# Patient Record
Sex: MALE | Race: WHITE | NOT HISPANIC OR LATINO | Employment: OTHER | URBAN - METROPOLITAN AREA
[De-identification: names, ages, dates, MRNs, and addresses within clinical notes are randomized per-mention and may not be internally consistent; named-entity substitution may affect disease eponyms.]

---

## 2017-01-03 ENCOUNTER — AMBULATORY - HEALTHEAST (OUTPATIENT)
Dept: CARDIAC REHAB | Facility: HOSPITAL | Age: 67
End: 2017-01-03

## 2017-01-03 DIAGNOSIS — Z95.5 STENTED CORONARY ARTERY: ICD-10-CM

## 2017-01-03 DIAGNOSIS — I50.21 ACUTE SYSTOLIC HEART FAILURE (H): ICD-10-CM

## 2017-01-05 ENCOUNTER — AMBULATORY - HEALTHEAST (OUTPATIENT)
Dept: CARDIAC REHAB | Facility: HOSPITAL | Age: 67
End: 2017-01-05

## 2017-01-05 DIAGNOSIS — Z95.5 STENTED CORONARY ARTERY: ICD-10-CM

## 2017-01-10 ENCOUNTER — AMBULATORY - HEALTHEAST (OUTPATIENT)
Dept: CARDIAC REHAB | Facility: HOSPITAL | Age: 67
End: 2017-01-10

## 2017-01-10 DIAGNOSIS — I50.21 ACUTE SYSTOLIC HEART FAILURE (H): ICD-10-CM

## 2017-01-10 DIAGNOSIS — I42.0 CARDIOMYOPATHY, DILATED, NONISCHEMIC (H): ICD-10-CM

## 2017-01-10 DIAGNOSIS — Z95.5 STENTED CORONARY ARTERY: ICD-10-CM

## 2017-01-11 ENCOUNTER — OFFICE VISIT - HEALTHEAST (OUTPATIENT)
Dept: CARDIOLOGY | Facility: CLINIC | Age: 67
End: 2017-01-11

## 2017-01-11 ENCOUNTER — AMBULATORY - HEALTHEAST (OUTPATIENT)
Dept: CARDIOLOGY | Facility: CLINIC | Age: 67
End: 2017-01-11

## 2017-01-11 DIAGNOSIS — I50.22 CHRONIC SYSTOLIC HEART FAILURE (H): ICD-10-CM

## 2017-01-11 DIAGNOSIS — I42.9 CARDIOMYOPATHY (H): ICD-10-CM

## 2017-01-11 DIAGNOSIS — I48.20 CHRONIC ATRIAL FIBRILLATION (H): ICD-10-CM

## 2017-01-11 DIAGNOSIS — E78.5 DYSLIPIDEMIA: ICD-10-CM

## 2017-01-11 ASSESSMENT — MIFFLIN-ST. JEOR: SCORE: 1806.79

## 2017-01-17 ENCOUNTER — AMBULATORY - HEALTHEAST (OUTPATIENT)
Dept: CARDIAC REHAB | Facility: HOSPITAL | Age: 67
End: 2017-01-17

## 2017-01-17 DIAGNOSIS — Z95.5 STENTED CORONARY ARTERY: ICD-10-CM

## 2017-01-17 DIAGNOSIS — I42.0 CARDIOMYOPATHY, DILATED, NONISCHEMIC (H): ICD-10-CM

## 2017-01-18 ENCOUNTER — OFFICE VISIT - HEALTHEAST (OUTPATIENT)
Dept: CARDIOLOGY | Facility: CLINIC | Age: 67
End: 2017-01-18

## 2017-01-18 DIAGNOSIS — I42.0 CARDIOMYOPATHY, DILATED, NONISCHEMIC (H): ICD-10-CM

## 2017-01-18 DIAGNOSIS — I48.20 CHRONIC ATRIAL FIBRILLATION (H): ICD-10-CM

## 2017-01-18 DIAGNOSIS — Z95.5 S/P RIGHT CORONARY ARTERY (RCA) STENT PLACEMENT: ICD-10-CM

## 2017-01-18 DIAGNOSIS — I25.10 CORONARY ARTERY DISEASE DUE TO CALCIFIED CORONARY LESION: ICD-10-CM

## 2017-01-18 DIAGNOSIS — E78.5 DYSLIPIDEMIA: ICD-10-CM

## 2017-01-18 DIAGNOSIS — I25.84 CORONARY ARTERY DISEASE DUE TO CALCIFIED CORONARY LESION: ICD-10-CM

## 2017-01-18 ASSESSMENT — MIFFLIN-ST. JEOR: SCORE: 1811.33

## 2017-01-19 ENCOUNTER — AMBULATORY - HEALTHEAST (OUTPATIENT)
Dept: CARDIAC REHAB | Facility: HOSPITAL | Age: 67
End: 2017-01-19

## 2017-01-19 DIAGNOSIS — Z95.5 STENTED CORONARY ARTERY: ICD-10-CM

## 2017-01-24 ENCOUNTER — AMBULATORY - HEALTHEAST (OUTPATIENT)
Dept: CARDIAC REHAB | Facility: HOSPITAL | Age: 67
End: 2017-01-24

## 2017-01-24 DIAGNOSIS — Z95.5 STENTED CORONARY ARTERY: ICD-10-CM

## 2017-01-26 ENCOUNTER — AMBULATORY - HEALTHEAST (OUTPATIENT)
Dept: CARDIAC REHAB | Facility: HOSPITAL | Age: 67
End: 2017-01-26

## 2017-01-26 DIAGNOSIS — Z95.5 STENTED CORONARY ARTERY: ICD-10-CM

## 2017-01-31 ENCOUNTER — AMBULATORY - HEALTHEAST (OUTPATIENT)
Dept: CARDIAC REHAB | Facility: HOSPITAL | Age: 67
End: 2017-01-31

## 2017-01-31 DIAGNOSIS — Z95.5 STENTED CORONARY ARTERY: ICD-10-CM

## 2017-01-31 DIAGNOSIS — I50.21 ACUTE SYSTOLIC HEART FAILURE (H): ICD-10-CM

## 2017-01-31 DIAGNOSIS — I42.0 CARDIOMYOPATHY, DILATED, NONISCHEMIC (H): ICD-10-CM

## 2017-02-01 ENCOUNTER — HOSPITAL ENCOUNTER (OUTPATIENT)
Dept: CARDIOLOGY | Facility: HOSPITAL | Age: 67
Discharge: HOME | End: 2017-02-01

## 2017-02-01 DIAGNOSIS — I42.9 CARDIOMYOPATHY (H): ICD-10-CM

## 2017-02-01 DIAGNOSIS — I50.22 CHRONIC SYSTOLIC HEART FAILURE (H): ICD-10-CM

## 2017-02-01 LAB
AORTIC ROOT: 3.3 CM
BSA FOR ECHO PROCEDURE: 2.26 M2
CV BLOOD PRESSURE: NORMAL MMHG
CV ECHO HEIGHT: 73 IN
CV ECHO WEIGHT: 220 LBS
DOP CALC LVOT AREA: 2.83 CM2
DOP CALC LVOT DIAMETER: 1.9 CM
EJECTION FRACTION: 27 % (ref 55–75)
FRACTIONAL SHORTENING: 7.7 % (ref 28–44)
INTERVENTRICULAR SEPTUM IN END DIASTOLE: 1.8 CM (ref 0.6–1)
IVS/PW RATIO: 1.2
LEFT ATRIUM AREA: 30.7 CM2
LEFT ATRIUM LENGTH: 6.7 CM
LEFT ATRIUM SIZE: 5.8 CM
LEFT ATRIUM TO AORTIC ROOT RATIO: 1.76 NO UNITS
LEFT VENTRICLE DIASTOLIC VOLUME INDEX: 87.6 CM3/M2 (ref 34–74)
LEFT VENTRICLE DIASTOLIC VOLUME: 198 CM3 (ref 62–150)
LEFT VENTRICLE MASS INDEX: 245.7 G/M2
LEFT VENTRICLE SYSTOLIC VOLUME INDEX: 63.7 CM3/M2 (ref 11–31)
LEFT VENTRICLE SYSTOLIC VOLUME: 144 CM3 (ref 21–61)
LEFT VENTRICULAR INTERNAL DIMENSION IN DIASTOLE: 6.5 CM (ref 4.2–5.8)
LEFT VENTRICULAR INTERNAL DIMENSION IN SYSTOLE: 6 CM (ref 2.5–4)
LEFT VENTRICULAR MASS: 555.2 G
LEFT VENTRICULAR POSTERIOR WALL IN END DIASTOLE: 1.5 CM (ref 0.6–1)
MV AVERAGE E/E' RATIO: 12.1 CM/S
MV DECELERATION TIME: 155 MS
MV E'TISSUE VEL-LAT: 7.31 CM/S
MV E'TISSUE VEL-MED: 5.56 CM/S
MV LATERAL E/E' RATIO: 10.7
MV MEDIAL E/E' RATIO: 14
MV PEAK E VELOCITY: 77.9 CM/S
NUC REST DIASTOLIC VOLUME INDEX: 3520 LBS
NUC REST SYSTOLIC VOLUME INDEX: 73 IN
PR MAX PG: 8 MMHG
PR PEAK VELOCITY: 143 CM/S
TRICUSPID REGURGITATION PEAK PRESSURE GRADIENT: 33.2 MMHG
TRICUSPID VALVE ANULAR PLANE SYSTOLIC EXCURSION: 1.5 CM
TRICUSPID VALVE PEAK REGURGITANT VELOCITY: 288 CM/S

## 2017-02-01 ASSESSMENT — MIFFLIN-ST. JEOR: SCORE: 1806.79

## 2017-02-02 ENCOUNTER — AMBULATORY - HEALTHEAST (OUTPATIENT)
Dept: CARDIAC REHAB | Facility: HOSPITAL | Age: 67
End: 2017-02-02

## 2017-02-02 DIAGNOSIS — I50.21 ACUTE SYSTOLIC HEART FAILURE (H): ICD-10-CM

## 2017-02-02 DIAGNOSIS — I42.0 CARDIOMYOPATHY, DILATED, NONISCHEMIC (H): ICD-10-CM

## 2017-02-02 DIAGNOSIS — Z95.5 STENTED CORONARY ARTERY: ICD-10-CM

## 2017-02-07 ENCOUNTER — AMBULATORY - HEALTHEAST (OUTPATIENT)
Dept: CARDIAC REHAB | Facility: HOSPITAL | Age: 67
End: 2017-02-07

## 2017-02-07 DIAGNOSIS — I50.21 ACUTE SYSTOLIC HEART FAILURE (H): ICD-10-CM

## 2017-02-07 DIAGNOSIS — Z95.5 STENTED CORONARY ARTERY: ICD-10-CM

## 2017-02-07 DIAGNOSIS — I42.0 CARDIOMYOPATHY, DILATED, NONISCHEMIC (H): ICD-10-CM

## 2017-02-09 ENCOUNTER — AMBULATORY - HEALTHEAST (OUTPATIENT)
Dept: CARDIAC REHAB | Facility: HOSPITAL | Age: 67
End: 2017-02-09

## 2017-02-09 DIAGNOSIS — Z95.5 STENTED CORONARY ARTERY: ICD-10-CM

## 2017-02-10 ENCOUNTER — COMMUNICATION - HEALTHEAST (OUTPATIENT)
Dept: CARDIOLOGY | Facility: CLINIC | Age: 67
End: 2017-02-10

## 2017-02-10 ENCOUNTER — OFFICE VISIT - HEALTHEAST (OUTPATIENT)
Dept: CARDIOLOGY | Facility: CLINIC | Age: 67
End: 2017-02-10

## 2017-02-10 ENCOUNTER — SURGERY - HEALTHEAST (OUTPATIENT)
Dept: CARDIOLOGY | Facility: CLINIC | Age: 67
End: 2017-02-10

## 2017-02-10 ENCOUNTER — AMBULATORY - HEALTHEAST (OUTPATIENT)
Dept: CARDIOLOGY | Facility: CLINIC | Age: 67
End: 2017-02-10

## 2017-02-10 DIAGNOSIS — I50.22 CHRONIC SYSTOLIC HEART FAILURE (H): ICD-10-CM

## 2017-02-10 DIAGNOSIS — I48.20 CHRONIC ATRIAL FIBRILLATION (H): ICD-10-CM

## 2017-02-10 DIAGNOSIS — I42.9 CARDIOMYOPATHY (H): ICD-10-CM

## 2017-02-10 DIAGNOSIS — I10 ESSENTIAL HYPERTENSION WITH GOAL BLOOD PRESSURE LESS THAN 130/80: ICD-10-CM

## 2017-02-10 DIAGNOSIS — I49.3 FREQUENT UNIFOCAL PREMATURE VENTRICULAR CONTRACTIONS: ICD-10-CM

## 2017-02-10 LAB
ATRIAL RATE - MUSE: 441 BPM
DIASTOLIC BLOOD PRESSURE - MUSE: NORMAL MMHG
INTERPRETATION ECG - MUSE: NORMAL
P AXIS - MUSE: NORMAL DEGREES
PR INTERVAL - MUSE: NORMAL MS
QRS DURATION - MUSE: 110 MS
QT - MUSE: 404 MS
QTC - MUSE: 494 MS
R AXIS - MUSE: -34 DEGREES
SYSTOLIC BLOOD PRESSURE - MUSE: NORMAL MMHG
T AXIS - MUSE: 85 DEGREES
VENTRICULAR RATE- MUSE: 90 BPM

## 2017-02-10 ASSESSMENT — MIFFLIN-ST. JEOR: SCORE: 1828.33

## 2017-02-15 ENCOUNTER — OFFICE VISIT - HEALTHEAST (OUTPATIENT)
Dept: CARDIOLOGY | Facility: CLINIC | Age: 67
End: 2017-02-15

## 2017-02-15 ENCOUNTER — AMBULATORY - HEALTHEAST (OUTPATIENT)
Dept: CARDIOLOGY | Facility: CLINIC | Age: 67
End: 2017-02-15

## 2017-02-15 DIAGNOSIS — I42.9 CARDIOMYOPATHY (H): ICD-10-CM

## 2017-02-15 DIAGNOSIS — I50.22 CHRONIC SYSTOLIC HEART FAILURE (H): ICD-10-CM

## 2017-02-15 DIAGNOSIS — I48.20 CHRONIC ATRIAL FIBRILLATION (H): ICD-10-CM

## 2017-02-15 DIAGNOSIS — I42.0 CARDIOMYOPATHY, DILATED, NONISCHEMIC (H): ICD-10-CM

## 2017-02-15 ASSESSMENT — MIFFLIN-ST. JEOR: SCORE: 1823.8

## 2017-02-17 ENCOUNTER — SURGERY - HEALTHEAST (OUTPATIENT)
Dept: CARDIOLOGY | Facility: CLINIC | Age: 67
End: 2017-02-17

## 2017-02-17 ASSESSMENT — MIFFLIN-ST. JEOR: SCORE: 1816.75

## 2017-02-18 ASSESSMENT — MIFFLIN-ST. JEOR: SCORE: 1804.07

## 2017-02-22 ENCOUNTER — AMBULATORY - HEALTHEAST (OUTPATIENT)
Dept: CARDIOLOGY | Facility: CLINIC | Age: 67
End: 2017-02-22

## 2017-02-22 DIAGNOSIS — I42.9 CARDIOMYOPATHY (H): ICD-10-CM

## 2017-02-23 ENCOUNTER — AMBULATORY - HEALTHEAST (OUTPATIENT)
Dept: CARDIAC REHAB | Facility: HOSPITAL | Age: 67
End: 2017-02-23

## 2017-02-23 DIAGNOSIS — I42.9 CARDIOMYOPATHY (H): ICD-10-CM

## 2017-02-25 ENCOUNTER — COMMUNICATION - HEALTHEAST (OUTPATIENT)
Dept: CARDIOLOGY | Facility: CLINIC | Age: 67
End: 2017-02-25

## 2017-02-25 DIAGNOSIS — I42.9 CARDIOMYOPATHY (H): ICD-10-CM

## 2017-02-28 ENCOUNTER — AMBULATORY - HEALTHEAST (OUTPATIENT)
Dept: CARDIAC REHAB | Facility: HOSPITAL | Age: 67
End: 2017-02-28

## 2017-02-28 DIAGNOSIS — I50.21 ACUTE SYSTOLIC HEART FAILURE (H): ICD-10-CM

## 2017-02-28 DIAGNOSIS — I42.0 CARDIOMYOPATHY, DILATED, NONISCHEMIC (H): ICD-10-CM

## 2017-02-28 DIAGNOSIS — Z95.5 STENTED CORONARY ARTERY: ICD-10-CM

## 2017-03-02 ENCOUNTER — AMBULATORY - HEALTHEAST (OUTPATIENT)
Dept: CARDIAC REHAB | Facility: HOSPITAL | Age: 67
End: 2017-03-02

## 2017-03-02 DIAGNOSIS — Z95.5 STENTED CORONARY ARTERY: ICD-10-CM

## 2017-03-07 ENCOUNTER — AMBULATORY - HEALTHEAST (OUTPATIENT)
Dept: CARDIOLOGY | Facility: CLINIC | Age: 67
End: 2017-03-07

## 2017-03-07 ENCOUNTER — OFFICE VISIT - HEALTHEAST (OUTPATIENT)
Dept: CARDIOLOGY | Facility: CLINIC | Age: 67
End: 2017-03-07

## 2017-03-07 DIAGNOSIS — I42.9 CARDIOMYOPATHY (H): ICD-10-CM

## 2017-03-07 DIAGNOSIS — Z95.810 ICD (IMPLANTABLE CARDIOVERTER-DEFIBRILLATOR), SINGLE, IN SITU: ICD-10-CM

## 2017-03-07 DIAGNOSIS — I42.0 CARDIOMYOPATHY, DILATED, NONISCHEMIC (H): ICD-10-CM

## 2017-03-07 DIAGNOSIS — I48.20 CHRONIC ATRIAL FIBRILLATION (H): ICD-10-CM

## 2017-03-07 DIAGNOSIS — E78.5 DYSLIPIDEMIA: ICD-10-CM

## 2017-03-07 DIAGNOSIS — I50.22 CHRONIC SYSTOLIC HEART FAILURE (H): ICD-10-CM

## 2017-03-07 ASSESSMENT — MIFFLIN-ST. JEOR: SCORE: 1822.66

## 2017-03-20 ENCOUNTER — AMBULATORY - HEALTHEAST (OUTPATIENT)
Dept: CARDIOLOGY | Facility: CLINIC | Age: 67
End: 2017-03-20

## 2017-03-20 DIAGNOSIS — Z95.810 ICD (IMPLANTABLE CARDIOVERTER-DEFIBRILLATOR) IN PLACE: ICD-10-CM

## 2017-03-23 ENCOUNTER — AMBULATORY - HEALTHEAST (OUTPATIENT)
Dept: CARDIOLOGY | Facility: CLINIC | Age: 67
End: 2017-03-23

## 2017-03-24 ENCOUNTER — AMBULATORY - HEALTHEAST (OUTPATIENT)
Dept: CARDIOLOGY | Facility: CLINIC | Age: 67
End: 2017-03-24

## 2017-03-28 ENCOUNTER — COMMUNICATION - HEALTHEAST (OUTPATIENT)
Dept: CARDIOLOGY | Facility: CLINIC | Age: 67
End: 2017-03-28

## 2017-03-29 ENCOUNTER — OFFICE VISIT - HEALTHEAST (OUTPATIENT)
Dept: CARDIOLOGY | Facility: CLINIC | Age: 67
End: 2017-03-29

## 2017-03-29 ENCOUNTER — AMBULATORY - HEALTHEAST (OUTPATIENT)
Dept: CARDIOLOGY | Facility: CLINIC | Age: 67
End: 2017-03-29

## 2017-03-29 DIAGNOSIS — I42.9 CARDIOMYOPATHY (H): ICD-10-CM

## 2017-03-29 DIAGNOSIS — I50.22 CHRONIC SYSTOLIC HEART FAILURE (H): ICD-10-CM

## 2017-03-29 DIAGNOSIS — E78.5 DYSLIPIDEMIA: ICD-10-CM

## 2017-03-29 ASSESSMENT — MIFFLIN-ST. JEOR: SCORE: 1858.95

## 2017-04-04 ENCOUNTER — AMBULATORY - HEALTHEAST (OUTPATIENT)
Dept: CARDIOLOGY | Facility: CLINIC | Age: 67
End: 2017-04-04

## 2017-04-04 DIAGNOSIS — Z00.6 RESEARCH EXAM: ICD-10-CM

## 2017-04-04 DIAGNOSIS — Z95.810 ICD (IMPLANTABLE CARDIOVERTER-DEFIBRILLATOR) IN PLACE: ICD-10-CM

## 2017-04-04 DIAGNOSIS — I42.9 CARDIOMYOPATHY (H): ICD-10-CM

## 2017-04-04 LAB
ATRIAL RATE - MUSE: 197 BPM
DIASTOLIC BLOOD PRESSURE - MUSE: NORMAL MMHG
INTERPRETATION ECG - MUSE: NORMAL
P AXIS - MUSE: NORMAL DEGREES
PR INTERVAL - MUSE: NORMAL MS
QRS DURATION - MUSE: 108 MS
QT - MUSE: 418 MS
QTC - MUSE: 463 MS
R AXIS - MUSE: -34 DEGREES
SYSTOLIC BLOOD PRESSURE - MUSE: NORMAL MMHG
T AXIS - MUSE: 134 DEGREES
VENTRICULAR RATE- MUSE: 74 BPM

## 2017-04-04 ASSESSMENT — MIFFLIN-ST. JEOR: SCORE: 1877.1

## 2017-04-05 ENCOUNTER — AMBULATORY - HEALTHEAST (OUTPATIENT)
Dept: CARDIOLOGY | Facility: CLINIC | Age: 67
End: 2017-04-05

## 2017-04-05 DIAGNOSIS — I48.20 CHRONIC ATRIAL FIBRILLATION (H): ICD-10-CM

## 2017-04-05 DIAGNOSIS — I42.9 CARDIOMYOPATHY (H): ICD-10-CM

## 2017-05-03 ENCOUNTER — AMBULATORY - HEALTHEAST (OUTPATIENT)
Dept: CARDIOLOGY | Facility: CLINIC | Age: 67
End: 2017-05-03

## 2017-05-03 DIAGNOSIS — I48.20 CHRONIC ATRIAL FIBRILLATION (H): ICD-10-CM

## 2017-05-03 DIAGNOSIS — I42.9 CARDIOMYOPATHY (H): ICD-10-CM

## 2017-05-19 ENCOUNTER — AMBULATORY - HEALTHEAST (OUTPATIENT)
Dept: CARDIOLOGY | Facility: CLINIC | Age: 67
End: 2017-05-19

## 2017-05-24 ENCOUNTER — AMBULATORY - HEALTHEAST (OUTPATIENT)
Dept: CARDIOLOGY | Facility: CLINIC | Age: 67
End: 2017-05-24

## 2017-05-24 ENCOUNTER — OFFICE VISIT - HEALTHEAST (OUTPATIENT)
Dept: CARDIOLOGY | Facility: CLINIC | Age: 67
End: 2017-05-24

## 2017-05-24 DIAGNOSIS — I10 ESSENTIAL HYPERTENSION WITH GOAL BLOOD PRESSURE LESS THAN 130/80: ICD-10-CM

## 2017-05-24 DIAGNOSIS — I50.22 CHRONIC SYSTOLIC HEART FAILURE (H): ICD-10-CM

## 2017-05-24 DIAGNOSIS — I25.84 CORONARY ARTERY DISEASE DUE TO CALCIFIED CORONARY LESION: ICD-10-CM

## 2017-05-24 DIAGNOSIS — I42.9 CARDIOMYOPATHY (H): ICD-10-CM

## 2017-05-24 DIAGNOSIS — E78.5 DYSLIPIDEMIA: ICD-10-CM

## 2017-05-24 DIAGNOSIS — I48.20 CHRONIC ATRIAL FIBRILLATION (H): ICD-10-CM

## 2017-05-24 DIAGNOSIS — I25.10 CORONARY ARTERY DISEASE DUE TO CALCIFIED CORONARY LESION: ICD-10-CM

## 2017-05-24 DIAGNOSIS — Z95.810 ICD (IMPLANTABLE CARDIOVERTER-DEFIBRILLATOR), SINGLE, IN SITU: ICD-10-CM

## 2017-05-24 LAB — HCC DEVICE COMMENTS: NORMAL

## 2017-05-24 ASSESSMENT — MIFFLIN-ST. JEOR: SCORE: 1872.56

## 2017-06-08 ENCOUNTER — COMMUNICATION - HEALTHEAST (OUTPATIENT)
Dept: CARDIOLOGY | Facility: CLINIC | Age: 67
End: 2017-06-08

## 2017-06-08 DIAGNOSIS — I50.22 CHRONIC SYSTOLIC HEART FAILURE (H): ICD-10-CM

## 2017-06-08 DIAGNOSIS — I42.9 CARDIOMYOPATHY (H): ICD-10-CM

## 2017-06-15 ENCOUNTER — AMBULATORY - HEALTHEAST (OUTPATIENT)
Dept: CARDIOLOGY | Facility: CLINIC | Age: 67
End: 2017-06-15

## 2017-06-15 DIAGNOSIS — I42.9 CARDIOMYOPATHY (H): ICD-10-CM

## 2017-06-15 DIAGNOSIS — I48.20 CHRONIC ATRIAL FIBRILLATION (H): ICD-10-CM

## 2017-06-21 ENCOUNTER — AMBULATORY - HEALTHEAST (OUTPATIENT)
Dept: CARDIOLOGY | Facility: CLINIC | Age: 67
End: 2017-06-21

## 2017-06-21 DIAGNOSIS — I48.20 CHRONIC ATRIAL FIBRILLATION (H): ICD-10-CM

## 2017-06-21 DIAGNOSIS — I42.9 CARDIOMYOPATHY (H): ICD-10-CM

## 2017-07-12 ENCOUNTER — AMBULATORY - HEALTHEAST (OUTPATIENT)
Dept: CARDIOLOGY | Facility: CLINIC | Age: 67
End: 2017-07-12

## 2017-07-12 DIAGNOSIS — I42.9 CARDIOMYOPATHY (H): ICD-10-CM

## 2017-07-12 DIAGNOSIS — I48.20 CHRONIC ATRIAL FIBRILLATION (H): ICD-10-CM

## 2017-08-09 ENCOUNTER — AMBULATORY - HEALTHEAST (OUTPATIENT)
Dept: CARDIOLOGY | Facility: CLINIC | Age: 67
End: 2017-08-09

## 2017-08-09 DIAGNOSIS — I48.20 CHRONIC ATRIAL FIBRILLATION (H): ICD-10-CM

## 2017-08-09 DIAGNOSIS — I42.9 CARDIOMYOPATHY (H): ICD-10-CM

## 2017-08-22 ENCOUNTER — RECORDS - HEALTHEAST (OUTPATIENT)
Dept: ADMINISTRATIVE | Facility: OTHER | Age: 67
End: 2017-08-22

## 2017-08-25 ENCOUNTER — OFFICE VISIT - HEALTHEAST (OUTPATIENT)
Dept: CARDIOLOGY | Facility: CLINIC | Age: 67
End: 2017-08-25

## 2017-08-25 ENCOUNTER — AMBULATORY - HEALTHEAST (OUTPATIENT)
Dept: CARDIOLOGY | Facility: CLINIC | Age: 67
End: 2017-08-25

## 2017-08-25 DIAGNOSIS — I48.20 CHRONIC ATRIAL FIBRILLATION (H): ICD-10-CM

## 2017-08-25 DIAGNOSIS — Z95.810 ICD (IMPLANTABLE CARDIOVERTER-DEFIBRILLATOR), SINGLE, IN SITU: ICD-10-CM

## 2017-08-25 DIAGNOSIS — I25.10 CORONARY ARTERY DISEASE DUE TO CALCIFIED CORONARY LESION: ICD-10-CM

## 2017-08-25 DIAGNOSIS — I25.84 CORONARY ARTERY DISEASE DUE TO CALCIFIED CORONARY LESION: ICD-10-CM

## 2017-08-25 DIAGNOSIS — Z95.810 ICD (IMPLANTABLE CARDIOVERTER-DEFIBRILLATOR) IN PLACE: ICD-10-CM

## 2017-08-25 DIAGNOSIS — I50.23 ACUTE ON CHRONIC SYSTOLIC HEART FAILURE (H): ICD-10-CM

## 2017-08-25 DIAGNOSIS — I10 ESSENTIAL HYPERTENSION WITH GOAL BLOOD PRESSURE LESS THAN 130/80: ICD-10-CM

## 2017-08-25 DIAGNOSIS — I42.0 CARDIOMYOPATHY, DILATED, NONISCHEMIC (H): ICD-10-CM

## 2017-08-25 DIAGNOSIS — R00.0 TACHYCARDIA: ICD-10-CM

## 2017-08-25 DIAGNOSIS — I42.9 CARDIOMYOPATHY (H): ICD-10-CM

## 2017-08-25 DIAGNOSIS — E78.5 DYSLIPIDEMIA: ICD-10-CM

## 2017-08-25 LAB — HCC DEVICE COMMENTS: NORMAL

## 2017-08-25 ASSESSMENT — MIFFLIN-ST. JEOR: SCORE: 1895.24

## 2017-09-05 ENCOUNTER — AMBULATORY - HEALTHEAST (OUTPATIENT)
Dept: CARDIOLOGY | Facility: CLINIC | Age: 67
End: 2017-09-05

## 2017-09-05 DIAGNOSIS — I42.9 CARDIOMYOPATHY (H): ICD-10-CM

## 2017-09-06 ENCOUNTER — AMBULATORY - HEALTHEAST (OUTPATIENT)
Dept: CARDIOLOGY | Facility: CLINIC | Age: 67
End: 2017-09-06

## 2017-09-06 DIAGNOSIS — I48.20 CHRONIC ATRIAL FIBRILLATION (H): ICD-10-CM

## 2017-09-14 ENCOUNTER — COMMUNICATION - HEALTHEAST (OUTPATIENT)
Dept: CARDIOLOGY | Facility: CLINIC | Age: 67
End: 2017-09-14

## 2017-09-14 DIAGNOSIS — I50.22 CHRONIC SYSTOLIC HEART FAILURE (H): ICD-10-CM

## 2017-09-14 DIAGNOSIS — I42.0 CARDIOMYOPATHY, DILATED, NONISCHEMIC (H): ICD-10-CM

## 2017-09-14 DIAGNOSIS — E78.5 DYSLIPIDEMIA: ICD-10-CM

## 2017-09-14 DIAGNOSIS — I42.9 CARDIOMYOPATHY (H): ICD-10-CM

## 2017-09-14 DIAGNOSIS — I48.91 A-FIB (H): ICD-10-CM

## 2017-10-03 ENCOUNTER — AMBULATORY - HEALTHEAST (OUTPATIENT)
Dept: CARDIOLOGY | Facility: CLINIC | Age: 67
End: 2017-10-03

## 2017-10-03 DIAGNOSIS — I48.20 CHRONIC ATRIAL FIBRILLATION (H): ICD-10-CM

## 2017-10-31 ENCOUNTER — AMBULATORY - HEALTHEAST (OUTPATIENT)
Dept: CARDIOLOGY | Facility: CLINIC | Age: 67
End: 2017-10-31

## 2017-10-31 DIAGNOSIS — I48.20 CHRONIC ATRIAL FIBRILLATION (H): ICD-10-CM

## 2017-11-16 ENCOUNTER — RECORDS - HEALTHEAST (OUTPATIENT)
Dept: ADMINISTRATIVE | Facility: OTHER | Age: 67
End: 2017-11-16

## 2017-11-20 ENCOUNTER — COMMUNICATION - HEALTHEAST (OUTPATIENT)
Dept: CARDIOLOGY | Facility: CLINIC | Age: 67
End: 2017-11-20

## 2017-11-21 ENCOUNTER — RECORDS - HEALTHEAST (OUTPATIENT)
Dept: ADMINISTRATIVE | Facility: OTHER | Age: 67
End: 2017-11-21

## 2017-11-22 ENCOUNTER — AMBULATORY - HEALTHEAST (OUTPATIENT)
Dept: CARDIOLOGY | Facility: CLINIC | Age: 67
End: 2017-11-22

## 2017-11-22 DIAGNOSIS — Z95.810 ICD (IMPLANTABLE CARDIOVERTER-DEFIBRILLATOR) IN PLACE: ICD-10-CM

## 2017-11-23 LAB — HCC DEVICE COMMENTS: NORMAL

## 2017-11-28 ENCOUNTER — AMBULATORY - HEALTHEAST (OUTPATIENT)
Dept: CARDIOLOGY | Facility: CLINIC | Age: 67
End: 2017-11-28

## 2017-11-28 DIAGNOSIS — I48.20 CHRONIC ATRIAL FIBRILLATION (H): ICD-10-CM

## 2017-12-04 ENCOUNTER — COMMUNICATION - HEALTHEAST (OUTPATIENT)
Dept: CARDIOLOGY | Facility: CLINIC | Age: 67
End: 2017-12-04

## 2017-12-04 ENCOUNTER — HOSPITAL ENCOUNTER (OUTPATIENT)
Dept: MRI IMAGING | Facility: HOSPITAL | Age: 67
Discharge: HOME | End: 2017-12-04

## 2017-12-04 ENCOUNTER — AMBULATORY - HEALTHEAST (OUTPATIENT)
Dept: CARDIOLOGY | Facility: CLINIC | Age: 67
End: 2017-12-04

## 2017-12-04 DIAGNOSIS — Z00.6 CLINICAL TRIAL PARTICIPANT: ICD-10-CM

## 2017-12-04 DIAGNOSIS — Z95.810 ICD (IMPLANTABLE CARDIOVERTER-DEFIBRILLATOR), SINGLE, IN SITU: ICD-10-CM

## 2017-12-04 DIAGNOSIS — M25.519 SHOULDER PAIN: ICD-10-CM

## 2017-12-04 DIAGNOSIS — M54.2 NECK PAIN: ICD-10-CM

## 2017-12-04 DIAGNOSIS — I42.9 CARDIOMYOPATHY (H): ICD-10-CM

## 2017-12-04 LAB
ATRIAL RATE - MUSE: NORMAL BPM
DIASTOLIC BLOOD PRESSURE - MUSE: NORMAL MMHG
INTERPRETATION ECG - MUSE: NORMAL
P AXIS - MUSE: NORMAL DEGREES
PR INTERVAL - MUSE: NORMAL MS
QRS DURATION - MUSE: 108 MS
QT - MUSE: 396 MS
QTC - MUSE: 456 MS
R AXIS - MUSE: -27 DEGREES
SYSTOLIC BLOOD PRESSURE - MUSE: NORMAL MMHG
T AXIS - MUSE: 144 DEGREES
VENTRICULAR RATE- MUSE: 80 BPM

## 2017-12-06 ENCOUNTER — COMMUNICATION - HEALTHEAST (OUTPATIENT)
Dept: NEUROSURGERY | Facility: CLINIC | Age: 67
End: 2017-12-06

## 2017-12-08 ENCOUNTER — COMMUNICATION - HEALTHEAST (OUTPATIENT)
Dept: CARDIOLOGY | Facility: CLINIC | Age: 67
End: 2017-12-08

## 2017-12-08 DIAGNOSIS — I48.91 A-FIB (H): ICD-10-CM

## 2017-12-14 ENCOUNTER — OFFICE VISIT - HEALTHEAST (OUTPATIENT)
Dept: NEUROSURGERY | Facility: CLINIC | Age: 67
End: 2017-12-14

## 2017-12-14 DIAGNOSIS — G95.9 CERVICAL MYELOPATHY (H): ICD-10-CM

## 2017-12-14 ASSESSMENT — MIFFLIN-ST. JEOR: SCORE: 1895.24

## 2017-12-20 ENCOUNTER — COMMUNICATION - HEALTHEAST (OUTPATIENT)
Dept: NEUROSURGERY | Facility: CLINIC | Age: 67
End: 2017-12-20

## 2017-12-26 ENCOUNTER — AMBULATORY - HEALTHEAST (OUTPATIENT)
Dept: CARDIOLOGY | Facility: CLINIC | Age: 67
End: 2017-12-26

## 2017-12-26 DIAGNOSIS — I48.20 CHRONIC ATRIAL FIBRILLATION (H): ICD-10-CM

## 2018-01-09 ENCOUNTER — AMBULATORY - HEALTHEAST (OUTPATIENT)
Dept: NEUROSURGERY | Facility: CLINIC | Age: 68
End: 2018-01-09

## 2018-01-17 ENCOUNTER — RECORDS - HEALTHEAST (OUTPATIENT)
Dept: ADMINISTRATIVE | Facility: OTHER | Age: 68
End: 2018-01-17

## 2018-01-22 ENCOUNTER — OFFICE VISIT - HEALTHEAST (OUTPATIENT)
Dept: CARDIOLOGY | Facility: CLINIC | Age: 68
End: 2018-01-22

## 2018-01-22 ENCOUNTER — AMBULATORY - HEALTHEAST (OUTPATIENT)
Dept: CARDIOLOGY | Facility: CLINIC | Age: 68
End: 2018-01-22

## 2018-01-22 DIAGNOSIS — I10 ESSENTIAL HYPERTENSION WITH GOAL BLOOD PRESSURE LESS THAN 130/80: ICD-10-CM

## 2018-01-22 DIAGNOSIS — I42.0 CARDIOMYOPATHY, DILATED, NONISCHEMIC (H): ICD-10-CM

## 2018-01-22 DIAGNOSIS — I25.10 CORONARY ARTERY DISEASE DUE TO CALCIFIED CORONARY LESION: ICD-10-CM

## 2018-01-22 DIAGNOSIS — R00.0 TACHYCARDIA: ICD-10-CM

## 2018-01-22 DIAGNOSIS — I48.20 CHRONIC ATRIAL FIBRILLATION (H): ICD-10-CM

## 2018-01-22 DIAGNOSIS — E78.5 DYSLIPIDEMIA: ICD-10-CM

## 2018-01-22 DIAGNOSIS — Z95.810 ICD (IMPLANTABLE CARDIOVERTER-DEFIBRILLATOR), SINGLE, IN SITU: ICD-10-CM

## 2018-01-22 DIAGNOSIS — I25.84 CORONARY ARTERY DISEASE DUE TO CALCIFIED CORONARY LESION: ICD-10-CM

## 2018-01-22 LAB
ANION GAP SERPL CALCULATED.3IONS-SCNC: 11 MMOL/L (ref 5–18)
BUN SERPL-MCNC: 22 MG/DL (ref 8–22)
CALCIUM SERPL-MCNC: 9.1 MG/DL (ref 8.5–10.5)
CHLORIDE BLD-SCNC: 105 MMOL/L (ref 98–107)
CO2 SERPL-SCNC: 24 MMOL/L (ref 22–31)
CREAT SERPL-MCNC: 0.91 MG/DL (ref 0.7–1.3)
GFR SERPL CREATININE-BSD FRML MDRD: >60 ML/MIN/1.73M2
GLUCOSE BLD-MCNC: 96 MG/DL (ref 70–125)
INTERNATIONAL NORMALIZATION RATIO, POC - HISTORICAL: 1.7
POTASSIUM BLD-SCNC: 4.5 MMOL/L (ref 3.5–5)
SODIUM SERPL-SCNC: 140 MMOL/L (ref 136–145)

## 2018-01-22 ASSESSMENT — MIFFLIN-ST. JEOR: SCORE: 1926.99

## 2018-02-02 ENCOUNTER — OFFICE VISIT - HEALTHEAST (OUTPATIENT)
Dept: NEUROSURGERY | Facility: CLINIC | Age: 68
End: 2018-02-02

## 2018-02-02 DIAGNOSIS — M48.02 CERVICAL SPINAL STENOSIS: ICD-10-CM

## 2018-02-02 ASSESSMENT — MIFFLIN-ST. JEOR: SCORE: 1926.99

## 2018-02-06 ENCOUNTER — RECORDS - HEALTHEAST (OUTPATIENT)
Dept: ADMINISTRATIVE | Facility: OTHER | Age: 68
End: 2018-02-06

## 2018-02-13 ENCOUNTER — AMBULATORY - HEALTHEAST (OUTPATIENT)
Dept: CARDIOLOGY | Facility: CLINIC | Age: 68
End: 2018-02-13

## 2018-02-13 DIAGNOSIS — I48.20 CHRONIC ATRIAL FIBRILLATION (H): ICD-10-CM

## 2018-02-13 LAB — INTERNATIONAL NORMALIZATION RATIO, POC - HISTORICAL: 2.6 (ref 0.9–1.1)

## 2018-02-14 ENCOUNTER — AMBULATORY - HEALTHEAST (OUTPATIENT)
Dept: CARDIOLOGY | Facility: CLINIC | Age: 68
End: 2018-02-14

## 2018-02-14 DIAGNOSIS — I48.20 CHRONIC ATRIAL FIBRILLATION (H): ICD-10-CM

## 2018-02-27 ENCOUNTER — RECORDS - HEALTHEAST (OUTPATIENT)
Dept: ADMINISTRATIVE | Facility: OTHER | Age: 68
End: 2018-02-27

## 2018-02-27 ENCOUNTER — RECORDS - HEALTHEAST (OUTPATIENT)
Dept: LAB | Facility: CLINIC | Age: 68
End: 2018-02-27

## 2018-02-27 ENCOUNTER — AMBULATORY - HEALTHEAST (OUTPATIENT)
Dept: CARDIOLOGY | Facility: CLINIC | Age: 68
End: 2018-02-27

## 2018-02-27 DIAGNOSIS — Z95.810 ICD (IMPLANTABLE CARDIOVERTER-DEFIBRILLATOR), SINGLE, IN SITU: ICD-10-CM

## 2018-02-27 LAB — LIPASE SERPL-CCNC: 43 U/L (ref 0–52)

## 2018-02-28 ENCOUNTER — AMBULATORY - HEALTHEAST (OUTPATIENT)
Dept: SCHEDULING | Facility: CLINIC | Age: 68
End: 2018-02-28

## 2018-02-28 ENCOUNTER — RECORDS - HEALTHEAST (OUTPATIENT)
Dept: LAB | Facility: CLINIC | Age: 68
End: 2018-02-28

## 2018-02-28 LAB
CHOLEST SERPL-MCNC: 144 MG/DL
FASTING STATUS PATIENT QL REPORTED: ABNORMAL
HDLC SERPL-MCNC: 39 MG/DL
LDLC SERPL CALC-MCNC: 67 MG/DL
TRIGL SERPL-MCNC: 189 MG/DL

## 2018-03-01 ENCOUNTER — AMBULATORY - HEALTHEAST (OUTPATIENT)
Dept: CARDIOLOGY | Facility: CLINIC | Age: 68
End: 2018-03-01

## 2018-03-01 DIAGNOSIS — Z95.810 ICD (IMPLANTABLE CARDIOVERTER-DEFIBRILLATOR), SINGLE, IN SITU: ICD-10-CM

## 2018-03-01 LAB — HCC DEVICE COMMENTS: NORMAL

## 2018-03-12 ENCOUNTER — HOSPITAL ENCOUNTER (OUTPATIENT)
Dept: MRI IMAGING | Facility: HOSPITAL | Age: 68
Discharge: HOME | End: 2018-03-12

## 2018-03-12 DIAGNOSIS — M25.519 SHOULDER PAIN: ICD-10-CM

## 2018-03-13 ENCOUNTER — AMBULATORY - HEALTHEAST (OUTPATIENT)
Dept: CARDIOLOGY | Facility: CLINIC | Age: 68
End: 2018-03-13

## 2018-03-13 ENCOUNTER — HOSPITAL ENCOUNTER (OUTPATIENT)
Dept: MRI IMAGING | Facility: HOSPITAL | Age: 68
Discharge: HOME | End: 2018-03-13

## 2018-03-13 ENCOUNTER — HOSPITAL ENCOUNTER (OUTPATIENT)
Dept: MRI IMAGING | Facility: HOSPITAL | Age: 68
Discharge: HOME | End: 2018-03-13
Attending: INTERNAL MEDICINE

## 2018-03-13 ENCOUNTER — HOSPITAL ENCOUNTER (OUTPATIENT)
Dept: RADIOLOGY | Facility: HOSPITAL | Age: 68
Discharge: HOME | End: 2018-03-13

## 2018-03-13 DIAGNOSIS — M25.519 SHOULDER PAIN: ICD-10-CM

## 2018-03-13 DIAGNOSIS — Z95.810 ICD (IMPLANTABLE CARDIOVERTER-DEFIBRILLATOR), SINGLE, IN SITU: ICD-10-CM

## 2018-03-13 DIAGNOSIS — I48.20 CHRONIC ATRIAL FIBRILLATION (H): ICD-10-CM

## 2018-03-13 LAB — INTERNATIONAL NORMALIZATION RATIO, POC - HISTORICAL: 2.7

## 2018-03-27 ENCOUNTER — COMMUNICATION - HEALTHEAST (OUTPATIENT)
Dept: CARDIOLOGY | Facility: CLINIC | Age: 68
End: 2018-03-27

## 2018-03-27 DIAGNOSIS — I25.10 CAD (CORONARY ARTERY DISEASE): ICD-10-CM

## 2018-03-27 DIAGNOSIS — Z95.5 S/P CORONARY ARTERY STENT PLACEMENT: ICD-10-CM

## 2018-04-03 ENCOUNTER — AMBULATORY - HEALTHEAST (OUTPATIENT)
Dept: NEUROSURGERY | Facility: CLINIC | Age: 68
End: 2018-04-03

## 2018-04-03 DIAGNOSIS — Z01.818 PRE-OP EVALUATION: ICD-10-CM

## 2018-04-04 ENCOUNTER — HOSPITAL ENCOUNTER (OUTPATIENT)
Dept: NUCLEAR MEDICINE | Facility: HOSPITAL | Age: 68
Discharge: HOME | End: 2018-04-04
Attending: INTERNAL MEDICINE

## 2018-04-04 ENCOUNTER — HOSPITAL ENCOUNTER (OUTPATIENT)
Dept: CARDIOLOGY | Facility: HOSPITAL | Age: 68
Discharge: HOME | End: 2018-04-04
Attending: INTERNAL MEDICINE

## 2018-04-04 DIAGNOSIS — Z95.5 S/P CORONARY ARTERY STENT PLACEMENT: ICD-10-CM

## 2018-04-04 DIAGNOSIS — I25.10 CAD (CORONARY ARTERY DISEASE): ICD-10-CM

## 2018-04-04 LAB
CV STRESS CURRENT BP HE: NORMAL
CV STRESS CURRENT HR HE: 67
CV STRESS CURRENT HR HE: 74
CV STRESS CURRENT HR HE: 74
CV STRESS CURRENT HR HE: 76
CV STRESS CURRENT HR HE: 77
CV STRESS CURRENT HR HE: 78
CV STRESS CURRENT HR HE: 78
CV STRESS CURRENT HR HE: 81
CV STRESS CURRENT HR HE: 81
CV STRESS CURRENT HR HE: 82
CV STRESS CURRENT HR HE: 82
CV STRESS CURRENT HR HE: 84
CV STRESS CURRENT HR HE: 85
CV STRESS CURRENT HR HE: 85
CV STRESS CURRENT HR HE: 88
CV STRESS CURRENT HR HE: 95
CV STRESS DEVIATION TIME HE: NORMAL
CV STRESS ECHO PERCENT HR HE: NORMAL
CV STRESS EXERCISE STAGE HE: NORMAL
CV STRESS FINAL RESTING BP HE: NORMAL
CV STRESS FINAL RESTING HR HE: 81
CV STRESS MAX HR HE: 97
CV STRESS MAX TREADMILL GRADE HE: 0
CV STRESS MAX TREADMILL SPEED HE: 0
CV STRESS PEAK DIA BP HE: NORMAL
CV STRESS PEAK SYS BP HE: NORMAL
CV STRESS PHASE HE: NORMAL
CV STRESS PROTOCOL HE: NORMAL
CV STRESS RESTING PT POSITION HE: NORMAL
CV STRESS ST DEVIATION AMOUNT HE: NORMAL
CV STRESS ST DEVIATION ELEVATION HE: NORMAL
CV STRESS ST EVELATION AMOUNT HE: NORMAL
CV STRESS TEST TYPE HE: NORMAL
CV STRESS TOTAL STAGE TIME MIN 1 HE: NORMAL
NUC STRESS EJECTION FRACTION: 45 %
STRESS ECHO BASELINE BP: NORMAL
STRESS ECHO BASELINE HR: 79
STRESS ECHO CALCULATED PERCENT HR: 64 %
STRESS ECHO LAST STRESS BP: NORMAL
STRESS ECHO LAST STRESS HR: 85

## 2018-04-09 ENCOUNTER — COMMUNICATION - HEALTHEAST (OUTPATIENT)
Dept: NEUROSURGERY | Facility: CLINIC | Age: 68
End: 2018-04-09

## 2018-04-10 ENCOUNTER — AMBULATORY - HEALTHEAST (OUTPATIENT)
Dept: CARDIOLOGY | Facility: CLINIC | Age: 68
End: 2018-04-10

## 2018-04-10 ENCOUNTER — COMMUNICATION - HEALTHEAST (OUTPATIENT)
Dept: CARDIOLOGY | Facility: CLINIC | Age: 68
End: 2018-04-10

## 2018-04-10 ENCOUNTER — RECORDS - HEALTHEAST (OUTPATIENT)
Dept: ADMINISTRATIVE | Facility: OTHER | Age: 68
End: 2018-04-10

## 2018-04-10 ENCOUNTER — AMBULATORY - HEALTHEAST (OUTPATIENT)
Dept: NEUROSURGERY | Facility: CLINIC | Age: 68
End: 2018-04-10

## 2018-04-10 ENCOUNTER — RECORDS - HEALTHEAST (OUTPATIENT)
Dept: LAB | Facility: CLINIC | Age: 68
End: 2018-04-10

## 2018-04-10 DIAGNOSIS — I48.20 CHRONIC ATRIAL FIBRILLATION (H): ICD-10-CM

## 2018-04-10 LAB
ANION GAP SERPL CALCULATED.3IONS-SCNC: 12 MMOL/L (ref 5–18)
BUN SERPL-MCNC: 24 MG/DL (ref 8–22)
CALCIUM SERPL-MCNC: 8.9 MG/DL (ref 8.5–10.5)
CHLORIDE BLD-SCNC: 106 MMOL/L (ref 98–107)
CO2 SERPL-SCNC: 20 MMOL/L (ref 22–31)
CREAT SERPL-MCNC: 0.84 MG/DL (ref 0.7–1.3)
GFR SERPL CREATININE-BSD FRML MDRD: >60 ML/MIN/1.73M2
GLUCOSE BLD-MCNC: 105 MG/DL (ref 70–125)
INTERNATIONAL NORMALIZATION RATIO, POC - HISTORICAL: 2.5
POTASSIUM BLD-SCNC: 4.4 MMOL/L (ref 3.5–5)
SODIUM SERPL-SCNC: 138 MMOL/L (ref 136–145)

## 2018-04-11 ENCOUNTER — RECORDS - HEALTHEAST (OUTPATIENT)
Dept: ADMINISTRATIVE | Facility: OTHER | Age: 68
End: 2018-04-11

## 2018-04-12 ENCOUNTER — AMBULATORY - HEALTHEAST (OUTPATIENT)
Dept: NEUROSURGERY | Facility: CLINIC | Age: 68
End: 2018-04-12

## 2018-04-12 ENCOUNTER — RECORDS - HEALTHEAST (OUTPATIENT)
Dept: ADMINISTRATIVE | Facility: OTHER | Age: 68
End: 2018-04-12

## 2018-04-16 ENCOUNTER — AMBULATORY - HEALTHEAST (OUTPATIENT)
Dept: LAB | Facility: HOSPITAL | Age: 68
End: 2018-04-16

## 2018-04-16 ENCOUNTER — OFFICE VISIT - HEALTHEAST (OUTPATIENT)
Dept: NEUROSURGERY | Facility: CLINIC | Age: 68
End: 2018-04-16

## 2018-04-16 ENCOUNTER — RECORDS - HEALTHEAST (OUTPATIENT)
Dept: ADMINISTRATIVE | Facility: OTHER | Age: 68
End: 2018-04-16

## 2018-04-16 DIAGNOSIS — Z01.818 PRE-OP EVALUATION: ICD-10-CM

## 2018-04-16 DIAGNOSIS — G95.20 CORD COMPRESSION (H): ICD-10-CM

## 2018-04-16 LAB
ANION GAP SERPL CALCULATED.3IONS-SCNC: 11 MMOL/L (ref 5–18)
APTT PPP: 27 SECONDS (ref 24–37)
BASOPHILS # BLD AUTO: 0 THOU/UL (ref 0–0.2)
BASOPHILS NFR BLD AUTO: 0 % (ref 0–2)
BUN SERPL-MCNC: 24 MG/DL (ref 8–22)
CALCIUM SERPL-MCNC: 9.1 MG/DL (ref 8.5–10.5)
CHLORIDE BLD-SCNC: 102 MMOL/L (ref 98–107)
CLOSURE TME COLL+ADP BLD: 93 SEC (ref 1–110)
CLOSURE TME COLL+EPINEP BLD: >300 SEC (ref 1–180)
CO2 SERPL-SCNC: 29 MMOL/L (ref 22–31)
CREAT SERPL-MCNC: 0.91 MG/DL (ref 0.7–1.3)
EOSINOPHIL # BLD AUTO: 0.1 THOU/UL (ref 0–0.4)
EOSINOPHIL NFR BLD AUTO: 1 % (ref 0–6)
ERYTHROCYTE [DISTWIDTH] IN BLOOD BY AUTOMATED COUNT: 13.2 % (ref 11–14.5)
GFR SERPL CREATININE-BSD FRML MDRD: >60 ML/MIN/1.73M2
GLUCOSE BLD-MCNC: 104 MG/DL (ref 70–125)
HCT VFR BLD AUTO: 48.3 % (ref 40–54)
HGB BLD-MCNC: 16.2 G/DL (ref 14–18)
INR PPP: 1.13 (ref 0.9–1.1)
LYMPHOCYTES # BLD AUTO: 1.7 THOU/UL (ref 0.8–4.4)
LYMPHOCYTES NFR BLD AUTO: 26 % (ref 20–40)
MCH RBC QN AUTO: 30.1 PG (ref 27–34)
MCHC RBC AUTO-ENTMCNC: 33.5 G/DL (ref 32–36)
MCV RBC AUTO: 90 FL (ref 80–100)
MONOCYTES # BLD AUTO: 0.7 THOU/UL (ref 0–0.9)
MONOCYTES NFR BLD AUTO: 10 % (ref 2–10)
NEUTROPHILS # BLD AUTO: 4.1 THOU/UL (ref 2–7.7)
NEUTROPHILS NFR BLD AUTO: 62 % (ref 50–70)
PLATELET # BLD AUTO: 197 THOU/UL (ref 140–440)
PMV BLD AUTO: 10.3 FL (ref 8.5–12.5)
POTASSIUM BLD-SCNC: 4.4 MMOL/L (ref 3.5–5)
RBC # BLD AUTO: 5.38 MILL/UL (ref 4.4–6.2)
SODIUM SERPL-SCNC: 142 MMOL/L (ref 136–145)
WBC: 6.6 THOU/UL (ref 4–11)

## 2018-04-18 ENCOUNTER — COMMUNICATION - HEALTHEAST (OUTPATIENT)
Dept: NEUROSURGERY | Facility: CLINIC | Age: 68
End: 2018-04-18

## 2018-04-19 ENCOUNTER — ANESTHESIA - HEALTHEAST (OUTPATIENT)
Dept: SURGERY | Facility: CLINIC | Age: 68
End: 2018-04-19

## 2018-04-23 ENCOUNTER — AMBULATORY - HEALTHEAST (OUTPATIENT)
Dept: LAB | Facility: HOSPITAL | Age: 68
End: 2018-04-23

## 2018-04-23 ENCOUNTER — AMBULATORY - HEALTHEAST (OUTPATIENT)
Dept: NEUROSURGERY | Facility: CLINIC | Age: 68
End: 2018-04-23

## 2018-04-23 DIAGNOSIS — Z01.818 PRE-OP EVALUATION: ICD-10-CM

## 2018-04-23 LAB
CLOSURE TME COLL+EPINEP BLD: 124 SEC (ref 1–180)
INR PPP: 1 (ref 0.9–1.1)

## 2018-04-25 ENCOUNTER — SURGERY - HEALTHEAST (OUTPATIENT)
Dept: SURGERY | Facility: CLINIC | Age: 68
End: 2018-04-25

## 2018-04-25 ENCOUNTER — AMBULATORY - HEALTHEAST (OUTPATIENT)
Dept: CARDIOLOGY | Facility: CLINIC | Age: 68
End: 2018-04-25

## 2018-04-25 DIAGNOSIS — Z95.810 ICD (IMPLANTABLE CARDIOVERTER-DEFIBRILLATOR), SINGLE, IN SITU: ICD-10-CM

## 2018-04-25 ASSESSMENT — MIFFLIN-ST. JEOR: SCORE: 1900.91

## 2018-04-26 LAB — HCC DEVICE COMMENTS: NORMAL

## 2018-04-27 ENCOUNTER — COMMUNICATION - HEALTHEAST (OUTPATIENT)
Dept: NEUROSURGERY | Facility: CLINIC | Age: 68
End: 2018-04-27

## 2018-04-27 DIAGNOSIS — G95.9 CERVICAL MYELOPATHY (H): ICD-10-CM

## 2018-04-27 ASSESSMENT — MIFFLIN-ST. JEOR: SCORE: 1908.85

## 2018-04-30 ENCOUNTER — AMBULATORY - HEALTHEAST (OUTPATIENT)
Dept: CARDIOLOGY | Facility: CLINIC | Age: 68
End: 2018-04-30

## 2018-04-30 DIAGNOSIS — Z95.810 ICD (IMPLANTABLE CARDIOVERTER-DEFIBRILLATOR), SINGLE, IN SITU: ICD-10-CM

## 2018-04-30 LAB
HCC DEVICE COMMENTS: NORMAL
HCC DEVICE IMPLANTING PROVIDER: NORMAL
HCC DEVICE MANUFACTURE: NORMAL
HCC DEVICE MODEL: NORMAL
HCC DEVICE SERIAL NUMBER: NORMAL
HCC DEVICE TYPE: NORMAL

## 2018-05-02 ENCOUNTER — AMBULATORY - HEALTHEAST (OUTPATIENT)
Dept: NEUROSURGERY | Facility: CLINIC | Age: 68
End: 2018-05-02

## 2018-05-02 DIAGNOSIS — G95.9 CERVICAL MYELOPATHY (H): ICD-10-CM

## 2018-05-02 DIAGNOSIS — Z51.89 VISIT FOR WOUND CHECK: ICD-10-CM

## 2018-05-14 ENCOUNTER — COMMUNICATION - HEALTHEAST (OUTPATIENT)
Dept: CARDIOLOGY | Facility: CLINIC | Age: 68
End: 2018-05-14

## 2018-05-14 DIAGNOSIS — I50.22 CHRONIC SYSTOLIC HEART FAILURE (H): ICD-10-CM

## 2018-05-14 DIAGNOSIS — I42.9 CARDIOMYOPATHY (H): ICD-10-CM

## 2018-05-14 DIAGNOSIS — I42.0 CARDIOMYOPATHY, DILATED, NONISCHEMIC (H): ICD-10-CM

## 2018-05-15 ENCOUNTER — COMMUNICATION - HEALTHEAST (OUTPATIENT)
Dept: CARDIOLOGY | Facility: CLINIC | Age: 68
End: 2018-05-15

## 2018-05-15 DIAGNOSIS — G95.9 CERVICAL MYELOPATHY (H): ICD-10-CM

## 2018-05-16 ENCOUNTER — AMBULATORY - HEALTHEAST (OUTPATIENT)
Dept: NEUROSURGERY | Facility: CLINIC | Age: 68
End: 2018-05-16

## 2018-05-16 DIAGNOSIS — Z48.02 REMOVAL OF STAPLES: ICD-10-CM

## 2018-05-22 ENCOUNTER — AMBULATORY - HEALTHEAST (OUTPATIENT)
Dept: CARDIOLOGY | Facility: CLINIC | Age: 68
End: 2018-05-22

## 2018-05-22 DIAGNOSIS — I48.20 CHRONIC ATRIAL FIBRILLATION (H): ICD-10-CM

## 2018-05-22 LAB — INTERNATIONAL NORMALIZATION RATIO, POC - HISTORICAL: 4.4

## 2018-06-04 ENCOUNTER — HOSPITAL ENCOUNTER (OUTPATIENT)
Dept: RADIOLOGY | Facility: HOSPITAL | Age: 68
Discharge: HOME | End: 2018-06-04
Attending: SURGERY

## 2018-06-04 ENCOUNTER — OFFICE VISIT - HEALTHEAST (OUTPATIENT)
Dept: NEUROSURGERY | Facility: CLINIC | Age: 68
End: 2018-06-04

## 2018-06-04 DIAGNOSIS — G95.9 CERVICAL MYELOPATHY (H): ICD-10-CM

## 2018-06-04 DIAGNOSIS — M25.819 SHOULDER IMPINGEMENT: ICD-10-CM

## 2018-06-05 ENCOUNTER — AMBULATORY - HEALTHEAST (OUTPATIENT)
Dept: CARDIOLOGY | Facility: CLINIC | Age: 68
End: 2018-06-05

## 2018-06-05 DIAGNOSIS — I48.20 CHRONIC ATRIAL FIBRILLATION (H): ICD-10-CM

## 2018-06-05 LAB — INTERNATIONAL NORMALIZATION RATIO, POC - HISTORICAL: 2.4

## 2018-06-12 ENCOUNTER — OFFICE VISIT - HEALTHEAST (OUTPATIENT)
Dept: PHYSICAL THERAPY | Facility: CLINIC | Age: 68
End: 2018-06-12

## 2018-06-12 DIAGNOSIS — R53.1 DECREASED STRENGTH: ICD-10-CM

## 2018-06-12 DIAGNOSIS — M54.2 CERVICALGIA: ICD-10-CM

## 2018-06-12 DIAGNOSIS — M43.6 NECK STIFFNESS: ICD-10-CM

## 2018-06-12 DIAGNOSIS — R29.3 ABNORMAL POSTURE: ICD-10-CM

## 2018-06-18 ENCOUNTER — COMMUNICATION - HEALTHEAST (OUTPATIENT)
Dept: CARDIOLOGY | Facility: CLINIC | Age: 68
End: 2018-06-18

## 2018-06-19 ENCOUNTER — RECORDS - HEALTHEAST (OUTPATIENT)
Dept: ADMINISTRATIVE | Facility: OTHER | Age: 68
End: 2018-06-19

## 2018-06-19 ENCOUNTER — AMBULATORY - HEALTHEAST (OUTPATIENT)
Dept: CARDIOLOGY | Facility: CLINIC | Age: 68
End: 2018-06-19

## 2018-06-27 ENCOUNTER — AMBULATORY - HEALTHEAST (OUTPATIENT)
Dept: CARDIOLOGY | Facility: CLINIC | Age: 68
End: 2018-06-27

## 2018-07-03 ENCOUNTER — OFFICE VISIT - HEALTHEAST (OUTPATIENT)
Dept: CARDIOLOGY | Facility: CLINIC | Age: 68
End: 2018-07-03

## 2018-07-03 ENCOUNTER — AMBULATORY - HEALTHEAST (OUTPATIENT)
Dept: CARDIOLOGY | Facility: CLINIC | Age: 68
End: 2018-07-03

## 2018-07-03 DIAGNOSIS — I48.20 CHRONIC ATRIAL FIBRILLATION (H): ICD-10-CM

## 2018-07-03 DIAGNOSIS — I42.0 CARDIOMYOPATHY, DILATED, NONISCHEMIC (H): ICD-10-CM

## 2018-07-03 DIAGNOSIS — I25.10 CORONARY ARTERY DISEASE DUE TO CALCIFIED CORONARY LESION: ICD-10-CM

## 2018-07-03 DIAGNOSIS — Z95.810 ICD (IMPLANTABLE CARDIOVERTER-DEFIBRILLATOR), SINGLE, IN SITU: ICD-10-CM

## 2018-07-03 DIAGNOSIS — I10 ESSENTIAL HYPERTENSION WITH GOAL BLOOD PRESSURE LESS THAN 130/80: ICD-10-CM

## 2018-07-03 DIAGNOSIS — I25.84 CORONARY ARTERY DISEASE DUE TO CALCIFIED CORONARY LESION: ICD-10-CM

## 2018-07-03 DIAGNOSIS — R00.0 TACHYCARDIA: ICD-10-CM

## 2018-07-03 DIAGNOSIS — E78.5 DYSLIPIDEMIA: ICD-10-CM

## 2018-07-03 LAB
HCC DEVICE COMMENTS: NORMAL
HCC DEVICE IMPLANTING PROVIDER: NORMAL
HCC DEVICE MANUFACTURE: NORMAL
HCC DEVICE MODEL: NORMAL
HCC DEVICE SERIAL NUMBER: NORMAL
HCC DEVICE TYPE: NORMAL
INTERNATIONAL NORMALIZATION RATIO, POC - HISTORICAL: 2.3

## 2018-07-03 ASSESSMENT — MIFFLIN-ST. JEOR: SCORE: 1869.72

## 2018-07-16 ENCOUNTER — HOSPITAL ENCOUNTER (OUTPATIENT)
Dept: MRI IMAGING | Facility: HOSPITAL | Age: 68
Discharge: HOME | End: 2018-07-16
Attending: INTERNAL MEDICINE

## 2018-07-16 ENCOUNTER — HOSPITAL ENCOUNTER (OUTPATIENT)
Dept: RADIOLOGY | Facility: HOSPITAL | Age: 68
Discharge: HOME | End: 2018-07-16
Attending: PHYSICIAN ASSISTANT

## 2018-07-16 ENCOUNTER — HOSPITAL ENCOUNTER (OUTPATIENT)
Dept: MRI IMAGING | Facility: HOSPITAL | Age: 68
Discharge: HOME | End: 2018-07-16
Attending: PHYSICIAN ASSISTANT

## 2018-07-16 DIAGNOSIS — R53.1 WEAKNESS: ICD-10-CM

## 2018-07-16 DIAGNOSIS — M25.629: ICD-10-CM

## 2018-07-16 DIAGNOSIS — M25.519 SHOULDER PAIN, UNSPECIFIED CHRONICITY, UNSPECIFIED LATERALITY: ICD-10-CM

## 2018-07-31 ENCOUNTER — AMBULATORY - HEALTHEAST (OUTPATIENT)
Dept: CARDIOLOGY | Facility: CLINIC | Age: 68
End: 2018-07-31

## 2018-07-31 DIAGNOSIS — I48.20 CHRONIC ATRIAL FIBRILLATION (H): ICD-10-CM

## 2018-07-31 LAB — INTERNATIONAL NORMALIZATION RATIO, POC - HISTORICAL: 1.8

## 2018-08-28 ENCOUNTER — AMBULATORY - HEALTHEAST (OUTPATIENT)
Dept: CARDIOLOGY | Facility: CLINIC | Age: 68
End: 2018-08-28

## 2018-08-28 DIAGNOSIS — I48.20 CHRONIC ATRIAL FIBRILLATION (H): ICD-10-CM

## 2018-08-28 LAB — INTERNATIONAL NORMALIZATION RATIO, POC - HISTORICAL: 2.7

## 2018-09-12 ENCOUNTER — COMMUNICATION - HEALTHEAST (OUTPATIENT)
Dept: CARDIOLOGY | Facility: CLINIC | Age: 68
End: 2018-09-12

## 2018-09-12 DIAGNOSIS — E78.5 DYSLIPIDEMIA: ICD-10-CM

## 2018-09-12 DIAGNOSIS — I50.23 ACUTE ON CHRONIC SYSTOLIC HEART FAILURE (H): ICD-10-CM

## 2018-09-17 ENCOUNTER — COMMUNICATION - HEALTHEAST (OUTPATIENT)
Dept: CARDIOLOGY | Facility: CLINIC | Age: 68
End: 2018-09-17

## 2018-09-17 DIAGNOSIS — G95.9 CERVICAL MYELOPATHY (H): ICD-10-CM

## 2018-09-18 ENCOUNTER — COMMUNICATION - HEALTHEAST (OUTPATIENT)
Dept: CARDIOLOGY | Facility: CLINIC | Age: 68
End: 2018-09-18

## 2018-09-18 DIAGNOSIS — G95.9 CERVICAL MYELOPATHY (H): ICD-10-CM

## 2018-09-25 ENCOUNTER — AMBULATORY - HEALTHEAST (OUTPATIENT)
Dept: CARDIOLOGY | Facility: CLINIC | Age: 68
End: 2018-09-25

## 2018-09-25 DIAGNOSIS — I48.20 CHRONIC ATRIAL FIBRILLATION (H): ICD-10-CM

## 2018-09-25 LAB — INTERNATIONAL NORMALIZATION RATIO, POC - HISTORICAL: 4.3

## 2018-10-09 ENCOUNTER — AMBULATORY - HEALTHEAST (OUTPATIENT)
Dept: CARDIOLOGY | Facility: CLINIC | Age: 68
End: 2018-10-09

## 2018-10-09 DIAGNOSIS — I48.20 CHRONIC ATRIAL FIBRILLATION (H): ICD-10-CM

## 2018-10-09 LAB — INTERNATIONAL NORMALIZATION RATIO, POC - HISTORICAL: 3.1

## 2018-10-10 ENCOUNTER — AMBULATORY - HEALTHEAST (OUTPATIENT)
Dept: CARDIOLOGY | Facility: CLINIC | Age: 68
End: 2018-10-10

## 2018-10-10 DIAGNOSIS — Z95.810 ICD (IMPLANTABLE CARDIOVERTER-DEFIBRILLATOR), SINGLE, IN SITU: ICD-10-CM

## 2018-11-06 ENCOUNTER — AMBULATORY - HEALTHEAST (OUTPATIENT)
Dept: CARDIOLOGY | Facility: CLINIC | Age: 68
End: 2018-11-06

## 2018-11-06 DIAGNOSIS — I48.20 CHRONIC ATRIAL FIBRILLATION (H): ICD-10-CM

## 2018-11-06 LAB — INTERNATIONAL NORMALIZATION RATIO, POC - HISTORICAL: 4.9

## 2018-11-13 ENCOUNTER — AMBULATORY - HEALTHEAST (OUTPATIENT)
Dept: CARDIOLOGY | Facility: CLINIC | Age: 68
End: 2018-11-13

## 2018-11-13 DIAGNOSIS — I48.20 CHRONIC ATRIAL FIBRILLATION (H): ICD-10-CM

## 2018-11-13 LAB — INTERNATIONAL NORMALIZATION RATIO, POC - HISTORICAL: 1.9

## 2018-11-26 ENCOUNTER — COMMUNICATION - HEALTHEAST (OUTPATIENT)
Dept: CARDIOLOGY | Facility: CLINIC | Age: 68
End: 2018-11-26

## 2018-11-26 DIAGNOSIS — G95.9 CERVICAL MYELOPATHY (H): ICD-10-CM

## 2018-11-27 ENCOUNTER — COMMUNICATION - HEALTHEAST (OUTPATIENT)
Dept: CARDIOLOGY | Facility: CLINIC | Age: 68
End: 2018-11-27

## 2018-11-27 DIAGNOSIS — I42.0 CARDIOMYOPATHY, DILATED, NONISCHEMIC (H): ICD-10-CM

## 2018-11-27 DIAGNOSIS — I42.9 CARDIOMYOPATHY (H): ICD-10-CM

## 2018-11-27 DIAGNOSIS — I50.22 CHRONIC SYSTOLIC HEART FAILURE (H): ICD-10-CM

## 2018-12-03 ENCOUNTER — COMMUNICATION - HEALTHEAST (OUTPATIENT)
Dept: CARDIOLOGY | Facility: CLINIC | Age: 68
End: 2018-12-03

## 2018-12-03 DIAGNOSIS — I42.9 CARDIOMYOPATHY (H): ICD-10-CM

## 2018-12-11 ENCOUNTER — AMBULATORY - HEALTHEAST (OUTPATIENT)
Dept: CARDIOLOGY | Facility: CLINIC | Age: 68
End: 2018-12-11

## 2018-12-11 ENCOUNTER — COMMUNICATION - HEALTHEAST (OUTPATIENT)
Dept: CARDIOLOGY | Facility: CLINIC | Age: 68
End: 2018-12-11

## 2018-12-11 DIAGNOSIS — I48.20 CHRONIC ATRIAL FIBRILLATION (H): ICD-10-CM

## 2018-12-11 DIAGNOSIS — I42.9 CARDIOMYOPATHY (H): ICD-10-CM

## 2018-12-11 LAB — INTERNATIONAL NORMALIZATION RATIO, POC - HISTORICAL: 3.5

## 2018-12-26 ENCOUNTER — AMBULATORY - HEALTHEAST (OUTPATIENT)
Dept: CARDIOLOGY | Facility: CLINIC | Age: 68
End: 2018-12-26

## 2018-12-26 DIAGNOSIS — I48.20 CHRONIC ATRIAL FIBRILLATION (H): ICD-10-CM

## 2018-12-26 LAB — INTERNATIONAL NORMALIZATION RATIO, POC - HISTORICAL: 3.2

## 2019-01-21 ENCOUNTER — AMBULATORY - HEALTHEAST (OUTPATIENT)
Dept: CARDIOLOGY | Facility: CLINIC | Age: 69
End: 2019-01-21

## 2019-01-21 DIAGNOSIS — Z95.810 ICD (IMPLANTABLE CARDIOVERTER-DEFIBRILLATOR), SINGLE, IN SITU: ICD-10-CM

## 2019-01-23 ENCOUNTER — AMBULATORY - HEALTHEAST (OUTPATIENT)
Dept: CARDIOLOGY | Facility: CLINIC | Age: 69
End: 2019-01-23

## 2019-01-23 DIAGNOSIS — I48.20 CHRONIC ATRIAL FIBRILLATION (H): ICD-10-CM

## 2019-01-23 LAB — INTERNATIONAL NORMALIZATION RATIO, POC - HISTORICAL: 1.8

## 2019-02-20 ENCOUNTER — AMBULATORY - HEALTHEAST (OUTPATIENT)
Dept: CARDIOLOGY | Facility: CLINIC | Age: 69
End: 2019-02-20

## 2019-02-20 DIAGNOSIS — I48.20 CHRONIC ATRIAL FIBRILLATION (H): ICD-10-CM

## 2019-02-20 LAB — INTERNATIONAL NORMALIZATION RATIO, POC - HISTORICAL: 1.8

## 2019-03-06 ENCOUNTER — AMBULATORY - HEALTHEAST (OUTPATIENT)
Dept: CARDIOLOGY | Facility: CLINIC | Age: 69
End: 2019-03-06

## 2019-03-06 DIAGNOSIS — I48.20 CHRONIC ATRIAL FIBRILLATION (H): ICD-10-CM

## 2019-03-06 LAB — INTERNATIONAL NORMALIZATION RATIO, POC - HISTORICAL: 2.5

## 2019-03-18 ENCOUNTER — COMMUNICATION - HEALTHEAST (OUTPATIENT)
Dept: CARDIOLOGY | Facility: CLINIC | Age: 69
End: 2019-03-18

## 2019-03-18 DIAGNOSIS — I50.23 ACUTE ON CHRONIC SYSTOLIC HEART FAILURE (H): ICD-10-CM

## 2019-04-03 ENCOUNTER — AMBULATORY - HEALTHEAST (OUTPATIENT)
Dept: CARDIOLOGY | Facility: CLINIC | Age: 69
End: 2019-04-03

## 2019-04-03 DIAGNOSIS — I48.20 CHRONIC ATRIAL FIBRILLATION (H): ICD-10-CM

## 2019-04-03 LAB — INTERNATIONAL NORMALIZATION RATIO, POC - HISTORICAL: 1.5

## 2019-04-10 ENCOUNTER — AMBULATORY - HEALTHEAST (OUTPATIENT)
Dept: CARDIOLOGY | Facility: CLINIC | Age: 69
End: 2019-04-10

## 2019-04-10 DIAGNOSIS — I48.20 CHRONIC ATRIAL FIBRILLATION (H): ICD-10-CM

## 2019-04-10 LAB — INTERNATIONAL NORMALIZATION RATIO, POC - HISTORICAL: 2.4

## 2019-04-24 ENCOUNTER — AMBULATORY - HEALTHEAST (OUTPATIENT)
Dept: CARDIOLOGY | Facility: CLINIC | Age: 69
End: 2019-04-24

## 2019-04-24 DIAGNOSIS — Z95.810 ICD (IMPLANTABLE CARDIOVERTER-DEFIBRILLATOR), SINGLE, IN SITU: ICD-10-CM

## 2019-05-02 ENCOUNTER — COMMUNICATION - HEALTHEAST (OUTPATIENT)
Dept: CARDIOLOGY | Facility: CLINIC | Age: 69
End: 2019-05-02

## 2019-05-02 DIAGNOSIS — I42.9 CARDIOMYOPATHY (H): ICD-10-CM

## 2019-05-02 DIAGNOSIS — E78.5 DYSLIPIDEMIA: ICD-10-CM

## 2019-05-09 ENCOUNTER — AMBULATORY - HEALTHEAST (OUTPATIENT)
Dept: CARDIOLOGY | Facility: CLINIC | Age: 69
End: 2019-05-09

## 2019-05-09 DIAGNOSIS — I48.20 CHRONIC ATRIAL FIBRILLATION (H): ICD-10-CM

## 2019-05-09 LAB — INTERNATIONAL NORMALIZATION RATIO, POC - HISTORICAL: 2.5

## 2019-05-10 ENCOUNTER — COMMUNICATION - HEALTHEAST (OUTPATIENT)
Dept: CARDIOLOGY | Facility: CLINIC | Age: 69
End: 2019-05-10

## 2019-05-10 DIAGNOSIS — I42.9 CARDIOMYOPATHY (H): ICD-10-CM

## 2019-05-10 DIAGNOSIS — I42.0 CARDIOMYOPATHY, DILATED, NONISCHEMIC (H): ICD-10-CM

## 2019-05-10 DIAGNOSIS — I50.22 CHRONIC SYSTOLIC HEART FAILURE (H): ICD-10-CM

## 2019-05-10 DIAGNOSIS — G95.9 CERVICAL MYELOPATHY (H): ICD-10-CM

## 2019-05-17 ENCOUNTER — COMMUNICATION - HEALTHEAST (OUTPATIENT)
Dept: ANTICOAGULATION | Facility: CLINIC | Age: 69
End: 2019-05-17

## 2019-05-17 DIAGNOSIS — I48.20 CHRONIC ATRIAL FIBRILLATION (H): ICD-10-CM

## 2019-05-22 ENCOUNTER — RECORDS - HEALTHEAST (OUTPATIENT)
Dept: LAB | Facility: CLINIC | Age: 69
End: 2019-05-22

## 2019-05-22 LAB
ALBUMIN SERPL-MCNC: 4 G/DL (ref 3.5–5)
ALP SERPL-CCNC: 77 U/L (ref 45–120)
ALT SERPL W P-5'-P-CCNC: 46 U/L (ref 0–45)
ANION GAP SERPL CALCULATED.3IONS-SCNC: 12 MMOL/L (ref 5–18)
AST SERPL W P-5'-P-CCNC: 53 U/L (ref 0–40)
BASOPHILS # BLD AUTO: 0 THOU/UL (ref 0–0.2)
BASOPHILS NFR BLD AUTO: 0 % (ref 0–2)
BILIRUB SERPL-MCNC: 0.6 MG/DL (ref 0–1)
BUN SERPL-MCNC: 20 MG/DL (ref 8–22)
CALCIUM SERPL-MCNC: 8.7 MG/DL (ref 8.5–10.5)
CHLORIDE BLD-SCNC: 99 MMOL/L (ref 98–107)
CHOLEST SERPL-MCNC: 158 MG/DL
CO2 SERPL-SCNC: 21 MMOL/L (ref 22–31)
CREAT SERPL-MCNC: 0.91 MG/DL (ref 0.7–1.3)
EOSINOPHIL COUNT (ABSOLUTE): 0.2 THOU/UL (ref 0–0.4)
EOSINOPHIL NFR BLD AUTO: 3 % (ref 0–6)
ERYTHROCYTE [DISTWIDTH] IN BLOOD BY AUTOMATED COUNT: 13.1 % (ref 11–14.5)
ERYTHROCYTE [SEDIMENTATION RATE] IN BLOOD BY WESTERGREN METHOD: 2 MM/HR (ref 0–15)
FASTING STATUS PATIENT QL REPORTED: YES
GFR SERPL CREATININE-BSD FRML MDRD: >60 ML/MIN/1.73M2
GLUCOSE BLD-MCNC: 101 MG/DL (ref 70–125)
HCT VFR BLD AUTO: 45.8 % (ref 40–54)
HDLC SERPL-MCNC: 46 MG/DL
HGB BLD-MCNC: 15.3 G/DL (ref 14–18)
LDLC SERPL CALC-MCNC: 69 MG/DL
LYMPHOCYTES # BLD AUTO: 2.2 THOU/UL (ref 0.8–4.4)
LYMPHOCYTES NFR BLD AUTO: 35 % (ref 20–40)
MCH RBC QN AUTO: 31.4 PG (ref 27–34)
MCHC RBC AUTO-ENTMCNC: 33.4 G/DL (ref 32–36)
MCV RBC AUTO: 94 FL (ref 80–100)
MONOCYTES # BLD AUTO: 0.6 THOU/UL (ref 0–0.9)
MONOCYTES NFR BLD AUTO: 10 % (ref 2–10)
PLAT MORPH BLD: NORMAL
PLATELET # BLD AUTO: 208 THOU/UL (ref 140–440)
PMV BLD AUTO: 10.7 FL (ref 8.5–12.5)
POTASSIUM BLD-SCNC: 4 MMOL/L (ref 3.5–5)
PROT SERPL-MCNC: 6.2 G/DL (ref 6–8)
PSA SERPL-MCNC: 4.1 NG/ML (ref 0–4.5)
RBC # BLD AUTO: 4.87 MILL/UL (ref 4.4–6.2)
SODIUM SERPL-SCNC: 132 MMOL/L (ref 136–145)
T4 FREE SERPL-MCNC: 1.1 NG/DL (ref 0.7–1.8)
TOTAL NEUTROPHILS-ABS(DIFF): 3.3 THOU/UL (ref 2–7.7)
TOTAL NEUTROPHILS-REL(DIFF): 52 % (ref 50–70)
TRIGL SERPL-MCNC: 215 MG/DL
TSH SERPL DL<=0.005 MIU/L-ACNC: 1.13 UIU/ML (ref 0.3–5)
VIT B12 SERPL-MCNC: 362 PG/ML (ref 213–816)
WBC: 6.3 THOU/UL (ref 4–11)

## 2019-05-23 LAB — ANA SER QL: 0.5 U

## 2019-05-29 ENCOUNTER — AMBULATORY - HEALTHEAST (OUTPATIENT)
Dept: CARDIOLOGY | Facility: CLINIC | Age: 69
End: 2019-05-29

## 2019-06-06 ENCOUNTER — AMBULATORY - HEALTHEAST (OUTPATIENT)
Dept: CARDIOLOGY | Facility: CLINIC | Age: 69
End: 2019-06-06

## 2019-06-06 ENCOUNTER — COMMUNICATION - HEALTHEAST (OUTPATIENT)
Dept: ANTICOAGULATION | Facility: CLINIC | Age: 69
End: 2019-06-06

## 2019-06-06 DIAGNOSIS — I48.20 CHRONIC ATRIAL FIBRILLATION (H): ICD-10-CM

## 2019-06-06 LAB — POC INR - HE - HISTORICAL: 3.2 (ref 0.9–1.1)

## 2019-06-25 ENCOUNTER — COMMUNICATION - HEALTHEAST (OUTPATIENT)
Dept: CARDIOLOGY | Facility: CLINIC | Age: 69
End: 2019-06-25

## 2019-06-25 DIAGNOSIS — I42.9 CARDIOMYOPATHY (H): ICD-10-CM

## 2019-06-25 DIAGNOSIS — I50.22 CHRONIC SYSTOLIC HEART FAILURE (H): ICD-10-CM

## 2019-07-09 ENCOUNTER — AMBULATORY - HEALTHEAST (OUTPATIENT)
Dept: CARDIOLOGY | Facility: CLINIC | Age: 69
End: 2019-07-09

## 2019-07-09 ENCOUNTER — COMMUNICATION - HEALTHEAST (OUTPATIENT)
Dept: CARDIOLOGY | Facility: CLINIC | Age: 69
End: 2019-07-09

## 2019-07-09 ENCOUNTER — COMMUNICATION - HEALTHEAST (OUTPATIENT)
Dept: ANTICOAGULATION | Facility: CLINIC | Age: 69
End: 2019-07-09

## 2019-07-09 DIAGNOSIS — I48.20 CHRONIC ATRIAL FIBRILLATION (H): ICD-10-CM

## 2019-07-09 LAB — POC INR - HE - HISTORICAL: 2.4 (ref 0.9–1.1)

## 2019-08-06 ENCOUNTER — AMBULATORY - HEALTHEAST (OUTPATIENT)
Dept: CARDIOLOGY | Facility: CLINIC | Age: 69
End: 2019-08-06

## 2019-08-06 ENCOUNTER — COMMUNICATION - HEALTHEAST (OUTPATIENT)
Dept: ANTICOAGULATION | Facility: CLINIC | Age: 69
End: 2019-08-06

## 2019-08-06 DIAGNOSIS — I48.20 CHRONIC ATRIAL FIBRILLATION (H): ICD-10-CM

## 2019-08-06 LAB — POC INR - HE - HISTORICAL: 2.5 (ref 0.9–1.1)

## 2019-08-19 ENCOUNTER — COMMUNICATION - HEALTHEAST (OUTPATIENT)
Dept: CARDIOLOGY | Facility: CLINIC | Age: 69
End: 2019-08-19

## 2019-08-19 DIAGNOSIS — G95.9 CERVICAL MYELOPATHY (H): ICD-10-CM

## 2019-08-30 ENCOUNTER — AMBULATORY - HEALTHEAST (OUTPATIENT)
Dept: CARDIOLOGY | Facility: CLINIC | Age: 69
End: 2019-08-30

## 2019-08-30 ENCOUNTER — RECORDS - HEALTHEAST (OUTPATIENT)
Dept: ADMINISTRATIVE | Facility: OTHER | Age: 69
End: 2019-08-30

## 2019-09-04 ENCOUNTER — RECORDS - HEALTHEAST (OUTPATIENT)
Dept: LAB | Facility: CLINIC | Age: 69
End: 2019-09-04

## 2019-09-04 ENCOUNTER — AMBULATORY - HEALTHEAST (OUTPATIENT)
Dept: CARDIOLOGY | Facility: CLINIC | Age: 69
End: 2019-09-04

## 2019-09-04 ENCOUNTER — RECORDS - HEALTHEAST (OUTPATIENT)
Dept: ADMINISTRATIVE | Facility: OTHER | Age: 69
End: 2019-09-04

## 2019-09-04 LAB
ALBUMIN SERPL-MCNC: 4.3 G/DL (ref 3.5–5)
ALP SERPL-CCNC: 104 U/L (ref 45–120)
ALT SERPL W P-5'-P-CCNC: 40 U/L (ref 0–45)
AST SERPL W P-5'-P-CCNC: 40 U/L (ref 0–40)
BILIRUB DIRECT SERPL-MCNC: 0.4 MG/DL
BILIRUB SERPL-MCNC: 1.2 MG/DL (ref 0–1)
PROT SERPL-MCNC: 7 G/DL (ref 6–8)

## 2019-09-05 ENCOUNTER — COMMUNICATION - HEALTHEAST (OUTPATIENT)
Dept: ANTICOAGULATION | Facility: CLINIC | Age: 69
End: 2019-09-05

## 2019-09-05 LAB — HCV AB SERPL QL IA: NEGATIVE

## 2019-09-06 ENCOUNTER — AMBULATORY - HEALTHEAST (OUTPATIENT)
Dept: CARDIOLOGY | Facility: CLINIC | Age: 69
End: 2019-09-06

## 2019-09-06 ENCOUNTER — OFFICE VISIT - HEALTHEAST (OUTPATIENT)
Dept: CARDIOLOGY | Facility: CLINIC | Age: 69
End: 2019-09-06

## 2019-09-06 DIAGNOSIS — I50.22 CHRONIC SYSTOLIC HEART FAILURE (H): ICD-10-CM

## 2019-09-06 DIAGNOSIS — Z95.810 ICD (IMPLANTABLE CARDIOVERTER-DEFIBRILLATOR), SINGLE, IN SITU: ICD-10-CM

## 2019-09-06 DIAGNOSIS — I48.20 CHRONIC ATRIAL FIBRILLATION (H): ICD-10-CM

## 2019-09-06 DIAGNOSIS — I25.10 CORONARY ARTERY DISEASE DUE TO CALCIFIED CORONARY LESION: ICD-10-CM

## 2019-09-06 DIAGNOSIS — I10 ESSENTIAL HYPERTENSION WITH GOAL BLOOD PRESSURE LESS THAN 130/80: ICD-10-CM

## 2019-09-06 DIAGNOSIS — G95.9 CERVICAL MYELOPATHY (H): ICD-10-CM

## 2019-09-06 DIAGNOSIS — I25.84 CORONARY ARTERY DISEASE DUE TO CALCIFIED CORONARY LESION: ICD-10-CM

## 2019-09-06 DIAGNOSIS — I42.0 CARDIOMYOPATHY, DILATED, NONISCHEMIC (H): ICD-10-CM

## 2019-09-06 DIAGNOSIS — R09.89 OTHER SPECIFIED SYMPTOMS AND SIGNS INVOLVING THE CIRCULATORY AND RESPIRATORY SYSTEMS: ICD-10-CM

## 2019-09-06 DIAGNOSIS — E78.5 DYSLIPIDEMIA: ICD-10-CM

## 2019-09-06 DIAGNOSIS — Z09 ENCOUNTER FOR FOLLOW-UP EXAMINATION AFTER COMPLETED TREATMENT FOR CONDITIONS OTHER THAN MALIGNANT NEOPLASM: ICD-10-CM

## 2019-09-06 ASSESSMENT — MIFFLIN-ST. JEOR: SCORE: 1878.79

## 2019-09-09 ENCOUNTER — COMMUNICATION - HEALTHEAST (OUTPATIENT)
Dept: CARDIOLOGY | Facility: CLINIC | Age: 69
End: 2019-09-09

## 2019-09-10 ENCOUNTER — AMBULATORY - HEALTHEAST (OUTPATIENT)
Dept: CARDIOLOGY | Facility: CLINIC | Age: 69
End: 2019-09-10

## 2019-09-10 ENCOUNTER — COMMUNICATION - HEALTHEAST (OUTPATIENT)
Dept: ANTICOAGULATION | Facility: CLINIC | Age: 69
End: 2019-09-10

## 2019-09-10 DIAGNOSIS — I48.20 CHRONIC ATRIAL FIBRILLATION (H): ICD-10-CM

## 2019-09-10 LAB — POC INR - HE - HISTORICAL: 1.3 (ref 0.9–1.1)

## 2019-09-16 ENCOUNTER — RECORDS - HEALTHEAST (OUTPATIENT)
Dept: VASCULAR ULTRASOUND | Facility: CLINIC | Age: 69
End: 2019-09-16

## 2019-09-16 DIAGNOSIS — G95.9 DISEASE OF SPINAL CORD, UNSPECIFIED (H): ICD-10-CM

## 2019-09-16 DIAGNOSIS — I10 ESSENTIAL (PRIMARY) HYPERTENSION: ICD-10-CM

## 2019-09-16 DIAGNOSIS — R09.89 OTHER SPECIFIED SYMPTOMS AND SIGNS INVOLVING THE CIRCULATORY AND RESPIRATORY SYSTEMS: ICD-10-CM

## 2019-09-18 ENCOUNTER — COMMUNICATION - HEALTHEAST (OUTPATIENT)
Dept: ANTICOAGULATION | Facility: CLINIC | Age: 69
End: 2019-09-18

## 2019-09-19 ENCOUNTER — COMMUNICATION - HEALTHEAST (OUTPATIENT)
Dept: CARDIOLOGY | Facility: CLINIC | Age: 69
End: 2019-09-19

## 2019-09-23 ENCOUNTER — COMMUNICATION - HEALTHEAST (OUTPATIENT)
Dept: ANTICOAGULATION | Facility: CLINIC | Age: 69
End: 2019-09-23

## 2019-09-23 ENCOUNTER — AMBULATORY - HEALTHEAST (OUTPATIENT)
Dept: CARDIOLOGY | Facility: CLINIC | Age: 69
End: 2019-09-23

## 2019-09-23 ENCOUNTER — COMMUNICATION - HEALTHEAST (OUTPATIENT)
Dept: CARDIOLOGY | Facility: CLINIC | Age: 69
End: 2019-09-23

## 2019-09-23 DIAGNOSIS — I48.20 CHRONIC ATRIAL FIBRILLATION (H): ICD-10-CM

## 2019-09-23 DIAGNOSIS — I42.9 CARDIOMYOPATHY (H): ICD-10-CM

## 2019-09-23 DIAGNOSIS — I50.22 CHRONIC SYSTOLIC HEART FAILURE (H): ICD-10-CM

## 2019-09-23 LAB — POC INR - HE - HISTORICAL: 2.6 (ref 0.9–1.1)

## 2019-10-04 ENCOUNTER — HOSPITAL ENCOUNTER (OUTPATIENT)
Dept: MRI IMAGING | Facility: HOSPITAL | Age: 69
Discharge: HOME | End: 2019-10-04
Attending: INTERNAL MEDICINE

## 2019-10-04 ENCOUNTER — HOSPITAL ENCOUNTER (OUTPATIENT)
Dept: MRI IMAGING | Facility: HOSPITAL | Age: 69
Discharge: HOME | End: 2019-10-04
Attending: FAMILY MEDICINE

## 2019-10-04 ENCOUNTER — HOSPITAL ENCOUNTER (OUTPATIENT)
Dept: RADIOLOGY | Facility: HOSPITAL | Age: 69
Discharge: HOME | End: 2019-10-04
Attending: FAMILY MEDICINE

## 2019-10-04 DIAGNOSIS — M54.9 UPPER BACK PAIN: ICD-10-CM

## 2019-10-11 ENCOUNTER — COMMUNICATION - HEALTHEAST (OUTPATIENT)
Dept: NEUROSURGERY | Facility: CLINIC | Age: 69
End: 2019-10-11

## 2019-10-12 ENCOUNTER — COMMUNICATION - HEALTHEAST (OUTPATIENT)
Dept: CARDIOLOGY | Facility: CLINIC | Age: 69
End: 2019-10-12

## 2019-10-12 DIAGNOSIS — E78.5 DYSLIPIDEMIA: ICD-10-CM

## 2019-10-14 ENCOUNTER — COMMUNICATION - HEALTHEAST (OUTPATIENT)
Dept: ANTICOAGULATION | Facility: CLINIC | Age: 69
End: 2019-10-14

## 2019-10-29 ENCOUNTER — COMMUNICATION - HEALTHEAST (OUTPATIENT)
Dept: ANTICOAGULATION | Facility: CLINIC | Age: 69
End: 2019-10-29

## 2019-10-29 ENCOUNTER — AMBULATORY - HEALTHEAST (OUTPATIENT)
Dept: CARDIOLOGY | Facility: CLINIC | Age: 69
End: 2019-10-29

## 2019-10-29 DIAGNOSIS — I48.20 CHRONIC ATRIAL FIBRILLATION (H): ICD-10-CM

## 2019-10-29 LAB — POC INR - HE - HISTORICAL: 3.9 (ref 0.9–1.1)

## 2019-11-01 ENCOUNTER — COMMUNICATION - HEALTHEAST (OUTPATIENT)
Dept: CARDIOLOGY | Facility: CLINIC | Age: 69
End: 2019-11-01

## 2019-11-01 DIAGNOSIS — I42.9 CARDIOMYOPATHY (H): ICD-10-CM

## 2019-11-06 ENCOUNTER — COMMUNICATION - HEALTHEAST (OUTPATIENT)
Dept: ANTICOAGULATION | Facility: CLINIC | Age: 69
End: 2019-11-06

## 2019-11-06 DIAGNOSIS — G95.9 CERVICAL MYELOPATHY (H): ICD-10-CM

## 2019-11-10 ENCOUNTER — COMMUNICATION - HEALTHEAST (OUTPATIENT)
Dept: CARDIOLOGY | Facility: CLINIC | Age: 69
End: 2019-11-10

## 2019-11-10 DIAGNOSIS — I50.23 ACUTE ON CHRONIC SYSTOLIC HEART FAILURE (H): ICD-10-CM

## 2019-11-11 ENCOUNTER — COMMUNICATION - HEALTHEAST (OUTPATIENT)
Dept: ANTICOAGULATION | Facility: CLINIC | Age: 69
End: 2019-11-11

## 2019-11-11 DIAGNOSIS — I48.20 CHRONIC ATRIAL FIBRILLATION (H): ICD-10-CM

## 2019-11-11 DIAGNOSIS — G95.9 CERVICAL MYELOPATHY (H): ICD-10-CM

## 2019-11-13 ENCOUNTER — AMBULATORY - HEALTHEAST (OUTPATIENT)
Dept: CARDIOLOGY | Facility: CLINIC | Age: 69
End: 2019-11-13

## 2019-11-13 ENCOUNTER — COMMUNICATION - HEALTHEAST (OUTPATIENT)
Dept: ANTICOAGULATION | Facility: CLINIC | Age: 69
End: 2019-11-13

## 2019-11-13 DIAGNOSIS — I48.20 CHRONIC ATRIAL FIBRILLATION (H): ICD-10-CM

## 2019-11-13 LAB — POC INR - HE - HISTORICAL: 2.7 (ref 0.9–1.1)

## 2019-11-19 ENCOUNTER — AMBULATORY - HEALTHEAST (OUTPATIENT)
Dept: CARDIOLOGY | Facility: CLINIC | Age: 69
End: 2019-11-19

## 2019-11-19 DIAGNOSIS — Z95.810 ICD (IMPLANTABLE CARDIOVERTER-DEFIBRILLATOR), SINGLE, IN SITU: ICD-10-CM

## 2019-11-19 DIAGNOSIS — I42.9 CARDIOMYOPATHY, UNSPECIFIED TYPE (H): ICD-10-CM

## 2019-12-03 ENCOUNTER — COMMUNICATION - HEALTHEAST (OUTPATIENT)
Dept: ANTICOAGULATION | Facility: CLINIC | Age: 69
End: 2019-12-03

## 2019-12-03 DIAGNOSIS — I48.20 CHRONIC ATRIAL FIBRILLATION (H): ICD-10-CM

## 2019-12-06 ENCOUNTER — AMBULATORY - HEALTHEAST (OUTPATIENT)
Dept: CARDIOLOGY | Facility: CLINIC | Age: 69
End: 2019-12-06

## 2019-12-06 ENCOUNTER — COMMUNICATION - HEALTHEAST (OUTPATIENT)
Dept: ANTICOAGULATION | Facility: CLINIC | Age: 69
End: 2019-12-06

## 2019-12-06 DIAGNOSIS — I48.20 CHRONIC ATRIAL FIBRILLATION (H): ICD-10-CM

## 2019-12-06 LAB — POC INR - HE - HISTORICAL: 1.7 (ref 0.9–1.1)

## 2019-12-14 ENCOUNTER — COMMUNICATION - HEALTHEAST (OUTPATIENT)
Dept: CARDIOLOGY | Facility: CLINIC | Age: 69
End: 2019-12-14

## 2019-12-14 DIAGNOSIS — I50.22 CHRONIC SYSTOLIC HEART FAILURE (H): ICD-10-CM

## 2019-12-14 DIAGNOSIS — I42.9 CARDIOMYOPATHY (H): ICD-10-CM

## 2019-12-14 DIAGNOSIS — I42.0 CARDIOMYOPATHY, DILATED, NONISCHEMIC (H): ICD-10-CM

## 2019-12-18 ENCOUNTER — RECORDS - HEALTHEAST (OUTPATIENT)
Dept: LAB | Facility: CLINIC | Age: 69
End: 2019-12-18

## 2019-12-18 LAB
ANION GAP SERPL CALCULATED.3IONS-SCNC: 9 MMOL/L (ref 5–18)
BUN SERPL-MCNC: 20 MG/DL (ref 8–22)
CALCIUM SERPL-MCNC: 9.2 MG/DL (ref 8.5–10.5)
CHLORIDE BLD-SCNC: 101 MMOL/L (ref 98–107)
CO2 SERPL-SCNC: 25 MMOL/L (ref 22–31)
CREAT SERPL-MCNC: 1.09 MG/DL (ref 0.7–1.3)
GFR SERPL CREATININE-BSD FRML MDRD: >60 ML/MIN/1.73M2
GLUCOSE BLD-MCNC: 120 MG/DL (ref 70–125)
POTASSIUM BLD-SCNC: 4.6 MMOL/L (ref 3.5–5)
SODIUM SERPL-SCNC: 135 MMOL/L (ref 136–145)

## 2019-12-19 ENCOUNTER — COMMUNICATION - HEALTHEAST (OUTPATIENT)
Dept: CARDIOLOGY | Facility: CLINIC | Age: 69
End: 2019-12-19

## 2019-12-23 ENCOUNTER — COMMUNICATION - HEALTHEAST (OUTPATIENT)
Dept: ANTICOAGULATION | Facility: CLINIC | Age: 69
End: 2019-12-23

## 2019-12-30 ENCOUNTER — COMMUNICATION - HEALTHEAST (OUTPATIENT)
Dept: CARDIOLOGY | Facility: CLINIC | Age: 69
End: 2019-12-30

## 2019-12-30 ENCOUNTER — ANESTHESIA - HEALTHEAST (OUTPATIENT)
Dept: SURGERY | Facility: HOSPITAL | Age: 69
End: 2019-12-30

## 2019-12-30 DIAGNOSIS — I42.0 CARDIOMYOPATHY, DILATED, NONISCHEMIC (H): ICD-10-CM

## 2019-12-30 DIAGNOSIS — I42.9 CARDIOMYOPATHY, UNSPECIFIED TYPE (H): ICD-10-CM

## 2019-12-31 ENCOUNTER — COMMUNICATION - HEALTHEAST (OUTPATIENT)
Dept: ANTICOAGULATION | Facility: CLINIC | Age: 69
End: 2019-12-31

## 2019-12-31 DIAGNOSIS — I48.20 CHRONIC ATRIAL FIBRILLATION (H): ICD-10-CM

## 2020-01-02 ENCOUNTER — HOSPITAL ENCOUNTER (OUTPATIENT)
Dept: CARDIOLOGY | Facility: CLINIC | Age: 70
Discharge: HOME | End: 2020-01-02
Attending: INTERNAL MEDICINE

## 2020-01-02 DIAGNOSIS — I42.0 CARDIOMYOPATHY, DILATED, NONISCHEMIC (H): ICD-10-CM

## 2020-01-02 LAB
AORTIC ROOT: 3.4 CM
AORTIC VALVE MEAN VELOCITY: 105 CM/S
ASCENDING AORTA: 3.4 CM
AV DIMENSIONLESS INDEX VTI: 0.5
AV MEAN GRADIENT: 5 MMHG
AV PEAK GRADIENT: 8.4 MMHG
AV VALVE AREA: 1.7 CM2
AV VELOCITY RATIO: 0.6
BSA FOR ECHO PROCEDURE: 2.29 M2
CV BLOOD PRESSURE: ABNORMAL MMHG
CV ECHO HEIGHT: 70.3 IN
DOP CALC AO PEAK VEL: 145 CM/S
DOP CALC AO VTI: 26.1 CM
DOP CALC LVOT AREA: 3.14 CM2
DOP CALC LVOT DIAMETER: 2 CM
DOP CALC LVOT PEAK VEL: 81.8 CM/S
DOP CALC LVOT STROKE VOLUME: 43.6 CM3
DOP CALCLVOT PEAK VEL VTI: 13.9 CM
ECHO EJECTION FRACTION ESTIMATED: 35 %
EJECTION FRACTION: 57 % (ref 55–75)
FRACTIONAL SHORTENING: 9.4 % (ref 28–44)
INTERVENTRICULAR SEPTUM IN END DIASTOLE: 1.5 CM (ref 0.6–1)
IVS/PW RATIO: 1.5
LA AREA 1: 29.5 CM2
LA AREA 2: 36.1 CM2
LEFT ATRIUM LENGTH: 7.3 CM
LEFT ATRIUM SIZE: 5.5 CM
LEFT ATRIUM VOLUME INDEX: 54.1 ML/M2
LEFT ATRIUM VOLUME: 124 ML
LEFT VENTRICLE CARDIAC INDEX: 1.4 L/MIN/M2
LEFT VENTRICLE CARDIAC OUTPUT: 3.3 L/MIN
LEFT VENTRICLE DIASTOLIC VOLUME INDEX: 56.3 CM3/M2 (ref 34–74)
LEFT VENTRICLE DIASTOLIC VOLUME: 129 CM3 (ref 62–150)
LEFT VENTRICLE HEART RATE: 76 BPM
LEFT VENTRICLE MASS INDEX: 161.1 G/M2
LEFT VENTRICLE SYSTOLIC VOLUME INDEX: 24.5 CM3/M2 (ref 11–31)
LEFT VENTRICLE SYSTOLIC VOLUME: 56 CM3 (ref 21–61)
LEFT VENTRICULAR INTERNAL DIMENSION IN DIASTOLE: 6.4 CM (ref 4.2–5.8)
LEFT VENTRICULAR INTERNAL DIMENSION IN SYSTOLE: 5.8 CM (ref 2.5–4)
LEFT VENTRICULAR MASS: 369 G
LEFT VENTRICULAR OUTFLOW TRACT MEAN GRADIENT: 2 MMHG
LEFT VENTRICULAR OUTFLOW TRACT MEAN VELOCITY: 57.3 CM/S
LEFT VENTRICULAR OUTFLOW TRACT PEAK GRADIENT: 3 MMHG
LEFT VENTRICULAR POSTERIOR WALL IN END DIASTOLE: 1 CM (ref 0.6–1)
LV STROKE VOLUME INDEX: 19.1 ML/M2
MV AVERAGE E/E' RATIO: 8.8 CM/S
MV DECELERATION TIME: 292 MS
MV E'TISSUE VEL-LAT: 10.1 CM/S
MV E'TISSUE VEL-MED: 6.35 CM/S
MV LATERAL E/E' RATIO: 7.2
MV MEDIAL E/E' RATIO: 11.4
MV PEAK E VELOCITY: 72.7 CM/S
NUC REST SYSTOLIC VOLUME INDEX: 70.25 IN
PR MAX PG: 9 MMHG
PR PEAK VELOCITY: 149 CM/S
TRICUSPID REGURGITATION PEAK PRESSURE GRADIENT: 20.3 MMHG
TRICUSPID VALVE ANULAR PLANE SYSTOLIC EXCURSION: 1.5 CM
TRICUSPID VALVE PEAK REGURGITANT VELOCITY: 225 CM/S

## 2020-01-03 ENCOUNTER — SURGERY - HEALTHEAST (OUTPATIENT)
Dept: SURGERY | Facility: HOSPITAL | Age: 70
End: 2020-01-03

## 2020-01-06 ENCOUNTER — COMMUNICATION - HEALTHEAST (OUTPATIENT)
Dept: ANTICOAGULATION | Facility: CLINIC | Age: 70
End: 2020-01-06

## 2020-01-06 DIAGNOSIS — I48.20 CHRONIC ATRIAL FIBRILLATION (H): ICD-10-CM

## 2020-01-09 ENCOUNTER — AMBULATORY - HEALTHEAST (OUTPATIENT)
Dept: CARDIOLOGY | Facility: CLINIC | Age: 70
End: 2020-01-09

## 2020-01-09 ENCOUNTER — COMMUNICATION - HEALTHEAST (OUTPATIENT)
Dept: ANTICOAGULATION | Facility: CLINIC | Age: 70
End: 2020-01-09

## 2020-01-09 DIAGNOSIS — I48.20 CHRONIC ATRIAL FIBRILLATION (H): ICD-10-CM

## 2020-01-09 LAB — POC INR - HE - HISTORICAL: 1.7 (ref 0.9–1.1)

## 2020-01-22 ENCOUNTER — COMMUNICATION - HEALTHEAST (OUTPATIENT)
Dept: ANTICOAGULATION | Facility: CLINIC | Age: 70
End: 2020-01-22

## 2020-01-22 ENCOUNTER — AMBULATORY - HEALTHEAST (OUTPATIENT)
Dept: CARDIOLOGY | Facility: CLINIC | Age: 70
End: 2020-01-22

## 2020-01-22 DIAGNOSIS — I48.20 CHRONIC ATRIAL FIBRILLATION (H): ICD-10-CM

## 2020-01-22 LAB — POC INR - HE - HISTORICAL: 3 (ref 0.9–1.1)

## 2020-02-11 ENCOUNTER — AMBULATORY - HEALTHEAST (OUTPATIENT)
Dept: CARDIOLOGY | Facility: CLINIC | Age: 70
End: 2020-02-11

## 2020-02-11 ENCOUNTER — COMMUNICATION - HEALTHEAST (OUTPATIENT)
Dept: ANTICOAGULATION | Facility: CLINIC | Age: 70
End: 2020-02-11

## 2020-02-11 DIAGNOSIS — I48.20 CHRONIC ATRIAL FIBRILLATION (H): ICD-10-CM

## 2020-02-11 LAB — POC INR - HE - HISTORICAL: 3.2 (ref 0.9–1.1)

## 2020-02-19 ENCOUNTER — AMBULATORY - HEALTHEAST (OUTPATIENT)
Dept: CARDIOLOGY | Facility: CLINIC | Age: 70
End: 2020-02-19

## 2020-02-19 DIAGNOSIS — I42.9 CARDIOMYOPATHY, UNSPECIFIED TYPE (H): ICD-10-CM

## 2020-02-19 DIAGNOSIS — Z95.810 ICD (IMPLANTABLE CARDIOVERTER-DEFIBRILLATOR), SINGLE, IN SITU: ICD-10-CM

## 2020-02-25 ENCOUNTER — COMMUNICATION - HEALTHEAST (OUTPATIENT)
Dept: ANTICOAGULATION | Facility: CLINIC | Age: 70
End: 2020-02-25

## 2020-02-25 ENCOUNTER — AMBULATORY - HEALTHEAST (OUTPATIENT)
Dept: CARDIOLOGY | Facility: CLINIC | Age: 70
End: 2020-02-25

## 2020-02-25 DIAGNOSIS — I48.20 CHRONIC ATRIAL FIBRILLATION (H): ICD-10-CM

## 2020-02-25 LAB — POC INR - HE - HISTORICAL: 2.4 (ref 0.9–1.1)

## 2020-02-26 ENCOUNTER — COMMUNICATION - HEALTHEAST (OUTPATIENT)
Dept: NEUROSURGERY | Facility: CLINIC | Age: 70
End: 2020-02-26

## 2020-02-26 DIAGNOSIS — M54.9 BACK PAIN: ICD-10-CM

## 2020-03-02 ENCOUNTER — HOSPITAL ENCOUNTER (OUTPATIENT)
Dept: RADIOLOGY | Facility: HOSPITAL | Age: 70
Discharge: HOME | End: 2020-03-02
Attending: SURGERY

## 2020-03-02 DIAGNOSIS — M54.9 BACK PAIN: ICD-10-CM

## 2020-03-06 ENCOUNTER — OFFICE VISIT - HEALTHEAST (OUTPATIENT)
Dept: NEUROSURGERY | Facility: CLINIC | Age: 70
End: 2020-03-06

## 2020-03-06 DIAGNOSIS — R52 PAIN: ICD-10-CM

## 2020-03-06 ASSESSMENT — MIFFLIN-ST. JEOR: SCORE: 1862.92

## 2020-03-11 ENCOUNTER — AMBULATORY - HEALTHEAST (OUTPATIENT)
Dept: CARDIOLOGY | Facility: CLINIC | Age: 70
End: 2020-03-11

## 2020-03-11 DIAGNOSIS — I42.0 CARDIOMYOPATHY, DILATED, NONISCHEMIC (H): ICD-10-CM

## 2020-03-11 DIAGNOSIS — Z95.810 ICD (IMPLANTABLE CARDIOVERTER-DEFIBRILLATOR), SINGLE, IN SITU: ICD-10-CM

## 2020-03-13 ENCOUNTER — AMBULATORY - HEALTHEAST (OUTPATIENT)
Dept: CARDIOLOGY | Facility: CLINIC | Age: 70
End: 2020-03-13

## 2020-03-13 ENCOUNTER — COMMUNICATION - HEALTHEAST (OUTPATIENT)
Dept: ANTICOAGULATION | Facility: CLINIC | Age: 70
End: 2020-03-13

## 2020-03-13 DIAGNOSIS — I48.20 CHRONIC ATRIAL FIBRILLATION (H): ICD-10-CM

## 2020-03-13 LAB — POC INR - HE - HISTORICAL: 2.7 (ref 0.9–1.1)

## 2020-04-16 ENCOUNTER — COMMUNICATION - HEALTHEAST (OUTPATIENT)
Dept: CARDIOLOGY | Facility: CLINIC | Age: 70
End: 2020-04-16

## 2020-04-17 ENCOUNTER — COMMUNICATION - HEALTHEAST (OUTPATIENT)
Dept: ANTICOAGULATION | Facility: CLINIC | Age: 70
End: 2020-04-17

## 2020-04-17 ENCOUNTER — AMBULATORY - HEALTHEAST (OUTPATIENT)
Dept: CARDIOLOGY | Facility: CLINIC | Age: 70
End: 2020-04-17

## 2020-04-17 DIAGNOSIS — I48.20 CHRONIC ATRIAL FIBRILLATION (H): ICD-10-CM

## 2020-04-17 LAB — POC INR - HE - HISTORICAL: 3.4 (ref 0.9–1.1)

## 2020-04-27 ENCOUNTER — COMMUNICATION - HEALTHEAST (OUTPATIENT)
Dept: CARDIOLOGY | Facility: CLINIC | Age: 70
End: 2020-04-27

## 2020-04-27 DIAGNOSIS — I48.20 CHRONIC ATRIAL FIBRILLATION (H): ICD-10-CM

## 2020-05-11 ENCOUNTER — COMMUNICATION - HEALTHEAST (OUTPATIENT)
Dept: CARDIOLOGY | Facility: CLINIC | Age: 70
End: 2020-05-11

## 2020-05-12 ENCOUNTER — COMMUNICATION - HEALTHEAST (OUTPATIENT)
Dept: ANTICOAGULATION | Facility: CLINIC | Age: 70
End: 2020-05-12

## 2020-05-12 ENCOUNTER — AMBULATORY - HEALTHEAST (OUTPATIENT)
Dept: CARDIOLOGY | Facility: CLINIC | Age: 70
End: 2020-05-12

## 2020-05-12 DIAGNOSIS — I48.20 CHRONIC ATRIAL FIBRILLATION (H): ICD-10-CM

## 2020-05-12 LAB — POC INR - HE - HISTORICAL: 3.5 (ref 0.9–1.1)

## 2020-05-20 ENCOUNTER — AMBULATORY - HEALTHEAST (OUTPATIENT)
Dept: CARDIOLOGY | Facility: CLINIC | Age: 70
End: 2020-05-20

## 2020-05-20 DIAGNOSIS — Z95.810 ICD (IMPLANTABLE CARDIOVERTER-DEFIBRILLATOR), SINGLE, IN SITU: ICD-10-CM

## 2020-05-20 DIAGNOSIS — I42.0 CARDIOMYOPATHY, DILATED, NONISCHEMIC (H): ICD-10-CM

## 2020-05-29 ENCOUNTER — AMBULATORY - HEALTHEAST (OUTPATIENT)
Dept: CARDIOLOGY | Facility: CLINIC | Age: 70
End: 2020-05-29

## 2020-05-29 ENCOUNTER — COMMUNICATION - HEALTHEAST (OUTPATIENT)
Dept: ANTICOAGULATION | Facility: CLINIC | Age: 70
End: 2020-05-29

## 2020-05-29 DIAGNOSIS — I48.20 CHRONIC ATRIAL FIBRILLATION (H): ICD-10-CM

## 2020-05-29 LAB — POC INR - HE - HISTORICAL: 1.5 (ref 0.9–1.1)

## 2020-06-22 ENCOUNTER — COMMUNICATION - HEALTHEAST (OUTPATIENT)
Dept: ANTICOAGULATION | Facility: CLINIC | Age: 70
End: 2020-06-22

## 2020-06-24 ENCOUNTER — COMMUNICATION - HEALTHEAST (OUTPATIENT)
Dept: CARDIOLOGY | Facility: CLINIC | Age: 70
End: 2020-06-24

## 2020-06-24 DIAGNOSIS — I48.20 CHRONIC ATRIAL FIBRILLATION (H): ICD-10-CM

## 2020-06-24 DIAGNOSIS — I50.23 ACUTE ON CHRONIC SYSTOLIC HEART FAILURE (H): ICD-10-CM

## 2020-06-25 ENCOUNTER — AMBULATORY - HEALTHEAST (OUTPATIENT)
Dept: CARDIOLOGY | Facility: CLINIC | Age: 70
End: 2020-06-25

## 2020-06-25 ENCOUNTER — COMMUNICATION - HEALTHEAST (OUTPATIENT)
Dept: ANTICOAGULATION | Facility: CLINIC | Age: 70
End: 2020-06-25

## 2020-06-25 DIAGNOSIS — I48.20 CHRONIC ATRIAL FIBRILLATION (H): ICD-10-CM

## 2020-06-25 LAB — POC INR - HE - HISTORICAL: 3.3 (ref 0.9–1.1)

## 2020-07-09 ENCOUNTER — COMMUNICATION - HEALTHEAST (OUTPATIENT)
Dept: CARDIOLOGY | Facility: CLINIC | Age: 70
End: 2020-07-09

## 2020-07-13 ENCOUNTER — COMMUNICATION - HEALTHEAST (OUTPATIENT)
Dept: CARDIOLOGY | Facility: CLINIC | Age: 70
End: 2020-07-13

## 2020-07-14 ENCOUNTER — AMBULATORY - HEALTHEAST (OUTPATIENT)
Dept: CARDIOLOGY | Facility: CLINIC | Age: 70
End: 2020-07-14

## 2020-07-14 ENCOUNTER — COMMUNICATION - HEALTHEAST (OUTPATIENT)
Dept: ANTICOAGULATION | Facility: CLINIC | Age: 70
End: 2020-07-14

## 2020-07-14 DIAGNOSIS — I48.20 CHRONIC ATRIAL FIBRILLATION (H): ICD-10-CM

## 2020-07-14 LAB — POC INR - HE - HISTORICAL: 3.3 (ref 0.9–1.1)

## 2020-07-29 ENCOUNTER — COMMUNICATION - HEALTHEAST (OUTPATIENT)
Dept: CARDIOLOGY | Facility: CLINIC | Age: 70
End: 2020-07-29

## 2020-07-30 ENCOUNTER — COMMUNICATION - HEALTHEAST (OUTPATIENT)
Dept: ANTICOAGULATION | Facility: CLINIC | Age: 70
End: 2020-07-30

## 2020-07-30 ENCOUNTER — AMBULATORY - HEALTHEAST (OUTPATIENT)
Dept: CARDIOLOGY | Facility: CLINIC | Age: 70
End: 2020-07-30

## 2020-07-30 DIAGNOSIS — I48.20 CHRONIC ATRIAL FIBRILLATION (H): ICD-10-CM

## 2020-07-30 DIAGNOSIS — Z79.01 LONG TERM CURRENT USE OF ANTICOAGULANT THERAPY: ICD-10-CM

## 2020-07-30 LAB — POC INR - HE - HISTORICAL: 3 (ref 0.9–1.1)

## 2020-07-31 ENCOUNTER — OFFICE VISIT (OUTPATIENT)
Dept: NEUROLOGY | Facility: CLINIC | Age: 70
End: 2020-07-31
Payer: MEDICARE

## 2020-07-31 VITALS
BODY MASS INDEX: 31.81 KG/M2 | HEART RATE: 88 BPM | DIASTOLIC BLOOD PRESSURE: 76 MMHG | WEIGHT: 240 LBS | HEIGHT: 73 IN | SYSTOLIC BLOOD PRESSURE: 113 MMHG

## 2020-07-31 DIAGNOSIS — R20.9 SENSORY DISORDER: ICD-10-CM

## 2020-07-31 DIAGNOSIS — G56.03 BILATERAL CARPAL TUNNEL SYNDROME: Primary | ICD-10-CM

## 2020-07-31 DIAGNOSIS — R25.1 TREMOR: ICD-10-CM

## 2020-07-31 DIAGNOSIS — G60.9 IDIOPATHIC PERIPHERAL NEUROPATHY: ICD-10-CM

## 2020-07-31 DIAGNOSIS — M54.16 LUMBAR RADICULOPATHY: ICD-10-CM

## 2020-07-31 PROCEDURE — 99204 OFFICE O/P NEW MOD 45 MIN: CPT | Performed by: PSYCHIATRY & NEUROLOGY

## 2020-07-31 PROCEDURE — 95886 MUSC TEST DONE W/N TEST COMP: CPT | Mod: RT | Performed by: PSYCHIATRY & NEUROLOGY

## 2020-07-31 PROCEDURE — 95913 NRV CNDJ TEST 13/> STUDIES: CPT | Performed by: PSYCHIATRY & NEUROLOGY

## 2020-07-31 RX ORDER — METOPROLOL SUCCINATE 100 MG/1
200 TABLET, EXTENDED RELEASE ORAL DAILY
COMMUNITY
Start: 2018-12-11 | End: 2021-11-16

## 2020-07-31 RX ORDER — ROSUVASTATIN CALCIUM 20 MG/1
20 TABLET, COATED ORAL DAILY
COMMUNITY
Start: 2019-10-14 | End: 2021-07-28

## 2020-07-31 RX ORDER — MULTIVIT-MIN/IRON FUM/FOLIC AC 7.5 MG-4
1 TABLET ORAL
COMMUNITY
End: 2021-08-25

## 2020-07-31 RX ORDER — ZOLPIDEM TARTRATE 5 MG/1
5-10 TABLET ORAL
COMMUNITY
Start: 2020-07-08 | End: 2022-08-31

## 2020-07-31 RX ORDER — GABAPENTIN 300 MG/1
600 CAPSULE ORAL AT BEDTIME
COMMUNITY
Start: 2020-07-23 | End: 2020-11-24

## 2020-07-31 RX ORDER — ACETAMINOPHEN 500 MG
TABLET ORAL PRN
COMMUNITY

## 2020-07-31 RX ORDER — WARFARIN SODIUM 5 MG/1
TABLET ORAL
COMMUNITY
Start: 2020-06-25 | End: 2022-02-17

## 2020-07-31 RX ORDER — SPIRONOLACTONE 25 MG/1
12.5 TABLET ORAL DAILY
COMMUNITY
Start: 2020-06-25 | End: 2021-09-09

## 2020-07-31 RX ORDER — FUROSEMIDE 40 MG
20 TABLET ORAL DAILY
COMMUNITY
Start: 2019-09-23 | End: 2021-08-09

## 2020-07-31 RX ORDER — LISINOPRIL 20 MG/1
20 TABLET ORAL DAILY
COMMUNITY
Start: 2019-12-16 | End: 2021-07-26

## 2020-07-31 ASSESSMENT — MIFFLIN-ST. JEOR: SCORE: 1902.51

## 2020-07-31 NOTE — PROCEDURES
[facility]    (Formerly) [facility], .[address], [address]  [place] [address]  Tel: [phone]  Fax: [phone]          Full Name: Zach Garcia Gender: Male  Patient ID: [id number] YOB: 1950      Visit Date: 7/31/2020 12:59  Age: 70 Years 4 Months Old  Interpreted By: Vaughn Leonard MD   Ref Dr.: Tyrone Patel MD  Tech: ST   EDX Exam: EMG   Height: 6 feet 1 inch  Reason for referral: Evaluate bilateral uppers/bilateral lowers. c/o pain, numbness, tingling in both arms/hands/fingers > 1 year. Left > Right. Balance difficulty, weakness, pain in both legs > 1 year. h/o neck surgery. lumbar surgery x 3.      Motor NCS      Nerve / Sites Lat Amp Dist Nils    ms mV cm m/s   L Median - APB      Wrist 5.21 10.9 7       Elbow 10.00 10.2 26 54   R Median - APB      Wrist 5.89 13.5 7       Elbow 10.73 12.5 27 56   L Ulnar - ADM      Wrist 3.28 14.1 7       B.Elbow 7.76 14.1 23 51      A.Elbow 9.48 13.6 10 58   L Peroneal - EDB      Ankle 3.80 4.8 9       Fib head 11.88 4.8 33 41      Pop fossa 13.96 4.7 10 48   R Peroneal - EDB      Ankle 4.64 1.2 9       Fib head 12.92 1.1 34 41      Pop fossa 15.31 1.2 10 42   L Tibial - AH      Ankle 4.64 8.1 10       Pop fossa 14.64 7.8 43 43   R Tibial - AH      Ankle 4.90 8.7 10       Pop fossa 14.79 7.9 42 42       F  Wave      Nerve Fmin    ms   L Ulnar - ADM 31.56   L Tibial - AH 59.69   R Tibial - AH 58.13       Sensory NCS      Nerve / Sites Onset Lat Pk Lat PP Amp Dist    ms ms  V cm   L Median - II (Antidr)      Wrist 3.07 3.96 34.7 13   R Median - II (Antidr)      Wrist 4.01 5.10 6.3 13   L Ulnar - V (Antidr)      Wrist 2.08 2.86 22.6 11   L Median, Ulnar - Transcarpal comparison      Median Palm 2.66 3.54 18.2 8      Ulnar Palm 1.35 1.82 54.0 8   R Median, Ulnar - Transcarpal comparison      Median Palm 2.81 3.70 5.3 8      Ulnar Palm 1.56 2.03 23.5 8   L Sural - Ankle (Calf)      Calf  3.07 3.96 10.2 14   R Sural - Ankle (Calf)      Calf 3.70 4.22 5.0 14   L Superficial peroneal - Ankle      Lat leg 3.02 3.96 6.9 12   R Superficial peroneal - Ankle      Lat leg 3.02 3.75 6.4 12       EMG Summary Table     Spontaneous MUAP Rcmt Note   Muscle Fib PSW Fasc IA # Amp Dur PPP Rate Type   L. Brachioradialis None None None N N N N N N N   L. Pronator teres None None None N N N N N N N   L. Biceps brachii None None None N N N N N N N   L. Deltoid None None None N N N N N N N   L. Flexor carpi ulnaris None None None N N N N N N N   L. Triceps brachii None None None N N N N N N N   L. First dorsal interosseous None None None N N N N N N N   L. Abductor pollicis brevis None None None N N N N N N N   R. Brachioradialis None None None N N N N N N N   R. Pronator teres None None None N N N N N N N   R. Biceps brachii None None None N N N N N N N   R. Deltoid None None None N N N N N N N   R. Triceps brachii None None None N N N N N N N   R. First dorsal interosseous None None None N N N N N N N   R. Abductor pollicis brevis None None None N Sl Red Incr Incr N N N   L. Gluteus medius None None None N N N N N N N   L. Gluteus sonya None None None N N N N N N N   L. Biceps femoris (long head) None None None N N N N N N N   L. Quadriceps None None None N N N N N N N   L. Peroneus longus None None None N Sl Red Incr Incr N N N   L. Gastrocnemius (Medial head) None None None N N N N N N N   L. Gastrocnemius (Lateral head) None None None N N N N N N N   L. Tibialis anterior None None None N Sl Red Incr Incr N N N   L. Tibialis posterior None None None N N N N N N N   L. Adductor bro[place]lyn None None None N N N N N N N   R. Quadriceps None None None N N N N N N N   R. Adductor bro[place] None None None N N N N N N N   R. Gastrocnemius (Medial head) None None None N N N N N N N   R. Peroneus longus None None None N Sl Red Incr Incr N N N   R. Tibialis anterior None None None N Sl Red Incr Incr N N N   R. Tibialis posterior  None None None N N N N N N N     EMG report  Patient name: Zach Garcia  Medical record number: [id number]  YOB: 1950  Date of service: 7/31/2020  Referring doctor: Tyrone Patel MD  Primary CARE provider: Hany Toledo MD  EMG interpreted by: Vaughn Leonrad MD    REASON FOR STUDY: 70-year-old male with numbness and tingling in both arms and legs.  Balance problems.  Status post cervical laminoplasty C3-C7.  Status post lumbar spine surgery x3.    SENSORY NCS: Right median sensory nerve conduction study demonstrates a prolonged distal latency, with a decrease in amplitude noted.  Left median sensory nerve conduction study demonstrates a prolonged distal latency, with normal amplitude.  Left ulnar sensory nerve conduction study demonstrates a normal distal latency and amplitude.  Bilateral median-ulnar palmar studies demonstrate prolongation of the median palmar response as compared to the ulnar palmar response.  Left sural, and bilateral superficial peroneal sensory nerve conduction studies demonstrate normal distal latencies and amplitudes.  Right sural sensory nerve conduction study demonstrates mild prolongation of the distal latency, with a mild decrease in amplitude noted.    MOTOR NCS: Bilateral median motor nerve conduction studies demonstrate prolonged distal latencies, right greater than left, with normal amplitudes and nerve conduction velocities.  Left ulnar, left peroneal, and bilateral tibial motor nerve conduction studies demonstrate normal distal latencies, amplitudes and nerve conduction velocities.  Right peroneal motor nerve conduction study demonstrates a normal distal latency, with a decrease in amplitude noted, with normal nerve conduction velocities.  Left ulnar F wave is within normal limits.  Bilateral tibial F waves demonstrate mild prolongation.    NEEDLE EXAMINATION: Needle examination of selected muscles of both upper extremities and lower extremities was  performed.  No spontaneous activity was seen.  Motor units were of increased amplitude and duration in the right abductor pollicis brevis, where there was a mild reduction in recruitment noted.  Bilateral peroneus longus and tibialis anterior motor unit potentials were of increased amplitude and duration, with a mild reduction in recruitment being noted.  All other muscles tested demonstrated motor units of normal amplitude, duration and recruitment.    IMPRESSION: 1.  There is electrical evidence of a median neuropathy at the wrist bilaterally, moderate in severity electrically.  2.  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  3.  There are chronic reinnervation changes noted in left peroneal innervated muscles.  This is nonlocalizing, but could be seen with a chronic L5 radiculopathy or peroneal neuropathy.  4.  No electrical evidence of a large fiber neuropathy was seen on this exam.  This study does not exclude the possibility of a small fiber neuropathy.    Vaughn Leonard MD

## 2020-07-31 NOTE — PATIENT INSTRUCTIONS
Your EMG showed a median neuropathy at the wrist bilaterally (carpal tunnel syndrome).  This is moderate in severity.  I would first try using the wrist splints at that time, though if persistent problem surgery could be considered.  Your lower extremity EMG showed chronic changes that are likely related to your prior lumbar spine surgery.  No clear evidence of a large fiber neuropathy, but the decrease position and vibratory sense along with absent ankle reflexes would suggest that possibility we will check blood work for treatable causes of that possibility.  I did put an order in to check an MRI of the brain without contrast this would be is to make sure there was no evidence of cerebellar stroke that occurred after the cervical laminoplasty; I think that is less likely and the tremor is likely an essential tremor.  Check with Dr. Patel before proceeding with the MRI scan of the brain, as from review of his note, he had indicated he wanted to do further MRI scanning of the ear lumbar cervical and thoracic spine.

## 2020-07-31 NOTE — LETTER
7/31/2020         RE: Zach Garcia  [address] [place] [address]        Dear Colleague,    Thank you for referring your patient, Zach Garcia, to the [facility] NEUROLOGY [place]. Please see a copy of my visit note below.        Zach Garcia  Age:70 year old  MRN [id number]  Referring doctor: Tyrone Patel  Primary care provider Hany Toledo MD    Consult requested by: Tyrone Patel MD  Regarding: Discomfort and altered feeling in arms and legs.    Allergies: Atorvastatin and Iodine  Medications:  Current Outpatient Medications   Medication Sig Dispense Refill     acetaminophen (GOODSENSE PAIN RELIEF EXTRA ST) 500 MG tablet as needed       ASPIRIN 81 PO daily       furosemide (LASIX) 40 MG tablet 20 mg daily       gabapentin (NEURONTIN) 300 MG capsule 600 mg At Bedtime       lisinopril (ZESTRIL) 20 MG tablet Take 20 mg by mouth daily       metoprolol succinate ER (TOPROL-XL) 100 MG 24 hr tablet Take 200 mg by mouth daily       multivitamins w/minerals tablet Take 1 tablet by mouth       rosuvastatin (CRESTOR) 20 MG tablet 20 mg daily       spironolactone (ALDACTONE) 25 MG tablet 12.5 mg daily       warfarin ANTICOAGULANT (COUMADIN) 5 MG tablet Taking 5 mg 6 days per week and 2.5 mg 1 day per week.       zolpidem (AMBIEN) 5 MG tablet 5-10 mg At Bedtime         History of Present Illness: Patient seen at the request of Dr. Patel.  Patient has a recent history of alteration of feeling in his arms and in his legs.  He had undergone a cervical laminoplasty from C3-C7 on 4/25/2018 for multilevel cervical spinal stenosis.  This did improve the pain that he was having.  He did have some left shoulder weakness immediately after the surgery.  He says that his pain was better but he started to notice more discomfort going down his arm starting 6 to 7 months afterwards.  He also would notice numbness in his hands and points to the palm of the left hand and particularly the index and  middle finger of the right hand.  This is particularly problematic at night.  He would feel tingling in his feet.  His legs just felt weak as if they were carrying a 500 pound weight.  His balance is also been poor.  He has not had any headache problems.  Has not had any loss of vision or double vision.  No speech or swallowing difficulties.  He did have a prior holes placed for removal of a subdural hematoma in 2015.  He had postoperative MRI scanning done of the cervical spine on 10/4/2019 that showed moderate cervical spondylosis from C3-C7.  There was no spinal stenosis noted.  He had severe right and moderate left foraminal narrowing at C5-6 and C6-7.  There was moderate right and mild left foraminal stenosis C4-5.  There is moderate bilateral foraminal stenosis at C3-4 and C7-T1.  I did review his preop and postop images.  He is also had lumbar spine surgery on 3 occasions with laminectomies having been done, x2 by Dr. Elizondo and 1 by Dr. Wiley.  Does not notice much in the way of pain shooting down the legs.  He did have difficulty with walking whereas before he could just walk 5 minutes time and now is up to 15 minutes time.  No history of diabetes by his report.  He had been seen by Dr. Patel in March of this year who had recommended EMG testing with plans to do MRI imagery of his cervical through lumbar spine.        PAST ILLNESSES:   Medical:  Medical History    Medical History Date Comments   Subdural hematoma (H) 05/02/2015 due to trauma   Chronic atrial fibrillation (H)   2 cardioversions in 2014   Essential hypertension with goal blood pressure less than 130/80       Hyperlipidemia       Cardiomyopathy (H)       Arrhythmia   atrial fibrillation   Congestive heart failure (H)   systolic heart failure   Atrial fibrillation (H)       COPD (chronic obstructive pulmonary disease) (H)   pt unsure, but recent told he may have this by MD and started on an inhaler   Kidney stones   per pt, has had in the  past. none recently   Coronary artery disease       Anxiety       Insomnia       Ankle fracture   wore boot- right    Lung nodule       Cervical stenosis of spine       Pulmonary hypertension (H)       ICD (implantable cardioverter-defibrillator) in place 02/2017 [place] [facility]   Chronic kidney disease       Shortness of breath   resolved per pt. report       SOCIAL:   Social History     Socioeconomic History     Marital status:      Spouse name: Not on file     Number of children: Not on file     Years of education: Not on file     Highest education level: Not on file   Occupational History     Not on file   Social Needs     Financial resource strain: Not on file     Food insecurity     Worry: Not on file     Inability: Not on file     Transportation needs     Medical: Not on file     Non-medical: Not on file   Tobacco Use     Smoking status: Former Smoker     Smokeless tobacco: Never Used     Tobacco comment: Quit 2014   Substance and Sexual Activity     Alcohol use: Yes     Drug use: Not Currently     Sexual activity: Not on file   Lifestyle     Physical activity     Days per week: Not on file     Minutes per session: Not on file     Stress: Not on file   Relationships     Social connections     Talks on phone: Not on file     Gets together: Not on file     Attends Yazidi service: Not on file     Active member of club or organization: Not on file     Attends meetings of clubs or organizations: Not on file     Relationship status: Not on file     Intimate partner violence     Fear of current or ex partner: Not on file     Emotionally abused: Not on file     Physically abused: Not on file     Forced sexual activity: Not on file   Other Topics Concern     Parent/sibling w/ CABG, MI or angioplasty before 65F 55M? Not Asked   Social History Narrative     Not on file     Employment: Had worked in manufacturing, now retired    FAMILY HISTORY:  Family History   Problem Relation Age of Onset     Heart Disease Mother       Heart Disease Father      Parents: Father  at age 89 of congestive heart failure.  Mother recently  at age 91 of heart disease.  Siblings:   Children: 1 son and 1 daughter.                        REVIEW OF SYSTEMS  Constitutional: No fever chills weight loss weight gain  Skin: No rash  HEENT: No loss of vision, double vision, hearing loss, vertigo or tinnitus  Respiratory: Occasional shortness of breath  Cardiovascular: No recent chest pain or rapid heart rate  Gastrointestinal: No abdominal pain, blood in the stool black tarry stools diarrhea constipation  Genitourinary: Negative incontinence  Musculoskeletal: Positive discomfort in arms and legs.  Neurologic: See HPI  Psychiatric: Positive anxiety  Hematologic/Lymphatic/Immunologic: Negative  Endocrine: No polydipsia/polyuria        PHYSICAL EXAMINATION:    General appearance: No acute distress.  Skin: Mild rubor of upper extremities  HEENT: Extraocular movements full.  Visual fields full.  Hearing decreased to 2 inches away bilaterally to finger rubbing.  Pupils equal round reactive to light.  Optic Fundi: Optic discs normal  Neck: No bruits.     Heart: Normal S1-S2  Peripheral pulses: Dorsalis pedis pulse 2/4      NEUROLOGIC EXAMINATION:  Oriented x3.  Speech is fluent.  Normal naming, repetition and comprehension.  Can tell me the name of the president and the governor.  Can spell world forward and backwards.  [place] 3/3 objects.  Remembered 2/3 objects at 10 minutes time; 2/3 with prompting discs flat pupils equal round reactive to light.  Extraocular movements full.  Visual fields full.  Hearing 2 inches away bilaterally.  Facial sensation/movement normal palate moves symmetrically.  Tongue midline.  Sternocleidomastoid/trapezius normal.  Motor exam 5/5 strength with exception of his right APB at 5-/5.  Positive Tinel at the wrist bilaterally.  DTRs 2/4 upper extremities 2/4 knees absent ankle reflexes mild decrease position sense.  Vibration  sense decreased to 3-4 seconds bilaterally at the ankle.  Dorsalis pedis pulse 2/4.  Sensation appeared normal to light touch.  No sensory level.  Positive positional tremor with action tremor noted bilaterally.  Tone normal.  Gait normal base      IMPRESSION: 1.  Carpal tunnel syndrome: He does have evidence of carpal tunnel syndrome on EMG study done today with weakness of his abductor pollicis brevis on the right.  We will have him use wrist splints at night.  We discussed he may need decompression at the wrist if symptoms persist.  2.  Alteration of feeling in legs.  No evidence of a large fiber neuropathy though he has absent ankle reflexes and does have a decrease of position and vibratory sense.  We will check labs for treatable causes of neuropathy.  This will include B12, TSH, serum protein electrophoresis, Lyme's test, ROLANDO, SSA/SSB and rheumatoid factor.  Glucose had been normal at 120 when checked 7 months ago.  3.  Tremor-most likely this is essential tremor.  No family history of tremor.  He describes the symptoms as coming on fairly shortly after the cervical laminoplasty.  I did put an order into do an MRI of the brain without contrast he does have a defibrillator but has had prior MRIs.  I suggested he contact Dr. Patel, as Dr. Patel had intended to do MRI scanning of the spinal axis and we could see if we could do this all at one time.  4.  History lumbar spine surgery.  He does have chronic reinnervation changes in the tibialis anterior and peroneus longus bilaterally.  Most likely these are residual changes from a prior L5 radiculopathy.  Time of patient: 50 minutes, greater than 50% of which was counseling/coordination of care, (35 minutes), outside of the time of the EMG study.        Vaughn Leonard MD      Again, thank you for allowing me to participate in the care of your patient.        Sincerely,        Vaughn Leonard MD, MD

## 2020-07-31 NOTE — PROGRESS NOTES
Zach Garcia  Age:70 year old  MRN [id number]  Referring doctor: Tyrone Patel  Primary care provider Hany Toledo MD    Consult requested by: Tyrone Patel MD  Regarding: Discomfort and altered feeling in arms and legs.    Allergies: Atorvastatin and Iodine  Medications:  Current Outpatient Medications   Medication Sig Dispense Refill     acetaminophen (GOODSENSE PAIN RELIEF EXTRA ST) 500 MG tablet as needed       ASPIRIN 81 PO daily       furosemide (LASIX) 40 MG tablet 20 mg daily       gabapentin (NEURONTIN) 300 MG capsule 600 mg At Bedtime       lisinopril (ZESTRIL) 20 MG tablet Take 20 mg by mouth daily       metoprolol succinate ER (TOPROL-XL) 100 MG 24 hr tablet Take 200 mg by mouth daily       multivitamins w/minerals tablet Take 1 tablet by mouth       rosuvastatin (CRESTOR) 20 MG tablet 20 mg daily       spironolactone (ALDACTONE) 25 MG tablet 12.5 mg daily       warfarin ANTICOAGULANT (COUMADIN) 5 MG tablet Taking 5 mg 6 days per week and 2.5 mg 1 day per week.       zolpidem (AMBIEN) 5 MG tablet 5-10 mg At Bedtime         History of Present Illness: Patient seen at the request of Dr. Patel.  Patient has a recent history of alteration of feeling in his arms and in his legs.  He had undergone a cervical laminoplasty from C3-C7 on 4/25/2018 for multilevel cervical spinal stenosis.  This did improve the pain that he was having.  He did have some left shoulder weakness immediately after the surgery.  He says that his pain was better but he started to notice more discomfort going down his arm starting 6 to 7 months afterwards.  He also would notice numbness in his hands and points to the palm of the left hand and particularly the index and middle finger of the right hand.  This is particularly problematic at night.  He would feel tingling in his feet.  His legs just felt weak as if they were carrying a 500 pound weight.  His balance is also been poor.  He has not had any headache  problems.  Has not had any loss of vision or double vision.  No speech or swallowing difficulties.  He did have a prior holes placed for removal of a subdural hematoma in 2015.  He had postoperative MRI scanning done of the cervical spine on 10/4/2019 that showed moderate cervical spondylosis from C3-C7.  There was no spinal stenosis noted.  He had severe right and moderate left foraminal narrowing at C5-6 and C6-7.  There was moderate right and mild left foraminal stenosis C4-5.  There is moderate bilateral foraminal stenosis at C3-4 and C7-T1.  I did review his preop and postop images.  He is also had lumbar spine surgery on 3 occasions with laminectomies having been done, x2 by Dr. Elizondo and 1 by Dr. Wiley.  Does not notice much in the way of pain shooting down the legs.  He did have difficulty with walking whereas before he could just walk 5 minutes time and now is up to 15 minutes time.  No history of diabetes by his report.  He had been seen by Dr. Patel in March of this year who had recommended EMG testing with plans to do MRI imagery of his cervical through lumbar spine.        PAST ILLNESSES:   Medical:  Medical History    Medical History Date Comments   Subdural hematoma (H) 05/02/2015 due to trauma   Chronic atrial fibrillation (H)   2 cardioversions in 2014   Essential hypertension with goal blood pressure less than 130/80       Hyperlipidemia       Cardiomyopathy (H)       Arrhythmia   atrial fibrillation   Congestive heart failure (H)   systolic heart failure   Atrial fibrillation (H)       COPD (chronic obstructive pulmonary disease) (H)   pt unsure, but recent told he may have this by MD and started on an inhaler   Kidney stones   per pt, has had in the past. none recently   Coronary artery disease       Anxiety       Insomnia       Ankle fracture   wore boot- right    Lung nodule       Cervical stenosis of spine       Pulmonary hypertension (H)       ICD (implantable  cardioverter-defibrillator) in place 2017 [facility]   Chronic kidney disease       Shortness of breath   resolved per pt. report       SOCIAL:   Social History     Socioeconomic History     Marital status:      Spouse name: Not on file     Number of children: Not on file     Years of education: Not on file     Highest education level: Not on file   Occupational History     Not on file   Social Needs     Financial resource strain: Not on file     Food insecurity     Worry: Not on file     Inability: Not on file     Transportation needs     Medical: Not on file     Non-medical: Not on file   Tobacco Use     Smoking status: Former Smoker     Smokeless tobacco: Never Used     Tobacco comment: Quit    Substance and Sexual Activity     Alcohol use: Yes     Drug use: Not Currently     Sexual activity: Not on file   Lifestyle     Physical activity     Days per week: Not on file     Minutes per session: Not on file     Stress: Not on file   Relationships     Social connections     Talks on phone: Not on file     Gets together: Not on file     Attends Taoist service: Not on file     Active member of club or organization: Not on file     Attends meetings of clubs or organizations: Not on file     Relationship status: Not on file     Intimate partner violence     Fear of current or ex partner: Not on file     Emotionally abused: Not on file     Physically abused: Not on file     Forced sexual activity: Not on file   Other Topics Concern     Parent/sibling w/ CABG, MI or angioplasty before 65F 55M? Not Asked   Social History Narrative     Not on file     Employment: Had worked in manufacturing, now retired    FAMILY HISTORY:  Family History   Problem Relation Age of Onset     Heart Disease Mother      Heart Disease Father      Parents: Father  at age 89 of congestive heart failure.  Mother recently  at age 91 of heart disease.  Siblings:   Children: 1 son and 1 daughter.                        REVIEW OF  SYSTEMS  Constitutional: No fever chills weight loss weight gain  Skin: No rash  HEENT: No loss of vision, double vision, hearing loss, vertigo or tinnitus  Respiratory: Occasional shortness of breath  Cardiovascular: No recent chest pain or rapid heart rate  Gastrointestinal: No abdominal pain, blood in the stool black tarry stools diarrhea constipation  Genitourinary: Negative incontinence  Musculoskeletal: Positive discomfort in arms and legs.  Neurologic: See HPI  Psychiatric: Positive anxiety  Hematologic/Lymphatic/Immunologic: Negative  Endocrine: No polydipsia/polyuria        PHYSICAL EXAMINATION:    General appearance: No acute distress.  Skin: Mild rubor of upper extremities  HEENT: Extraocular movements full.  Visual fields full.  Hearing decreased to 2 inches away bilaterally to finger rubbing.  Pupils equal round reactive to light.  Optic Fundi: Optic discs normal  Neck: No bruits.     Heart: Normal S1-S2  Peripheral pulses: Dorsalis pedis pulse 2/4      NEUROLOGIC EXAMINATION:  Oriented x3.  Speech is fluent.  Normal naming, repetition and comprehension.  Can tell me the name of the president and the governor.  Can spell world forward and backwards.  [place] 3/3 objects.  Remembered 2/3 objects at 10 minutes time; 2/3 with prompting discs flat pupils equal round reactive to light.  Extraocular movements full.  Visual fields full.  Hearing 2 inches away bilaterally.  Facial sensation/movement normal palate moves symmetrically.  Tongue midline.  Sternocleidomastoid/trapezius normal.  Motor exam 5/5 strength with exception of his right APB at 5-/5.  Positive Tinel at the wrist bilaterally.  DTRs 2/4 upper extremities 2/4 knees absent ankle reflexes mild decrease position sense.  Vibration sense decreased to 3-4 seconds bilaterally at the ankle.  Dorsalis pedis pulse 2/4.  Sensation appeared normal to light touch.  No sensory level.  Positive positional tremor with action tremor noted bilaterally.  Tone  normal.  Gait normal base      IMPRESSION: 1.  Carpal tunnel syndrome: He does have evidence of carpal tunnel syndrome on EMG study done today with weakness of his abductor pollicis brevis on the right.  We will have him use wrist splints at night.  We discussed he may need decompression at the wrist if symptoms persist.  2.  Alteration of feeling in legs.  No evidence of a large fiber neuropathy though he has absent ankle reflexes and does have a decrease of position and vibratory sense.  We will check labs for treatable causes of neuropathy.  This will include B12, TSH, serum protein electrophoresis, Lyme's test, ROLANDO, SSA/SSB and rheumatoid factor.  Glucose had been normal at 120 when checked 7 months ago.  3.  Tremor-most likely this is essential tremor.  No family history of tremor.  He describes the symptoms as coming on fairly shortly after the cervical laminoplasty.  I did put an order into do an MRI of the brain without contrast he does have a defibrillator but has had prior MRIs.  I suggested he contact Dr. Patel, as Dr. Patel had intended to do MRI scanning of the spinal axis and we could see if we could do this all at one time.  4.  History lumbar spine surgery.  He does have chronic reinnervation changes in the tibialis anterior and peroneus longus bilaterally.  Most likely these are residual changes from a prior L5 radiculopathy.  Time of patient: 50 minutes, greater than 50% of which was counseling/coordination of care, (35 minutes), outside of the time of the EMG study.        Vaughn Leonard MD

## 2020-07-31 NOTE — LETTER
7/31/2020         RE: Zach Garcia  [address]  [place] [facility] [address]        Dear Colleague,    Thank you for referring your patient, Zach Gracia, to the [facility]UR[facility] [place]. Please see a copy of my visit note below.    EMG performed.  Vaughn Leonard MD    Again, thank you for allowing me to participate in the care of your patient.        Sincerely,        Vaughn Leonard MD, MD

## 2020-07-31 NOTE — NURSING NOTE
Rm 10  Chief Complaint   Patient presents with     Numbness     EMG follow up.     Shaking     Extremity Weakness     Rosette Rothman on 7/31/2020 at 2:40 PM

## 2020-08-03 ENCOUNTER — RECORDS - HEALTHEAST (OUTPATIENT)
Dept: LAB | Facility: HOSPITAL | Age: 70
End: 2020-08-03

## 2020-08-03 LAB
B BURGDOR IGG+IGM SER QL: 0.02 INDEX VALUE
RHEUMATOID FACT SERPL-ACNC: <15 IU/ML (ref 0–30)
TSH SERPL DL<=0.005 MIU/L-ACNC: 1.11 UIU/ML (ref 0.3–5)
VIT B12 SERPL-MCNC: 421 PG/ML (ref 213–816)

## 2020-08-04 LAB
ANA SER QL: 0.4 U
JO-1 AUTOANTIBODIES - HISTORICAL: 0 EU
SCL-70 AUTOANTIBODIES - HISTORICAL: 0 EU
SM (SMITH AUTOANTIBODIES - HISTORICAL: 1 EU
SM/RNP AUTOANTIBODIES - HISTORICAL: 0 EU
SS-A/RO AUTOANTIBODIES - HISTORICAL: 0 EU
SS-B/LA AUTOANTIBODIES - HISTORICAL: 0 EU

## 2020-08-05 LAB
ALBUMIN PERCENT: 66.6 % (ref 51–67)
ALBUMIN SERPL ELPH-MCNC: 4.3 G/DL (ref 3.2–4.7)
ALPHA 1 PERCENT: 2.3 % (ref 2–4)
ALPHA 2 PERCENT: 11.3 % (ref 5–13)
ALPHA1 GLOB SERPL ELPH-MCNC: 0.1 G/DL (ref 0.1–0.3)
ALPHA2 GLOB SERPL ELPH-MCNC: 0.7 G/DL (ref 0.4–0.9)
B-GLOBULIN SERPL ELPH-MCNC: 0.8 G/DL (ref 0.7–1.2)
BETA PERCENT: 11.8 % (ref 10–17)
GAMMA GLOB SERPL ELPH-MCNC: 0.5 G/DL (ref 0.6–1.4)
GAMMA GLOBULIN PERCENT: 8 % (ref 9–20)
PATH ICD:: ABNORMAL
PROT PATTERN SERPL ELPH-IMP: ABNORMAL
PROT SERPL-MCNC: 6.4 G/DL (ref 6–8)
REVIEWING PATHOLOGIST: ABNORMAL

## 2020-08-18 ENCOUNTER — AMBULATORY - HEALTHEAST (OUTPATIENT)
Dept: CARDIOLOGY | Facility: CLINIC | Age: 70
End: 2020-08-18

## 2020-08-18 DIAGNOSIS — Z95.810 ICD (IMPLANTABLE CARDIOVERTER-DEFIBRILLATOR), SINGLE, IN SITU: ICD-10-CM

## 2020-08-20 ENCOUNTER — COMMUNICATION - HEALTHEAST (OUTPATIENT)
Dept: CARDIOLOGY | Facility: CLINIC | Age: 70
End: 2020-08-20

## 2020-08-21 ENCOUNTER — COMMUNICATION - HEALTHEAST (OUTPATIENT)
Dept: ANTICOAGULATION | Facility: CLINIC | Age: 70
End: 2020-08-21

## 2020-08-21 ENCOUNTER — AMBULATORY - HEALTHEAST (OUTPATIENT)
Dept: CARDIOLOGY | Facility: CLINIC | Age: 70
End: 2020-08-21

## 2020-08-21 DIAGNOSIS — I48.20 CHRONIC ATRIAL FIBRILLATION (H): ICD-10-CM

## 2020-08-21 LAB — POC INR - HE - HISTORICAL: 2 (ref 0.9–1.1)

## 2020-08-24 ENCOUNTER — AMBULATORY - HEALTHEAST (OUTPATIENT)
Dept: CARDIOLOGY | Facility: CLINIC | Age: 70
End: 2020-08-24

## 2020-08-24 ENCOUNTER — COMMUNICATION - HEALTHEAST (OUTPATIENT)
Dept: CARDIOLOGY | Facility: CLINIC | Age: 70
End: 2020-08-24

## 2020-08-24 DIAGNOSIS — Z95.810 ICD (IMPLANTABLE CARDIOVERTER-DEFIBRILLATOR), SINGLE, IN SITU: ICD-10-CM

## 2020-08-24 DIAGNOSIS — I48.20 CHRONIC ATRIAL FIBRILLATION (H): ICD-10-CM

## 2020-09-08 ENCOUNTER — COMMUNICATION - HEALTHEAST (OUTPATIENT)
Dept: CARDIOLOGY | Facility: CLINIC | Age: 70
End: 2020-09-08

## 2020-09-08 DIAGNOSIS — I42.0 CARDIOMYOPATHY, DILATED, NONISCHEMIC (H): ICD-10-CM

## 2020-09-08 DIAGNOSIS — I50.22 CHRONIC SYSTOLIC HEART FAILURE (H): ICD-10-CM

## 2020-09-08 DIAGNOSIS — I42.9 CARDIOMYOPATHY (H): ICD-10-CM

## 2020-09-08 DIAGNOSIS — E78.5 DYSLIPIDEMIA: ICD-10-CM

## 2020-09-09 ENCOUNTER — COMMUNICATION - HEALTHEAST (OUTPATIENT)
Dept: CARDIOLOGY | Facility: CLINIC | Age: 70
End: 2020-09-09

## 2020-09-09 ENCOUNTER — OFFICE VISIT - HEALTHEAST (OUTPATIENT)
Dept: CARDIOLOGY | Facility: CLINIC | Age: 70
End: 2020-09-09

## 2020-09-09 ENCOUNTER — COMMUNICATION - HEALTHEAST (OUTPATIENT)
Dept: ANTICOAGULATION | Facility: CLINIC | Age: 70
End: 2020-09-09

## 2020-09-09 DIAGNOSIS — I48.91 A-FIB (H): ICD-10-CM

## 2020-09-09 DIAGNOSIS — Z95.810 ICD (IMPLANTABLE CARDIOVERTER-DEFIBRILLATOR), SINGLE, IN SITU: ICD-10-CM

## 2020-09-09 DIAGNOSIS — I25.10 CORONARY ARTERY DISEASE DUE TO CALCIFIED CORONARY LESION: ICD-10-CM

## 2020-09-09 DIAGNOSIS — E78.5 DYSLIPIDEMIA: ICD-10-CM

## 2020-09-09 DIAGNOSIS — I50.22 CHRONIC SYSTOLIC HEART FAILURE (H): ICD-10-CM

## 2020-09-09 DIAGNOSIS — I48.20 CHRONIC ATRIAL FIBRILLATION (H): ICD-10-CM

## 2020-09-09 DIAGNOSIS — I10 ESSENTIAL HYPERTENSION WITH GOAL BLOOD PRESSURE LESS THAN 130/80: ICD-10-CM

## 2020-09-09 DIAGNOSIS — I25.84 CORONARY ARTERY DISEASE DUE TO CALCIFIED CORONARY LESION: ICD-10-CM

## 2020-09-09 LAB
ANION GAP SERPL CALCULATED.3IONS-SCNC: 14 MMOL/L (ref 5–18)
BUN SERPL-MCNC: 18 MG/DL (ref 8–28)
CALCIUM SERPL-MCNC: 8.3 MG/DL (ref 8.5–10.5)
CHLORIDE BLD-SCNC: 103 MMOL/L (ref 98–107)
CO2 SERPL-SCNC: 23 MMOL/L (ref 22–31)
CREAT SERPL-MCNC: 1.06 MG/DL (ref 0.7–1.3)
GFR SERPL CREATININE-BSD FRML MDRD: >60 ML/MIN/1.73M2
GLUCOSE BLD-MCNC: 124 MG/DL (ref 70–125)
MAGNESIUM SERPL-MCNC: 1.8 MG/DL (ref 1.8–2.6)
POC INR - HE - HISTORICAL: 4.4 (ref 0.9–1.1)
POTASSIUM BLD-SCNC: 4.2 MMOL/L (ref 3.5–5)
SODIUM SERPL-SCNC: 140 MMOL/L (ref 136–145)

## 2020-09-09 ASSESSMENT — MIFFLIN-ST. JEOR: SCORE: 1894.68

## 2020-09-10 ENCOUNTER — COMMUNICATION - HEALTHEAST (OUTPATIENT)
Dept: CARDIOLOGY | Facility: CLINIC | Age: 70
End: 2020-09-10

## 2020-09-13 ENCOUNTER — COMMUNICATION - HEALTHEAST (OUTPATIENT)
Dept: CARDIOLOGY | Facility: CLINIC | Age: 70
End: 2020-09-13

## 2020-09-13 DIAGNOSIS — I50.22 CHRONIC SYSTOLIC HEART FAILURE (H): ICD-10-CM

## 2020-09-13 DIAGNOSIS — I42.9 CARDIOMYOPATHY (H): ICD-10-CM

## 2020-09-15 ENCOUNTER — COMMUNICATION - HEALTHEAST (OUTPATIENT)
Dept: CARDIOLOGY | Facility: CLINIC | Age: 70
End: 2020-09-15

## 2020-09-16 ENCOUNTER — AMBULATORY - HEALTHEAST (OUTPATIENT)
Dept: CARDIOLOGY | Facility: CLINIC | Age: 70
End: 2020-09-16

## 2020-09-16 DIAGNOSIS — I25.84 CORONARY ARTERY DISEASE DUE TO CALCIFIED CORONARY LESION: ICD-10-CM

## 2020-09-16 DIAGNOSIS — Z95.810 ICD (IMPLANTABLE CARDIOVERTER-DEFIBRILLATOR), SINGLE, IN SITU: ICD-10-CM

## 2020-09-16 DIAGNOSIS — I42.9 CARDIOMYOPATHY, UNSPECIFIED TYPE (H): ICD-10-CM

## 2020-09-16 DIAGNOSIS — I42.0 CARDIOMYOPATHY, DILATED, NONISCHEMIC (H): ICD-10-CM

## 2020-09-16 DIAGNOSIS — I47.29 PAROXYSMAL VENTRICULAR TACHYCARDIA (H): ICD-10-CM

## 2020-09-16 DIAGNOSIS — I25.10 CORONARY ARTERY DISEASE DUE TO CALCIFIED CORONARY LESION: ICD-10-CM

## 2020-09-16 DIAGNOSIS — I42.9 CARDIOMYOPATHY (H): ICD-10-CM

## 2020-09-16 DIAGNOSIS — I10 ESSENTIAL HYPERTENSION WITH GOAL BLOOD PRESSURE LESS THAN 130/80: ICD-10-CM

## 2020-09-16 ASSESSMENT — MIFFLIN-ST. JEOR: SCORE: 1890.14

## 2020-09-25 ENCOUNTER — HOSPITAL ENCOUNTER (OUTPATIENT)
Dept: RADIOLOGY | Facility: HOSPITAL | Age: 70
Discharge: HOME | End: 2020-09-25

## 2020-09-25 ENCOUNTER — HOSPITAL ENCOUNTER (OUTPATIENT)
Dept: MRI IMAGING | Facility: HOSPITAL | Age: 70
Discharge: HOME | End: 2020-09-25
Attending: INTERNAL MEDICINE

## 2020-09-25 ENCOUNTER — COMMUNICATION - HEALTHEAST (OUTPATIENT)
Dept: ANTICOAGULATION | Facility: CLINIC | Age: 70
End: 2020-09-25

## 2020-09-25 ENCOUNTER — HOSPITAL ENCOUNTER (OUTPATIENT)
Dept: MRI IMAGING | Facility: HOSPITAL | Age: 70
Discharge: HOME | End: 2020-09-25

## 2020-09-25 DIAGNOSIS — R25.1 TREMOR: ICD-10-CM

## 2020-09-29 ENCOUNTER — HOSPITAL ENCOUNTER (OUTPATIENT)
Dept: NUCLEAR MEDICINE | Facility: HOSPITAL | Age: 70
Discharge: HOME | End: 2020-09-29
Attending: INTERNAL MEDICINE

## 2020-09-29 ENCOUNTER — COMMUNICATION - HEALTHEAST (OUTPATIENT)
Dept: CARDIOLOGY | Facility: CLINIC | Age: 70
End: 2020-09-29

## 2020-09-29 ENCOUNTER — HOSPITAL ENCOUNTER (OUTPATIENT)
Dept: CARDIOLOGY | Facility: HOSPITAL | Age: 70
Discharge: HOME | End: 2020-09-29
Attending: INTERNAL MEDICINE

## 2020-09-29 DIAGNOSIS — I25.84 CORONARY ARTERY DISEASE DUE TO CALCIFIED CORONARY LESION: ICD-10-CM

## 2020-09-29 DIAGNOSIS — I25.10 CORONARY ARTERY DISEASE DUE TO CALCIFIED CORONARY LESION: ICD-10-CM

## 2020-09-29 DIAGNOSIS — I48.20 CHRONIC ATRIAL FIBRILLATION (H): ICD-10-CM

## 2020-09-29 LAB
AORTIC ROOT: 3.2 CM
AORTIC VALVE MEAN VELOCITY: 90.2 CM/S
ASCENDING AORTA: 4.3 CM
AV DIMENSIONLESS INDEX VTI: 0.6
AV MEAN GRADIENT: 4 MMHG
AV PEAK GRADIENT: 6.8 MMHG
AV VALVE AREA: 2.9 CM2
BSA FOR ECHO PROCEDURE: 2.36 M2
CV BLOOD PRESSURE: ABNORMAL MMHG
CV ECHO HEIGHT: 70.3 IN
CV ECHO WEIGHT: 248 LBS
CV STRESS CURRENT BP HE: NORMAL
CV STRESS CURRENT HR HE: 71
CV STRESS CURRENT HR HE: 74
CV STRESS CURRENT HR HE: 76
CV STRESS CURRENT HR HE: 76
CV STRESS CURRENT HR HE: 79
CV STRESS CURRENT HR HE: 81
CV STRESS CURRENT HR HE: 82
CV STRESS CURRENT HR HE: 85
CV STRESS CURRENT HR HE: 85
CV STRESS CURRENT HR HE: 86
CV STRESS CURRENT HR HE: 87
CV STRESS CURRENT HR HE: 88
CV STRESS CURRENT HR HE: 92
CV STRESS CURRENT HR HE: 97
CV STRESS DEVIATION TIME HE: NORMAL
CV STRESS ECHO PERCENT HR HE: NORMAL
CV STRESS EXERCISE STAGE HE: NORMAL
CV STRESS FINAL RESTING BP HE: NORMAL
CV STRESS FINAL RESTING HR HE: 87
CV STRESS MAX HR HE: 102
CV STRESS MAX TREADMILL GRADE HE: 0
CV STRESS MAX TREADMILL SPEED HE: 0
CV STRESS PEAK DIA BP HE: NORMAL
CV STRESS PEAK SYS BP HE: NORMAL
CV STRESS PHASE HE: NORMAL
CV STRESS PROTOCOL HE: NORMAL
CV STRESS RESTING PT POSITION HE: NORMAL
CV STRESS ST DEVIATION AMOUNT HE: NORMAL
CV STRESS ST DEVIATION ELEVATION HE: NORMAL
CV STRESS ST EVELATION AMOUNT HE: NORMAL
CV STRESS TEST TYPE HE: NORMAL
CV STRESS TOTAL STAGE TIME MIN 1 HE: NORMAL
DOP CALC AO PEAK VEL: 130 CM/S
DOP CALC AO VTI: 25.6 CM
DOP CALC LVOT AREA: 4.52 CM2
DOP CALC LVOT DIAMETER: 2.4 CM
DOP CALC LVOT STROKE VOLUME: 74.6 CM3
DOP CALCLVOT PEAK VEL VTI: 16.5 CM
ECHO EJECTION FRACTION ESTIMATED: 40 %
EJECTION FRACTION: 45 % (ref 55–75)
FRACTIONAL SHORTENING: 31 % (ref 28–44)
INTERVENTRICULAR SEPTUM IN END DIASTOLE: 1.4 CM (ref 0.6–1)
IVS/PW RATIO: 1.1
LA AREA 1: 30.7 CM2
LA AREA 2: 26.5 CM2
LEFT ATRIUM LENGTH: 6.2 CM
LEFT ATRIUM SIZE: 5.4 CM
LEFT ATRIUM VOLUME INDEX: 47.3 ML/M2
LEFT ATRIUM VOLUME: 111.5 ML
LEFT VENTRICLE CARDIAC INDEX: 2.3 L/MIN/M2
LEFT VENTRICLE CARDIAC OUTPUT: 5.4 L/MIN
LEFT VENTRICLE DIASTOLIC VOLUME INDEX: 72 CM3/M2 (ref 34–74)
LEFT VENTRICLE DIASTOLIC VOLUME: 170 CM3 (ref 62–150)
LEFT VENTRICLE HEART RATE: 73 BPM
LEFT VENTRICLE MASS INDEX: 148 G/M2
LEFT VENTRICLE SYSTOLIC VOLUME INDEX: 39.8 CM3/M2 (ref 11–31)
LEFT VENTRICLE SYSTOLIC VOLUME: 94 CM3 (ref 21–61)
LEFT VENTRICULAR INTERNAL DIMENSION IN DIASTOLE: 5.8 CM (ref 4.2–5.8)
LEFT VENTRICULAR INTERNAL DIMENSION IN SYSTOLE: 4 CM (ref 2.5–4)
LEFT VENTRICULAR MASS: 349.2 G
LEFT VENTRICULAR OUTFLOW TRACT MEAN GRADIENT: 1 MMHG
LEFT VENTRICULAR OUTFLOW TRACT MEAN VELOCITY: 51.7 CM/S
LEFT VENTRICULAR POSTERIOR WALL IN END DIASTOLE: 1.3 CM (ref 0.6–1)
LV STROKE VOLUME INDEX: 31.6 ML/M2
MV AVERAGE E/E' RATIO: 10.3 CM/S
MV DECELERATION TIME: 187 MS
MV E'TISSUE VEL-LAT: 10.7 CM/S
MV E'TISSUE VEL-MED: 6.04 CM/S
MV LATERAL E/E' RATIO: 8.1
MV MEDIAL E/E' RATIO: 14.3
MV PEAK E VELOCITY: 86.6 CM/S
NUC REST DIASTOLIC VOLUME INDEX: 3968 LBS
NUC REST SYSTOLIC VOLUME INDEX: 70.25 IN
NUC STRESS EJECTION FRACTION: 36 %
RATE PRESSURE PRODUCT: NORMAL
STRESS ECHO BASELINE DIASTOLIC HE: 92
STRESS ECHO BASELINE HR: 79
STRESS ECHO BASELINE SYSTOLIC BP: 143
STRESS ECHO CALCULATED PERCENT HR: 68 %
STRESS ECHO LAST STRESS DIASTOLIC BP: 84
STRESS ECHO LAST STRESS HR: 74
STRESS ECHO LAST STRESS SYSTOLIC BP: 132
STRESS ECHO TARGET HR: 150
TRICUSPID REGURGITATION PEAK PRESSURE GRADIENT: 26.8 MMHG
TRICUSPID VALVE ANULAR PLANE SYSTOLIC EXCURSION: 2.1 CM
TRICUSPID VALVE PEAK REGURGITANT VELOCITY: 259 CM/S

## 2020-09-29 ASSESSMENT — MIFFLIN-ST. JEOR: SCORE: 1890.14

## 2020-09-30 ENCOUNTER — AMBULATORY - HEALTHEAST (OUTPATIENT)
Dept: CARDIOLOGY | Facility: CLINIC | Age: 70
End: 2020-09-30

## 2020-09-30 ENCOUNTER — COMMUNICATION - HEALTHEAST (OUTPATIENT)
Dept: ANTICOAGULATION | Facility: CLINIC | Age: 70
End: 2020-09-30

## 2020-09-30 DIAGNOSIS — I48.20 CHRONIC ATRIAL FIBRILLATION (H): ICD-10-CM

## 2020-09-30 LAB — POC INR - HE - HISTORICAL: 2.1 (ref 0.9–1.1)

## 2020-10-01 ENCOUNTER — COMMUNICATION - HEALTHEAST (OUTPATIENT)
Dept: CARDIOLOGY | Facility: CLINIC | Age: 70
End: 2020-10-01

## 2020-10-01 DIAGNOSIS — I50.22 CHRONIC SYSTOLIC HEART FAILURE (H): ICD-10-CM

## 2020-10-01 DIAGNOSIS — I42.9 CARDIOMYOPATHY (H): ICD-10-CM

## 2020-10-01 DIAGNOSIS — I42.0 CARDIOMYOPATHY, DILATED, NONISCHEMIC (H): ICD-10-CM

## 2020-10-14 ENCOUNTER — COMMUNICATION - HEALTHEAST (OUTPATIENT)
Dept: CARDIOLOGY | Facility: CLINIC | Age: 70
End: 2020-10-14

## 2020-10-15 ENCOUNTER — COMMUNICATION - HEALTHEAST (OUTPATIENT)
Dept: ANTICOAGULATION | Facility: CLINIC | Age: 70
End: 2020-10-15

## 2020-10-15 ENCOUNTER — AMBULATORY - HEALTHEAST (OUTPATIENT)
Dept: CARDIOLOGY | Facility: CLINIC | Age: 70
End: 2020-10-15

## 2020-10-15 DIAGNOSIS — I48.20 CHRONIC ATRIAL FIBRILLATION (H): ICD-10-CM

## 2020-10-15 LAB — POC INR - HE - HISTORICAL: 2.8 (ref 0.9–1.1)

## 2020-10-25 ENCOUNTER — COMMUNICATION - HEALTHEAST (OUTPATIENT)
Dept: CARDIOLOGY | Facility: CLINIC | Age: 70
End: 2020-10-25

## 2020-10-25 DIAGNOSIS — I48.20 CHRONIC ATRIAL FIBRILLATION (H): ICD-10-CM

## 2020-10-25 DIAGNOSIS — I50.23 ACUTE ON CHRONIC SYSTOLIC HEART FAILURE (H): ICD-10-CM

## 2020-10-26 ENCOUNTER — TELEPHONE (OUTPATIENT)
Dept: NEUROLOGY | Facility: CLINIC | Age: 70
End: 2020-10-26

## 2020-10-27 NOTE — TELEPHONE ENCOUNTER
Dr. Leonard please review and advise on radiology report below   Benitez Reed CMA on 10/27/2020 at 8:42 AM

## 2020-10-27 NOTE — TELEPHONE ENCOUNTER
Interface, Rad Results In - 09/25/2020  4:09 PM CDT  EXAM: MR BRAIN WO CONTRAST  LOCATION: [facility]  DATE/TIME: 9/25/2020 9:13 AM    INDICATION: Tremor, unspecified  COMPARISON: CT 07/20/2015.  TECHNIQUE: Routine multiplanar multisequence head MRI without intravenous contrast.    FINDINGS:    Motion artifact degrades image quality.    No diffusion abnormalities are demonstrated to suggest acute infarction. No mass effect or midline shift is apparent. There is no evidence of intracranial hemorrhage.    There is mild T2-hyperintense white matter signal abnormality. Although nonspecific, this commonly reflects chronic small vessel ischemic change. No foci of encephalomalacia are demonstrated. The sella and pituitary are unremarkable for technique.    Coronal imaging through the hippocampi demonstrates asymmetric appearance of the hippocampi with unremarkable architecture and signal intensity. The fornices and mamillary bodies are also symmetric.    The major intracranial flow voids are unremarkable.    Diffuse and proportionate prominence of the ventricles, sulci and cisterns is compatible with mild generalized parenchymal atrophy. The cerebellar tonsils are appropriately positioned with a patent foramen magnum.    Paranasal sinuses and mastoid air cells are unremarkable in signal.    There are bilateral frontal cindy hole defects. Calvarium appears otherwise intact. Limited images of the upper cervical spine demonstrate no acute abnormality. The extracranial soft tissues are unremarkable. The globes and orbits appear intact.    IMPRESSION:   1.  No evidence of an acute intracranial abnormality.  2.  Findings compatible with mild chronic small vessel ischemic change.  3.  Mild generalized atrophy.  4.  Motion degraded study.

## 2020-10-28 ENCOUNTER — COMMUNICATION - HEALTHEAST (OUTPATIENT)
Dept: CARDIOLOGY | Facility: CLINIC | Age: 70
End: 2020-10-28

## 2020-10-28 DIAGNOSIS — I42.9 CARDIOMYOPATHY (H): ICD-10-CM

## 2020-10-29 NOTE — TELEPHONE ENCOUNTER
I spoke with the patient today.  We discussed the MRI showed age-related changes and the areas of cindy hole placement when he had subdural hematoma  drained in the past.  No clear anatomic call explanation for tremor which appeared to be an essential tremor.  His major concern relates to pain issues.  He had been taking 600 mg of gabapentin at night when I had seen him 3 months ago.  He had stopped it a couple of months ago because he did not think it was providing much benefit.  He had findings on exam that suggested a neuropathy.  We have checked other blood studies with his B12 level being normal at 421.  ROLANDO had been negative at 0.4.  Rheumatoid factor had been negative.  SSA/SSB had been negative.  Serum protein electrophoresis that showed a decrease gammaglobulin, but no evidence of a monoclonal gammopathy was seen.  He had been seen by his cardiologist and had an echocardiogram done which showed his ejection fraction to be 40%, which would not explain his pain all over.  We did discuss giving a trial again of gabapentin and trying it throughout the day to see if that might help.  We discussed there can be weight gain, drowsiness, fluid retention and dizziness with gabapentin.  We will have him take 300 mg at bedtime x5 days, then 300 mg twice daily x5 days, then 300 mg 3 times a day to see if this will provide some benefit.  His last test of his kidney function was normal.  He will call with an update in 3 weeks time.

## 2020-10-29 NOTE — TELEPHONE ENCOUNTER
Tried calling patient-no answer.  Left message she can call me.  I had ordered the MRI scan as when I had seen him there had been the appearance of a essential tremor.  He does have a prior history of subdural hematoma removal.  MRI of the brain showed no clear changes that would explain his tremor issues.  There were the cindy holes present as well as age-related changes.

## 2020-11-13 ENCOUNTER — COMMUNICATION - HEALTHEAST (OUTPATIENT)
Dept: ANTICOAGULATION | Facility: CLINIC | Age: 70
End: 2020-11-13

## 2020-11-13 DIAGNOSIS — I48.20 CHRONIC ATRIAL FIBRILLATION (H): ICD-10-CM

## 2020-11-18 ENCOUNTER — COMMUNICATION - HEALTHEAST (OUTPATIENT)
Dept: CARDIOLOGY | Facility: CLINIC | Age: 70
End: 2020-11-18

## 2020-11-19 ENCOUNTER — AMBULATORY - HEALTHEAST (OUTPATIENT)
Dept: CARDIOLOGY | Facility: CLINIC | Age: 70
End: 2020-11-19

## 2020-11-19 ENCOUNTER — COMMUNICATION - HEALTHEAST (OUTPATIENT)
Dept: ANTICOAGULATION | Facility: CLINIC | Age: 70
End: 2020-11-19

## 2020-11-19 DIAGNOSIS — I48.20 CHRONIC ATRIAL FIBRILLATION (H): ICD-10-CM

## 2020-11-19 LAB — POC INR - HE - HISTORICAL: 1.4 (ref 0.9–1.1)

## 2020-11-24 ENCOUNTER — TELEPHONE (OUTPATIENT)
Dept: NEUROLOGY | Facility: CLINIC | Age: 70
End: 2020-11-24

## 2020-11-24 ENCOUNTER — AMBULATORY - HEALTHEAST (OUTPATIENT)
Dept: CARDIOLOGY | Facility: CLINIC | Age: 70
End: 2020-11-24

## 2020-11-24 DIAGNOSIS — Z95.810 ICD (IMPLANTABLE CARDIOVERTER-DEFIBRILLATOR), SINGLE, IN SITU: ICD-10-CM

## 2020-11-24 DIAGNOSIS — I42.0 CARDIOMYOPATHY, DILATED, NONISCHEMIC (H): ICD-10-CM

## 2020-11-24 DIAGNOSIS — G60.9 IDIOPATHIC PERIPHERAL NEUROPATHY: Primary | ICD-10-CM

## 2020-11-24 RX ORDER — GABAPENTIN 400 MG/1
400 CAPSULE ORAL 3 TIMES DAILY
Qty: 90 CAPSULE | Refills: 11 | Status: SHIPPED | OUTPATIENT
Start: 2020-11-24 | End: 2023-08-31

## 2020-11-24 NOTE — TELEPHONE ENCOUNTER
Please See Dr. Leonard's note from 10/29/2020;  We did discuss giving a trial again of gabapentin and trying it throughout the day to see if that might help.  We discussed there can be weight gain, drowsiness, fluid retention and dizziness with gabapentin.  We will have him take 300 mg at bedtime x5 days, then 300 mg twice daily x5 days, then 300 mg 3 times a day to see if this will provide some benefit.  His last test of his kidney function was normal.  He will call with an update in 3 weeks time.  Please advise, any recommendations in Dr. Leonard's absence?   Benitez Reed CMA on 11/24/2020 at 7:44 AM

## 2020-11-24 NOTE — TELEPHONE ENCOUNTER
Pt left msg stating his meds aren't working. Didn't see which meds but stated that he was asked to report back. [phone]

## 2020-11-24 NOTE — TELEPHONE ENCOUNTER
Spoke with patient and relayed message. He verbalized understanding and will call again in a few weeks if there are further questions or no improvement   Benitez Reed CMA on 11/24/2020 at 2:02 PM

## 2020-11-24 NOTE — TELEPHONE ENCOUNTER
Increase gabapentin to 400 mg 3 times daily.  I have sent a new prescription to patient's pharmacy

## 2020-11-25 ENCOUNTER — COMMUNICATION - HEALTHEAST (OUTPATIENT)
Dept: CARDIOLOGY | Facility: CLINIC | Age: 70
End: 2020-11-25

## 2020-11-27 ENCOUNTER — AMBULATORY - HEALTHEAST (OUTPATIENT)
Dept: CARDIOLOGY | Facility: CLINIC | Age: 70
End: 2020-11-27

## 2020-11-27 ENCOUNTER — COMMUNICATION - HEALTHEAST (OUTPATIENT)
Dept: ANTICOAGULATION | Facility: CLINIC | Age: 70
End: 2020-11-27

## 2020-11-27 DIAGNOSIS — I48.20 CHRONIC ATRIAL FIBRILLATION (H): ICD-10-CM

## 2020-11-27 LAB — POC INR - HE - HISTORICAL: 3 (ref 0.9–1.1)

## 2020-12-10 ENCOUNTER — COMMUNICATION - HEALTHEAST (OUTPATIENT)
Dept: CARDIOLOGY | Facility: CLINIC | Age: 70
End: 2020-12-10

## 2020-12-11 ENCOUNTER — AMBULATORY - HEALTHEAST (OUTPATIENT)
Dept: CARDIOLOGY | Facility: CLINIC | Age: 70
End: 2020-12-11

## 2020-12-11 ENCOUNTER — COMMUNICATION - HEALTHEAST (OUTPATIENT)
Dept: ANTICOAGULATION | Facility: CLINIC | Age: 70
End: 2020-12-11

## 2020-12-11 DIAGNOSIS — I48.20 CHRONIC ATRIAL FIBRILLATION (H): ICD-10-CM

## 2020-12-11 LAB — POC INR - HE - HISTORICAL: 2.8 (ref 0.9–1.1)

## 2020-12-24 ENCOUNTER — COMMUNICATION - HEALTHEAST (OUTPATIENT)
Dept: CARDIOLOGY | Facility: CLINIC | Age: 70
End: 2020-12-24

## 2020-12-24 DIAGNOSIS — I48.91 A-FIB (H): ICD-10-CM

## 2021-01-11 ENCOUNTER — AMBULATORY - HEALTHEAST (OUTPATIENT)
Dept: CARDIOLOGY | Facility: CLINIC | Age: 71
End: 2021-01-11

## 2021-01-11 ENCOUNTER — COMMUNICATION - HEALTHEAST (OUTPATIENT)
Dept: ANTICOAGULATION | Facility: CLINIC | Age: 71
End: 2021-01-11

## 2021-01-11 DIAGNOSIS — I48.20 CHRONIC ATRIAL FIBRILLATION (H): ICD-10-CM

## 2021-01-11 LAB — POC INR - HE - HISTORICAL: 3.6 (ref 0.9–1.1)

## 2021-01-25 ENCOUNTER — COMMUNICATION - HEALTHEAST (OUTPATIENT)
Dept: CARDIOLOGY | Facility: CLINIC | Age: 71
End: 2021-01-25

## 2021-01-26 ENCOUNTER — COMMUNICATION - HEALTHEAST (OUTPATIENT)
Dept: ANTICOAGULATION | Facility: CLINIC | Age: 71
End: 2021-01-26

## 2021-01-26 ENCOUNTER — AMBULATORY - HEALTHEAST (OUTPATIENT)
Dept: CARDIOLOGY | Facility: CLINIC | Age: 71
End: 2021-01-26

## 2021-01-26 DIAGNOSIS — I48.20 CHRONIC ATRIAL FIBRILLATION (H): ICD-10-CM

## 2021-01-26 LAB — POC INR - HE - HISTORICAL: 1.4 (ref 0.9–1.1)

## 2021-02-01 ENCOUNTER — COMMUNICATION - HEALTHEAST (OUTPATIENT)
Dept: CARDIOLOGY | Facility: CLINIC | Age: 71
End: 2021-02-01

## 2021-02-02 ENCOUNTER — COMMUNICATION - HEALTHEAST (OUTPATIENT)
Dept: ANTICOAGULATION | Facility: CLINIC | Age: 71
End: 2021-02-02

## 2021-02-02 ENCOUNTER — AMBULATORY - HEALTHEAST (OUTPATIENT)
Dept: CARDIOLOGY | Facility: CLINIC | Age: 71
End: 2021-02-02

## 2021-02-02 DIAGNOSIS — I48.20 CHRONIC ATRIAL FIBRILLATION (H): ICD-10-CM

## 2021-02-02 LAB — POC INR - HE - HISTORICAL: 1.4 (ref 0.9–1.1)

## 2021-02-09 ENCOUNTER — COMMUNICATION - HEALTHEAST (OUTPATIENT)
Dept: CARDIOLOGY | Facility: CLINIC | Age: 71
End: 2021-02-09

## 2021-02-10 ENCOUNTER — COMMUNICATION - HEALTHEAST (OUTPATIENT)
Dept: ANTICOAGULATION | Facility: CLINIC | Age: 71
End: 2021-02-10

## 2021-02-10 ENCOUNTER — AMBULATORY - HEALTHEAST (OUTPATIENT)
Dept: CARDIOLOGY | Facility: CLINIC | Age: 71
End: 2021-02-10

## 2021-02-10 DIAGNOSIS — I48.20 CHRONIC ATRIAL FIBRILLATION (H): ICD-10-CM

## 2021-02-10 LAB — POC INR - HE - HISTORICAL: 3.3 (ref 0.9–1.1)

## 2021-02-16 ENCOUNTER — COMMUNICATION - HEALTHEAST (OUTPATIENT)
Dept: CARDIOLOGY | Facility: CLINIC | Age: 71
End: 2021-02-16

## 2021-02-17 ENCOUNTER — COMMUNICATION - HEALTHEAST (OUTPATIENT)
Dept: ANTICOAGULATION | Facility: CLINIC | Age: 71
End: 2021-02-17

## 2021-02-17 ENCOUNTER — AMBULATORY - HEALTHEAST (OUTPATIENT)
Dept: CARDIOLOGY | Facility: CLINIC | Age: 71
End: 2021-02-17

## 2021-02-17 DIAGNOSIS — I48.20 CHRONIC ATRIAL FIBRILLATION (H): ICD-10-CM

## 2021-02-17 LAB — POC INR - HE - HISTORICAL: 2.2 (ref 0.9–1.1)

## 2021-02-23 ENCOUNTER — AMBULATORY - HEALTHEAST (OUTPATIENT)
Dept: CARDIOLOGY | Facility: CLINIC | Age: 71
End: 2021-02-23

## 2021-02-23 DIAGNOSIS — Z95.810 ICD (IMPLANTABLE CARDIOVERTER-DEFIBRILLATOR), SINGLE, IN SITU: ICD-10-CM

## 2021-02-23 DIAGNOSIS — I42.0 CARDIOMYOPATHY, DILATED, NONISCHEMIC (H): ICD-10-CM

## 2021-03-02 ENCOUNTER — COMMUNICATION - HEALTHEAST (OUTPATIENT)
Dept: CARDIOLOGY | Facility: CLINIC | Age: 71
End: 2021-03-02

## 2021-03-02 DIAGNOSIS — I48.91 A-FIB (H): ICD-10-CM

## 2021-03-02 DIAGNOSIS — I48.20 CHRONIC ATRIAL FIBRILLATION (H): ICD-10-CM

## 2021-03-03 ENCOUNTER — COMMUNICATION - HEALTHEAST (OUTPATIENT)
Dept: CARDIOLOGY | Facility: CLINIC | Age: 71
End: 2021-03-03

## 2021-03-04 ENCOUNTER — AMBULATORY - HEALTHEAST (OUTPATIENT)
Dept: CARDIOLOGY | Facility: CLINIC | Age: 71
End: 2021-03-04

## 2021-03-04 ENCOUNTER — COMMUNICATION - HEALTHEAST (OUTPATIENT)
Dept: ANTICOAGULATION | Facility: CLINIC | Age: 71
End: 2021-03-04

## 2021-03-04 DIAGNOSIS — I48.20 CHRONIC ATRIAL FIBRILLATION (H): ICD-10-CM

## 2021-03-04 LAB — POC INR - HE - HISTORICAL: 2.7 (ref 0.9–1.1)

## 2021-03-29 ENCOUNTER — COMMUNICATION - HEALTHEAST (OUTPATIENT)
Dept: CARDIOLOGY | Facility: CLINIC | Age: 71
End: 2021-03-29

## 2021-03-29 DIAGNOSIS — I50.23 ACUTE ON CHRONIC SYSTOLIC HEART FAILURE (H): ICD-10-CM

## 2021-04-08 ENCOUNTER — COMMUNICATION - HEALTHEAST (OUTPATIENT)
Dept: ANTICOAGULATION | Facility: CLINIC | Age: 71
End: 2021-04-08

## 2021-04-08 ENCOUNTER — AMBULATORY - HEALTHEAST (OUTPATIENT)
Dept: CARDIOLOGY | Facility: CLINIC | Age: 71
End: 2021-04-08

## 2021-04-08 DIAGNOSIS — I48.20 CHRONIC ATRIAL FIBRILLATION (H): ICD-10-CM

## 2021-04-08 LAB — POC INR - HE - HISTORICAL: 4.1 (ref 0.9–1.1)

## 2021-04-19 ENCOUNTER — AMBULATORY - HEALTHEAST (OUTPATIENT)
Dept: CARDIOLOGY | Facility: CLINIC | Age: 71
End: 2021-04-19

## 2021-04-19 ENCOUNTER — COMMUNICATION - HEALTHEAST (OUTPATIENT)
Dept: ANTICOAGULATION | Facility: CLINIC | Age: 71
End: 2021-04-19

## 2021-04-19 DIAGNOSIS — I48.20 CHRONIC ATRIAL FIBRILLATION (H): ICD-10-CM

## 2021-04-19 LAB — POC INR - HE - HISTORICAL: 2.7 (ref 0.9–1.1)

## 2021-05-04 ENCOUNTER — COMMUNICATION - HEALTHEAST (OUTPATIENT)
Dept: ANTICOAGULATION | Facility: CLINIC | Age: 71
End: 2021-05-04

## 2021-05-04 ENCOUNTER — AMBULATORY - HEALTHEAST (OUTPATIENT)
Dept: CARDIOLOGY | Facility: CLINIC | Age: 71
End: 2021-05-04

## 2021-05-04 DIAGNOSIS — I48.20 CHRONIC ATRIAL FIBRILLATION (H): ICD-10-CM

## 2021-05-04 LAB — POC INR - HE - HISTORICAL: 3.9 (ref 0.9–1.1)

## 2021-05-18 ENCOUNTER — AMBULATORY - HEALTHEAST (OUTPATIENT)
Dept: CARDIOLOGY | Facility: CLINIC | Age: 71
End: 2021-05-18

## 2021-05-18 ENCOUNTER — COMMUNICATION - HEALTHEAST (OUTPATIENT)
Dept: ANTICOAGULATION | Facility: CLINIC | Age: 71
End: 2021-05-18

## 2021-05-18 DIAGNOSIS — I48.20 CHRONIC ATRIAL FIBRILLATION (H): ICD-10-CM

## 2021-05-18 LAB — POC INR - HE - HISTORICAL: 5.4 (ref 0.9–1.1)

## 2021-05-20 ENCOUNTER — COMMUNICATION - HEALTHEAST (OUTPATIENT)
Dept: ANTICOAGULATION | Facility: CLINIC | Age: 71
End: 2021-05-20

## 2021-05-20 ENCOUNTER — AMBULATORY - HEALTHEAST (OUTPATIENT)
Dept: CARDIOLOGY | Facility: CLINIC | Age: 71
End: 2021-05-20

## 2021-05-20 DIAGNOSIS — I48.20 CHRONIC ATRIAL FIBRILLATION (H): ICD-10-CM

## 2021-05-20 LAB — POC INR - HE - HISTORICAL: 2.8 (ref 0.9–1.1)

## 2021-05-25 ENCOUNTER — AMBULATORY - HEALTHEAST (OUTPATIENT)
Dept: CARDIOLOGY | Facility: CLINIC | Age: 71
End: 2021-05-25

## 2021-05-25 DIAGNOSIS — Z95.810 ICD (IMPLANTABLE CARDIOVERTER-DEFIBRILLATOR), SINGLE, IN SITU: ICD-10-CM

## 2021-05-25 DIAGNOSIS — I42.0 CARDIOMYOPATHY, DILATED, NONISCHEMIC (H): ICD-10-CM

## 2021-05-26 ENCOUNTER — RECORDS - HEALTHEAST (OUTPATIENT)
Dept: ADMINISTRATIVE | Facility: CLINIC | Age: 71
End: 2021-05-26

## 2021-05-27 ENCOUNTER — RECORDS - HEALTHEAST (OUTPATIENT)
Dept: ADMINISTRATIVE | Facility: CLINIC | Age: 71
End: 2021-05-27

## 2021-05-27 NOTE — PROGRESS NOTES
INR 1.5 After talking with pt and discussing history of greens/salads and medication change. Pt will  continue  with current diet and dosing of Warfarin.  Continue with moderation of Vit K and green leafy vegetables. Cautioned to call with increase bruising or bleeding. Reminded to call with medication change especially antibiotic. Call with any questions or concerns or any up coming procedures. Cautioned about using Herbal medication.  Take 5 mg daily and retest in one week.

## 2021-05-27 NOTE — PATIENT INSTRUCTIONS - HE
INR 1.5 After talking with pt and discussing history of greens/salads and medication change. Pt will  continue  with current diet and dosing of Warfarin.  Continue with moderation of Vit K and green leafy vegetables. Cautioned to call with increase bruising or bleeding. Reminded to call with medication change especially antibiotic. Call with any questions or concerns or any up coming procedures. Cautioned about using Herbal medication.

## 2021-05-28 NOTE — PATIENT INSTRUCTIONS - HE
INR 2.5 mg daily. After talking with pt and discussing history of greens/salads and medication change. Pt will  continue  with current diet and dosing of Warfarin.  Continue with moderation of Vit K and green leafy vegetables. Cautioned to call with increase bruising or bleeding. Reminded to call with medication change especially antibiotic. Call with any questions or concerns or any up coming procedures. Cautioned about using Herbal medication.

## 2021-05-29 NOTE — TELEPHONE ENCOUNTER
ANTICOAGULATION  MANAGEMENT    Assessment     Today's INR result of 3.2 is Supratherapeutic (goal INR of 2.0-3.0)        Warfarin taken as previously instructed    Change in alcohol intake may be affecting INR    No new medication/supplements affecting INR    Continues to tolerate warfarin with no reported s/s of bleeding or thromboembolism     Previous INR was Therapeutic     Dental Procedure tooth extraction on 6/19    Patient introduced to [facility] program    Plan:     Spoke with Zach regarding INR result and instructed:     Warfarin Dosing Instructions:  take one time lower dose of 2.5 mg today then continue current warfarin dose    5 mg every day      (0 % change)    Instructed patient to follow up no later than: 2- 4 weeks.  Recommended to have INR checked prior to dental procedure- patient declined stating that he will come in every 4 weeks due to he pays out of pocket every time he has his INR's done.    Education provided: importance of therapeutic range and target INR goal and significance of current INR result    Zach verbalizes understanding and agrees to warfarin dosing plan.    Instructed to call the [facility] for any changes, questions or concerns. (#[phone])   ?   Terri Cornell RN    Subjective/Objective:      Zach Garcia, a 69 y.o. male is on warfarin.     Zach reports:     Home warfarin dose: as updated on anticoagulation calendar per template     Missed doses: No     Medication changes:  No     S/S of bleeding or thromboembolism:  No     New Injury or illness:  No     Changes in diet or alcohol consumption:  Yes had 2 beers last Tuesday. Claimed that he does not like to eat greens/vit k.     Upcoming surgery, procedure or cardioversion:  Yes: tooth extraction 6/19    Anticoagulation Episode Summary     Current INR goal:   2.0-3.0   TTR:   62.2 % (3.6 y)   Next INR check:   7/4/2019   INR from last check:   3.20! (6/6/2019)   Weekly max warfarin dose:      Target end date:    Indefinite   INR check location:      Preferred lab:      Send INR reminders to:   [facility]ART [facility]    Indications    Chronic atrial fibrillation (H) [I48.2]           Comments:            Anticoagulation Care Providers     Provider Role Specialty Phone number    Teagan Martel MD  Cardiology [phone]

## 2021-05-29 NOTE — TELEPHONE ENCOUNTER
FYI - Status Update  Who is Calling: Patient  Update: Patient returning phone call. ACN not available at the time of the call, please call the patient back, thank you.  Okay to leave a detailed message?:  Yes

## 2021-05-30 VITALS — BODY MASS INDEX: 29.77 KG/M2 | WEIGHT: 232 LBS | HEIGHT: 74 IN

## 2021-05-30 VITALS — BODY MASS INDEX: 28.23 KG/M2 | HEIGHT: 74 IN | WEIGHT: 220 LBS

## 2021-05-30 VITALS — HEIGHT: 74 IN | BODY MASS INDEX: 28.62 KG/M2 | WEIGHT: 223 LBS

## 2021-05-30 VITALS — WEIGHT: 219 LBS | BODY MASS INDEX: 28.89 KG/M2

## 2021-05-30 VITALS — WEIGHT: 220 LBS | BODY MASS INDEX: 29.03 KG/M2

## 2021-05-30 VITALS — BODY MASS INDEX: 29.29 KG/M2 | HEIGHT: 73 IN | WEIGHT: 221 LBS

## 2021-05-30 VITALS — BODY MASS INDEX: 28.89 KG/M2 | WEIGHT: 219 LBS

## 2021-05-30 VITALS — BODY MASS INDEX: 29.16 KG/M2 | WEIGHT: 221 LBS

## 2021-05-30 VITALS — BODY MASS INDEX: 29.16 KG/M2 | HEIGHT: 73 IN | WEIGHT: 220 LBS

## 2021-05-30 VITALS — WEIGHT: 222 LBS | HEIGHT: 74 IN | BODY MASS INDEX: 28.49 KG/M2

## 2021-05-30 VITALS — BODY MASS INDEX: 29.26 KG/M2 | WEIGHT: 228 LBS | HEIGHT: 74 IN

## 2021-05-30 VITALS — BODY MASS INDEX: 29.03 KG/M2 | WEIGHT: 220 LBS

## 2021-05-30 VITALS — WEIGHT: 219 LBS | BODY MASS INDEX: 28.12 KG/M2

## 2021-05-30 VITALS — BODY MASS INDEX: 27.99 KG/M2 | WEIGHT: 218 LBS

## 2021-05-30 VITALS — BODY MASS INDEX: 28.63 KG/M2 | WEIGHT: 217 LBS

## 2021-05-30 VITALS — WEIGHT: 220 LBS | WEIGHT: 220 LBS | BODY MASS INDEX: 29.03 KG/M2 | BODY MASS INDEX: 29.03 KG/M2

## 2021-05-30 VITALS — BODY MASS INDEX: 27.71 KG/M2 | WEIGHT: 215.9 LBS | HEIGHT: 74 IN

## 2021-05-30 VITALS — WEIGHT: 218 LBS | BODY MASS INDEX: 27.99 KG/M2

## 2021-05-30 VITALS — HEIGHT: 73 IN | BODY MASS INDEX: 29.16 KG/M2 | WEIGHT: 220 LBS

## 2021-05-30 NOTE — TELEPHONE ENCOUNTER
ANTICOAGULATION  MANAGEMENT    Assessment     Today's INR result of 2.4 is Therapeutic (goal INR of 2.0-3.0)        Warfarin taken as previously instructed    No new diet changes affecting INR    No new medication/supplements affecting INR    Continues to tolerate warfarin with no reported s/s of bleeding or thromboembolism     Previous INR was Supratherapeutic 3.2 on 6/6/19    Plan:     Spoke with Zach regarding INR result and instructed:     Warfarin Dosing Instructions:  Continue current warfarin dose    5 mg every day     (0 % change)    Instructed patient to follow up no later than: 4 weeks- Appointment made.    Education provided: importance of consistent vitamin K intake and importance of therapeutic range    Zach verbalizes understanding and agrees to warfarin dosing plan.    Instructed to call the [facility] for any changes, questions or concerns. (#[phone])   ?   Per [facility] Protocol/ Terri Cornell RN ,ACN / Dr. Teagan Martel      Subjective/Objective:      Zach Garcia, a 69 y.o. male is on warfarin.     Zach reports:     Home warfarin dose: verbally confirmed home dose with Pat and updated on anticoagulation calendar     Missed doses: No     Medication changes:  No     S/S of bleeding or thromboembolism:  No     New Injury or illness:  No     Changes in diet or alcohol consumption:  No     Upcoming surgery, procedure or cardioversion:  No    Anticoagulation Episode Summary     Current INR goal:   2.0-3.0   TTR:   62.5 % (3.7 y)   Next INR check:   8/6/2019   INR from last check:   2.40 (7/9/2019)   Weekly max warfarin dose:      Target end date:   Indefinite   INR check location:      Preferred lab:      Send INR reminders to:   [facility]    Indications    Chronic atrial fibrillation (H) [I48.2]           Comments:            Anticoagulation Care Providers     Provider Role Specialty Phone number    Teagan Martel MD  Cardiology [phone]

## 2021-05-30 NOTE — TELEPHONE ENCOUNTER
Called back patient again to see if he still required refill of furosemide. Per our records, it would seem that this was refilled 6/25/19 to his mail-order pharmacy. LM for to call back if he still needs this done. Encounter closed. -[facility] [place]

## 2021-05-30 NOTE — TELEPHONE ENCOUNTER
----- Message from Elida Fierro sent at 7/9/2019  7:41 AM CDT -----  Regarding: [facility] PT NEEDS REFILLS  Contact: [phone]  Pat was in this morning for INR and said he would like a call to discuss a refill on the below medication. He said he would like it sent to the mail order.     furosemide (LASIX) 40 MG tablet    ThanksElida.         Per Epic records- this was refilled on 6/25/19 and sent to [facility] mail order pharmacy. Called patient to let him know this and to see if he still requires a refill. Follow up visit with [facility] scheduled in September. [place] [place]

## 2021-05-31 VITALS — WEIGHT: 231 LBS | HEIGHT: 74 IN | BODY MASS INDEX: 29.65 KG/M2

## 2021-05-31 VITALS — WEIGHT: 236 LBS | BODY MASS INDEX: 30.29 KG/M2 | HEIGHT: 74 IN

## 2021-05-31 VITALS — WEIGHT: 243 LBS | HEIGHT: 74 IN | BODY MASS INDEX: 31.18 KG/M2

## 2021-05-31 NOTE — TELEPHONE ENCOUNTER
Lab Results   Component Value Date    INR 2.50 (!) 08/06/2019    INR 2.40 (!) 07/09/2019    INR 3.20 (!) 06/06/2019       Patient's current Warfarin doses: 5 mg daily      Next INR check is on 9/3/19      Patient's last OV with HCC was on 7/3/18 but has an upcoming visit with Dr Martel on 9/6/19    Warfarin prescription 3 month supply refill sent to patient's pharmacy today.    Terri Cornell RN

## 2021-05-31 NOTE — TELEPHONE ENCOUNTER
ANTICOAGULATION  MANAGEMENT    Assessment     Today's INR result of 2.5 is Therapeutic (goal INR of 2.0-3.0)        Warfarin taken as previously instructed    No new diet changes affecting INR    No new medication/supplements affecting INR    Continues to tolerate warfarin with no reported s/s of bleeding or thromboembolism     Previous INR was Therapeutic    Plan:     Left a detailed message for Zach regarding INR result and instructed:     Warfarin Dosing Instructions:  Continue current warfarin dose    5 mg every day      (0 % change)    Instructed patient to follow up no later than: 4-6 weeks    Education provided: importance of therapeutic range and target INR goal and significance of current INR result      Instructed to call the [facility] for any changes, questions or concerns. (#[phone])   ?   Per [facility] Protocol/ Terri Cornell RN ,ACN / Dr. Teagan Martel        Subjective/Objective:      Zach Garcia, a 69 y.o. male is on warfarin.     Zach reports:     Home warfarin dose: as updated on anticoagulation calendar per template     Missed doses: No     Medication changes:  No     S/S of bleeding or thromboembolism:  No     New Injury or illness:  No     Changes in diet or alcohol consumption:  No     Upcoming surgery, procedure or cardioversion:  No    Anticoagulation Episode Summary     Current INR goal:   2.0-3.0   TTR:   63.2 % (3.8 y)   Next INR check:   9/3/2019   INR from last check:   2.50 (8/6/2019)   Weekly max warfarin dose:      Target end date:   Indefinite   INR check location:      Preferred lab:      Send INR reminders to:   [facility]    Indications    Chronic atrial fibrillation (H) [I48.2]           Comments:            Anticoagulation Care Providers     Provider Role Specialty Phone number    Teagan Martel MD  Cardiology [phone]

## 2021-06-01 VITALS — BODY MASS INDEX: 30.67 KG/M2 | WEIGHT: 239 LBS | HEIGHT: 74 IN

## 2021-06-01 VITALS — WEIGHT: 240 LBS | BODY MASS INDEX: 33.6 KG/M2 | HEIGHT: 71 IN

## 2021-06-01 VITALS — HEIGHT: 74 IN | BODY MASS INDEX: 31.18 KG/M2 | WEIGHT: 243 LBS

## 2021-06-01 NOTE — TELEPHONE ENCOUNTER
----- Message from May Harris RN sent at 9/5/2019 10:13 AM CDT -----  Contact: RONNIE CHEN  Warfarin pt. Thank you, May  ----- Message -----  From: Bill Hung  Sent: 9/5/2019  10:05 AM  To: Jenna Givens RN    General phone call:    Caller: RONNIE CHEN    Primary cardiologist: Dr Martel    Detailed reason for call: Needing hold/bridge orders for upcoming colonoscopy - they recommend 4 day hold - asking for call back - thank you     [phone] opt 1 opt 1

## 2021-06-01 NOTE — TELEPHONE ENCOUNTER
Who is calling:  Patient  Reason for Call:  Calling back regarding today's INR  Date of last appointment with primary care: n/a  Okay to leave a detailed message: Yes

## 2021-06-01 NOTE — TELEPHONE ENCOUNTER
CHARLIE-PROCEDURAL ANTICOAGULATION  MANAGEMENT    Assessment     Warfarin interruption plan for Colonoscopy    Atrial Fibrillation      Risk stratification for thromboembolism per 2017 ACC periprocedure pathway for NVAF: LOW ([id number] 0-4 without hx of stroke/tia).       [id number] = 3-4  (HTN, Age 65-74, Vascular disease +/- CHF)      Bleeding risk factors:  Aspirin use, hx of subdural hematoma > 3 months ago (2015)    2017 ACC periprocedure pathway for NVAF recommends no bridge for low risk stratification or moderate risk stratification with no hx of stroke/tia      Recommendation:      Okay to hold warfarin 4 days prior to procedure    No bridge    INR check 1-2 weeks prior to procedure; 1 week after restarting warfairn  ?   Vania Garland, PharmD   [facility] Anticoagulation    Subjective/Objective:      Zach Garcia, a 69 y.o. male    Reason for Anticoagulation: Atrial Fibrillation    Goal INR Range: 2-3    Pertinent History: Hypertension, CAD w/ LIZA, Cardiomyopathy (no current symptoms, EF 45% on stress test 4/2018, on Lasix),  Subdural hematoma due to trauma 2015    Wt Readings from Last 3 Encounters:   09/06/19 (!) 242 lb (109.8 kg)   12/13/18 (!) 230 lb (104.3 kg)   07/03/18 (!) 240 lb (108.9 kg)        Ideal body weight: 75.9 kg (167 lb 4.4 oz)  Adjusted ideal body weight: 89.4 kg (197 lb 2.6 oz)     Lab Results   Component Value Date    INR 2.50 (!) 08/06/2019    INR 2.40 (!) 07/09/2019    INR 3.20 (!) 06/06/2019     Lab Results   Component Value Date    HGB 15.3 05/22/2019    HCT 45.8 05/22/2019     05/22/2019     Lab Results   Component Value Date    CREATININE 0.91 05/22/2019    CREATININE 0.91 04/16/2018    CREATININE 0.84 04/10/2018

## 2021-06-01 NOTE — TELEPHONE ENCOUNTER
ANTICOAGULATION  MANAGEMENT PROGRAM    Zach Garcia is overdue for INR check.  Reminder call made.    Left message for Zach. If returning call, please schedule INR check as soon as possible.    Concepción Church RN

## 2021-06-01 NOTE — TELEPHONE ENCOUNTER
ANTICOAGULATION  MANAGEMENT    Assessment     Today's INR result of 2.6 is Therapeutic (goal INR of 2.0-3.0)        Warfarin taken as previously instructed    No new diet changes affecting INR    No new medication/supplements affecting INR    Continues to tolerate warfarin with no reported s/s of bleeding or thromboembolism     Previous INR was Subtherapeutic    Plan:     Left a detailed message for Zach regarding INR result and instructed:     Warfarin Dosing Instructions:  Continue current warfarin dose 5 mg daily  (0 % change)    Instructed patient to follow up no later than: two weeks.    Education provided:     Instructed to call the [facility] for any changes, questions or concerns. (#[phone])   ?   Irene Black RN    Subjective/Objective:      Zach Garcia, a 69 y.o. male is on warfarin.     Zach reports:     Home warfarin dose: as updated on anticoagulation calendar per template     Missed doses: No     Medication changes:  No     S/S of bleeding or thromboembolism:  No     New Injury or illness:  No     Changes in diet or alcohol consumption:  No     Upcoming surgery, procedure or cardioversion:  No    Anticoagulation Episode Summary     Current INR goal:   2.0-3.0   TTR:   51.9 %   Next INR check:   10/7/2019   INR from last check:   2.60 (9/23/2019)   Weekly max warfarin dose:      Target end date:   Indefinite   INR check location:      Preferred lab:      Send INR reminders to:   [facility]    Indications    Chronic atrial fibrillation (H) [I48.2]           Comments:            Anticoagulation Care Providers     Provider Role Specialty Phone number    Teagan Martel MD  Cardiology [phone]

## 2021-06-01 NOTE — TELEPHONE ENCOUNTER
ANTICOAGULATION  MANAGEMENT    Assessment     Today's INR result of 1.3 is Subtherapeutic (goal INR of 2.0-3.0)        Missed dose(s) 8/31-9/3  may be affecting INR days    No new diet changes affecting INR    No interaction expected between gabapentin and warfarin    Continues to tolerate warfarin with no reported s/s of  thromboembolism     Torn biceps bruising/hematoma right arm     Previous INR was Therapeutic    Plan:     Spoke with Pat regarding INR result and instructed:     Warfarin Dosing Instructions:  take bosster doses of 7.5 mg today and tomorrow then continue current warfarin dose    5 mg every day      (0 % change)    Instructed patient to follow up no later than: 5-7 days - patient prefers to call to make appoint ment    Education provided: importance of therapeutic range, target INR goal and significance of current INR result, importance of taking warfarin as instructed, no interaction anticipated between warfarin and gabapentin, monitoring for clotting signs and symptoms and when to seek medical attention/emergency care    Zach verbalizes understanding and agrees to warfarin dosing plan.    Instructed to call the [facility] for any changes, questions or concerns. (#[phone])   ?   Per [facility] Protocol/ Terri Cornell RN ,ACN / Dr. Teagan Martel        Subjective/Objective:      Zach Garcia, a 69 y.o. male is on warfarin.     Zach reports:     Home warfarin dose: verbally confirmed home dose with Pat and updated on anticoagulation calendar     Missed doses: Yes: patient reported that he decided not to take his warfarin doses on 8/31-9/3 due to he pulled a muscle and had bruising /hematoma.     Medication changes:  Yes: Gabapentin     S/S of bleeding or thromboembolism:  Yes: Bruising/ hematoma on right arm      New Injury or illness:  Yes: Partial tear on right bicep muscle- patient reported that [facility].     Changes in diet or alcohol consumption:   No     Upcoming surgery, procedure or cardioversion:  No    Anticoagulation Episode Summary     Current INR goal:   2.0-3.0   TTR:   50.2 %   Next INR check:   9/17/2019   INR from last check:   1.30! (9/10/2019)   Weekly max warfarin dose:      Target end date:   Indefinite   INR check location:      Preferred lab:      Send INR reminders to:   [facility]    Indications    Chronic atrial fibrillation (H) [I48.2]           Comments:            Anticoagulation Care Providers     Provider Role Specialty Phone number    Teagan Martel MD  Cardiology [phone]

## 2021-06-01 NOTE — TELEPHONE ENCOUNTER
Noted that another RN already addressed the ABN. No order placed or adjusted. -[facility]

## 2021-06-01 NOTE — PATIENT INSTRUCTIONS - HE
Mr Zach STALLWORTH Radha,  I enjoyed visiting with you again today.  I am glad to hear you are doing well.  Per our conversation if having any more surgery let me know and I will get a repeat stress test.  Given the fast heart beat I will check the echo and if heart weaker will adjust meds.  I will also check blood pressures in legs.  I will plan on seeing you 1 year or sooner if needed.  Aram Martel

## 2021-06-01 NOTE — PROGRESS NOTES
[facility] Follow-up Note    Assessment & Plan        1. Coronary artery disease due to calcified coronary lesion -angiography November 1, 2016 showed normal left main and mid left anterior descending 30% lesion, normal circumflex and right coronary artery with a proximal 99% lesion which received a drug-coated stent. He was on Plavix for 6 months, which was discontinued and back on aspirin and Coumadin therapy. This was a drug-coated stent.  No symptoms and work on prevention.  In anticipation of cervical surgery last year he underwent nuclear stress test which showed no significant ischemia but if he is planning on having repeat surgery may need to repeat this this year or next.   2. Chronic atrial fibrillation (H) -asymptomatic not valvular and on chronic anticoagulation given advanced CHADS 2 VASC score.  50% of heartbeats are greater than 120.  I will check echo and if significant left ventricular dysfunction uptitrate beta-blocker.   3. Chronic systolic heart failure (H) -no signs or symptoms currently and on Lasix.   4. Essential hypertension with goal blood pressure less than 130/80 -under good control currently.   5. Cardiomyopathy, dilated, nonischemic (H) -initial ejection fraction 15% and on most recent stress nuclear 45%.  As above we will recheck echo.  If ejection fraction markedly diminished uptitrate beta-blocker.   6. Dyslipidemia -LDL excellent at 69 from this year.   7. ICD (implantable cardioverter-defibrillator), single, in situ -[employer] with [employer] lead in VVI mode with less than 2% ventricular pacing, leads looking stable, 12 years of battery left.  As above, consider uptitrating beta-blocker.   8. Cervical myelopathy (H) -still having numbness and weakness of legs, I appreciate no good pulses down there.  Will check ankle-brachial indices.   9.  Obesity-defer to primary but might consider ruling out sleep apnea.    Plan  1.  Check echo and if ejection  "fraction diminished uptitrate beta-blocker.  2.  Check ankle-brachial indices and if diminished consider CTA of legs.  3.  Weight loss.  4.  Defer to primary but consider ruling out sleep apnea.  5.  Follow-up me one year with device check or sooner if needed.    Subjective  CC: 69-year-old white gentleman being seen in follow-up, since of seen him he had neck surgery.  He is still having numbness involving his extremities and lower limbs.  He has been labeled as neuropathy.  He has a new food smoker and is smoking a lot of meats and fishes and thus eating more and not losing weight.  He denies any syncope, dizziness, chest discomfort, palpitations, shortness of breath, PND, orthopnea or peripheral edema.    Medications  Current Outpatient Medications   Medication Sig     acetaminophen (TYLENOL) 500 MG tablet Take 1 tablet (500 mg total) by mouth every 4 (four) hours.     aspirin 81 MG EC tablet Take 1 tablet (81 mg total) by mouth daily.     furosemide (LASIX) 40 MG tablet TAKE ONE-HALF TABLET BY  MOUTH DAILY     lisinopril (PRINIVIL,ZESTRIL) 20 MG tablet Take 1 tablet (20 mg total) by mouth daily.     metoprolol succinate (TOPROL-XL) 100 MG 24 hr tablet TAKE 1 AND 1/2 TABLETS BY  MOUTH DAILY (Patient taking differently: TAKE 2 TABLETS BY  MOUTH DAILY)     multivitamin with minerals tablet Take 1 tablet by mouth daily.     rosuvastatin (CRESTOR) 20 MG tablet TAKE 1 TABLET BY MOUTH AT  BEDTIME     spironolactone (ALDACTONE) 25 MG tablet TAKE ONE-HALF TABLET BY  MOUTH DAILY     warfarin (COUMADIN/JANTOVEN) 5 MG tablet Take 1 tablet (5 mg total) by mouth daily. Adjust dose per INR results as instructed.     zolpidem (AMBIEN) 5 MG tablet Take 5-10 mg by mouth at bedtime as needed.        Objective  /68 (Patient Site: Left Arm, Patient Position: Sitting, Cuff Size: Adult Large)   Pulse 68   Resp 16   Ht 5' 11.25\" (1.81 m)   Wt (!) 242 lb (109.8 kg)   BMI 33.52 kg/m      General Appearance:    Alert, " cooperative, no distress, appears stated age   Head:    Normocephalic, without obvious abnormality, atraumatic   Throat:   Lips, mucosa, and tongue normal; teeth and gums normal   Neck:   Supple, symmetrical, trachea midline, no adenopathy;        thyroid:  No enlargement/tenderness/nodules; no carotid    bruit or JVD   Back:     Symmetric, no curvature, ROM normal, no CVA tenderness   Lungs:     Clear to auscultation bilaterally, respirations unlabored   Chest wall:    No tenderness, left-sided ICD   Heart:   Irregularly irregular, S1 and S2 normal, no murmur, rub   or gallop   Abdomen:     Soft, non-tender, bowel sounds active all four quadrants,     no masses, no organomegaly   Extremities:   Normal, atraumatic, no cyanosis or edema   Pulses:   2+ and symmetric all upper extremities absent lower extremities   Skin:   Skin color, texture, turgor normal, no rashes or lesions     Results    Lab Results personally reviewed   Lab Results   Component Value Date    CHOL 158 05/22/2019    CHOL 144 02/27/2018     Lab Results   Component Value Date    HDL 46 05/22/2019    HDL 39 (L) 02/27/2018     Lab Results   Component Value Date    LDLCALC 69 05/22/2019    LDLCALC 67 02/27/2018     Lab Results   Component Value Date    TRIG 215 (H) 05/22/2019    TRIG 189 (H) 02/27/2018     Lab Results   Component Value Date    WBC 6.3 05/22/2019    HGB 15.3 05/22/2019    HCT 45.8 05/22/2019     05/22/2019     Lab Results   Component Value Date    CREATININE 0.91 05/22/2019    BUN 20 05/22/2019     (L) 05/22/2019    K 4.0 05/22/2019    CO2 21 (L) 05/22/2019     Review of Systems:   General: WNL  Eyes: WNL  Ears/Nose/Throat: WNL  Lungs: WNL  Heart: WNL  Stomach: WNL  Bladder: WNL  Muscle/Joints: WNL  Skin: WNL  Nervous System: WNL  Mental Health: WNL     Blood: WNL

## 2021-06-01 NOTE — TELEPHONE ENCOUNTER
----- Message from Rachel Pang RN sent at 9/9/2019  3:04 PM CDT -----  Regarding: FW: Please change diagnosis on US order      ----- Message -----  From: Zoraida Dillon  Sent: 9/6/2019  11:24 AM  To: Teagan Martel MD, #  Subject: Please change diagnosis on US order              This patient is scheduled for your referred ultrasound on 9/16/19. When scheduling an ABN popped up. Please change the diagnosis so the patient's insurance will cover it.     Thank you,   Zoraida Dillon  [phone]

## 2021-06-01 NOTE — PROGRESS NOTES
In clinic device check with Dr. Martel.  Please see link for full device report.  Patient was informed of results and next follow up during today's visit.

## 2021-06-02 NOTE — PLAN OF CARE
Monitored for MRI after [employer] rep placed Pt's ICD in protection mode.  Pt remained in a-fib with average HR 80 and tolerated testing well.

## 2021-06-02 NOTE — TELEPHONE ENCOUNTER
Who is calling:  Patient  Reason for Call:  Patient is scheduled for INR 10/29- Patient mentioned that he was advised by ACN that his recommended follow up for INR was 4-6 weeks from 09/23 INR and feels he is not overdue, please call patient if sooner appointment is needed.  Date of last appointment with primary care: na  Okay to leave a detailed message: Yes

## 2021-06-02 NOTE — TELEPHONE ENCOUNTER
ANTICOAGULATION  MANAGEMENT    Assessment     Today's INR result of 3.9 is Supratherapeutic (goal INR of 2.0-3.0)        Warfarin taken as previously instructed    No new diet changes affecting INR    Interaction between Tylenol and warfarin may be affecting INR    Continues to tolerate warfarin with no reported s/s of bleeding or thromboembolism     Previous INR was Therapeutic    Plan:     Spoke with Zach regarding INR result and instructed:     Warfarin Dosing Instructions:  hold warfarin dose today then continue current warfarin dose    5 mg every day     (0 % change)    Instructed patient to follow up no later than: 1-2 weeks, patient prefers to call to make appointment    Education provided: potential interaction between warfarin and tylenol and monitoring for bleeding signs and symptoms    Zach verbalizes understanding and agrees to warfarin dosing plan.    Instructed to call the [facility] for any changes, questions or concerns. (#[phone])   ?   Terri Cornell RN    Subjective/Objective:      Zach Garcia, a 69 y.o. male is on warfarin.     Zach reports:     Home warfarin dose: as updated on anticoagulation calendar per template     Missed doses: No     Medication changes:  Yes: taking tylenol for pain     S/S of bleeding or thromboembolism:  No     New Injury or illness:  No     Changes in diet or alcohol consumption:  No     Upcoming surgery, procedure or cardioversion:  No    Anticoagulation Episode Summary     Current INR goal:   2.0-3.0   TTR:   54.9 %   Next INR check:   11/12/2019   INR from last check:   3.90! (10/29/2019)   Weekly max warfarin dose:      Target end date:   Indefinite   INR check location:      Preferred lab:      Send INR reminders to:   [facility]    Indications    Chronic atrial fibrillation [I48.20]           Comments:            Anticoagulation Care Providers     Provider Role Specialty Phone number    Teagan Martel MD  Cardiology  [phone]

## 2021-06-02 NOTE — TELEPHONE ENCOUNTER
ANTICOAGULATION  MANAGEMENT PROGRAM    Zach Garcia is overdue for INR check.  Reminder call made.    Left message for Zach. If returning call, please schedule INR check as soon as possible.    Terri Cornell RN

## 2021-06-03 VITALS
HEIGHT: 71 IN | RESPIRATION RATE: 16 BRPM | SYSTOLIC BLOOD PRESSURE: 116 MMHG | BODY MASS INDEX: 33.88 KG/M2 | DIASTOLIC BLOOD PRESSURE: 68 MMHG | HEART RATE: 68 BPM | WEIGHT: 242 LBS

## 2021-06-03 NOTE — TELEPHONE ENCOUNTER
ACN called and spoke with patient and he stated that he prefers to have warfarin prescription sent to [facility] he stated that he has enough warfarin at this time until mail prescription arrives.    Made aware that warfarin refill will be sent to [facility] per request.    Terri Cornell RN

## 2021-06-03 NOTE — TELEPHONE ENCOUNTER
Received a faxed warfarin refill request today from [facility].    Noted that warfarin refill was sent to patient's pharmacy [facility]  on 11/6/19.    Instructed patient is he prefers for the prescription sent via [facility].    Awaiting call back.    Terri Cornell RN

## 2021-06-03 NOTE — TELEPHONE ENCOUNTER
Lab Results   Component Value Date    INR 3.90 (!) 10/29/2019    INR 2.60 (!) 09/23/2019    INR 1.30 (!) 09/10/2019       Patient's current Warfarin doses: 5 mg daily      Next INR check is on 11/12/19      Patient's last OV with HCC was on 9/6/19    Warfarin prescription 3 month supply and one refill sent to patient's pharmacy today.    Terri Cornell RN

## 2021-06-03 NOTE — TELEPHONE ENCOUNTER
ANTICOAGULATION  MANAGEMENT    Assessment     Today's INR result of 2.7 is Therapeutic (goal INR of 2.0-3.0)        Warfarin taken as previously instructed    No new diet changes affecting INR    No new medication/supplements affecting INR    Continues to tolerate warfarin with no reported s/s of bleeding or thromboembolism     Previous INR was Supratherapeutic     Noted: 1/3/20 Colonoscopy Procedure at [facility]    Plan:     Left a detailed message for Zach regarding INR result and instructed:     Warfarin Dosing Instructions:  Continue current warfarin dose 5 mg daily   (0 % change)    Instructed patient to follow up no later than: 2 weeks.    Education provided: importance of therapeutic range and target INR goal and significance of current INR result        Instructed to call the [facility] for any changes, questions or concerns. (#[phone])   ?   Terri Cornell RN    Subjective/Objective:      Zach Garcia, a 69 y.o. male is on warfarin.     Zach reports:     Home warfarin dose: as updated on anticoagulation calendar per template     Missed doses: No     Medication changes:  No     S/S of bleeding or thromboembolism:  No     New Injury or illness:  No     Changes in diet or alcohol consumption:  No     Upcoming surgery, procedure or cardioversion:  No    Anticoagulation Episode Summary     Current INR goal:   2.0-3.0   TTR:   55.3 %   Next INR check:   11/27/2019   INR from last check:   2.70 (11/13/2019)   Weekly max warfarin dose:      Target end date:   Indefinite   INR check location:      Preferred lab:      Send INR reminders to:   [facility]    Indications    Chronic atrial fibrillation [I48.20]           Comments:            Anticoagulation Care Providers     Provider Role Specialty Phone number    Teagan Martel MD  Cardiology [phone]

## 2021-06-03 NOTE — TELEPHONE ENCOUNTER
"Anticoagulation Annual Referral Renewal Review    Zach Garcia's chart reviewed for annual renewal of referral to anticoagulation monitoring.        Criteria for anticoagulation nurse and/or pharmacist renewal met   Warfarin indication: Atrial Fibrillation Yes, per indication   Current with INR monitoring/compliant Yes Yes   Date of last office visit 9/6/19 Yes, had office visit within last year   Time in Therapeutic Range (TTR) 54.9 % TTR is < 60 %       Zach Garcia did NOT meet all criteria for anticoagulation management program initiated renewal and requires provider review. Using dot phrase, \".acmrenewalprovider\", please advise if Zach's anticoagulation management referral should be renewed or if patient should be seen in office to review anticoagulation therapy      Terri Cornell RN  2:20 PM      "

## 2021-06-04 ENCOUNTER — COMMUNICATION - HEALTHEAST (OUTPATIENT)
Dept: ANTICOAGULATION | Facility: CLINIC | Age: 71
End: 2021-06-04

## 2021-06-04 ENCOUNTER — AMBULATORY - HEALTHEAST (OUTPATIENT)
Dept: CARDIOLOGY | Facility: CLINIC | Age: 71
End: 2021-06-04

## 2021-06-04 VITALS
HEIGHT: 70 IN | DIASTOLIC BLOOD PRESSURE: 78 MMHG | WEIGHT: 242 LBS | OXYGEN SATURATION: 97 % | SYSTOLIC BLOOD PRESSURE: 118 MMHG | BODY MASS INDEX: 34.65 KG/M2 | HEART RATE: 80 BPM

## 2021-06-04 VITALS — BODY MASS INDEX: 34.48 KG/M2 | HEIGHT: 70 IN

## 2021-06-04 DIAGNOSIS — I48.20 CHRONIC ATRIAL FIBRILLATION (H): ICD-10-CM

## 2021-06-04 LAB — POC INR - HE - HISTORICAL: 5.2 (ref 0.9–1.1)

## 2021-06-04 NOTE — TELEPHONE ENCOUNTER
----- Message from Teagan Martel MD sent at 12/27/2019  4:56 PM CST -----  Regarding: RE: pt needing an echo?  Yes, this man with cad and a fib can have colonoscopy without reservation heart wise, but I did want a repeat echo to evaluate ef, and please order as I am out of office for a week.  LF  ----- Message -----  From: Lisa Solomon RN  Sent: 12/27/2019   1:48 PM CST  To: Teagan Martel MD, Rachel Lacy RN, #  Subject: pt needing an echo?                              Good Afternoon Dr. Martel,   I'm looking through the above stated patient's chart making sure everything is in order for his upcoming colonoscopy here at [facility]. From your note on 9/6/19 it seems like you were wanting the patient to have an echo done and then depending on the results, would maybe titrate the beta-blocker. I can't find a more recent echo on this patient past 2017. ??? Did I read your note properly? Thank you for your time.   Lisa Solomon RN   [facility]  [phone]

## 2021-06-04 NOTE — TELEPHONE ENCOUNTER
Order placed for complete echo. LM for patient to return call to discuss. -[facility] [place]

## 2021-06-04 NOTE — TELEPHONE ENCOUNTER
ANTICOAGULATION  MANAGEMENT PROGRAM    Zach Garcia is overdue for INR check.     Left message to call and schedule INR appointment as soon as possible.      Concepción Church RN

## 2021-06-04 NOTE — ANESTHESIA POSTPROCEDURE EVALUATION
Patient: Zach Garcia  COLONOSCOPY with Polypectomies  Anesthesia type: MAC    Patient location: Phase II Recovery  Last vitals:   Vitals Value Taken Time   /86 1/3/2020 12:45 PM   Temp 36.7  C (98.1  F) 1/3/2020 12:16 PM   Pulse 88 1/3/2020 12:47 PM   Resp 16 1/3/2020 12:45 PM   SpO2 85 % 1/3/2020 12:47 PM   Vitals shown include unvalidated device data.  Post vital signs: stable  Level of consciousness: awake and responds to simple questions  Post-anesthesia pain: pain controlled  Post-anesthesia nausea and vomiting: no  Pulmonary: unassisted, return to baseline  Cardiovascular: stable and blood pressure at baseline  Hydration: adequate  Anesthetic events: no    QCDR Measures:  ASA# 11 - Rocio-op Cardiac Arrest: ASA11B - Patient did NOT experience unanticipated cardiac arrest  ASA# 12 - Rocio-op Mortality Rate: ASA12B - Patient did NOT die  ASA# 13 - PACU Re-Intubation Rate: NA - No ETT / LMA used for case  ASA# 10 - Composite Anes Safety: ASA10A - No serious adverse event    Additional Notes:

## 2021-06-04 NOTE — ANESTHESIA PREPROCEDURE EVALUATION
Anesthesia Evaluation      Patient summary reviewed   No history of anesthetic complications     Airway   Mallampati: II  Neck ROM: full   Pulmonary     breath sounds clear to auscultation  (+) COPD mild,                          Cardiovascular   (+) pacemaker (ICD - BS), hypertension, CAD, CABG/stent (stent x 1 2016), dysrhythmias (afib), CHF, cardiomyopathy, hypercholesterolemia,     Rhythm: irregular        Neuro/Psych    (+) depression, anxiety/panic attacks,     Endo/Other    (+) arthritis, obesity,      GI/Hepatic/Renal    (+)   chronic renal disease (stones) CRI,      Other findings:     NPO > 8 hrs   ECHO 1/2/20:    Summary          Left Ventricle: The estimated left ventricular ejection fraction is 35%. This represents a moderately decreased ejection fraction. Cavity is moderately increased. Mild concentric hypertrophy noted. Unable to assess left ventricular diastolic function given patient is in atrial fibrillation. Probable A. fib with some pacing    The left ventricular wall motion is globally hypokinetic.    Right Ventricle: The right ventricle is mildly dilated. The systolic function is moderately reduced. TAPSE is abnormal, which is consistent with abnormal right ventricular systolic function. Pacer lead is present in the ventricle.    Left Atrium: Left atrial volume is severely increased.    No hemodynamically significant valvular heart abnormalities.     Compared to the echocardiogram from 2/1/2017, the left ventricular ejection fraction has improved.          Dental    (+) poor dentition                       Anesthesia Plan  Planned anesthetic: MAC and total IV anesthesia      No versed  No fentanyl    ASA 4     Anesthetic plan and risks discussed with: patient  Anesthesia plan special considerations: antiemetics,   Post-op plan: routine recovery

## 2021-06-04 NOTE — TELEPHONE ENCOUNTER
Received a call back from Pat. As far as he is aware, echo is not holding up scheduled colonoscopy and he starts prep tomorrow. He was transferred to scheduling to arrange for echocardiogram. -[facility] [place]

## 2021-06-04 NOTE — ANESTHESIA CARE TRANSFER NOTE
Last vitals:   Vitals:    01/03/20 1026   BP: 137/89   Pulse: 68   Resp: 18   Temp: 36.6  C (97.9  F)   SpO2: 96%     Patient's level of consciousness is awake and drowsy  Spontaneous respirations: yes  Maintains airway independently: yes  Dentition unchanged: yes  Oropharynx: oropharynx clear of all foreign objects    QCDR Measures:  ASA# 20 - Surgical Safety Checklist: WHO surgical safety checklist completed prior to induction    PQRS# 430 - Adult PONV Prevention: NA - Not adult patient, not GA or 3 or more risk factors NOT present  ASA# 8 - Peds PONV Prevention: NA - Not pediatric patient, not GA or 2 or more risk factors NOT present  PQRS# 424 - Rocio-op Temp Management: NA - MAC anesthesia or case < 60 minutes  PQRS# 426 - PACU Transfer Protocol: - Transfer of care checklist used  ASA# 14 - Acute Post-op Pain: ASA14B - Patient did NOT experience pain >= 7 out of 10

## 2021-06-04 NOTE — TELEPHONE ENCOUNTER
"ANTICOAGULATION  MANAGEMENT    Assessment     Today's INR result of 1.70 is Subtherapeutic (goal INR of 2.0-3.0)        Warfarin taken as previously instructed    No new diet changes affecting INR    No new medication/supplements affecting INR    Continues to tolerate warfarin with no reported s/s of bleeding or thromboembolism     Previous INR was Therapeutic at 2.70 on 11/13/19.    Scheduled for Colonoscopy on 1/3/2020 @ [facility]    [place]  (he just returned today to get INR, then heading back north).    Plan:     Spoke with Pat regarding INR result and instructed:     Warfarin Dosing Instructions:  (evenings. has 5mg tabs)   - today, advised one time booster with 7.5mg warfarin dose,    - then continue current warfarin dose 5 mg daily.    Instructed patient to follow up no later than:  1-2 wks.    - \"expressed not really wanting to come back to town till 4 wks.  Getting harder on his wallet  coming in so often for INR's.\"    Education provided: importance of consistent vitamin K intake and target INR goal and significance of current INR result    Pat verbalizes understanding and agrees to warfarin dosing plan.    Instructed to call the [facility] for any changes, questions or concerns. (#[phone])   ?   Layla Alvarado RN    Subjective/Objective:      Zach Garcia, a 69 y.o. male is on warfarin.     Zach reports:     Home warfarin dose: as updated on anticoagulation calendar per template     Missed doses: No     Medication changes:  No     S/S of bleeding or thromboembolism:  No     New Injury or illness:  No     Changes in diet or alcohol consumption:  No     Upcoming surgery, procedure or cardioversion:  Yes:  Colonoscopy scheduled on 1/3/2019 @ [facility] for colon cancer screening.    Anticoagulation Episode Summary     Current INR goal:   2.0-3.0   TTR:   54.9 % (1 y)   Next INR check:   12/20/2019   INR from last check:   1.70! (12/6/2019)   Weekly " max warfarin dose:      Target end date:   Indefinite   INR check location:      Preferred lab:      Send INR reminders to:   [facility] with [facility]    Indications    Chronic atrial fibrillation [I48.20]           Comments:            Anticoagulation Care Providers     Provider Role Specialty Phone number    Teagan Martel MD  Cardiology [phone]

## 2021-06-04 NOTE — TELEPHONE ENCOUNTER
ANTICOAGULATION  MANAGEMENT PROGRAM    Zach Garcia is overdue for INR check.     Spoke with patient and he stated that he is currently holding warfarin since 12/29  for his upcoming Colonoscopy Procedure on 1/3/2020     He stated that he was already instructed by his PCP during pre op visit to resume warfarin on 1/3 after the procedure.    Recommended to have INR checked one week after resuming warfarin doses. Patient refers to call to make appointment.  Patient is apprehensive that the procedure might be cancelled depending on the echo procedure outcome.    Terri Cornell RN

## 2021-06-04 NOTE — TELEPHONE ENCOUNTER
----- Message from Bill Hung sent at 12/19/2019 11:44 AM CST -----  Regarding: Hold Orders  General phone call:      Primary cardiologist: Dr Martel    Detailed reason for call:   Amy LANDERS - calling for hold orders for Coumadin - they suggest 4 days hold    Colonoscopy on 1/3    Please call back with order -   [phone] ext [phone]        ANSHUL Douglass calling asking for blood thinner hold orders, any new recommendations?

## 2021-06-04 NOTE — TELEPHONE ENCOUNTER
From: Teagan Martel MD  Sent: 12/19/2019   1:48 PM CST  To: May Harris RN  I am okay with holding anticoagulant for 4 days prior to colonoscopy in this 69-year-old gentleman with atrial fibrillation who had a prior subdural hematoma.  LF    Called [facility] and shared with representative hold order recommendations. -kcl

## 2021-06-05 ENCOUNTER — COMMUNICATION - HEALTHEAST (OUTPATIENT)
Dept: CARDIOLOGY | Facility: CLINIC | Age: 71
End: 2021-06-05

## 2021-06-05 VITALS
HEART RATE: 72 BPM | HEIGHT: 70 IN | BODY MASS INDEX: 35.65 KG/M2 | DIASTOLIC BLOOD PRESSURE: 70 MMHG | SYSTOLIC BLOOD PRESSURE: 126 MMHG | OXYGEN SATURATION: 96 % | WEIGHT: 249 LBS | RESPIRATION RATE: 16 BRPM

## 2021-06-05 VITALS — BODY MASS INDEX: 35.5 KG/M2 | WEIGHT: 248 LBS | HEIGHT: 70 IN

## 2021-06-05 VITALS
HEART RATE: 71 BPM | BODY MASS INDEX: 35.5 KG/M2 | WEIGHT: 248 LBS | RESPIRATION RATE: 16 BRPM | OXYGEN SATURATION: 95 % | SYSTOLIC BLOOD PRESSURE: 122 MMHG | HEIGHT: 70 IN | DIASTOLIC BLOOD PRESSURE: 72 MMHG

## 2021-06-05 DIAGNOSIS — I48.20 CHRONIC ATRIAL FIBRILLATION (H): ICD-10-CM

## 2021-06-05 NOTE — TELEPHONE ENCOUNTER
ANTICOAGULATION  MANAGEMENT: Discharge Review    Zach Garcia chart reviewed for anticoagulation continuity of care    Hospital admission on  1/3/2020  for Colonoscopy with Polypectomies.    Discharge disposition: Home    INR Results:     No results for input(s): INR in the last 168 hours.    Warfarin inpatient management:  not applicable    Warfarin discharge instructions: home regimen continued     Medication Changes Affecting Anticoagulation: No    Additional Factors Affecting Anticoagulation: No    Plan   Left a detailed message for Pat to continue current warfarin doses and recheck INR on 1/10/2020   Instructed to call  to schedule an appointment.    Anticoagulation calendar updated    Terri Cornell, PATRIC

## 2021-06-05 NOTE — TELEPHONE ENCOUNTER
ANTICOAGULATION  MANAGEMENT    Assessment     Today's INR result of 3.0 is Therapeutic (goal INR of 2.0-3.0)        Warfarin taken as previously instructed    No new diet changes affecting INR    No new medication/supplements affecting INR    Continues to tolerate warfarin with no reported s/s of bleeding or thromboembolism     Previous INR was Subtherapeutic likely due to missed dose    Plan:     Spoke with Zach regarding INR result and instructed:     Warfarin Dosing Instructions:  Continue current warfarin dose 5 mg daily      (0 % change)    Instructed patient to follow up no later than: 2 weeks - patient prefers to call to make appointment.    Education provided: importance of therapeutic range    Zach verbalizes understanding and agrees to warfarin dosing plan.    Instructed to call the [facility] for any changes, questions or concerns. (#[phone])   ?   Terri Cornell RN    Subjective/Objective:      Zach Garcia, a 69 y.o. male is on warfarin.     Zach reports:     Home warfarin dose: as updated on anticoagulation calendar per template     Missed doses: No     Medication changes:  No     S/S of bleeding or thromboembolism:  No     New Injury or illness:  No     Changes in diet or alcohol consumption:  No     Upcoming surgery, procedure or cardioversion:  No    Anticoagulation Episode Summary     Current INR goal:   2.0-3.0   TTR:   52.2 % (1 y)   Next INR check:   2/5/2020   INR from last check:   3.00 (1/22/2020)   Weekly max warfarin dose:      Target end date:   Indefinite   INR check location:      Preferred lab:      Send INR reminders to:   [facility]ARE    Indications    Chronic atrial fibrillation [I48.20]           Comments:            Anticoagulation Care Providers     Provider Role Specialty Phone number    Teagan Martel MD  Cardiology [phone]

## 2021-06-05 NOTE — TELEPHONE ENCOUNTER
ANTICOAGULATION  MANAGEMENT    Assessment     Today's INR result of 1.7 is Subtherapeutic (goal INR of 2.0-3.0)        Warfarin recently held as instructed and missed doses which may be affecting INR     No new diet changes affecting INR    No new medication/supplements affecting INR    Continues to tolerate warfarin with no reported s/s of bleeding or thromboembolism     Previous INR was Subtherapeutic at 1.7 on 12/6/19.    Plan:     Spoke with Zach regarding INR result and instructed:     Warfarin Dosing Instructions:  take one time booster dose of 7.5 mg today then continue current warfarin dose 5 mg daily   (0 % change)    Instructed patient to follow up no later than: 1-2 weeks.Prefers to call to make appointment.    Education provided: importance of therapeutic range, target INR goal and significance of current INR result, importance of taking warfarin as instructed and importance of calling for warfarin refill at least 2 weeks in advance to prevent delay in mail delivery    Zach verbalizes understanding and agrees to warfarin dosing plan.    Instructed to call the [facility] for any changes, questions or concerns. (#[phone])   ?   Terri Cornell RN    Subjective/Objective:      Zach Garcia, a 69 y.o. male is on warfarin.     Zach reports:     Home warfarin dose: verbally confirmed home dose with Pat and updated on anticoagulation calendar Stated that he restarted warfarin on 1/3 after the procedure but missed taking doses on 1/4 and 1/5 due to the prescription via mail delivery did not arrived until Monday. He reported that he took 5 mg on Monday Tues and Wed.     Missed doses: Yes: Sat and Sun due to he ran out of warfarin- the mail delivery was delayed.     Medication changes:  No     S/S of bleeding or thromboembolism:  No     New Injury or illness:  No     Changes in diet or alcohol consumption:  No     Upcoming surgery, procedure or cardioversion:  No    Anticoagulation Episode  Summary     Current INR goal:   2.0-3.0   TTR:   52.2 % (1 y)   Next INR check:   1/23/2020   INR from last check:   1.70! (1/9/2020)   Weekly max warfarin dose:      Target end date:   Indefinite   INR check location:      Preferred lab:      Send INR reminders to:   [facility]ART [facility]    Indications    Chronic atrial fibrillation [I48.20]           Comments:            Anticoagulation Care Providers     Provider Role Specialty Phone number    Teagan Martel MD  Cardiology [phone]

## 2021-06-06 NOTE — TELEPHONE ENCOUNTER
ANTICOAGULATION  MANAGEMENT    Assessment     Today's INR result of 3.2 is Supratherapeutic (goal INR of 2.0-3.0)        Warfarin taken as previously instructed    Change in alcohol intake may be affecting INR - had wine with dinner last night.    No new medication/supplements affecting INR    Continues to tolerate warfarin with no reported s/s of bleeding or thromboembolism     Previous INR was Therapeutic    Plan:     Spoke with Zach regarding INR result and instructed:     Warfarin Dosing Instructions:  take one time lower dose of 2.5 mg today then continue current warfarin dose 5 mg daily  (0 % change)    Instructed patient to follow up no later than: 2 weeks - prefers to call to make appointment.    Education provided: potential interaction between warfarin and alcohol, importance of therapeutic range, target INR goal and significance of current INR result and importance of following up for INR monitoring at instructed interval    Zach verbalizes understanding and agrees to warfarin dosing plan.    Instructed to call the [facility] for any changes, questions or concerns. (#[phone])   ?   Terri Cornell RN    Subjective/Objective:      Zach Garcia, a 69 y.o. male is on warfarin.     Zach reports:     Home warfarin dose: as updated on anticoagulation calendar per template     Missed doses: No     Medication changes:  No     S/S of bleeding or thromboembolism:  No     New Injury or illness:  No     Changes in diet or alcohol consumption:  Yes- per patient he had wine with his dinner last night.     Upcoming surgery, procedure or cardioversion:  No    Anticoagulation Episode Summary     Current INR goal:   2.0-3.0   TTR:   52.2 % (1 y)   Next INR check:   2/25/2020   INR from last check:   3.20! (2/11/2020)   Weekly max warfarin dose:      Target end date:   Indefinite   INR check location:      Preferred lab:      Send INR reminders to:   [facility]ARE    Indications     Chronic atrial fibrillation [I48.20]           Comments:            Anticoagulation Care Providers     Provider Role Specialty Phone number    Teagan Martel MD  Cardiology [phone]

## 2021-06-06 NOTE — TELEPHONE ENCOUNTER
HX of cervical laminoplasty w Dr. Patel 4/2018. Today, patient complains of ongoing bi-lateral pain, numbness, tingling in arms, greater on left side where it also wraps up and around his shoulder.Cervical MRI in [facility] from 10/1/2019.    Patient also complains that his 'legs don't want to work'. He said they too are painful and has some numbness. No recent lumbar imaging. Please order any imaging needed for this appt.     Patient DOES have an ICD so rep would need to come out if he has an MRI.

## 2021-06-06 NOTE — TELEPHONE ENCOUNTER
ANTICOAGULATION  MANAGEMENT    Assessment     Today's INR result of 2.7 is Therapeutic (goal INR of 2.0-3.0)        Warfarin taken as previously instructed    No new diet changes affecting INR    No new medication/supplements affecting INR    Continues to tolerate warfarin with no reported s/s of bleeding or thromboembolism     Previous INR was Therapeutic    Plan:     Left a detailed message for Zach regarding INR result and instructed:     Warfarin Dosing Instructions:  Continue current warfarin dose    5 mg every day     (0 % change)    Instructed patient to follow up no later than: 4 weeks    Education provided: importance of therapeutic range and target INR goal and significance of current INR result    Instructed to call the [facility] for any changes, questions or concerns. (#[phone])   ?   Terri Cornell RN    Subjective/Objective:      Zach Garcia, a 70 y.o. male is on warfarin.     Zach reports:     Home warfarin dose: as updated on anticoagulation calendar per template     Missed doses: No     Medication changes:  No     S/S of bleeding or thromboembolism:  No     New Injury or illness:  No     Changes in diet or alcohol consumption:  No     Upcoming surgery, procedure or cardioversion:  No    Anticoagulation Episode Summary     Current INR goal:   2.0-3.0   TTR:   54.8 % (1 y)   Next INR check:   4/10/2020   INR from last check:   2.70 (3/13/2020)   Weekly max warfarin dose:      Target end date:   Indefinite   INR check location:      Preferred lab:      Send INR reminders to:   [facility] CARE    Indications    Chronic atrial fibrillation [I48.20]           Comments:            Anticoagulation Care Providers     Provider Role Specialty Phone number    Teagan Martel MD  Cardiology [phone]

## 2021-06-06 NOTE — TELEPHONE ENCOUNTER
ANTICOAGULATION  MANAGEMENT    Assessment     Today's INR result of 2.4 is Therapeutic (goal INR of 2.0-3.0)        Warfarin taken as previously instructed    No new diet changes affecting INR    No new medication/supplements affecting INR    Continues to tolerate warfarin with no reported s/s of bleeding or thromboembolism     Previous INR was Supratherapeutic most likely due to interaction between alcohol and warfarin.    Plan:     Left a detailed message for Zach regarding INR result and instructed:     Warfarin Dosing Instructions:  Continue current warfarin dose 5 mg daily   (0 % change)    Instructed patient to follow up no later than: 2 weeks    Education provided: importance of therapeutic range        Instructed to call the [facility] for any changes, questions or concerns. (#[phone])   ?   Terri Cornell RN    Subjective/Objective:      Zach Garcia, a 69 y.o. male is on warfarin.     Zach reports:     Home warfarin dose: as updated on anticoagulation calendar per template     Missed doses: No     Medication changes:  No     S/S of bleeding or thromboembolism:  No     New Injury or illness:  No     Changes in diet or alcohol consumption:  No     Upcoming surgery, procedure or cardioversion:  No    Anticoagulation Episode Summary     Current INR goal:   2.0-3.0   TTR:   54.8 % (1 y)   Next INR check:   3/10/2020   INR from last check:   2.40 (2/25/2020)   Weekly max warfarin dose:      Target end date:   Indefinite   INR check location:      Preferred lab:      Send INR reminders to:   [facility]    Indications    Chronic atrial fibrillation [I48.20]           Comments:            Anticoagulation Care Providers     Provider Role Specialty Phone number    Teagan Martel MD  Cardiology [phone]

## 2021-06-06 NOTE — PROGRESS NOTES
The patient is a 69-year-old male.  He had a cervical laminoplasty done by me.  He has had 3 low back operations, two by Dr. Elizondo, one by Dr. Wiley.  The patient now complains of pain and tingling in his legs and feet.  He does not have pain in his arms or hands.  He complains of weakness in his legs, not in his arms or hands.  He complains of numbness and tingling in his arms and legs.  He notes numbness and tingling in both hands, worse on the left.  He complains of  burning in both arms and both legs.  He complains of cramps in both arms and both legs.  He complains of stiffness in his arms and hands.  He complains of tremor when he writes.  He complains of trouble with balance.  Cervical MRI in October showed a patent spinal canal but with some foramen stenosis at multiple levels.  Plan for now is new MRIs of his cervical and thoracic and lumbar spine with and without contrast.  A company rep needs to deal with his defibrillator.  After the MRIs I would like him to get a neurology consult and EMGs of his arms and legs.  We can then decide whether there is anything more that we can do to help him.  Total time 25 minutes, more than 50% spent counseling and/or coordinating care.

## 2021-06-06 NOTE — PROGRESS NOTES
Patient states that he is having some trouble walking due to discomfort. He has cramping bilaterally in his arms and hands. He will feel the cramping in his side also. He mentions that his feet become very hot and he has pain in the front of his thighs. Numbness and tingling in both legs. He mentions that he has a slight tremor in his right hand that he noticed when he tried to write or do detailed tasks.    He has trouble sleeping at night. He will wake up every 2-3 hours with a burning sensation in his arms and legs.   Katie De Anda,[facility],9:37 AM

## 2021-06-07 NOTE — TELEPHONE ENCOUNTER
Wellness Screening Tool  Symptom Screening:  Do you have one of the following new symptoms:    Fever or reported chills?  No    A new cough (started within the past 14 days)?  No    Shortness of breath (started within the past 14 days) ?  No     Nausea, vomiting, or diarrhea?  No    Within the past 3 weeks, have you been exposed to someone with a known positive illness below:    COVID-19 (known or suspected)?  No    Chicken pox?  No    Mealses?  No    Pertussis?  No    Patient notified of visitor restrictions: Yes  Patient's appointment status: Patient will be seen in clinic as scheduled on 4/17/20

## 2021-06-07 NOTE — TELEPHONE ENCOUNTER
Lab Results   Component Value Date    INR 3.40 (!) 04/17/2020    INR 2.70 (!) 03/13/2020    INR 2.40 (!) 02/25/2020       Patient's current Warfarin doses: 5 mg daily      Next INR check is on 5/1/2020      Patient's last OV with HCC was on 9/6/2019    Warfarin prescription  refill sent to patient's pharmacy today.    Terri Cornell RN

## 2021-06-07 NOTE — TELEPHONE ENCOUNTER
ANTICOAGULATION  MANAGEMENT    Assessment     Today's INR result of 3.4 is Supratherapeutic (goal INR of 2.0-3.0)        Warfarin taken as previously instructed    Change in alcohol intake may be affecting INR - had a drink yesterday  but not planning to continue routine    No new medication/supplements affecting INR    Continues to tolerate warfarin with no reported s/s of bleeding or thromboembolism     Previous INR was Therapeutic    Plan:     Spoke with Zach regarding INR result and instructed:     Warfarin Dosing Instructions:  take one time lower dose of 2.5 mg today then continue current warfarin dose 5 mg daily   (0 % change)    Instructed patient to follow up no later than: 2 weeks.Prefers to call to make appointment.    Education provided: potential interaction between warfarin and alcohol, importance of therapeutic range and target INR goal and significance of current INR result    Zach verbalizes understanding and agrees to warfarin dosing plan.    Instructed to call the [facility] for any changes, questions or concerns. (#[phone])   ?   Terri Cornell RN    Subjective/Objective:      Zach Garcia, a 70 y.o. male is on warfarin.     Zach reports:     Home warfarin dose: verbally confirmed home dose with Alea and updated on anticoagulation calendar     Missed doses: No     Medication changes:  No     S/S of bleeding or thromboembolism:  No     New Injury or illness:  No     Changes in diet or alcohol consumption:  Yes: had alcoholic drink yesterday - stated that his 12 year old dog  yesterday.     Upcoming surgery, procedure or cardioversion:  No    Anticoagulation Episode Summary     Current INR goal:   2.0-3.0   TTR:   54.2 % (1 y)   Next INR check:   2020   INR from last check:   3.40! (2020)   Weekly max warfarin dose:      Target end date:   Indefinite   INR check location:      Preferred lab:      Send INR reminders to:   formerly [facility]     Indications    Chronic atrial fibrillation [I48.20]           Comments:            Anticoagulation Care Providers     Provider Role Specialty Phone number    Teagan Martel MD  Cardiology [phone]

## 2021-06-08 NOTE — PROGRESS NOTES
Zach FEDERICA Garcia has participated in 20 sessions of Phase II Cardiac Rehab.    Progress Report:   Cardiac Rehab Treatment Progress Report 1/26/2017 1/31/2017 2/2/2017 2/7/2017 2/9/2017   Pre Exercise  HR 84 87 88 95 82   Pre Exercise /60 116/70 118/72 138/78 110/72   Treadmill Peak 's 120-130's 128 120's 130   Nustep Peak Heart Rate 135 128 120's 120's 128   Nustep Peak Blood Pressure 130/74 132/72 142/80 132/78 128/70   Heart Rate 89 87 90 95 92   Post Exercise /70 108/64 110/60 128/76 118/72   ECG Afib with frequent PVC A-Fib, Frequent PVC's, Couplets,  Occ. Triplets A-Fib,  Frequent PVC's A-FIb,  Frequent PVC's Afib, freq PVCs   Total Exercise Minutes 45 45 40 40 45         Current Status:  Currently exercising without complaints or symptoms.    If Physician recommends change in treatment plan, please place orders.        __________________________________________________      _____________  Signature                                                                                                  Date

## 2021-06-08 NOTE — PROGRESS NOTES
Zach FEDERICA Garcia has participated in 13 sessions of Phase II Cardiac Rehab.    Progress Report:   Cardiac Rehab Treatment Progress Report 12/29/2016 1/3/2017 1/5/2017 1/10/2017 1/17/2017   Pre Exercise  HR 88 85 86 93 98   Pre Exercise /60 100/66 114/70 140/80 120/68   Treadmill Peak  103 100-120 116 118   Nustep Peak Heart Rate 117 99 118 115 119   Nustep Peak Blood Pressure 124/64 100/74 128/68 148/80 140/82   Heart Rate 95 84 82 90 88   Post Exercise /80 104/74 118/64 104/60 110/60   ECG A-Fib, Frequent PVC's, and Couplets A fib with freq PVCs A-Fib; Frequent PVC's, Couplets and Occ. Triplets A-fib, frequent PVC's, some couplets A-Fib, frequent pvc's, Couplets and Triplets   Total Exercise Minutes 45 40 45 40 45         Current Status:  Currently exercising without complaints or symptoms.    If Physician recommends change in treatment plan, please place orders.        __________________________________________________      _____________  Signature                                                                                                  DateSee Doc Flowsheet

## 2021-06-08 NOTE — PROGRESS NOTES
INR 1.5. Pt unable to explain why INR is low. Will continue current dose and pt is having device procedure on Friday 2/17. Will retest then and assess medication changes. After talking with pt and discussing history of greens/salads and medication change. Pt will  continue  with current diet and dosing of Warfarin.  Continue with moderation of Vit K and green leafy vegetables. Cautioned to call with increase bruising or bleeding. Reminded to call with medication change especially antibiotic. Call with any questions or concerns or any up coming procedures. Cautioned about using Herbal medication.

## 2021-06-08 NOTE — TELEPHONE ENCOUNTER
Wellness Screening Tool  Symptom Screening:  Do you have one of the following new symptoms:    Fever or reported chills?  No    A new cough (started within the past 14 days)?  No    Shortness of breath (started within the past 14 days) ?  No     Nausea, vomiting, or diarrhea?  No    Within the past 3 weeks, have you been exposed to someone with a known positive illness below:    COVID-19 (known or suspected)?  No    Chicken pox?  No    Mealses?  No    Pertussis?  No    Patient notified of visitor restrictions: Yes  If pt asymptomatic, please remind patient to bring in and wear their own mask if they have one available to their appointment: Yes  Patient's appointment status: Patient will be seen in clinic as scheduled on 5/29/20

## 2021-06-08 NOTE — PROGRESS NOTES
Zach FEDERICA Garcia has participated in 13 sessions of Phase II Cardiac Rehab.    Progress Report:   Cardiac Rehab Treatment Progress Report 12/27/2016 12/29/2016 1/3/2017 1/5/2017 1/10/2017   Pre Exercise  HR 95 88 85 86 93   Pre Exercise /64 102/60 100/66 114/70 140/80   Treadmill Peak  110 103 100-120 116   Nustep Peak Heart Rate 116 117 99 118 115   Nustep Peak Blood Pressure 124/64 124/64 100/74 128/68 148/80   Heart Rate 97 95 84 82 90   Post Exercise /64 110/80 104/74 118/64 104/60   ECG Afib, Frequent PVC A-Fib, Frequent PVC's, and Couplets A fib with freq PVCs A-Fib; Frequent PVC's, Couplets and Occ. Triplets A-fib, frequent PVC's, some couplets   Total Exercise Minutes 46 45 40 45 40         Current Status:  Currently exercising without complaints or symptoms.    If Physician recommends change in treatment plan, please place orders.        __________________________________________________      _____________  Signature                                                                                                  DateSee Doc Flowsheet

## 2021-06-08 NOTE — TELEPHONE ENCOUNTER
ANTICOAGULATION  MANAGEMENT    Assessment     Today's INR result of 3.5 is Supratherapeutic (goal INR of 2.0-3.0)        Warfarin taken as previously instructed    Change in alcohol intake may be affecting INR    No new medication/supplements affecting INR    Continues to tolerate warfarin with no reported s/s of bleeding or thromboembolism     Previous INR was Supratherapeutic    Plan:     Spoke with Zach regarding INR result and instructed:     Warfarin Dosing Instructions:  Change warfarin dose to    2.5 mg every Tue; 5 mg all other days     (0 % change)    Instructed patient to follow up no later than: 2 weeks -patient prefers to call to make appointment.    Education provided: impact of vitamin K foods on INR, vitamin K content of foods, potential interaction between warfarin and alcohol and importance of therapeutic range    Zach verbalizes understanding and agrees to warfarin dosing plan.    Instructed to call the [facility] for any changes, questions or concerns. (#[phone])   ?   Terri Cornell RN    Subjective/Objective:      Zach Garcia, a 70 y.o. male is on warfarin.     Zach reports:     Home warfarin dose: verbally confirmed home dose with Pat and updated on anticoagulation calendar     Missed doses: No     Medication changes:  No     S/S of bleeding or thromboembolism:  No     New Injury or illness:  No     Changes in diet or alcohol consumption:  Yes: had alcoholic beverage over the weekend - patient agreeable to adjust dose due to he also had previous high INR due to alcohol intake. Patient stated that he does not like to eat greens unless it is incorporated in a soup dish.     Upcoming surgery, procedure or cardioversion:  No    Anticoagulation Episode Summary     Current INR goal:   2.0-3.0   TTR:   47.4 % (1 y)   Next INR check:   5/26/2020   INR from last check:   3.50! (5/12/2020)   Weekly max warfarin dose:      Target end date:   Indefinite   INR check location:       Preferred lab:      Send INR reminders to:   [facility]    Indications    Chronic atrial fibrillation [I48.20]           Comments:            Anticoagulation Care Providers     Provider Role Specialty Phone number    Teagan Martel MD  Cardiology [phone]

## 2021-06-08 NOTE — PROGRESS NOTES
[facility] Follow-up Note    Assessment & Plan        1. S/P right coronary artery (RCA) stent placement -around late fall he developed chest discomfort.  CT angiography suggested 80% stenosis of the proximal right coronary artery and he received a drug-coated stent at that time.  He has no recurrent symptoms.     2. Coronary artery disease due to calcified coronary lesion -angiography November 1 showed normal left main and mid left anterior descending 30% lesion, normal circumflex and right coronary artery with a proximal 99% lesion received a drug-coated stent.  He is on Plavix for 6 months, at least until May 1.  Will discontinue aspirin February 1 as he is on chronic Coumadin therapy.     3. Chronic atrial fibrillation -permanent, asymptomatic and not valvular and on chronic anticoagulation and rate control.     4. Cardiomyopathy, dilated, nonischemic -ejection fraction 15% and on lisinopril and metoprolol.  He is also on Aldactone and he is wearing a life vest.  We will recheck echo February 1, and if EF still significantly diminished refer on for implantable ICD.     5. Dyslipidemia -cholesterol 154 with an LDL of 90 and he is on Crestor 20 mg a day.  He is complaining of muscle aches and pains so therefore we'll have him go to Crestor every other day and call us to let us know how he is doing.     6.  Dental issues-I would like him to inform his dentist that he is on aspirin and Plavix as well as warfarin.  I suspect he will need to hold somebody before dental procedures.    Plan  1.  Recheck echo February 1 and refer her on for ICD would discontinue LifeVest at that time.  2.  Discontinue aspirin February when since he is on chronic Coumadin as well as Plavix.  3.  Come May 1 switched Plavix over to aspirin.  4.  Follow-up with me in 4 months or sooner if needed.  5.  Inform his dentist of the numerous medicines he is on and see if we need to hold the before dental procedures and I would  like to bridge him.    Subjective  CC: 66-year-old white gentleman being seen in post hospital discharge follow-up today.  Since receiving his stents he has had no further chest discomfort, palpitations, shortness of breath, PND, orthopnea or peripheral edema.  He does complain of bilateral arm achiness most likely from the statin.    Medications  Current Outpatient Prescriptions   Medication Sig     acetaminophen (TYLENOL) 500 MG tablet Take 1 tablet (500 mg total) by mouth every 4 (four) hours as needed for pain or fever.     albuterol (PROVENTIL HFA;VENTOLIN HFA) 90 mcg/actuation inhaler Inhale 2 puffs every 6 (six) hours as needed for wheezing or shortness of breath.     albuterol (PROVENTIL) 2.5 mg /3 mL (0.083 %) nebulizer solution Take 2.5 mg by nebulization every 6 (six) hours as needed for wheezing.     ALPRAZolam (XANAX) 0.5 MG tablet Take 0.5 mg by mouth.     aspirin 81 MG EC tablet Take 1 tablet (81 mg total) by mouth daily.     clopidogrel (PLAVIX) 75 mg tablet Take 1 tablet (75 mg total) by mouth daily.     furosemide (LASIX) 40 MG tablet Take 0.5 tablets (20 mg total) by mouth daily.     lisinopril (PRINIVIL,ZESTRIL) 20 MG tablet Take 1 tablet (20 mg total) by mouth 2 (two) times a day.     metoprolol succinate (TOPROL-XL) 50 MG 24 hr tablet Take 1 tablet (50 mg total) by mouth daily.     multivitamin with minerals tablet Take 1 tablet by mouth daily.     nitroglycerin (NITROSTAT) 0.4 MG SL tablet Place 1 tablet (0.4 mg total) under the tongue every 5 (five) minutes as needed for chest pain.     rosuvastatin (CRESTOR) 20 MG tablet Take 1 tablet (20 mg total) by mouth bedtime.     spironolactone (ALDACTONE) 25 MG tablet Take 1 tablet (25 mg total) by mouth daily.     warfarin (COUMADIN) 5 MG tablet Take 1-1.5 tablets (5-7.5 mg total) by mouth daily. 5 mg three times a week on Tues, Thurs, and Sunday.  7.5 mg on the other four days.       Objective  Visit Vitals     /80 (Patient Site: Left Arm,  "Patient Position: Sitting, Cuff Size: Adult Regular)     Pulse 75  Comment: irregular     Ht 6' 1\" (1.854 m)     Wt 221 lb (100.2 kg)     SpO2 96%     BMI 29.16 kg/m2       General Appearance:    Alert, cooperative, no distress, appears stated age, wearing a life vest    Head:    Normocephalic, without obvious abnormality, atraumatic   Throat:   Lips, mucosa, and tongue normal; teeth and gums normal   Neck:   Supple, symmetrical, trachea midline, no adenopathy;        thyroid:  No enlargement/tenderness/nodules; no carotid    bruit or JVD   Back:     Symmetric, no curvature, ROM normal, no CVA tenderness   Lungs:     Clear to auscultation bilaterally, respirations unlabored   Chest wall:    No tenderness or deformity   Heart:    Regular rate and rhythm, S1 and S2 normal, no murmur, rub   or gallop   Abdomen:     Soft, non-tender, bowel sounds active all four quadrants,     no masses, no organomegaly   Extremities:   Normal, atraumatic, no cyanosis or edema   Pulses:   2+ and symmetric all extremities   Skin:   Skin color, texture, turgor normal, no rashes or lesions     Results    Lab Results personally reviewed   Lab Results   Component Value Date    CHOL 154 08/26/2016    CHOL 300 (H) 11/25/2015     Lab Results   Component Value Date    HDL 35 (L) 08/26/2016    HDL 40 11/25/2015     Lab Results   Component Value Date    LDLCALC 90 08/26/2016    LDLCALC  11/25/2015      Comment:      Invalid, Triglycerides >400     Lab Results   Component Value Date    TRIG 145 08/26/2016    TRIG 505 (H) 11/25/2015     Lab Results   Component Value Date    WBC 7.5 11/01/2016    HGB 15.1 11/02/2016    HCT 50.4 11/01/2016     11/02/2016     Lab Results   Component Value Date    CREATININE 0.89 01/11/2017    BUN 23 (H) 01/11/2017     01/11/2017    K 4.3 01/11/2017    CO2 20 (L) 01/11/2017     Review of Systems:   General: WNL  Eyes: WNL  Ears/Nose/Throat: WNL  Lungs: WNL  Heart: Arm Pain, Irregular Heartbeat  Stomach: " WNL  Bladder: WNL  Muscle/Joints: Muscle Pain  Skin: WNL  Nervous System: WNL  Mental Health: Anxiety     Blood: Easy Bruising

## 2021-06-08 NOTE — TELEPHONE ENCOUNTER
ANTICOAGULATION  MANAGEMENT    Assessment     Today's INR result of 1.5 is Subtherapeutic (goal INR of 2.0-3.0)        Warfarin taken as previously instructed     Will plan to adjust dose back to patient's previous dose.    No new diet changes affecting INR    No new medication/supplements affecting INR    Continues to tolerate warfarin with no reported s/s of bleeding or thromboembolism     Previous INR was Supratherapeutic    Plan:     Spoke with Zach regarding INR result and instructed:     Warfarin Dosing Instructions:  7.5 mg booster dose today then change warfarin dose to    5 mg every day      (8 % change)    Instructed patient to follow up no later than: 1-2 weeks - patient prefers to call to make appointment.    Education provided: potential interaction between warfarin and alcohol, importance of therapeutic range and target INR goal and significance of current INR result    Zach verbalizes understanding and agrees to warfarin dosing plan.    Instructed to call the [facility] for any changes, questions or concerns. (#[phone])   ?   Terri Cornell RN    Subjective/Objective:      Zach Garcia, a 70 y.o. male is on warfarin.     Zach reports:     Home warfarin dose: as updated on anticoagulation calendar per template     Missed doses: No     Medication changes:  No     S/S of bleeding or thromboembolism:  No     New Injury or illness:  No     Changes in diet or alcohol consumption:  No     Upcoming surgery, procedure or cardioversion:  No    Anticoagulation Episode Summary     Current INR goal:   2.0-3.0   TTR:   45.2 % (1 y)   Next INR check:   6/12/2020   INR from last check:   1.50! (5/29/2020)   Weekly max warfarin dose:      Target end date:   Indefinite   INR check location:      Preferred lab:      Send INR reminders to:   [facility]    Indications    Chronic atrial fibrillation [I48.20]           Comments:            Anticoagulation Care Providers     Provider  Role Specialty Phone number    Teagan Martel MD  Cardiology [phone]

## 2021-06-08 NOTE — PROGRESS NOTES
ITP ASSESSMENT   Assessment Day: 60 Day  Session Number: 13  Precautions: Low EF  Diagnosis: Stent;CHF  Risk Stratification: High  Referring Provider: Dr. Gresham  EXERCISE  Exercise Assessment: Reassessment     Tolerating 40' of ex. At 2.5-2.6 mets                           Exercise Plan  Goals Next 30 days  ADL'S: Moderate household tasks, without SOB  Leisure: [facility] y 4-5 days per week  No Data Recorded    Education Goals: All goals in this section met  Education Goals Met: Patient can state cardiac s/s and appropriate emergency response.;Has system for taking medication.;Medication review.                        Goals Met  30 day ADL'S goals met: Reports he does strength training 3 x week, with 3# wts  30 day Leisure goals met: [facility],  Advised to Increase  No Data Recorded  30 Day Progression: Tolerating increasing activity,  Still reports fatigue    Initial ADL's goals met: Cooks meals, Resumed light housework  Initial Leisure goals met: Walked dog 10-15 min without SOB  No Data Recorded  Initial Progression: Met all Goals.    Exercise Prescription  Exercise Mode: Treadmill;Bike;Nustep;Arm Erg.  Frequency: 2 x week  Duration: 40'  Intensity / THR: 20-30 beats above resting heart rate  RPE 11-14  Progression / Met level: 2.5-2.9  Resistive Training?: Yes    Current Exercise (mins/week): 170    Interventions  Home Exercise:  Mode: Walk/ Bike  Frequency: 5-7 days per week  No Data Recorded    Education Material : Provide written material;Individual education and counseling    Education Completed  Exercise Education Completed: Signs and Symptoms;RPE;Emergency Plan;Home Exercise;Warm up/cool down;FITT Principles;BP/HR Reponse to exercise;Benefits of Exercise;End point of exercise;Medication review;Strength training            Exercise Follow-up/Discharge  Follow up/Discharge: Encouraged to increase [facility] NUTRITION  Nutrition Assessment: Reassessment    Nutrition Risk  "Factors:  Nutrition Risk Factors: Dyslipidemia;Overweight    Nutrition Plan  Interventions  Nutrition Interventions: Therapist/Patient discussion;Educational videos;Provide with written material (Declines to meet with the dietician)      Education Completed  Nutrition Education Completed: Risk factor overview    Goals  Nutrition Goals (Next 30 days): Provide Rate your Plate Survey;Review Dietitian schedule    Goals Met  Nutrition Goals Met: Provided Rate your Plate Survey;Reviewed Dietitian schedule    Height, Weight, and  BMI  Weight: 220 lb (99.8 kg)  Height: 6' 1\" (1.854 m)  BMI: 29.03    Nutrition Follow-up  Follow-up/Discharge: Declines to meet with the dietician       Other Risk Factors  Other Risk Factor Assessment: Reassessment    HTN Risk Factor: Hypertension    Pre Exercise BP: 120/68  Post Exercise BP: 110/60    Hypertension Plan  Goals  HTN Goals: Exercises regularly    Goals Met  HTN Goals Met: Follow low sodium diet;Take medication as prescribed    HTN Interventions  HTN Interventions: Diet consult;Therapist/patient discussion;Provide written material;Offer educational videos;Offer educational classes    HTN Education Completed  HTN Education Completed: Risk factor overview    Tobacco Risk Factor: Tobacco    Initial Use:: 1 pack/day  Current Use:: 0    Tobacco Plan  Tobacco Goals  Tobacco Goals: Patient remain tobacco free    Goals Met  Tobacco Goals Met: Patient remain tobacco free    Tobacco Interventions  Tobacco Interventions: Therapist/patient discussion    Tobacco Education Completed  Tobacco Education Completed: Risk factor overview    Risk Factor Follow-up   Follow-up/Discharge: Pt to remain smike free.   PSYCHOSOCIAL  Psychosocial Assessment: Reassessment       Psychosocial Risk Factor: Stress    Psychosocial Plan  Interventions  Interventions: Offer educational videos and classes;Provide written material    Education Completed  Education Completed: Relaxation/Coping Techniques    Goals  Goals " (Next 30 days): Practicing stress management skills    Goals Met  Goals Met: Identified Support system;Oriented to stress management classes;Identify stressors    Psychosocial Follow-up  Follow-up/Discharge: Reports no issues with dealing with stress.           Patient involved in Goal setting?: Yes    Signature: _____________________________________________________________    Date: __________________

## 2021-06-08 NOTE — PROGRESS NOTES
1950  [phone] (home)    +++Important patient information for [facility]/Cath Lab staff : None+++    [facility] EP Cath Lab Procedure Order     Device Implant/Revision:  Procedure: New Implant  Device Type: Single ICD  Device Company/Device Rep Needed for Procedure: [employer]    Diagnosis:  Cardiomyopathy  Anticipated Case Duration:  Standard  Scheduling Needs/Timeframe:  scheduled 2/17/17  Anesthesia:  None  Research Protocol:  No    [facility] EP Cath Lab Prep   Ordering Provider: Dr Tinajero  Ordering Date: 2/10/2017  Orders Status: Intial order placed and Order set placed  EP NC Contact: Vanessa Gage RN    H&P:  Compled by Dr. Tinajero on 2/10/17  PCP: Rio Pinzon MD, [phone]    Pre-op Labs: Ordered AM of procedure    Medical Records Pertinent for Procedure:  angiogram 11/1/16, Holter 12/1/16, Echo 2/1/17 and EKG 2/10/17    Patient Education:    Teach with Patient: Complete in the clinic on 2/10/17    Risks Reviewed:        Internal Cardiac Defibrillator     <1% for each of the following: infection, bleeding, hematoma,   pneumothorax, subclavian vein thrombosis, cardiac perforation, cardiac tamponade, arrhythmias, pectoral or diaphragmatic stimulation, air embolus, pocket erosion.    <4 % lead dislodgment; <1% lead fracture or generator malfunction.    <0.5% CVA or MI.     <0.1% death.    If external defibrillation is needed, 25% risk for superficial burn.    <5% risk of ICD system revision.    >95% VT/VF success implant rate.    Risks associated with general anesthesia will be addressed by the Anesthesiology Department.    For patients on anticoagulation, the risk of bleeding, hematoma, tamponade, and stroke with partial withdrawal are increased.  Patient education also completed on SHARA, spurious shocks, driving limitation, and psychological and social aspects of having an ICD.    Consent: Will be obtained in [facility] day of procedure    Pre-Procedure Instructions that were Reviewed with Patient:  NPO after  midnight, Notified patient of time and date of procedure by CV , Transportation arrangements needed s/p procedure, Post-procedure follow up process, Sedation plan/orders and Pre-procedure letter was sent to pt by CV     Pre-Procedure Medication Instructions:  Instructions given to pt regarding anticoagulants: Coumadin- continue taking normal dose wtih the exception of Wednesday 2/15, take 5mg  Instructions given to pt regarding antiarrhythmic medication: N/A; N/A  Instructions for medication, other than anticoagulants/antiarrhythmics listed above, given to pt: to take all morning medications with small sips of water, with the exception of OTC supplements and MVI      No Known Allergies    Current Outpatient Prescriptions:      acetaminophen (TYLENOL) 500 MG tablet, Take 1 tablet (500 mg total) by mouth every 4 (four) hours as needed for pain or fever., Disp: 30 tablet, Rfl: 0     albuterol (PROVENTIL HFA;VENTOLIN HFA) 90 mcg/actuation inhaler, Inhale 2 puffs every 6 (six) hours as needed for wheezing or shortness of breath., Disp: , Rfl:      albuterol (PROVENTIL) 2.5 mg /3 mL (0.083 %) nebulizer solution, Take 2.5 mg by nebulization every 6 (six) hours as needed for wheezing., Disp: , Rfl:      ALPRAZolam (XANAX) 0.5 MG tablet, Take 0.5 mg by mouth., Disp: , Rfl:      aspirin 81 MG EC tablet, Take 1 tablet (81 mg total) by mouth daily., Disp: 30 tablet, Rfl: 0     clopidogrel (PLAVIX) 75 mg tablet, Take 1 tablet (75 mg total) by mouth daily., Disp: 90 tablet, Rfl: 3     furosemide (LASIX) 40 MG tablet, Take 0.5 tablets (20 mg total) by mouth daily., Disp: 90 tablet, Rfl: 1     lisinopril (PRINIVIL,ZESTRIL) 20 MG tablet, Take 1 tablet (20 mg total) by mouth 2 (two) times a day., Disp: 180 tablet, Rfl: 3     metoprolol succinate (TOPROL-XL) 50 MG 24 hr tablet, Take 1 tablet (50 mg total) by mouth daily., Disp: 90 tablet, Rfl: 3     multivitamin with minerals tablet, Take 1 tablet by mouth daily.,  Disp: , Rfl:      nitroglycerin (NITROSTAT) 0.4 MG SL tablet, Place 1 tablet (0.4 mg total) under the tongue every 5 (five) minutes as needed for chest pain., Disp: 25 tablet, Rfl: 2     rosuvastatin (CRESTOR) 20 MG tablet, Take 1 tablet (20 mg total) by mouth bedtime., Disp: 90 tablet, Rfl: 3     spironolactone (ALDACTONE) 25 MG tablet, Take 1 tablet (25 mg total) by mouth daily., Disp: 90 tablet, Rfl: 3     warfarin (COUMADIN) 5 MG tablet, Take 1-1.5 tablets (5-7.5 mg total) by mouth daily. 5 mg three times a week on Tues, Thurs, and Sunday.  7.5 mg on the other four days., Disp: 110 tablet, Rfl: 3

## 2021-06-08 NOTE — PROGRESS NOTES
"ITP ASSESSMENT   Assessment Day: 90 Day  Session Number: 20  Precautions: Low EF  Diagnosis: Stent;CHF  Risk Stratification: High  Referring Provider: Mp  EXERCISE  Exercise Assessment: Reassessment     Tolerating 40' of exercise at 3.3 METs, denies sx                         Exercise Plan  Goals Next 30 days  ADL'S: Make a decision regarding ICD placement  Leisure: Increase frequency/time at [facility]    Education Goals: All goals in this section met  Education Goals Met: Patient can state cardiac s/s and appropriate emergency response.;Has system for taking medication.;Medication review.                        Goals Met  60 day ADL'S goals met: Pt is tolerating moderate housework w/o SOB  60 day Leisure goals met: Pt is exercising 3-5 days/week at the [facility] -progressing towards goal  60 Day Progression: Pt states he does not feel limited in activities by SOB anymore, but \"I still move a lot slower\" will continue to progress pt to increase endurance    30 day ADL'S goals met: Reports he does strength training 3 x week, with 3# wts  30 day Leisure goals met: Does go to the  2-3 x week,  Advised to Increase  30 Day Progression: Tolerating increasing activity,  Still reports fatigue    Initial ADL's goals met: Cooks meals, Resumed light housework  Initial Leisure goals met: Walked dog 10-15 min without SOB  Initial Progression: Met all Goals.    Exercise Prescription  Exercise Mode: Treadmill;Bike;Nustep;Arm Erg.  Frequency: 2x/week  Duration: 40'  Intensity / THR: 20-30 beats above resting heart rate  RPE 11-14  Progression / Met level: 3.3-3.8  Resistive Training?: Yes    Current Exercise (mins/week): 240    Interventions  Home Exercise:  Mode: Walk/bike/Nustep  Frequency: 5-7 days/week  Duration: 30-60'    Education Material : Provide written material;Individual education and counseling;Educational videos;Offer educational classes    Education Completed  Exercise Education Completed: Signs and " "Symptoms;RPE;Emergency Plan;Home Exercise;Warm up/cool down;FITT Principles;BP/HR Reponse to exercise;Benefits of Exercise;End point of exercise;Medication review;Strength training;Stretching            Exercise Follow-up/Discharge  Follow up/Discharge: Pt is exercising at the [facility] \"most days\" when he is not at cardiac rehab. Reviewed s/s of intolerance of exercise, FITT principle, RPE, and emergency plan. Pt states understanding NUTRITION  Nutrition Assessment: Reassessment    Nutrition Risk Factors:  Nutrition Risk Factors: Dyslipidemia;Overweight    Nutrition Plan  Interventions  Nutrition Interventions: Diet consult;Provide with written material (discussed low sodium, low sat. fat diet while exercising)    Education Completed  Nutrition Education Completed: Low sodium diet;Low Saturated fat diet (2000 mg sodium)    Goals  Nutrition Goals (Next 30 days): Patient will follow a low sodium diet;Patient will follow a low saturated fat diet    Goals Met  Nutrition Goals Met: Patient can identify their risk factors for CAD    Height, Weight, and  BMI  Weight: 220 lb (99.8 kg)  Height: 6' 1\" (1.854 m)  BMI: 29.03    Nutrition Follow-up  Follow-up/Discharge: Pt. reports he has cut back on pizza and eating more salads.  He has been working on cutting back other processed/salty foods.        Other Risk Factors  Other Risk Factor Assessment: Reassessment    HTN Risk Factor: Hypertension    Pre Exercise BP: 110/72  Post Exercise BP: 118/72    Hypertension Plan  Goals  HTN Goals: Patient demonstrates understanding of HTN, no goals identified for the next 30 days    Goals Met  HTN Goals Met: Follow low sodium diet;Take medication as prescribed;Exercises regularly    HTN Interventions  HTN Interventions: Diet consult;Therapist/patient discussion;Provide written material;Offer educational videos;Offer educational classes    HTN Education Completed  HTN Education Completed: Low sodium diet;Medication review;Risk factor " overview    Tobacco Risk Factor: Tobacco    Initial Use:: 1 pack/day  Current Use:: Smoke free    Tobacco Plan  Tobacco Goals  Tobacco Goals: Patient demonstrates understanding of tobacco cessation, no goals identified for the next 30 days    Goals Met  Tobacco Goals Met: Patient remain tobacco free    Tobacco Interventions  Tobacco Interventions: Therapist/patient discussion;Provide written material;Offer educational videos;Offer class on risk factor modification    Tobacco Education Completed  Tobacco Education Completed: Identifies triggers;Health benefits of tobacco cessation;Risk factor overview    Risk Factor Follow-up   Follow-up/Discharge: Reviewed importance of remaining smoke free, pt states understanding   PSYCHOSOCIAL  Psychosocial Assessment: Reassessment     Psychosocial Risk Factor: Stress    Psychosocial Plan  Interventions  Interventions: Offer educational videos and classes;Provide written material;Individual education and counseling    Education Completed  Education Completed: Relaxation/Coping Techniques;Effects of stress on body    Goals  Goals (Next 30 days): Improvement in [employer] COOP score    Goals Met  Goals Met: Practicing stress management skills;Identified Support system;Oriented to stress management classes;Identify stressors    Psychosocial Follow-up  Follow-up/Discharge: Pt denies stress at this time. Will continue to follow up. Reviewed effects of stress on the heart and importance of stress mgmt. Pt states understanding           Patient involved in Goal setting?: Yes    Signature: _____________________________________________________________    Date: __________________    Time: __________________

## 2021-06-09 ENCOUNTER — COMMUNICATION - HEALTHEAST (OUTPATIENT)
Dept: ANTICOAGULATION | Facility: CLINIC | Age: 71
End: 2021-06-09

## 2021-06-09 ENCOUNTER — AMBULATORY - HEALTHEAST (OUTPATIENT)
Dept: CARDIOLOGY | Facility: CLINIC | Age: 71
End: 2021-06-09

## 2021-06-09 DIAGNOSIS — I48.20 CHRONIC ATRIAL FIBRILLATION (H): ICD-10-CM

## 2021-06-09 LAB — POC INR - HE - HISTORICAL: 1.5 (ref 0.9–1.1)

## 2021-06-09 NOTE — TELEPHONE ENCOUNTER
ANTICOAGULATION  MANAGEMENT PROGRAM    Zach Garcia is overdue for INR check.     Spoke with Pat who declined to schedule INR at this time. If calling back please schedule as soon as possible.      Terri Cornell RN

## 2021-06-09 NOTE — TELEPHONE ENCOUNTER
Wellness Screening Tool  Symptom Screening:  Do you have one of the following NEW symptoms:    Fever (subjective or >100.0)?  No    A new cough?  No    Shortness of breath?  No     Chills? No     New loss of taste or smell? No     Generalized body aches? No     New persistent headache? No     New sore throat? No     Nausea, vomiting, or diarrhea?  No    Within the past 3 weeks, have you been exposed to someone with a known positive illness below:    COVID-19 (known or suspected)?  No    Chicken pox?  No    Mealses?  No    Pertussis?  No    Patient notified of visitor policy- They may have one person accompany them to their appointment, but they will need to wear a mask and will be screened upon arrival for symptoms: Yes  Pt informed to wear a mask: Yes  Pt notified if they develop any symptoms listed above, prior to their appointment, they are to call the clinic directly at [phone] for further instructions.  Yes  Patient's appointment status: Patient will be seen in clinic as scheduled on 7/10/20

## 2021-06-09 NOTE — TELEPHONE ENCOUNTER
ANTICOAGULATION  MANAGEMENT    Assessment     Today's INR result of 3.3 is Supratherapeutic (goal INR of 2.0-3.0)        Warfarin taken as previously instructed    No new diet changes affecting INR    No new medication/supplements affecting INR    Continues to tolerate warfarin with no reported s/s of bleeding or thromboembolism     Previous INR was Subtherapeutic    Plan:     Spoke with Zach regarding INR result and instructed:     Warfarin Dosing Instructions:  Change warfarin dose to    2.5 mg every Thu; 5 mg all other days        (7 % change)    Instructed patient to follow up no later than: 2 weeks, prefers to call to make appointment.    Education provided: importance of therapeutic range    Pat verbalizes understanding and agrees to warfarin dosing plan.    Instructed to call the [facility] for any changes, questions or concerns. (#[phone])   ?   Terri Cornell RN    Subjective/Objective:      Zach Garcia, a 70 y.o. male is on warfarin.     Zach reports:     Home warfarin dose: as updated on anticoagulation calendar per template     Missed doses: No     Medication changes:  No     S/S of bleeding or thromboembolism:  No     New Injury or illness:  No     Changes in diet or alcohol consumption:  No     Upcoming surgery, procedure or cardioversion:  No    Anticoagulation Episode Summary     Current INR goal:   2.0-3.0   TTR:   46.2 % (1 y)   Next INR check:   7/9/2020   INR from last check:   3.30! (6/25/2020)   Weekly max warfarin dose:      Target end date:   Indefinite   INR check location:      Preferred lab:      Send INR reminders to:   [facility]    Indications    Chronic atrial fibrillation (H) [I48.20]           Comments:            Anticoagulation Care Providers     Provider Role Specialty Phone number    Teagan Martel MD  Cardiology [phone]

## 2021-06-09 NOTE — PROGRESS NOTES
ENABLE MRI Study    Study Purpose: The objective of this study is to collect data to confirm the safety and effectiveness of the ImageReady  MR Conditional Defibrillation System when used in the 1.5T MRI environment under the labeled Conditions of Use     Zach Garcia was given the opportunity to participate in the ENABLE MRI study.     The consent form was reviewed and discussed with the patient with the research associate.     The study discussion continued with an     introduction of the study purpose and     the qualifications for participation    review of the study procedures    Study visits, length of participation    risks and side effects    benefits (if any)    voluntary nature of participation    confidentiality of records    financial considerations   were also included in the discussion.       The PHASE 2 consent form (version 22 Sep 2016) was signed 04/04/17.     No study procedures were done prior to signing the consent form.    A copy of the signed consent form was placed in the patient s medical record and a copy was given to the patient. The patient also received a copy of the patient information sheet and the ENABLE MRI Patient brochure.    After the patient signed consent the screening ECG/rhythm strip was performed with patients device programmed at [id number].     The patient has the following metallic objects implanted: none    Plan: An enrollment letter will be sent to the patient PMD and we will mail the patient their study enrollment card. I encouraged the patient to keep their MRI card with their device card or insurance card.    Rebekah Jiménez

## 2021-06-09 NOTE — TELEPHONE ENCOUNTER
ANTICOAGULATION  MANAGEMENT    Assessment     Today's INR result of 3.3 is Supratherapeutic (goal INR of 2.0-3.0)        Warfarin taken as previously instructed    Alcohol intake in the past week may be affecting INR    No new medication/supplements affecting INR    Continues to tolerate warfarin with no reported s/s of bleeding or thromboembolism     Previous INR was Supratherapeutic    Plan:     Spoke with Zach regarding INR result and instructed:     Warfarin Dosing Instructions:  one time lower dose of 2.5 mg today then continue current warfarin dose    2.5 mg every Thu; 5 mg all other days      (0 % change)    Instructed patient to follow up no later than: 1-2 weeks    Education provided: importance of therapeutic range, target INR goal and significance of current INR result and importance of taking warfarin as instructed    Pat verbalizes understanding and agrees to warfarin dosing plan.    Instructed to call the [facility] for any changes, questions or concerns. (#[phone])   ?   Terri Cornell RN    Subjective/Objective:      Zach Garcia, a 70 y.o. male is on warfarin.     Zach reports:     Home warfarin dose: as updated on anticoagulation calendar per template     Missed doses: No     Medication changes:  No     S/S of bleeding or thromboembolism:  No     New Injury or illness:  No     Changes in diet or alcohol consumption:  No     Upcoming surgery, procedure or cardioversion:  No    Anticoagulation Episode Summary     Current INR goal:   2.0-3.0   TTR:   41.0 % (1 y)   Next INR check:   7/28/2020   INR from last check:   3.30! (7/14/2020)   Weekly max warfarin dose:      Target end date:   Indefinite   INR check location:      Preferred lab:      Send INR reminders to:   [facility]    Indications    Chronic atrial fibrillation (H) [I48.20]           Comments:            Anticoagulation Care Providers     Provider Role Specialty Phone number    Teagan Martel MD   Cardiology [phone]

## 2021-06-09 NOTE — PROGRESS NOTES
"DEVICE CLINIC RN POST-OP NOTE    Reason for visit: Post-operative wound and device check, s/p placement of a single chamber implantable cardioverter defibrillator (ICD)     Device: [employer] D140 INOGEN, RV Defibrillation Lead: [employer] 0293 [place] 4-Site SG  Procedure date: 02/17/2017  Implant Diagnosis: Non-ischemic Cardiomyopathy, Permanent Atrial Fibrillation, Chronic Systolic Congestive Heart Failure  Implanting Physician: Dr. Ivan Tinajero      Assessment  Subjective: \"All of this went fine.  I'm feeling good and it all looks good.\"   Physical Assessment: Mr. Garcia is seeing Ceci Camargo, RN, CNP as well today, please refer to her dictation for a full physical assessment.  Incision/device pocket: Clean, dry and intact with resolving ecchymosis and trace edema.  There are no signs of infection present.  Mr. Garcia reports, \"The [Steri] strips came off in the bath already.\"      Device Findings  Interrogation of device reveals normal sensing and capture thresholds  See the Paceart Report for a full summary of the device information.    Other: Underlying rhythm is atrial fibrillation with intrinsic ventricular response, rate range 60s-130s bpm.       Patient Education  Zach Garcia was unaccompanied today.     [facility]'s Partnership Agreement for Device Patients and Post-operative Checklist were presented and reviewed with patient at today's appointment.    Signs and symptoms of infection, poor wound healing, and normal device function were reviewed. Range of motion and weight restrictions for the left arm are for four weeks. He was issued a [employer] Latitude NXT remote monitor and instructed on its set-up and use for remote monitoring by the [employer] representative prior to hospital discharge. These instructions were reviewed with the patient at today s visit.       Plan  - One month remote device check 03/20/2017  - Three month in-clinic post-operative " wound and device check 05/11/2017    Sarika Guaman, RN, MA  Device Nurse  [facility]

## 2021-06-09 NOTE — PROGRESS NOTES
Discharge Note -  Pt completed 23 sessions of Phase II Cardiac Rehab. Tolerates 40-50 minutes of exercise at 2.9-3 mets without any complaints. Pt called today stating he had an MD appointment earlier this week, MD said that he didn't need to come to rehab anymore. We will be discharging patient from our care today (3.8.17).

## 2021-06-09 NOTE — TELEPHONE ENCOUNTER
Wellness Screening Tool  Symptom Screening:  Do you have one of the following NEW symptoms:    Fever (subjective or >100.0)?  No    A new cough?  No    Shortness of breath?  No     Chills? No     New loss of taste or smell? No     Generalized body aches? No     New persistent headache? No     New sore throat? No     Nausea, vomiting, or diarrhea?  No    Within the past 3 weeks, have you been exposed to someone with a known positive illness below:    COVID-19 (known or suspected)?  No    Chicken pox?  No    Mealses?  No    Pertussis?  No    Patient notified of visitor policy- They may have one person accompany them to their appointment, but they will need to wear a mask and will be screened upon arrival for symptoms: Yes  Pt informed to wear a mask: Yes  Pt notified if they develop any symptoms listed above, prior to their appointment, they are to call the clinic directly at [phone] for further instructions.  No  Patient's appointment status: Patient will be seen in clinic as scheduled on 7/14/20

## 2021-06-09 NOTE — TELEPHONE ENCOUNTER
Wellness Screening Tool  Symptom Screening:  Do you have one of the following NEW symptoms:    Fever (subjective or >100.0)?  No    A new cough?  No    Shortness of breath?  No     Chills? No     New loss of taste or smell? No     Generalized body aches? No     New persistent headache? No     New sore throat? No     Nausea, vomiting, or diarrhea?  No    Within the past 3 weeks, have you been exposed to someone with a known positive illness below:    COVID-19 (known or suspected)?  No    Chicken pox?  No    Mealses?  No    Pertussis?  No    Patient notified of visitor restrictions: Yes  Pt informed to wear a mask: Yes  Pt notified if they develop any symptoms listed above, prior to their appointment, they are to call the clinic directly at [phone] for further instructions.  Yes  Patient's appointment status: Patient will be seen in clinic as scheduled on 6/25/20

## 2021-06-10 NOTE — PROGRESS NOTES
INR 2.5 will finish steroids on thurs and then return to previous dosing of 7.5 mg M,W,F and 5 mg all other days. Retest in 3 weeks. After talking with pt and discussing history of greens/salads and medication change. Pt will  continue  with current diet and dosing of Warfarin.  Continue with moderation of Vit K and green leafy vegetables. Cautioned to call with increase bruising or bleeding. Reminded to call with medication change especially antibiotic. Call with any questions or concerns or any up coming procedures. Cautioned about using Herbal medication.

## 2021-06-10 NOTE — TELEPHONE ENCOUNTER
ANTICOAGULATION  MANAGEMENT    Assessment     Today's INR result of 3.0 is Therapeutic (goal INR of 2.0-3.0)        Warfarin taken as previously instructed    No new diet changes affecting INR    No new medication/supplements affecting INR    Continues to tolerate warfarin with no reported s/s of bleeding or thromboembolism     Previous INR was Supratherapeutic    Plan:     Spoke with Zach regarding INR result and instructed:     Warfarin Dosing Instructions:  Continue current warfarin dose    2.5 mg every Thu; 5 mg all other days     (0 % change)    Instructed patient to follow up no later than: 2 weeks - prefers to call to make appointment.    Education provided: importance of therapeutic range    Pat verbalizes understanding and agrees to warfarin dosing plan.    Instructed to call the [facility] for any changes, questions or concerns. (#[phone])   ?   Terri Cornell RN    Subjective/Objective:      Zach Garcia, a 70 y.o. male is on warfarin.     Zach reports:     Home warfarin dose: as updated on anticoagulation calendar per template     Missed doses: No     Medication changes:  No     S/S of bleeding or thromboembolism:  No     New Injury or illness:  No     Changes in diet or alcohol consumption:  No     Upcoming surgery, procedure or cardioversion:  No    Anticoagulation Episode Summary     Current INR goal:   2.0-3.0   TTR:   36.6 % (1 y)   Next INR check:   8/13/2020   INR from last check:   3.00 (7/30/2020)   Weekly max warfarin dose:      Target end date:   Indefinite   INR check location:      Preferred lab:      Send INR reminders to:   [facility]    Indications    Chronic atrial fibrillation (H) [I48.20]           Comments:            Anticoagulation Care Providers     Provider Role Specialty Phone number    Teagan Martel MD  Cardiology [phone]

## 2021-06-10 NOTE — TELEPHONE ENCOUNTER
Type: routine ICD remote transmission   Presenting rhythm: ventricular pacing and sensing 50-80 bpm.  Battery/lead status: stable.  Arrhythmias: since 5/20/20; one VF episode on 7/22/20, duration 8sec, ATPx1 with immediate conversion, shock aborted. 87 NSVT and 275 other VT episodes with no therapy.   Anticoagulant: warfarin.  Comments: normal ICD function. Routed to device RN for review.  Device/lead alerts: none. LATISHA, ADD: see epic for note. BK      Reviewed with patient. Denies any symptoms or awareness of episode on 7/22/2020 1936.     Routed to Dr. Tinajero for review.   Darlene Angel RN BSN PHN  Device Nurse   [facility]

## 2021-06-10 NOTE — PROGRESS NOTES
In clinic device check with Device RN and Dr. Martel..  Please see link for full device report.  Patient was informed of results and next follow up during today's visit.

## 2021-06-10 NOTE — TELEPHONE ENCOUNTER
----- Message -----  From: Ivan Tinajero MD  Sent: 8/28/2020   8:56 AM CDT  To: Darlene Angel RN, [facility] Device  Pool    Please schedule in device clinic to see me on Monday at 8:00 am if feasible for VT.  Either Sixto or Arielle is OK with me.  dd

## 2021-06-10 NOTE — PROGRESS NOTES
[facility] Follow-up Note    Assessment & Plan        1. Coronary artery disease due to calcified coronary lesion -angiography November 1, 2016 showed normal left main and mid left anterior descending 30% lesion, normal circumflex and right coronary artery with a proximal 99% lesion which received a drug-coated stent.  He was on Plavix for 6 months, which was discontinued May 1 and back on aspirin as he is on chronic Coumadin therapy.   this was a drug-coated stent.     2. Cardiomyopathy -ejection fraction was 15% and he is on appropriate Aldactone beta-blocker and ACE inhibitor and has an ICD in place.  Echo February 2017 showed ejection fraction of 27%.     3. Chronic systolic heart failure -no signs or symptoms currently and due to partial dehydration his furosemide dose has been lowered to 20 mg a day.     4. Chronic atrial fibrillation -permanent, asymptomatic and not valvular and on chronic Coumadin therapy with an INR adjusted by primary but I do not have recent level and will arrange to get 1 from the clinic.     5. Essential hypertension with goal blood pressure less than 130/80 -blood pressure under good control but given tachycardia I am going to increase the Toprol to 100 mg a day and low back off on the Aldactone to 1200 mg a day given potassium mid fours.     6. Dyslipidemia -cholesterol 154 with a LDL of 90 from August 2016.     7.  Tachycardia- heart rates in the 170s.  Due to atrial fibrillation.  I will increase his metoprolol up to 100 mg as above and recheck device in about 3 months.    Plan  1.  As above decrease Aldactone to 12.5 mg a day.  2.  Increase metoprolol to 100 mg a day.  Prescription sent to local pharmacy.  3.  Follow-up with me in about 3 months with a device check to make sure he is not having any subsequent tachycardia.    Subjective  CC: 67-year-old white gentleman here for a follow-up after hospitalization and ICD implant.  He tells me he is resting more than  "he used to and feels a little bit more fatigued.  There is no syncope, dizziness, chest discomfort, palpitations, shortness of breath, PND, orthopnea or peripheral edema.    Medications  Current Outpatient Prescriptions   Medication Sig     acetaminophen (TYLENOL) 500 MG tablet Take 1 tablet (500 mg total) by mouth every 4 (four) hours as needed for pain or fever.     aspirin 81 MG EC tablet Take 81 mg by mouth daily.     clonazePAM (KLONOPIN) 1 MG tablet Take 1 mg by mouth 2 (two) times a day.     furosemide (LASIX) 40 MG tablet Take 0.5 tablet by mouth  daily     lisinopril (PRINIVIL,ZESTRIL) 20 MG tablet Take 1 tablet (20 mg total) by mouth 2 (two) times a day.     metoprolol succinate (TOPROL-XL) 100 MG 24 hr tablet Take 1 tablet (100 mg total) by mouth daily.     multivitamin with minerals tablet Take 1 tablet by mouth daily.     nitroglycerin (NITROSTAT) 0.4 MG SL tablet Place 1 tablet (0.4 mg total) under the tongue every 5 (five) minutes as needed for chest pain.     rosuvastatin (CRESTOR) 20 MG tablet Take 1 tablet (20 mg total) by mouth at bedtime.     spironolactone (ALDACTONE) 25 MG tablet Take 1 tablet (25 mg total) by mouth daily.     warfarin (COUMADIN) 5 MG tablet Take 1-1.5 tablets (5-7.5 mg total) by mouth daily. 5 mg three times a week on Tues, Thurs, and Sunday.  7.5 mg on the other four days.       Objective  /78 (Patient Site: Right Arm, Patient Position: Sitting, Cuff Size: Adult Regular)  Pulse (!) 48 Comment: irregular  Resp 20  Ht 6' 2\" (1.88 m)  Wt (!) 231 lb (104.8 kg)  BMI 29.66 kg/m2    General Appearance:    Alert, cooperative, no distress, appears stated age   Head:    Normocephalic, without obvious abnormality, atraumatic   Throat:   Lips, mucosa, and tongue normal; teeth and gums normal   Neck:   Supple, symmetrical, trachea midline, no adenopathy;        thyroid:  No enlargement/tenderness/nodules; no carotid    bruit or JVD   Back:     Symmetric, no curvature, ROM " normal, no CVA tenderness   Lungs:     Clear to auscultation bilaterally, respirations unlabored   Chest wall:    No tenderness, left-sided ICD    Heart:    Regular rate and rhythm, S1 and S2 normal, no murmur, rub   or gallop   Abdomen:     Soft, non-tender, bowel sounds active all four quadrants,     no masses, no organomegaly   Extremities:   Normal, atraumatic, no cyanosis or edema   Pulses:   2+ and symmetric all extremities   Skin:   Skin color, texture, turgor normal, no rashes or lesions     Results    Lab Results personally reviewed   Lab Results   Component Value Date    CHOL 154 08/26/2016    CHOL 300 (H) 11/25/2015     Lab Results   Component Value Date    HDL 35 (L) 08/26/2016    HDL 40 11/25/2015     Lab Results   Component Value Date    LDLCALC 90 08/26/2016    LDLCALC  11/25/2015      Comment:      Invalid, Triglycerides >400     Lab Results   Component Value Date    TRIG 145 08/26/2016    TRIG 505 (H) 11/25/2015     Lab Results   Component Value Date    WBC 7.1 02/17/2017    HGB 16.9 02/17/2017    HCT 50.6 02/17/2017     02/17/2017     Lab Results   Component Value Date    CREATININE 0.81 02/17/2017    BUN 24 (H) 02/17/2017     02/17/2017    K 4.5 02/17/2017    CO2 18 (L) 02/17/2017     Review of Systems:   General: WNL  Eyes: WNL  Ears/Nose/Throat: WNL  Lungs: WNL  Heart: Irregular Heartbeat  Stomach: WNL  Bladder: WNL  Muscle/Joints: WNL  Skin: WNL  Nervous System: WNL  Mental Health: Anxiety     Blood: Easy Bruising

## 2021-06-10 NOTE — TELEPHONE ENCOUNTER
ANTICOAGULATION  MANAGEMENT    Assessment     Today's INR result of 2.0 is Therapeutic (goal INR of 2.0-3.0)        Missed dose(s) may be affecting INR    No new diet changes affecting INR    No new medication/supplements affecting INR    Continues to tolerate warfarin with no reported s/s of bleeding or thromboembolism     Previous INR was Therapeutic    Plan:     Spoke on phone with Zach regarding INR result and instructed:     Warfarin Dosing Instructions:  take 7.5 mg booster dose today then continue current warfarin dose    2.5 mg every Thu; 5 mg all other days     (0 % change)    Instructed patient to follow up no later than: 2-3 weeks, patient prefers to call to make appointment.    Education provided: importance of therapeutic range, importance of following up for INR monitoring at instructed interval and importance of taking warfarin as instructed    Pat verbalizes understanding and agrees to warfarin dosing plan.    Instructed to call the [facility] for any changes, questions or concerns. (#[phone])   ?   Terri Cornell RN    Subjective/Objective:      Zach Garcia, a 70 y.o. male is on warfarin.     Zach reports:     Home warfarin dose: verbally confirmed home dose with Pat and updated on anticoagulation calendar     Missed doses: Yes: forgot to take dose yesterday.     Medication changes:  No     S/S of bleeding or thromboembolism:  No     New Injury or illness:  No     Changes in diet or alcohol consumption:  No     Upcoming surgery, procedure or cardioversion:  No    Anticoagulation Episode Summary     Current INR goal:   2.0-3.0   TTR:   36.9 % (1 y)   Next INR check:   9/11/2020   INR from last check:   2.00 (8/21/2020)   Weekly max warfarin dose:      Target end date:   Indefinite   INR check location:      Preferred lab:      Send INR reminders to:   [facility]    Indications    Chronic atrial fibrillation (H) [I48.20]           Comments:             Anticoagulation Care Providers     Provider Role Specialty Phone number    Teagan Martel MD  Cardiology [phone]

## 2021-06-10 NOTE — TELEPHONE ENCOUNTER
Wellness Screening Tool  Symptom Screening:  Do you have one of the following NEW symptoms:    Fever (subjective or >100.0)?  No    A new cough?  No    Shortness of breath?  No     Chills? No     New loss of taste or smell? No     Generalized body aches? No     New persistent headache? No     New sore throat? No     Nausea, vomiting, or diarrhea?  No    Within the past 3 weeks, have you been exposed to someone with a known positive illness below:    COVID-19 (known or suspected)?  No    Chicken pox?  No    Mealses?  No    Pertussis?  No    Patient notified of visitor policy- They may have one person accompany them to their appointment, but they will need to wear a mask and will be screened upon arrival for symptoms: Yes  Pt informed to wear a mask: Yes  Pt notified if they develop any symptoms listed above, prior to their appointment, they are to call the clinic directly at [phone] for further instructions.  Yes  Patient's appointment status: Patient will be seen in clinic as scheduled on 7/30/20

## 2021-06-10 NOTE — PROGRESS NOTES
INR 4.1 pt is on steroids for 4 weeks due to shoulder injury.pt states he has been on for about 10 days.  Will hold today's dose of Warfarin and then decrease to 5 mg daily will retest in 3 week. He will resee his PCP on 5/30. After talking with pt and discussing history of greens/salads and medication change. Pt will  continue  with current diet and dosing of Warfarin.  Continue with moderation of Vit K and green leafy vegetables. Cautioned to call with increase bruising or bleeding. Reminded to call with medication change especially antibiotic. Call with any questions or concerns or any up coming procedures. Cautioned about using Herbal medication.

## 2021-06-10 NOTE — TELEPHONE ENCOUNTER
Wellness Screening Tool  Symptom Screening:  Do you have one of the following NEW symptoms:    Fever (subjective or >100.0)?  No    A new cough?  No    Shortness of breath?  No     Chills? No     New loss of taste or smell? No     Generalized body aches? No     New persistent headache? No     New sore throat? No     Nausea, vomiting, or diarrhea?  No    Within the past 3 weeks, have you been exposed to someone with a known positive illness below:    COVID-19 (known or suspected)?  No    Chicken pox?  No    Mealses?  No    Pertussis?  No    Patient notified of visitor policy- They may have one person accompany them to their appointment, but they will need to wear a mask and will be screened upon arrival for symptoms: yes  Pt informed to wear a mask: Yes  Pt notified if they develop any symptoms listed above, prior to their appointment, they are to call the clinic directly at [phone] for further instructions.  Yes  Patient's appointment status: Patient will be seen in clinic as scheduled on 8/21

## 2021-06-11 NOTE — TELEPHONE ENCOUNTER
Wellness Screening Tool  Symptom Screening:  Do you have one of the following NEW symptoms:    Fever (subjective or >100.0)?  No    A new cough?  No    Shortness of breath?  No     Chills? No     New loss of taste or smell? No     Generalized body aches? No     New persistent headache? No     New sore throat? No     Nausea, vomiting, or diarrhea?  No    Within the past 2 weeks, have you been exposed to someone with a known positive illness below:    COVID-19 (known or suspected)?  No    Chicken pox?  No    Mealses?  No    Pertussis?  No    Patient notified of visitor policy- They may have one person accompany them to their appointment, but they will need to wear a mask and will be screened upon arrival for symptoms: Yes  Pt informed to wear a mask: Yes  Pt notified if they develop any symptoms listed above, prior to their appointment, they are to call the clinic directly at [phone] for further instructions.  Yes  Patient's appointment status: Patient will be seen in clinic as scheduled on 9/16/20       Shasha Dewitt RN

## 2021-06-11 NOTE — TELEPHONE ENCOUNTER
----- Message from Teagan Martel MD sent at 9/30/2020  5:55 PM CDT -----  Have him increase the lisinorpil to 40 mg a day if [id number].  LF  ----- Message -----  From: Jenna Givens, RN  Sent: 9/30/2020   2:18 PM CDT  To: Teagan Martel MD    Hi Dr. Martel,  It looks like Dr. Tinajero increased his Toprol to 200 mg on 9/16/2020 during their office visit already. Any other recommendations?  Thanks,  Mal   ----- Message -----  From: Teagan Martel MD  Sent: 9/29/2020   6:49 PM CDT  To: Jenna Givens, RN    Please let him know I reviewed his stress and echo, no new blockages but ef down slightly. Given this can he increase his toprol to 200 mg or 2 tabs a day and let us recheck echo limited in 6 months to make sure ef stableizes.  LF

## 2021-06-11 NOTE — TELEPHONE ENCOUNTER
Wellness Screening Tool  Symptom Screening:  Do you have one of the following NEW symptoms:    Fever (subjective or >100.0)?  No    A new cough?  No    Shortness of breath?  No     Chills? No     New loss of taste or smell? No     Generalized body aches? No     New persistent headache? No     New sore throat? No     Nausea, vomiting, or diarrhea?  No    Within the past 2 weeks, have you been exposed to someone with a known positive illness below:    COVID-19 (known or suspected)?  No    Chicken pox?  No    Mealses?  No    Pertussis?  No    Patient notified of visitor policy- They may have one person accompany them to their appointment, but they will need to wear a mask and will be screened upon arrival for symptoms: No  Pt informed to wear a mask: No  Pt notified if they develop any symptoms listed above, prior to their appointment, they are to call the clinic directly at [phone] for further instructions.  No  Patient's appointment status: Patient will be seen in clinic as scheduled on 9/30/20

## 2021-06-11 NOTE — TELEPHONE ENCOUNTER
Wellness Screening Tool  Symptom Screening:  Do you have one of the following NEW symptoms:    Fever (subjective or >100.0)?  No    A new cough?  No    Shortness of breath?  No     Chills? No     New loss of taste or smell? No     Generalized body aches? No     New persistent headache? No     New sore throat? No     Nausea, vomiting, or diarrhea?  No    Within the past 2 weeks, have you been exposed to someone with a known positive illness below:    COVID-19 (known or suspected)?  No    Chicken pox?  No    Mealses?  No    Pertussis?  No    Patient notified of visitor policy- They may have one person accompany them to their appointment, but they will need to wear a mask and will be screened upon arrival for symptoms:Yes  Pt informed to wear a mask: Yes  Pt notified if they develop any symptoms listed above, prior to their appointment, they are to call the clinic directly at [phone] for further instructions.  yes  Patient's appointment status: 9/9/2020

## 2021-06-11 NOTE — TELEPHONE ENCOUNTER
ANTICOAGULATION  MANAGEMENT    Assessment     Today's INR result of 4.4 is Supratherapeutic (goal INR of 2.0-3.0)        Warfarin taken as previously instructed    Change in alcohol intake may be affecting INR - had 3-4 beers last night.    No new medication/supplements affecting INR    Continues to tolerate warfarin with no reported s/s of bleeding or thromboembolism     Previous INR was Therapeutic    Plan:     Spoke on phone with Zach regarding INR result and instructed:     Warfarin Dosing Instructions:  hold warfarin doses today and tomorrow then continue current warfarin dose    2.5 mg every Thu; 5 mg all other days        (0 % change)    Instructed patient to follow up no later than: 7-10 days - patient prefers to call to make appointment- he stated that he will come in on 9/21 or 9/22    Education provided: potential interaction between warfarin and alcohol, importance of therapeutic range, target INR goal and significance of current INR result and monitoring for bleeding signs and symptoms    Pat verbalizes understanding and agrees to warfarin dosing plan.    Instructed to call the [facility] for any changes, questions or concerns. (#[phone])   ?   Terri Cornell RN    Subjective/Objective:      Zach Garcia, a 70 y.o. male is on warfarin.     Zach reports:     Home warfarin dose: as updated on anticoagulation calendar per template     Missed doses: No     Medication changes:  No     S/S of bleeding or thromboembolism:  No     New Injury or illness:  No     Changes in diet or alcohol consumption:  Yes: had 3-4 beers last night.     Upcoming surgery, procedure or cardioversion:  No    Anticoagulation Episode Summary     Current INR goal:   2.0-3.0   TTR:   39.2 % (1 y)   Next INR check:   9/18/2020   INR from last check:   4.40! (9/9/2020)   Weekly max warfarin dose:      Target end date:   Indefinite   INR check location:      Preferred lab:      Send INR reminders to:   [facility]    Indications    Chronic atrial fibrillation (H) [I48.20]           Comments:            Anticoagulation Care Providers     Provider Role Specialty Phone number    Teagan Martel MD  Cardiology [phone]

## 2021-06-11 NOTE — PROGRESS NOTES
INR at 1.8 will increase dose to 7.5 mg sun and Wed and 5 mg all other days. Retest in 3 weeks. After talking with pt and discussing history of greens/salads and medication change. Pt will  continue  with current diet and dosing of Warfarin.  Continue with moderation of Vit K and green leafy vegetables. Cautioned to call with increase bruising or bleeding. Reminded to call with medication change especially antibiotic. Call with any questions or concerns or any up coming procedures. Cautioned about using Herbal medication.

## 2021-06-11 NOTE — TELEPHONE ENCOUNTER
Called patient and updated on stress test results. He verbalized understanding and will increase his Lisinopril to 40 mg daily. Rx list updated and new Rx sent to [facility]. Writer personally called [facility] to make sure they knew the dose was increased, he did not need a refill yet but when he calls in, it will be sooner than if he were taking 20 mg.  saw this on file and verbalized understanding. -mmjc

## 2021-06-11 NOTE — PATIENT INSTRUCTIONS - HE
Mr Zach STALLWORTH Radha,  I enjoyed visiting with you again today.  I am glad to hear you are doing well from the heart standpoint, I appreciate your frustration with your neurological issues.  Per our conversation lab work today as well as echocardiogram and stress test of the heart.  I will plan on seeing you 1 year unless all these tests look abnormal and then will talk.  Aram Martel

## 2021-06-11 NOTE — PROGRESS NOTES
INR 2.6 INR stable. Discussed continuing management of dose of Warfarin and returning in one month . No changes to diet needed at this time. Continue moderate intake of Vitamin K and call if increase bruising or unexplained bleeding. Call with medication changes or upcoming procedures.

## 2021-06-11 NOTE — PROGRESS NOTES
INR 4.4 pt is now off steroids and feeling well. Will hold today's Warfarin and decrease dose to 5 mg daily. Retest in one week. After talking with pt and discussing history of greens/salads and medication change. Pt will  continue  with current diet and dosing of Warfarin.  Continue with moderation of Vit K and green leafy vegetables. Cautioned to call with increase bruising or bleeding. Reminded to call with medication change especially antibiotic. Call with any questions or concerns or any up coming procedures. Cautioned about using Herbal medication.

## 2021-06-11 NOTE — TELEPHONE ENCOUNTER
ANTICOAGULATION  MANAGEMENT PROGRAM    Zach Garcia is overdue for INR check.     Left message to call and schedule INR appointment as soon as possible.      Terri Cornell RN

## 2021-06-11 NOTE — PATIENT INSTRUCTIONS - HE
Zach Garcia    It was a pleasure to see you today for a clinic visit.    Your defibrillator was adjusted to better discriminate between atrial fibrillation and ventricular tachycardia  Continue current medications  Perscription for metoprolol succinate 200 mg daily was sent  Cardiac echo and stress nuclear imaging as ordered by Dr. Martel  Follow up appointment:   Dr. Martel as scheduled, Dr. Tinajero in 6 months in device clinic    Contact Vanessa at [phone] with questions or concerns.    Ivan Tinajero MD

## 2021-06-11 NOTE — PROGRESS NOTES
Patient was monitored by RN via EKG and pulse oximeter throughout procedure. Patient stable throughout. [employer] reset pacer without difficulty. Dc to home

## 2021-06-11 NOTE — TELEPHONE ENCOUNTER
ANTICOAGULATION  MANAGEMENT    Assessment     Today's INR result of 2.1 is Therapeutic (goal INR of 2.0-3.0)        Warfarin taken as previously instructed    No new diet changes affecting INR    No new medication/supplements affecting INR    Continues to tolerate warfarin with no reported s/s of bleeding or thromboembolism     Previous INR was Supratherapeutic most likely due to interaction between warfarin and alcohol.    Plan:     Left detailed message for Zach regarding INR result and instructed:     Warfarin Dosing Instructions:  Continue current warfarin dose    2.5 mg every Thu; 5 mg all other days     (0 % change)    Instructed patient to follow up no later than: 2 weeks    Education provided: none        Instructed to call the [facility] for any changes, questions or concerns. (#[phone])   ?   Terri Cornell RN    Subjective/Objective:      Zach Garcia, a 70 y.o. male is on warfarin.     Zach reports:     Home warfarin dose: as updated on anticoagulation calendar per template     Missed doses: No     Medication changes:  No     S/S of bleeding or thromboembolism:  No     New Injury or illness:  No     Changes in diet or alcohol consumption:  No     Upcoming surgery, procedure or cardioversion:  No    Anticoagulation Episode Summary     Current INR goal:   2.0-3.0   TTR:   37.6 % (1 y)   Next INR check:   10/14/2020   INR from last check:   2.10 (9/30/2020)   Weekly max warfarin dose:      Target end date:   Indefinite   INR check location:      Preferred lab:      Send INR reminders to:   [facility]    Indications    Chronic atrial fibrillation (H) [I48.20]           Comments:            Anticoagulation Care Providers     Provider Role Specialty Phone number    Teagan Martel MD  Cardiology [phone]

## 2021-06-12 NOTE — TELEPHONE ENCOUNTER
Wellness Screening Tool  Symptom Screening:  Do you have one of the following NEW symptoms:    Fever (subjective or >100.0)?  No    A new cough?  No    Shortness of breath?  No     Chills? No     New loss of taste or smell? No     Generalized body aches? No     New persistent headache? No     New sore throat? No     Nausea, vomiting, or diarrhea?  No    Within the past 2 weeks, have you been exposed to someone with a known positive illness below:    COVID-19 (known or suspected)?  No    Chicken pox?  No    Mealses?  No    Pertussis?  No    Patient notified of visitor policy- They may have one person accompany them to their appointment, but they will need to wear a mask and will be screened upon arrival for symptoms: Yes  Pt informed to wear a mask: Yes  Pt notified if they develop any symptoms listed above, prior to their appointment, they are to call the clinic directly at [phone] for further instructions.  Yes  Patient's appointment status: Patient will be seen in clinic as scheduled on 10/15/20

## 2021-06-12 NOTE — TELEPHONE ENCOUNTER
Lab Results   Component Value Date    INR 2.80 (!) 10/15/2020    INR 2.10 (!) 09/30/2020    INR 4.40 (!) 09/09/2020         Next INR advised: 11/12/20    Current warfarin dose per anticoagulation flowsheet:   Outpatient Anticoagulation Plan Latest Ref Rng & Units 10/15/2020   ANTICOAG FULL INSTRUCTIONS  2.5 mg every Thu; 5 mg all other days       Last health maintenance OV/physical exam: 9/9/20    Patient is up to date.  Warfarin refilled per protocol.

## 2021-06-12 NOTE — PROGRESS NOTES
[facility] Follow-up Note    Assessment & Plan        1. Coronary artery disease due to calcified coronary lesion  -angiography November 1, 2016 showed normal left main and mid left anterior descending 30% lesion, normal circumflex and right coronary artery with a proximal 99% lesion which received a drug-coated stent.  He was on Plavix for 6 months, which was discontinued May 1 and back on aspirin as he is on chronic Coumadin therapy. This was a drug-coated stent.   2. Acute on chronic systolic heart failure -no signs or symptoms on exam currently.  Continue furosemide.   3. Cardiomyopathy, dilated, nonischemic -ejection fraction was 15% and he is on appropriate Aldactone beta-blocker and ACE inhibitor and has an ICD in place.  Echo February 2017 showed ejection fraction of 27%.    Given that he is still tachycardic I will increase the metoprolol up to 150 mg a day and if he tolerates we will then send off prescription for 50 mg tablets.   4. Chronic atrial fibrillation -asymptomatic not valvular permanent chronic and on anticoagulation adjusted by primary clinic.   5. Dyslipidemia -cholesterol 154 with an LDL of 90 which is acceptable from August 20 16.   6. ICD (implantable cardioverter-defibrillator), single, in situ -[employer] device with no alerts seen.  It is not a biventricular device.  Consider upgrading should he develop symptoms.   7. Essential hypertension with goal blood pressure less than 130/80 -under good control and as above increase Toprol from 100 150 and if he tolerates we will send a prescription for 50.   8. Tachycardia -attempt uptitrate of Toprol from 100-150 and will call let me know if he tolerates.     Plan  1.  Change Toprol-XL from 100-150.  He has some 50 mg tablets at home and if he tolerates he will call us and instead of 50 prescription to his mail order pharmacy.  2.  Renew Toprol as well as Aldactone and mail-order pharmacy.  3.  Follow-up in 6 months  "with device check or sooner if needed given these changes.    Subjective  CC: 67-year-old white gentleman being seen in six-month follow-up today.  He still is very active walking his Doberman dogs.  He does have some soreness involving his left shoulder but otherwise denies any syncope, dizziness, chest discomfort, palpitations, PND, orthopnea or peripheral edema.    Medications  Current Outpatient Prescriptions   Medication Sig     acetaminophen (TYLENOL) 500 MG tablet Take 1 tablet (500 mg total) by mouth every 4 (four) hours as needed for pain or fever.     aspirin 81 MG EC tablet Take 81 mg by mouth daily.     furosemide (LASIX) 40 MG tablet Take 0.5 tablets (20 mg total) by mouth daily.     lisinopril (PRINIVIL,ZESTRIL) 20 MG tablet Take 1 tablet (20 mg total) by mouth 2 (two) times a day.     metoprolol succinate (TOPROL-XL) 100 MG 24 hr tablet Take 1 tablet (100 mg total) by mouth daily.     multivitamin with minerals tablet Take 1 tablet by mouth daily.     nitroglycerin (NITROSTAT) 0.4 MG SL tablet Place 1 tablet (0.4 mg total) under the tongue every 5 (five) minutes as needed for chest pain.     rosuvastatin (CRESTOR) 20 MG tablet Take 1 tablet (20 mg total) by mouth at bedtime.     spironolactone (ALDACTONE) 25 MG tablet Take 0.5 tablets (12.5 mg total) by mouth daily.     warfarin (COUMADIN) 5 MG tablet Take 1-1.5 tablets (5-7.5 mg total) by mouth daily. 5 mg three times a week on Tues, Thurs, and Sunday.  7.5 mg on the other four days.     zolpidem (AMBIEN) 5 MG tablet Take 5 mg by mouth at bedtime as needed.       Objective  /82 (Patient Site: Left Arm, Patient Position: Sitting, Cuff Size: Adult Large)  Pulse 64  Resp 16  Ht 6' 2\" (1.88 m)  Wt (!) 236 lb (107 kg)  BMI 30.3 kg/m2    General Appearance:    Alert, cooperative, no distress, appears stated age   Head:    Normocephalic, without obvious abnormality, atraumatic   Throat:   Lips, mucosa, and tongue normal; teeth and gums normal "   Neck:   Supple, symmetrical, trachea midline, no adenopathy;        thyroid:  No enlargement/tenderness/nodules; no carotid    bruit or JVD   Back:     Symmetric, no curvature, ROM normal, no CVA tenderness   Lungs:     Clear to auscultation bilaterally, respirations unlabored   Chest wall:    No tenderness, left-sided ICD   Heart:    Regular rate and rhythm, S1 and S2 normal, no murmur, rub   or gallop   Abdomen:     Soft, non-tender, bowel sounds active all four quadrants,     no masses, no organomegaly   Extremities:   Normal, atraumatic, no cyanosis or edema   Pulses:   2+ and symmetric all extremities   Skin:   Skin color, texture, turgor normal, no rashes or lesions     Results    Lab Results personally reviewed   Lab Results   Component Value Date    CHOL 154 08/26/2016    CHOL 300 (H) 11/25/2015     Lab Results   Component Value Date    HDL 35 (L) 08/26/2016    HDL 40 11/25/2015     Lab Results   Component Value Date    LDLCALC 90 08/26/2016    LDLCALC  11/25/2015      Comment:      Invalid, Triglycerides >400     Lab Results   Component Value Date    TRIG 145 08/26/2016    TRIG 505 (H) 11/25/2015     Lab Results   Component Value Date    WBC 7.1 02/17/2017    HGB 16.9 02/17/2017    HCT 50.6 02/17/2017     02/17/2017     Lab Results   Component Value Date    CREATININE 0.81 02/17/2017    BUN 24 (H) 02/17/2017     02/17/2017    K 4.5 02/17/2017    CO2 18 (L) 02/17/2017     Review of Systems:   General: WNL  Eyes: WNL  Ears/Nose/Throat: WNL  Lungs: WNL  Heart: Irregular Heartbeat  Stomach: WNL  Bladder: WNL  Muscle/Joints: WNL  Skin: WNL  Nervous System: WNL  Mental Health: Anxiety     Blood: WNL

## 2021-06-12 NOTE — TELEPHONE ENCOUNTER
ANTICOAGULATION  MANAGEMENT    Assessment     Today's INR result of 2.8 is Therapeutic (goal INR of 2.0-3.0)        Warfarin taken as previously instructed    No new diet changes affecting INR    No new medication/supplements affecting INR    Continues to tolerate warfarin with no reported s/s of bleeding or thromboembolism     Previous INR was Therapeutic    Plan:     Left detailed message for Zach regarding INR result and instructed:     Warfarin Dosing Instructions:  Continue current warfarin dose    2.5 mg every Thu; 5 mg all other days      (0 % change)    Instructed patient to follow up no later than: 4 weeks    Education provided:         Instructed to call the [facility] for any changes, questions or concerns. (#[phone])   ?   Terri Cornell RN    Subjective/Objective:      Zach Garcia, a 70 y.o. male is on warfarin.     Zach reports:     Home warfarin dose: as updated on anticoagulation calendar per template     Missed doses: No     Medication changes:  No     S/S of bleeding or thromboembolism:  No     New Injury or illness:  No     Changes in diet or alcohol consumption:  No     Upcoming surgery, procedure or cardioversion:  No    Anticoagulation Episode Summary     Current INR goal:  2.0-3.0   TTR:  40.8 % (1 y)   Next INR check:  11/12/2020   INR from last check:  2.80 (10/15/2020)   Weekly max warfarin dose:     Target end date:  Indefinite   INR check location:     Preferred lab:     Send INR reminders to:  [facility]    Indications    Chronic atrial fibrillation (H) [I48.20]           Comments:           Anticoagulation Care Providers     Provider Role Specialty Phone number    Teagan Martel MD  Cardiology [phone]

## 2021-06-12 NOTE — TELEPHONE ENCOUNTER
----- Message from Deborah Yousif RN sent at 10/28/2020  9:52 AM CDT -----  Regarding: medication questions  Good Morning M[place] calling regarding a few medication questions.   Somehow he was transferred to my .  How all is well with you and your family!    Thank you, Deborah      Called back Pat to address his concerns. He just wanted to confirm he is taking the right dose of Lisinopril- 40 mg daily. Confirmed with patient that this was accurate. He also states that he is running low on his Metoprolol succinate as [facility] thought he was still taking 175 mg not 200 mg daily. Rx temporary supply sent to [facility] on file and then resent to [facility] clarifying that dose is 200 mg daily. -[facility] [place]

## 2021-06-13 NOTE — PROGRESS NOTES
INR 3.5 will decrease dose to 7.5 on Sunday and 5 mg all other days. Retest in 2 weeks. Increase greens and salads. INR stable. Discussed continuing management of dose of Warfarin and returning in one month . No changes to diet needed at this time. Continue moderate intake of Vitamin K and call if increase bruising or unexplained bleeding. Call with medication changes or upcoming procedures.

## 2021-06-13 NOTE — TELEPHONE ENCOUNTER
ANTICOAGULATION  MANAGEMENT    Assessment     Today's INR result of 3.0 is Therapeutic (goal INR of 2.0-3.0)        Warfarin taken as previously instructed    No new diet changes affecting INR    No new medication/supplements affecting INR    Continues to tolerate warfarin with no reported s/s of bleeding or thromboembolism     Previous INR was Subtherapeutic    Plan:     Spoke on phone with Zach regarding INR result and instructed:     Warfarin Dosing Instructions:  Continue current warfarin dose    2.5 mg every Thu; 5 mg all other days     (0 % change)    Instructed patient to follow up no later than: 1-2 weeks- prefers to call to make appointment.    Education provided: importance of therapeutic range and importance of following up for INR monitoring at instructed interval    Pat verbalizes understanding and agrees to warfarin dosing plan.    Instructed to call the [facility] for any changes, questions or concerns. (#[phone])   ?   Terri Cornell RN    Subjective/Objective:      Zach Garcia, a 70 y.o. male is on warfarin.     Zach reports:     Home warfarin dose: as updated on anticoagulation calendar per template     Missed doses: No     Medication changes:  No     S/S of bleeding or thromboembolism:  No     New Injury or illness:  No     Changes in diet or alcohol consumption:  No     Upcoming surgery, procedure or cardioversion:  No    Anticoagulation Episode Summary     Current INR goal:  2.0-3.0   TTR:  42.6 % (1 y)   Next INR check:  12/11/2020   INR from last check:  3.00 (11/27/2020)   Weekly max warfarin dose:     Target end date:  Indefinite   INR check location:     Preferred lab:     Send INR reminders to:  [facility]    Indications    Chronic atrial fibrillation (H) [I48.20]           Comments:           Anticoagulation Care Providers     Provider Role Specialty Phone number    Teagan Martel MD  Cardiology [phone]

## 2021-06-13 NOTE — TELEPHONE ENCOUNTER
Wellness Screening Tool  Symptom Screening:  Do you have one of the following NEW symptoms:    Fever (subjective or >100.0)?  No    A new cough?  No    Shortness of breath?  No     Chills? No     New loss of taste or smell? No     Generalized body aches? No     New persistent headache? No     New sore throat? No     Nausea, vomiting, or diarrhea?  No    Within the past 2 weeks, have you been exposed to someone with a known positive illness below:    COVID-19 (known or suspected)?  No    Chicken pox?  No    Mealses?  No    Pertussis?  No    Patient notified of visitor policy- No visitors are allowed to accompany the patient at this time. Yes  Pt informed to wear a mask: Yes  Pt notified if they develop any symptoms listed above, prior to their appointment, they are to call the clinic directly at [phone] for further instructions.  Yes  Patient's appointment status: 12/11

## 2021-06-13 NOTE — TELEPHONE ENCOUNTER
Wellness Screening Tool  Symptom Screening:  Do you have one of the following NEW symptoms:    Fever (subjective or >100.0)?  No    A new cough?  No    Shortness of breath?  No     Chills? No     New loss of taste or smell? No     Generalized body aches? No     New persistent headache? No     New sore throat? No     Nausea, vomiting, or diarrhea?  No    Within the past 2 weeks, have you been exposed to someone with a known positive illness below:    COVID-19 (known or suspected)?  No    Chicken pox?  No    Mealses?  No    Pertussis?  No    Patient notified of visitor policy- No visitors are allowed to accompany the patient at this time. Yes  Pt informed to wear a mask: Yes  Pt notified if they develop any symptoms listed above, prior to their appointment, they are to call the clinic directly at [phone] for further instructions.  Yes  Patient's appointment status: Patient will be seen in clinic as scheduled on 11/19

## 2021-06-13 NOTE — TELEPHONE ENCOUNTER
"Anticoagulation Annual Referral Renewal Review    Zach Garcia's chart reviewed for annual renewal of referral to anticoagulation monitoring.        Criteria for anticoagulation nurse and/or pharmacist renewal met   Warfarin indication: Atrial Fibrillation Yes, per indication   Current with INR monitoring/compliant Yes Yes   Date of last office visit 9/16/2020 Yes, had office visit within last year   Time in Therapeutic Range (TTR) 40.8 % No, TTR < 60 %       Zach Garcia did NOT meet all criteria for anticoagulation management program initiated renewal and requires provider review. Using dot phrase, \".acmrenewalprovider\", please advise if Zach's anticoagulation management referral should be renewed or if patient should be seen in office to review anticoagulation therapy      Terri Cornell RN  11:16 AM      "

## 2021-06-13 NOTE — TELEPHONE ENCOUNTER
Wellness Screening Tool  Symptom Screening:  Do you have one of the following NEW symptoms:    Fever (subjective or >100.0)?  No    A new cough?  No    Shortness of breath?  No     Chills? No     New loss of taste or smell? No     Generalized body aches? No     New persistent headache? No     New sore throat? No     Nausea, vomiting, or diarrhea?  No    Within the past 2 weeks, have you been exposed to someone with a known positive illness below:    COVID-19 (known or suspected)?  No    Chicken pox?  No    Mealses?  No    Pertussis?  No    Patient notified of visitor policy- No visitors are allowed to accompany the patient at this time. Yes  Pt informed to wear a mask: Yes  Pt notified if they develop any symptoms listed above, prior to their appointment, they are to call the clinic directly at [phone] for further instructions.  Yes  Patient's appointment status: 11/27

## 2021-06-13 NOTE — TELEPHONE ENCOUNTER
ANTICOAGULATION  MANAGEMENT    Assessment     Today's INR result of 2.8 is Therapeutic (goal INR of 2.0-3.0)        Warfarin taken as previously instructed    No new diet changes affecting INR    No new medication/supplements affecting INR    Continues to tolerate warfarin with no reported s/s of bleeding or thromboembolism     Previous INR was Therapeutic    Plan:     Spoke on phone with Zach regarding INR result and instructed:     Warfarin Dosing Instructions:  Continue current warfarin dose    2.5 mg every Thu; 5 mg all other days     (0 % change)    Instructed patient to follow up no later than: 4 weeks - prefers to call to make appointment.    Education provided: importance of therapeutic range and importance of notifying clinic for changes in medications    Pat verbalizes understanding and agrees to warfarin dosing plan.    Instructed to call the [facility] for any changes, questions or concerns. (#[phone])   ?   Terri Cornell RN    Subjective/Objective:      Zach Garcia, a 70 y.o. male is on warfarin.     Zach reports:     Home warfarin dose: as updated on anticoagulation calendar per template     Missed doses: No     Medication changes:  No     S/S of bleeding or thromboembolism:  No     New Injury or illness:  No     Changes in diet or alcohol consumption:  No     Upcoming surgery, procedure or cardioversion:  No    Anticoagulation Episode Summary     Current INR goal:  2.0-3.0   TTR:  46.0 % (1 y)   Next INR check:  1/8/2021   INR from last check:  2.80 (12/11/2020)   Weekly max warfarin dose:     Target end date:  Indefinite   INR check location:     Preferred lab:     Send INR reminders to:  [facility]    Indications    Chronic atrial fibrillation (H) [I48.20]           Comments:           Anticoagulation Care Providers     Provider Role Specialty Phone number    Teagan Martel MD  Cardiology [phone]

## 2021-06-14 NOTE — PROGRESS NOTES
Enable MRI study  Patient presented for clinically indicated cervical spine MRI.  He did not have any questions about the study or the scan for the research nurse or [employer] device rep.  Device interrogation performed pre- MRI per study protocol.  ECG done-see report for full details.    Thuy Moody RN, BSN  Clinical Trials Nurse

## 2021-06-14 NOTE — PROGRESS NOTES
Zach is here with c/o bilateral shoulder pain and bilateral arm pain. Tingling sensation in in left shoulder and arm. He also c/o trigger finger in middle finger on both hands, left side worse. Worse when sleeping and when  first waking up and gets better once up and moving around. Symptoms have been going on since 5/2015 when pt was originally seen in ED for not feeling well and is s/p Hill hole by Dr. Patel.   Gait and balance are good and no urinary issues.   He has done some therapy but did not seem to help.   NDI today is 10%  JShowen,CMA

## 2021-06-14 NOTE — PROGRESS NOTES
Scans completed.  Denies complaint @ end of scan.  Device re-programmed out of MRI safe mode and back into normal function.    Reviewed tachy event noted in device check. He felt the tachy event was AF. Will update Dr. Martel on tachy event per Dr. Mehta's instructions.     Thuy Moody RN, BSN  Clinical Trials Nurse

## 2021-06-14 NOTE — TELEPHONE ENCOUNTER

## 2021-06-14 NOTE — TELEPHONE ENCOUNTER

## 2021-06-14 NOTE — PROGRESS NOTES
Patient is a 67-year-old male.  I did cindy holes in the past and the patient is happy with the outcome.  He now complains of burning in both shoulders down both arms.  He is unable to sleep more than 1-1/2 hours at a time.  He then has to get up and walk around.  His  feels stiff bilaterally and his third fingers get locked.  His symptoms started on the right but are now worse in the left arm.  He has a history of defibrillator implantation.  He is on Coumadin.  He is otherwise in good health.  I showed him his MRI which shows high-grade cervical spinal stenosis both centrally and foraminal.  He probably needs a laminoplasty and foraminotomies.  I would like him to see a neurologist for an opinion regarding need for laminoplasty and also an EMG to pick levels for foraminotomies and to rule out something peripheral like carpal tunnel or ulnar nerve.  Patient is satisfied with the plan.  Total time 20 minutes, more than 50% spent counseling and/or coordinating care.

## 2021-06-14 NOTE — PROGRESS NOTES
INR 3.1 pt will increase greens and retest in 4 weeks. After talking with pt and discussing history of greens/salads and medication change. Pt will  continue  with current diet and dosing of Warfarin.  Continue with moderation of Vit K and green leafy vegetables. Cautioned to call with increase bruising or bleeding. Reminded to call with medication change especially antibiotic. Call with any questions or concerns or any up coming procedures. Cautioned about using Herbal medication.

## 2021-06-15 PROBLEM — G89.29 CHRONIC LOW BACK PAIN: Chronic | Status: ACTIVE | Noted: 2017-02-18

## 2021-06-15 PROBLEM — Z95.810 ICD (IMPLANTABLE CARDIOVERTER-DEFIBRILLATOR), SINGLE, IN SITU: Status: ACTIVE | Noted: 2017-03-07

## 2021-06-15 PROBLEM — I49.3 FREQUENT UNIFOCAL PREMATURE VENTRICULAR CONTRACTIONS: Status: ACTIVE | Noted: 2017-02-10

## 2021-06-15 PROBLEM — M54.50 CHRONIC LOW BACK PAIN: Chronic | Status: ACTIVE | Noted: 2017-02-18

## 2021-06-15 PROBLEM — G47.00 INSOMNIA: Chronic | Status: ACTIVE | Noted: 2017-02-18

## 2021-06-15 NOTE — PROGRESS NOTES
INR 1.7 missed 1/18 dose. Will increase today's dose to 7.5 mg and then return to 7.5 mg Sunday and 5 mg all other days. Retest in 2 weeks. After talking with pt and discussing history of greens/salads and medication change. Pt will  continue  with current diet and dosing of Warfarin.  Continue with moderation of Vit K and green leafy vegetables. Cautioned to call with increase bruising or bleeding. Reminded to call with medication change especially antibiotic. Call with any questions or concerns or any up coming procedures. Cautioned about using Herbal medication.

## 2021-06-15 NOTE — TELEPHONE ENCOUNTER
Refill Request  Did you contact pharmacy: Yes-- request came from pharmacy  Medication name:   Requested Prescriptions     Pending Prescriptions Disp Refills     warfarin ANTICOAGULANT (COUMADIN/JANTOVEN) 5 MG tablet 120 tablet 0     Sig: Take 1 tablet (5 mg total) by mouth daily. TAKE 2 AND 1/2 TO 5 MG BY  MOUTH DAILY AS DIRECTED.  ADJUST DOSE BASED ON INR     Who prescribed the medication:   Requested Pharmacy: [facility]    Routing to [facility] for review.      Yumiko Torres RN  Anticoagulation Clinic

## 2021-06-15 NOTE — TELEPHONE ENCOUNTER
ANTICOAGULATION  MANAGEMENT    Assessment     Today's INR result of 2.7 is Therapeutic (goal INR of 2.0-3.0)        Warfarin taken as previously instructed    No new diet changes affecting INR    No new medication/supplements affecting INR    Continues to tolerate warfarin with no reported s/s of bleeding or thromboembolism     Previous INR was Therapeutic    Plan:     Spoke on phone with Pat regarding INR result and instructed:      Warfarin Dosing Instructions:  Continue current warfarin dose 5 mg daily  (0 % change)    Instructed patient to follow up no later than: 4 weeks - prefers to call o make appointment.    Education provided: importance of therapeutic range, target INR goal and significance of current INR result and importance of following up for INR monitoring at instructed interval    Pat verbalizes understanding and agrees to warfarin dosing plan.    Instructed to call the [facility] for any changes, questions or concerns. (#[phone])   ?   Terri Cornell RN    Subjective/Objective:      Zach Garcia, a 70 y.o. male is on warfarin. Pat      Pat reports:     Home warfarin dose: as updated on anticoagulation calendar per template     Missed doses: No     Medication changes:  No     S/S of bleeding or thromboembolism:  No     New Injury or illness:  No     Changes in diet or alcohol consumption:  No     Upcoming surgery, procedure or cardioversion:  No    Anticoagulation Episode Summary     Current INR goal:  2.0-3.0   TTR:  49.0 % (1 y)   Next INR check:  4/1/2021   INR from last check:  2.70 (3/4/2021)   Weekly max warfarin dose:     Target end date:  Indefinite   INR check location:     Preferred lab:     Send INR reminders to:  [facility]    Indications    Chronic atrial fibrillation (H) [I48.20]           Comments:           Anticoagulation Care Providers     Provider Role Specialty Phone number    Teagan Martel MD  Cardiology [phone]

## 2021-06-15 NOTE — TELEPHONE ENCOUNTER
Lab Results   Component Value Date    INR 2.20 (!) 02/17/2021    INR 3.30 (!) 02/10/2021    INR 1.40 (!) 02/02/2021       Patient's current Warfarin doses: 5 mg daily      Next INR check is on 3/3      Patient's last OV with HCC was on 9/16/2020    Warfarin prescription 3 month supply sent to patient's pharmacy today.    Terri Cornell RN

## 2021-06-15 NOTE — TELEPHONE ENCOUNTER
Lab Results   Component Value Date    INR 2.70 (!) 03/04/2021    INR 2.20 (!) 02/17/2021    INR 3.30 (!) 02/10/2021       Patient's current Warfarin doses: 5 mg daily      Next INR check is on 4/1      Patient's last OV with HCC was on 9/16/2020    Warfarin prescription 3 month refill sent to patient's pharmacy today.    Terri Cornell RN

## 2021-06-15 NOTE — TELEPHONE ENCOUNTER

## 2021-06-15 NOTE — TELEPHONE ENCOUNTER

## 2021-06-15 NOTE — PROGRESS NOTES
[facility] Follow-up Note    Assessment & Plan        1. Tachycardia -resolved with 200 mg of Toprol.   2. ICD (implantable cardioverter-defibrillator), single, in situ -[place] device with [place] lead with single lead.  Underlying atrial fibrillation seen.  Device being utilized 9% of time in the right ventricle.  No arrhythmia alerts noted.   3. Essential hypertension with goal blood pressure less than 130/80 -currently under good control on furosemide metoprolol and lisinopril.   4. Coronary artery disease due to calcified coronary lesion -angiography November 1, 2016 showed normal left main and mid left anterior descending 30% lesion, normal circumflex and right coronary artery with a proximal 99% lesion which received a drug-coated stent.  He was on Plavix for 6 months, which was discontinued and back on aspirin and Coumadin therapy. This was a drug-coated stent.   5. Chronic atrial fibrillation -permanent asymptomatic and not valvular and on chronic Coumadin adjusted by our clinic.   6. Cardiomyopathy, dilated, nonischemic -initial ejection fraction of 15% and most recently 27%.  On appropriate ACE inhibitor beta-blocker and ICD and Aldactone.  Anticipate checking echo as well as stress test prior to any surgery on his neck.   7. Dyslipidemia -cholesterol 154 from 2016 with an LDL of 90.     Plan  1.  Check renal profile today and adjust medicines if need be.  2.  If undergoing neck surgery will need to consider stress test as well as echocardiogram.  3.  Follow-up with me in a year or sooner if needed with device check.    Subjective  CC: 67-year-old white gentleman being seen in follow-up.  Since have increased his Toprol to 200 mg a day he is getting along well without any fatigue, syncope, dizziness, palpitations, chest discomfort, PND orthopnea or peripheral edema.  His biggest complaint is bilateral hand numbness and achiness from his neck injury.    Medications  Current Outpatient  "Prescriptions   Medication Sig     acetaminophen (TYLENOL) 500 MG tablet Take 1 tablet (500 mg total) by mouth every 4 (four) hours as needed for pain or fever.     aspirin 81 MG EC tablet Take 81 mg by mouth daily.     furosemide (LASIX) 40 MG tablet Take 0.5 tablets (20 mg total) by mouth daily.     lisinopril (PRINIVIL,ZESTRIL) 20 MG tablet Take 1 tablet by mouth two  times daily     metoprolol succinate (TOPROL XL) 100 MG 24 hr tablet Take 2 tablets (200 mg total) by mouth daily.     multivitamin with minerals tablet Take 1 tablet by mouth daily.     rosuvastatin (CRESTOR) 20 MG tablet Take 1 tablet by mouth at  bedtime     spironolactone (ALDACTONE) 25 MG tablet Take 0.5 tablets (12.5 mg total) by mouth daily.     warfarin (COUMADIN) 5 MG tablet Take 7.5 mg Sun and 5 mg all other days.     zolpidem (AMBIEN) 5 MG tablet Take 5 mg by mouth at bedtime as needed.       Objective  /72 (Patient Site: Left Arm, Patient Position: Sitting, Cuff Size: Adult Large)  Pulse 60  Resp 18  Ht 6' 2\" (1.88 m)  Wt (!) 243 lb (110.2 kg)  BMI 31.2 kg/m2    General Appearance:    Alert, cooperative, no distress, appears stated age   Head:    Normocephalic, without obvious abnormality, atraumatic   Throat:   Lips, mucosa, and tongue normal; teeth and gums normal   Neck:   Supple, symmetrical, trachea midline, no adenopathy;        thyroid:  No enlargement/tenderness/nodules; no carotid    bruit or JVD   Back:     Symmetric, no curvature, ROM normal, no CVA tenderness   Lungs:     Clear to auscultation bilaterally, respirations unlabored   Chest wall:    No tenderness, left-sided ICD noted   Heart:    Regular rate and rhythm, S1 and S2 normal, no murmur, rub   or gallop   Abdomen:     Soft, non-tender, bowel sounds active all four quadrants,     no masses, no organomegaly   Extremities:   Normal, atraumatic, no cyanosis or edema   Pulses:   2+ and symmetric all extremities   Skin:   Skin color, texture, turgor normal, no " rashes or lesions     Results    Lab Results personally reviewed   Lab Results   Component Value Date    CHOL 154 08/26/2016    CHOL 300 (H) 11/25/2015     Lab Results   Component Value Date    HDL 35 (L) 08/26/2016    HDL 40 11/25/2015     Lab Results   Component Value Date    LDLCALC 90 08/26/2016    LDLCALC  11/25/2015      Comment:      Invalid, Triglycerides >400     Lab Results   Component Value Date    TRIG 145 08/26/2016    TRIG 505 (H) 11/25/2015     Lab Results   Component Value Date    WBC 7.1 02/17/2017    HGB 16.9 02/17/2017    HCT 50.6 02/17/2017     02/17/2017     Lab Results   Component Value Date    CREATININE 0.81 02/17/2017    BUN 24 (H) 02/17/2017     02/17/2017    K 4.5 02/17/2017    CO2 18 (L) 02/17/2017     Review of Systems:   General: WNL  Eyes: WNL  Ears/Nose/Throat: WNL  Lungs: WNL  Heart: Irregular Heartbeat  Stomach: WNL  Bladder: WNL  Muscle/Joints: WNL  Skin: WNL  Nervous System: WNL  Mental Health: Anxiety     Blood: Easy Bruising

## 2021-06-15 NOTE — PROGRESS NOTES
Should not is a 67-year-old male.  He complains of pain in his arms, worse on the left.  He has difficulty bending his third digit of each hand, worse on the left.  He gets tingling down the left arm.  He does not have trouble with leg function.  EMG suggests left C7 nerve root.  Dr. Leonard thought cervical laminoplasty would be reasonable.  I showed the MRI pictures to the patient and described cervical laminoplasty C3-4-5-6 7, full-thickness cut on the left, hinge of the right with foraminotomies C4-5 and C5-6 and C6-7 on the left.  We talked about the nature of the problem, nature of the surgery, rationale for doing it, goals, benefits, alternatives, and significant risks and complications.  I answered all his questions.  He said he was satisfied with the explanation and understood.  He is going to call us back if he wants us to do any scheduling.  He is satisfied with the plan.  Total time 25 minutes, more than 50% spent counseling and/or coordinating care.

## 2021-06-15 NOTE — TELEPHONE ENCOUNTER
ANTICOAGULATION  MANAGEMENT    Assessment     Today's INR result of 2.2 is Therapeutic (goal INR of 2.0-3.0)        Warfarin taken as previously instructed    No new diet changes affecting INR    No new medication/supplements affecting INR    Continues to tolerate warfarin with no reported s/s of bleeding or thromboembolism     Previous INR was Supratherapeutic    Plan:     Spoke on phone with Pat regarding INR result and instructed:      Warfarin Dosing Instructions:  Continue current warfarin dose    5 mg every day     (0 % change)    Instructed patient to follow up no later than: 2 weeks -prefers to call to make appointment.    Education provided: importance of therapeutic range and importance of following up for INR monitoring at instructed interval    Pat verbalizes understanding and agrees to warfarin dosing plan.    Instructed to call the [facility] for any changes, questions or concerns. (#[phone])   ?   Terri Cornell RN    Subjective/Objective:      Zach Garcia, a 70 y.o. male is on warfarin. Pat      Pat reports:     Home warfarin dose: as updated on anticoagulation calendar per template     Missed doses: No     Medication changes:  No     S/S of bleeding or thromboembolism:  No     New Injury or illness:  No     Changes in diet or alcohol consumption:  No     Upcoming surgery, procedure or cardioversion:  No    Anticoagulation Episode Summary     Current INR goal:  2.0-3.0   TTR:  48.9 % (1 y)   Next INR check:  3/3/2021   INR from last check:  2.20 (2/17/2021)   Weekly max warfarin dose:     Target end date:  Indefinite   INR check location:     Preferred lab:     Send INR reminders to:  [facility]    Indications    Chronic atrial fibrillation (H) [I48.20]           Comments:           Anticoagulation Care Providers     Provider Role Specialty Phone number    Teagan Martel MD  Cardiology [phone]

## 2021-06-16 PROBLEM — I42.9 CARDIOMYOPATHY, UNSPECIFIED TYPE (H): Status: ACTIVE | Noted: 2020-02-19

## 2021-06-16 PROBLEM — I47.20 PAROXYSMAL VENTRICULAR TACHYCARDIA (H): Status: ACTIVE | Noted: 2020-09-16

## 2021-06-16 PROBLEM — I47.29 PAROXYSMAL VENTRICULAR TACHYCARDIA (H): Status: ACTIVE | Noted: 2020-09-16

## 2021-06-16 PROBLEM — R00.0 TACHYCARDIA: Status: ACTIVE | Noted: 2017-08-25

## 2021-06-16 PROBLEM — G95.9 CERVICAL MYELOPATHY (H): Status: ACTIVE | Noted: 2018-04-25

## 2021-06-16 PROBLEM — M25.819 SHOULDER IMPINGEMENT: Status: ACTIVE | Noted: 2018-06-04

## 2021-06-16 NOTE — PROGRESS NOTES
Pts INR was 2.6, pt will continue dose of warfarin listed above, pt will recheck INR as indicated above, pt denies s/s of bleeding, pt has clear understanding of warfarin management and does not need further education at this time, pt understands to contact the clinic with s/s of bleeding, pt was given these instructions via CMA and pt verbalized understanding

## 2021-06-16 NOTE — TELEPHONE ENCOUNTER
Telephone Encounter by Vania Garland, PharmD at 9/6/2019  3:24 PM     Author: Vania Garland, PharmCORAL Service: -- Author Type: Pharmacist    Filed: 9/6/2019  3:28 PM Encounter Date: 9/5/2019 Status: Signed    : Vania Garland, Oriana (Pharmacist)       CHARLIE-PROCEDURAL ANTICOAGULATION MANAGEMENT    Returned call to [facility]. Spoke to Sheron and relayed pre-procedure orders:      Okay to hold warfarin 4 days prior to procedure    No bridge    INR check 1-2 weeks prior to procedure      Vania Garland, PharmD  [facility]  _____________    Teagan Martel MD  You 18 minutes ago (2:59 PM)      Okay to hold without a bridge per protocol.   LF    Routing comment

## 2021-06-16 NOTE — TELEPHONE ENCOUNTER
ANTICOAGULATION  MANAGEMENT    Assessment     Today's INR result of 2.7 is Therapeutic (goal INR of 2.0-3.0)        Warfarin taken as previously instructed    No new diet changes affecting INR    No new medication/supplements affecting INR    Continues to tolerate warfarin with no reported s/s of bleeding or thromboembolism     Previous INR was Supratherapeutic     4/20 Tooth extraction    Plan:     Spoke on phone with Pat regarding INR result and instructed:      Warfarin Dosing Instructions:  Continue current warfarin dose    2.5 mg every Thu; 5 mg all other days     (0 % change)    Instructed patient to follow up no later than: 2 weeks , instructed patient to have INR checked on day 4 if he will start taking Pen VK    Education provided: importance of therapeutic range, target INR goal and significance of current INR result and potential interaction between warfarin and penvk    Pat verbalizes understanding and agrees to warfarin dosing plan.    Instructed to call the [facility] for any changes, questions or concerns. (#[phone])   ?   Terri Cornell RN    Subjective/Objective:      Zach Garcia, a 71 y.o. male is on warfarin. Pat      Pat reports:     Home warfarin dose: as updated on anticoagulation calendar per template     Missed doses: No     Medication changes:  No- per patient he did not  the penvk rx yet and will ask Dentist tomorrow if he needs to start taking it.     S/S of bleeding or thromboembolism:  No     New Injury or illness:  No     Changes in diet or alcohol consumption:  No     Upcoming surgery, procedure or cardioversion:  Yes: tooth extraction tomorrow.    Anticoagulation Episode Summary     Current INR goal:  2.0-3.0   TTR:  45.0 % (1 y)   Next INR check:  5/3/2021   INR from last check:  2.70 (4/19/2021)   Weekly max warfarin dose:     Target end date:  Indefinite   INR check location:     Preferred lab:     Send INR reminders to:  [facility]     Indications    Chronic atrial fibrillation (H) [I48.20]           Comments:           Anticoagulation Care Providers     Provider Role Specialty Phone number    Teagan Martel MD  Cardiology [phone]

## 2021-06-16 NOTE — TELEPHONE ENCOUNTER
Telephone Encounter by Terri Cornell RN at 12/12/2019 12:14 PM     Author: Terri Cornell RN Service: -- Author Type: Registered Nurse    Filed: 12/12/2019  4:05 PM Encounter Date: 12/3/2019 Status: Signed    : Teagan Martel MD (Physician)    Related Notes: Original Note by Terri Cornell RN (Registered Nurse) filed at 12/12/2019 12:17 PM       Teagan Martel MD  You 45 minutes ago (11:29 AM)      Please renew his anticoagulation as he has chronic and permanent atrial fibrillation.  LF               Provider Review: Anticoagulation Annual Referral Renewal    ACM Renewal Decision:  Renew [facility]CM warfarin management      INR Range:   Continue management at current INR goal   Anticipated Duration of Therapy (from today):  Long-term anticoagulation         Per Dr. Martel/ Terri Cornell RN

## 2021-06-16 NOTE — TELEPHONE ENCOUNTER
Telephone Encounter by Jenna Givens RN at 12/30/2019  1:15 PM     Author: Jenna Givens RN Service: -- Author Type: Registered Nurse    Filed: 12/30/2019  1:15 PM Encounter Date: 12/30/2019 Status: Signed    : Jenna Givens RN (Registered Nurse)        RE: pt needing an echo?   Due: 3 days ago   Received: Today   Message Contents   Teagan Martel MD Caswell, Mallory J, RN             Yes, can have procedure without echo.   LF    Previous Messages      ----- Message -----   From: Jenna Givens RN   Sent: 12/30/2019  10:30 AM CST   To: Teagan Martel MD   Subject: RE: pt needing an echo?                           Echo is ordered. Does this patient need prior to scheduled colonoscopy or can this be done without regard to his procedure?   Thanks,   Mal   ----- Message -----   From: Teagan Martel MD   Sent: 12/27/2019   4:56 PM CST   To: Lisa Solomon RN, Jenna Givens RN   Subject: RE: pt needing an echo?                           Yes, this man with cad and a fib can have colonoscopy without reservation heart wise, but I did want a repeat echo to evaluate ef, and please order as I am out of office for a week.   LF   ----- Message -----   From: Lisa Solomon RN   Sent: 12/27/2019   1:48 PM CST   To: Teagan Martel MD, Rachel Lacy RN, *   Subject: pt needing an echo?                               Good Afternoon Dr. Martel,    I'm looking through the above stated patient's chart making sure everything is in order for his upcoming colonoscopy here at [facility]. From your note on 9/6/19 it seems like you were wanting the patient to have an echo done and then depending on the results, would maybe titrate the beta-blocker. I can't find a more recent echo on this patient past 2017. ??? Did I read your note properly? Thank you for your time.   Lisa Solomon RN   [facility]/PACU   [phone]

## 2021-06-17 ENCOUNTER — COMMUNICATION - HEALTHEAST (OUTPATIENT)
Dept: ANTICOAGULATION | Facility: CLINIC | Age: 71
End: 2021-06-17

## 2021-06-17 ENCOUNTER — AMBULATORY - HEALTHEAST (OUTPATIENT)
Dept: CARDIOLOGY | Facility: CLINIC | Age: 71
End: 2021-06-17

## 2021-06-17 DIAGNOSIS — I48.20 CHRONIC ATRIAL FIBRILLATION (H): ICD-10-CM

## 2021-06-17 LAB — POC INR - HE - HISTORICAL: 2.6 (ref 0.9–1.1)

## 2021-06-17 NOTE — TELEPHONE ENCOUNTER
Telephone Encounter by Shasha Dewitt RN at 9/15/2020  2:03 PM     Author: Shasha Dewitt RN Service: -- Author Type: Registered Nurse    Filed: 9/15/2020  2:05 PM Encounter Date: 8/24/2020 Status: Signed    : Shasha Dewitt RN (Registered Nurse)       Ivan Tinajero MD Forgosh, Leslie B, MD    Cc: P Hcc Device Pool               Les,   Your work-up is fine.  I still want to see him in device clinic to better assess rhythm, VT vs AF with RVR and possible reprogramming.   Delmer    Previous Messages     ----- Message -----   From: Teagan Martel MD   Sent: 9/9/2020   4:10 PM CDT   To: Shasha Dewitt RN, Ivan Tinajero MD     In case you do not, could run a message to Dr. Tinajero, checking electrolytes, echo, stress test.  Anything else he would want to do in case the patient does not respond back to the EP service?  LF         Above noted. Will forward to scheduling and attempt to reach patient again with DND.     Shasha Dewitt, PATRIC

## 2021-06-17 NOTE — TELEPHONE ENCOUNTER
Telephone Encounter by Terri Cornell RN at 5/4/2021 11:45 AM     Author: Terri Cornell RN Service: -- Author Type: Registered Nurse    Filed: 5/4/2021  1:42 PM Encounter Date: 5/4/2021 Status: Signed    : Terri Cornell RN (Registered Nurse)       ANTICOAGULATION  MANAGEMENT    Assessment     Today's INR result of 3.9 is Supratherapeutic (goal INR of 2.0-3.0)        Warfarin taken as previously instructed    Change in alcohol intake may be affecting INR    No new medication/supplements affecting INR    Continues to tolerate warfarin with no reported s/s of bleeding or thromboembolism     Previous INR was Therapeutic    Plan:     Spoke on phone with Pat regarding INR result and instructed:      Warfarin Dosing Instructions:  hold dose today then change warfarin dose to 2.5 mg every Mon, Thu; 5 mg all other days   (8 % change)    Instructed patient to follow up no later than: 1-2 weeks- prefers to call to make appointment,    Education provided: potential interaction between warfarin and alcohol, importance of therapeutic range and target INR goal and significance of current INR result    Pat verbalizes understanding and agrees to warfarin dosing plan.    Instructed to call the [facility] for any changes, questions or concerns. (#[phone])   ?   Terri Cornell RN    Subjective/Objective:      Zach Garcia, a 71 y.o. male is on warfarin. Pat      Pat reports:     Home warfarin dose: as updated on anticoagulation calendar per template     Missed doses: No     Medication changes:  No     S/S of bleeding or thromboembolism:  No     New Injury or illness:  No     Changes in diet or alcohol consumption:  Per patient he had 3 beers last Saturday     Upcoming surgery, procedure or cardioversion:  No    Anticoagulation Episode Summary     Current INR goal:  2.0-3.0   TTR:  46.0 % (1 y)   Next INR check:  5/18/2021   INR from last check:  3.90 (5/4/2021)   Weekly max warfarin dose:     Target end  date:  Indefinite   INR check location:     Preferred lab:     Send INR reminders to:  [facility]    Indications    Chronic atrial fibrillation (H) [I48.20]           Comments:           Anticoagulation Care Providers     Provider Role Specialty Phone number    Teagan Martel MD  Cardiology [phone]

## 2021-06-17 NOTE — TELEPHONE ENCOUNTER
Telephone Encounter by Terri Cornell RN at 1/26/2021  9:51 AM     Author: Terri Cornell RN Service: -- Author Type: Registered Nurse    Filed: 1/26/2021 10:00 AM Encounter Date: 1/26/2021 Status: Signed    : Terri Cornell RN (Registered Nurse)       ANTICOAGULATION  MANAGEMENT    Assessment     Today's INR result of 1.4 is Subtherapeutic (goal INR of 2.0-3.0)        Warfarin taken as previously instructed    No new diet changes affecting INR    No new medication/supplements affecting INR    Continues to tolerate warfarin with no reported s/s of bleeding or thromboembolism     Previous INR was Supratherapeutic    Plan:     Spoke on phone with Zach regarding INR result and instructed:     Warfarin Dosing Instructions:  7.5 mg booster dose today then continue current warfarin dose    2.5 mg every Thu; 5 mg all other days     (0 % change)    Instructed patient to follow up no later than: 5-7 days - patient prefers to make appointment.    Education provided: importance of therapeutic range, target INR goal and significance of current INR result and importance of taking warfarin as instructed    Pat verbalizes understanding and agrees to warfarin dosing plan.    Instructed to call the [facility] for any changes, questions or concerns. (#[phone])   ?   Terri Cornell RN    Subjective/Objective:      Zach Garcia, a 70 y.o. male is on warfarin.     Zach reports:     Home warfarin dose: as updated on anticoagulation calendar per template     Missed doses: No     Medication changes:  No     S/S of bleeding or thromboembolism:  No     New Injury or illness:  No     Changes in diet or alcohol consumption:  No     Upcoming surgery, procedure or cardioversion:  No    Anticoagulation Episode Summary     Current INR goal:  2.0-3.0   TTR:  47.3 % (1 y)   Next INR check:  2/2/2021   INR from last check:  1.40 (1/26/2021)   Weekly max warfarin dose:     Target end date:  Indefinite   INR check  location:     Preferred lab:     Send INR reminders to:  [facility]    Indications    Chronic atrial fibrillation (H) [I48.20]           Comments:           Anticoagulation Care Providers     Provider Role Specialty Phone number    Teagan Martel MD  Cardiology [phone]

## 2021-06-17 NOTE — PROGRESS NOTES
"Pt is here for a wound check CERVICAL LAMINOPLASTY CERVICAL 3-4-5-6-7 CUT LEFT HINGE RIGHT FORAMINOTOMIES CERVICAL 4-5 AND CERVICAL 5-6 AND CERVICAL 6-7 LEFT on 4-25-18 by Dr. Patel.  Before surgery, he reports he had mid neck pain that radiated down both arms to hands and no  in either hand with \"trigger finger\" 3rd digit bilaterally.    Today, he reports he has improved strength in both hands and no trigger fingers. The pain is significantly improved except the post op pain and muscle spasms in the shoulders/scapula area.     Surgical wound WNL, staples intact - CDI, no signs of infection or skin breakdown.  Incision well-healed: good skin approximation, no redness or visible/palpable edema, no tenderness to palpation.  PT. AF, denies fever, chills or sweats.  Pt. reports that the symptoms are improved from pre-op.    Debra Virk RN  "

## 2021-06-17 NOTE — PROGRESS NOTES
INR 2.5 pt having procedure to spine in 1 week. Will hold Warfarin for 4 days and take with Dr. KEITH Patel staff. Retest on 4/24 after restart of Warfarin. After talking with pt and discussing history of greens/salads and medication change. Pt will  continue  with current diet and dosing of Warfarin.  Continue with moderation of Vit K and green leafy vegetables. Cautioned to call with increase bruising or bleeding. Reminded to call with medication change especially antibiotic. Call with any questions or concerns or any up coming procedures. Cautioned about using Herbal medication.

## 2021-06-17 NOTE — TELEPHONE ENCOUNTER
ANTICOAGULATION  MANAGEMENT    Assessment     Today's INR result of 2.8 is Therapeutic (goal INR of 2.0-3.0)        Warfarin taken as previously instructed.  Patient held two doses as directed by ACN    No new diet changes affecting INR    No new medication/supplements affecting INR    Continues to tolerate warfarin with no reported s/s of bleeding or thromboembolism     Previous INR was Supratherapeutic    Plan:     Spoke on phone with Pat regarding INR result and instructed:      Warfarin Dosing Instructions:  Continue current warfarin dose 2.5 mg daily on Monday/Thursday; and 5 mg daily rest of week  (0 % change)    Instructed patient to follow up no later than: 2 weeks. Patient will call to schedule    Education provided: importance of therapeutic range, target INR goal and significance of current INR result, importance of following up for INR monitoring at instructed interval and importance of taking warfarin as instructed    Pat verbalizes understanding and agrees to warfarin dosing plan.    Instructed to call the [facility] for any changes, questions or concerns. (#[phone])   ?   Yi Ricci RN    Subjective/Objective:      Zach Garcia, a 71 y.o. male is on warfarin. Pat      Pat reports:     Home warfarin dose: verbally confirmed home dose with Pat and updated on anticoagulation calendar     Missed doses: No     Medication changes:  No     S/S of bleeding or thromboembolism:  No     New Injury or illness:  No     Changes in diet or alcohol consumption:  No     Upcoming surgery, procedure or cardioversion:  No    Anticoagulation Episode Summary     Current INR goal:  2.0-3.0   TTR:  45.1 % (1 y)   Next INR check:  5/27/2021   INR from last check:  2.80 (5/20/2021)   Weekly max warfarin dose:     Target end date:  Indefinite   INR check location:     Preferred lab:     Send INR reminders to:  [facility]    Indications    Chronic atrial fibrillation (H) [I48.20]            Comments:           Anticoagulation Care Providers     Provider Role Specialty Phone number    Teagan Martel MD  Cardiology [phone]

## 2021-06-17 NOTE — ANESTHESIA POSTPROCEDURE EVALUATION
Patient: Zach STALLWORTH Radha  CERVICAL LAMINOPLASTY CERVICAL 3-4-5-6-7 CUT LEFT HINGE RIGHT FORAMINOTOMIES CERVICAL 4-5 AND CERVICAL 5-6 AND CERVICAL 6-7 LEFT, CYSTOSCOPY; GODFREY CATHETER PLACEMENT  Anesthesia type: No value filed.    Patient location: PACU  Last vitals:   Vitals:    04/25/18 1600   BP: 118/69   Pulse: 76   Resp: 21   Temp:    SpO2: 98%     Post vital signs: stable  Level of consciousness: awake and responds to simple questions  Post-anesthesia pain: pain controlled  Post-anesthesia nausea and vomiting: no  Pulmonary: unassisted, spontaneous ventilation, nasal cannula  Cardiovascular: stable and blood pressure at baseline  Hydration: adequate  Anesthetic events: no    QCDR Measures:  ASA# 11 - Rocio-op Cardiac Arrest: ASA11B - Patient did NOT experience unanticipated cardiac arrest  ASA# 12 - Rocio-op Mortality Rate: ASA12B - Patient did NOT die  ASA# 13 - PACU Re-Intubation Rate: ASA13B - Patient did NOT require a new airway mgmt  ASA# 10 - Composite Anes Safety: ASA10A - No serious adverse event    Additional Notes:

## 2021-06-17 NOTE — ANESTHESIA CARE TRANSFER NOTE
Last vitals:   Vitals:    04/25/18 1552   BP: 138/68   Pulse: 76   Resp: 20   Temp: 37.1  C (98.7  F)   SpO2: 98%     Patient's level of consciousness is drowsy  Spontaneous respirations: yes  Maintains airway independently: yes  Dentition unchanged: yes  Oropharynx: oropharynx clear of all foreign objects    QCDR Measures:  ASA# 20 - Surgical Safety Checklist: WHO surgical safety checklist completed prior to induction  PQRS# 430 - Adult PONV Prevention: 4558F - Pt received => 2 anti-emetic agents (different classes) preop & intraop  ASA# 8 - Peds PONV Prevention: NA - Not pediatric patient, not GA or 2 or more risk factors NOT present  PQRS# 424 - Rocio-op Temp Management: 4559F - At least one body temp DOCUMENTED => 35.5C or 95.9F within required timeframe  PQRS# 426 - PACU Transfer Protocol: - Transfer of care checklist used  ASA# 14 - Acute Post-op Pain: ASA14B - Patient did NOT experience pain >= 7 out of 10

## 2021-06-17 NOTE — TELEPHONE ENCOUNTER
"Telephone Encounter by Yi Ricci RN at 5/18/2021  9:02 AM     Author: Yi Ricci RN Service: -- Author Type: Registered Nurse    Filed: 5/18/2021 10:06 AM Encounter Date: 5/18/2021 Status: Signed    : Yi Ricci RN (Registered Nurse)       ANTICOAGULATION  MANAGEMENT    Assessment     Today's INR result of 5.4 is Supratherapeutic (goal INR of 2.0-3.0)        Warfarin taken as previously instructed    Change in alcohol intake may be affecting INR  Patient had 2 beers yesterday at the [employer] game    No new medication/supplements affecting INR    Continues to tolerate warfarin with no reported s/s of bleeding or thromboembolism     Previous INR was Supratherapeutic    Plan:     Spoke on phone with Pat regarding INR result and instructed:      Warfarin Dosing Instructions:  Hold today and tomorrow and recheck on Thursday     Instructed patient to follow up no later than: Thursday 5/20.  Patient stated, \"ok, I'll try and make it\"    Education provided: potential interaction between warfarin and alcohol, importance of therapeutic range, target INR goal and significance of current INR result, importance of following up for INR monitoring at instructed interval and importance of taking warfarin as instructed    Pat verbalizes understanding and agrees to warfarin dosing plan.    Instructed to call the [facility] for any changes, questions or concerns. (#[phone])   ?   Yi Ricci RN    Subjective/Objective:      Zach Garcia, a 71 y.o. male is on warfarin. Pat      Pat reports:     Home warfarin dose: verbally confirmed home dose with Pat and updated on anticoagulation calendar     Missed doses: No     Medication changes:  No     S/S of bleeding or thromboembolism:  No     New Injury or illness:  No     Changes in diet or alcohol consumption:  Yes: Patient stated he had 2 beers at the [employer], but could not say how many ounces they were     Upcoming surgery, " procedure or cardioversion:  No    Anticoagulation Episode Summary     Current INR goal:  2.0-3.0   TTR:  45.6 % (1 y)   Next INR check:  5/20/2021   INR from last check:  5.40 (5/18/2021)   Weekly max warfarin dose:     Target end date:  Indefinite   INR check location:     Preferred lab:     Send INR reminders to:  [facility]    Indications    Chronic atrial fibrillation (H) [I48.20]           Comments:           Anticoagulation Care Providers     Provider Role Specialty Phone number    Teagan Martel MD  Cardiology [phone]

## 2021-06-17 NOTE — TELEPHONE ENCOUNTER
"Telephone Encounter by Lupe Crum RN at 11/19/2020  9:30 AM     Author: Lupe Crum RN Service: -- Author Type: Registered Nurse    Filed: 11/19/2020 10:06 AM Encounter Date: 11/19/2020 Status: Signed    : Lupe Crum RN (Registered Nurse)       ANTICOAGULATION  MANAGEMENT    Assessment     Today's INR result of 1.4 is Subtherapeutic (goal INR of 2.0-3.0)        Missed dose(s) may be affecting INR - reports has some warfarin at home and at a cabin, attempted to discuss helpful ways to increase compliance patient reports preferred not to be \"grilled\".     No new diet changes affecting INR    No new medication/supplements affecting INR    Continues to tolerate warfarin with no reported s/s of bleeding or thromboembolism     Previous INR was Therapeutic    Plan:     Spoke on phone with Zach regarding INR result and instructed:     Warfarin Dosing Instructions:  Take one time booster dose warfarin 5 mg  then continue current warfarin dose 2.5 mg daily on Thursdays; and 5 mg daily rest of week  (0 % change)    Patient reports forgot last night, took warfarin 5 this morning. This is the recommended booster, did not instruct to take any more. Discussed what to do if missed a dose and it's the next day. Writer recommended to skip dose until he is due for another.     Instructed patient to follow up no later than: 5-7 days, due to holidays patient reports he will not be around    Education provided: target INR goal and significance of current INR result, importance of following up for INR monitoring at instructed interval and importance of taking warfarin as instructed    Pat verbalizes understanding and agrees to warfarin dosing plan.    Instructed to call the [facility] for any changes, questions or concerns. (#[phone])   ?   Lupe Crum RN    Subjective/Objective:      Zach STALLWORTH Radha, a 70 y.o. male is on warfarin.     Zach reports:     Home warfarin dose: verbally " confirmed home dose with Pat  and updated on anticoagulation calendar     Missed doses: Yes: one dose      Medication changes:  No     S/S of bleeding or thromboembolism:  No      New Injury or illness:  No     Changes in diet or alcohol consumption:  No     Upcoming surgery, procedure or cardioversion:  No    Anticoagulation Episode Summary     Current INR goal:  2.0-3.0   TTR:  43.4 % (1 y)   Next INR check:  11/27/2020   INR from last check:  1.40 (11/19/2020)   Weekly max warfarin dose:     Target end date:  Indefinite   INR check location:     Preferred lab:     Send INR reminders to:  [facility]    Indications    Chronic atrial fibrillation (H) [I48.20]           Comments:           Anticoagulation Care Providers     Provider Role Specialty Phone number    Teagan Martel MD  Cardiology [phone]

## 2021-06-17 NOTE — TELEPHONE ENCOUNTER
Telephone Encounter by Terri Cornell RN at 4/8/2021 10:39 AM     Author: Terri Cornell RN Service: -- Author Type: Registered Nurse    Filed: 4/8/2021  2:58 PM Encounter Date: 4/8/2021 Status: Addendum    : Terri Cornell RN (Registered Nurse)    Related Notes: Original Note by Terri Cornell RN (Registered Nurse) filed at 4/8/2021  1:18 PM       ANTICOAGULATION  MANAGEMENT    Assessment     Today's INR result of 4.1 is Supratherapeutic (goal INR of 2.0-3.0)        Warfarin taken as previously instructed    No new diet changes affecting INR    No new medication/supplements affecting INR    Continues to tolerate warfarin with no reported s/s of bleeding or thromboembolism     Previous INR was Therapeutic    Plan:     Spoke with Pat regarding INR result and instructed:      Warfarin Dosing Instructions:  hold warfarin dose then change warfarin dose to     2.5 mg every Thu; 5 mg all other days      (7 % change)    Instructed patient to follow up no later than: 7-10 days, appt.made    Education provided: impact of vitamin K foods on INR, importance of therapeutic range, target INR goal and significance of current INR result, importance of following up for INR monitoring at instructed interval and monitoring for bleeding signs and symptoms    Pat verbalized understanding and agrees to plan.    Instructed to call the [facility] for any changes, questions or concerns. (#[phone])   ?   Terri Cornell RN    Subjective/Objective:      Zach Garcia, a 71 y.o. male is on warfarin. Pat      Pat reports:     Home warfarin dose: as updated on anticoagulation calendar per template     Missed doses: No     Medication changes:  No     S/S of bleeding or thromboembolism:  No     New Injury or illness:  No     Changes in diet or alcohol consumption:  No     Upcoming surgery, procedure or cardioversion:  No    Anticoagulation Episode Summary     Current INR goal:  2.0-3.0   TTR:  44.3 % (1 y)   Next  INR check:  4/19/2021   INR from last check:  4.10 (4/8/2021)   Weekly max warfarin dose:     Target end date:  Indefinite   INR check location:     Preferred lab:     Send INR reminders to:  [facility]    Indications    Chronic atrial fibrillation (H) [I48.20]           Comments:           Anticoagulation Care Providers     Provider Role Specialty Phone number    Teagan Martel MD  Cardiology [phone]

## 2021-06-17 NOTE — ANESTHESIA PREPROCEDURE EVALUATION
Anesthesia Evaluation      Patient summary reviewed   No history of anesthetic complications     Airway   Mallampati: III  Neck ROM: limited   Pulmonary - normal exam    breath sounds clear to auscultation  (+) COPD, a smoker (Former, 45 pack years)  (-) shortness of breath                         Cardiovascular - normal exam  Exercise tolerance: > or = 4 METS  (+) pacemaker ([employer] ICD), hypertension, valvular problems/murmurs (Mild AS), CAD, CABG/stent (Stent RCA), dysrhythmias (p.a.fib, PVCs), CHF (systolic), cardiomyopathy (Nonischemic dilatated  initial LV EF 15%, last year 27%, and 45% with recent NM stress), hypercholesterolemia,     (-) angina, murmur  ECG reviewed  Rhythm: regular  Rate: normal,    no murmur   ROS comment: Pulmonary HTN     Neuro/Psych    (+) anxiety/panic attacks,     Comments: 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    There is a very small fixed perfusion defect involving the apical lateral wall associated with normal wall motion. This likely represents artifact due to images having been acquired in the arms down position.    Atrial fibrillation is noted at rest.    The left ventricular ejection fraction is 45%.    Overall low risk Lexiscan nuclear perfusion study.    When compared to the images of 5/31/2016,    3/1/18 Device Check  Conclusion      Probably normal lexiscan nuclear stress test.    There is a very small fixed perfusion defect involving the apical lateral wall associated with normal wall motion. This likely represents artifact due to images having been acquired in the arms down position.    Atrial fibrillation is noted at rest.    The left ventricular ejection fraction is 45%.    Overall  low risk Lexiscan nuclear perfusion study.    When compared to the images of 5/31/2016,                  Dental - normal exam                          Anesthesia Plan    Ketamine 50 mg IV for pain  Magnesium 4 gm, 1 gm/hour for pain  Precedex for pain    ICD to be turned off    Maintain MAPs above 85  Phenylephrine inline for induction    Neutral intubation with Glidescope  ASA 4   Induction: intravenous   Anesthetic plan and risks discussed with: patient  Anesthesia plan special considerations: video-assisted, antiemetics, arterial catheterization, IV therapy two IVs, dexmedetomidine  Post-op plan: routine recovery

## 2021-06-17 NOTE — TELEPHONE ENCOUNTER
Telephone Encounter by Terri Cornell RN at 1/11/2021 12:23 PM     Author: Terri Cornell RN Service: -- Author Type: Registered Nurse    Filed: 1/11/2021 12:30 PM Encounter Date: 1/11/2021 Status: Signed    : Terri Cornell RN (Registered Nurse)       ANTICOAGULATION  MANAGEMENT    Assessment     Today's INR result of 3.6 is Supratherapeutic (goal INR of 2.0-3.0)        Warfarin taken as previously instructed    No new diet changes affecting INR    No new medication/supplements affecting INR    Continues to tolerate warfarin with no reported s/s of bleeding or thromboembolism     Previous INR was Therapeutic    Plan:     Spoke on phone with Zach regarding INR result and instructed:     Warfarin Dosing Instructions:  2.5 mg lower dose today then continue current warfarin dose    2.5 mg every Thu; 5 mg all other days      (0 % change)    Instructed patient to follow up no later than: 1-2 weeks- patient prefers to call to make appointment.    Education provided: impact of vitamin K foods on INR, importance of therapeutic range and target INR goal and significance of current INR result    Pat verbalizes understanding and agrees to warfarin dosing plan.    Instructed to call the [facility] for any changes, questions or concerns. (#[phone])   ?   Terri Cornell RN    Subjective/Objective:      Zach Garcia, a 70 y.o. male is on warfarin.     Zach reports:     Home warfarin dose: template incorrect; verbally confirmed home dose with Pat and updated on anticoagulation calendar     Missed doses: No     Medication changes:  No     S/S of bleeding or thromboembolism:  No     New Injury or illness:  No     Changes in diet or alcohol consumption:  No     Upcoming surgery, procedure or cardioversion:  No    Anticoagulation Episode Summary     Current INR goal:  2.0-3.0   TTR:  48.1 % (1 y)   Next INR check:  1/25/2021   INR from last check:  3.60 (1/11/2021)   Weekly max warfarin dose:      Target end date:  Indefinite   INR check location:     Preferred lab:     Send INR reminders to:  [facility]    Indications    Chronic atrial fibrillation (H) [I48.20]           Comments:           Anticoagulation Care Providers     Provider Role Specialty Phone number    Teagan Martel MD  Cardiology [phone]

## 2021-06-17 NOTE — TELEPHONE ENCOUNTER
Telephone Encounter by Terri Cornell RN at 2/2/2021  9:56 AM     Author: Terri Cornell RN Service: -- Author Type: Registered Nurse    Filed: 2/2/2021 10:10 AM Encounter Date: 2/2/2021 Status: Signed    : Terri Cornell RN (Registered Nurse)       ANTICOAGULATION  MANAGEMENT    Assessment     Today's INR result of 1.4 is Subtherapeutic (goal INR of 2.0-3.0)        Warfarin taken as previously instructed     No new diet changes affecting INR    No new medication/supplements affecting INR    Continues to tolerate warfarin with no reported s/s of bleeding or thromboembolism     Previous INR was Subtherapeutic    Plan:     Spoke on phone with Pat regarding INR result and instructed:      Warfarin Dosing Instructions:  10 mg booster dose today then change warfarin dose to    7.5 mg every Tue; 5 mg all other days      (15 % change)    Instructed patient to follow up no later than: 5-7 days, patient prefers to call to make appointment.    Education provided: importance of therapeutic range, target INR goal and significance of current INR result, monitoring for clotting signs and symptoms and when to seek medical attention/emergency care    Pat verbalizes understanding and agrees to warfarin dosing plan.    Instructed to call the [facility] for any changes, questions or concerns. (#[phone])   ?   Terri Cornell RN    Subjective/Objective:      Zach Garcia, a 70 y.o. male is on warfarin. Pat      Pat reports: Patient confirmed that he did not make any changes in diet, medications of any missed doses. He also confirmed that he is using warfarin 5 mg tablets for his doses    Home warfarin dose: as updated on anticoagulation calendar per template     Missed doses: No     Medication changes:  No     S/S of bleeding or thromboembolism:  No     New Injury or illness:  No     Changes in diet or alcohol consumption:  No     Upcoming surgery, procedure or cardioversion:  No    Anticoagulation Episode  Summary     Current INR goal:  2.0-3.0   TTR:  47.3 % (1 y)   Next INR check:  2/9/2021   INR from last check:  1.40 (2/2/2021)   Weekly max warfarin dose:     Target end date:  Indefinite   INR check location:     Preferred lab:     Send INR reminders to:  [facility]    Indications    Chronic atrial fibrillation (H) [I48.20]           Comments:           Anticoagulation Care Providers     Provider Role Specialty Phone number    Teagan Martel MD  Cardiology [phone]

## 2021-06-17 NOTE — PROGRESS NOTES
Preop Assessment: Zach FEDERICA Garcia presents for pre-op review.  Surgeon: Dr. Tyrone Patel  Name of Surgery: cervicallaminoplasty C3-C7 pen left  Diagnosis: cord compression  Date of Surgery: 04/17/2018  Time of Surgery: 0730  Hospital: [facility]  H&P: by Brittanie Cardona NP on 4/10/2018 - cleared for surgery  History of ASA, NSAIDS, vitamin and/or herbal supplements within 10 days: Yes - took ASA this am  History of blood thinners: No  History of anti-seizure med's: No  Review of systems: unchanged    Diagnostics:  Labs: abnormal PFT - will cancel surgery  CXR: 3/13/2018 - CT recommended  EKG: cleared by cardiology on 4/4/2018  Other: [place] J collar to R  Films: MRI from 12/4/2017 - in [facility]      Last BM - 4/15/2018  Nausea or Vomiting - denies  Urinary retention - denies  Pain management - no Red Flags  Home PT Evaluation: ambulates independently, steady gait and stride, no imbalance noted  - no indication for Home PT pre-op  Patient confirmed they have help/assistance in place at home upon discharge      All questions answered regarding surgery and expected pre and postoperative course including rehabilitation phase.     Reviewed with patient: Arrive 2.5 -3 hours prior to scheduled surgery, nothing to eat or drink after midnight the night before surgery and bring all pertinent films to the hospital the day of surgery.  Continue to refrain from NSAIDS (Ibuprofen, Aleve, Naprosyn) ASA or over the counter herbal medication or supplements, anticoagulants and blood thinners.    Preop skin preparations and instructions provided.    Incentive Spirometer usage instructions provided.    Patient confirmed they have help/assistance in place at home upon discharge.    Abbie Hugo, RN, CNRN

## 2021-06-17 NOTE — TELEPHONE ENCOUNTER
Telephone Encounter by Terri Cornell RN at 2/10/2021 10:54 AM     Author: Terri Cornell RN Service: -- Author Type: Registered Nurse    Filed: 2/10/2021 11:10 AM Encounter Date: 2/10/2021 Status: Signed    : Terri Cornell RN (Registered Nurse)       ANTICOAGULATION  MANAGEMENT    Assessment     Today's INR result of 3.3 is Supratherapeutic (goal INR of 2.0-3.0)        Less warfarin taken than instructed which may be affecting INR    No new diet changes affecting INR    No new medication/supplements affecting INR    Continues to tolerate warfarin with no reported s/s of bleeding or thromboembolism     Previous INR was Subtherapeutic x2    Plan:     Spoke on phone with Pat regarding INR result and instructed:      Warfarin Dosing Instructions:  Continue doses taken warfarin dose 5 mg daily   (7 %= 0% change from total taken by patient)    Instructed patient to follow up no later than: 2/16 no later than 2/17 patient prefers to call to make appointment.    Education provided: importance of therapeutic range, target INR goal and significance of current INR result and importance of following up for INR monitoring at instructed interval    Pat verbalizes understanding and agrees to warfarin dosing plan.    Instructed to call the [facility] for any changes, questions or concerns. (#[phone])   ?   Terri Cornell RN    Subjective/Objective:      Zach Garcia, a 70 y.o. male is on warfarin. Pat      Pat reports:     Home warfarin dose: as updated on anticoagulation calendar per template patient confirmed that he took the booster dose on 2/2 then just took 5 mg daily there after.     Missed doses: No     Medication changes:  No     S/S of bleeding or thromboembolism:  No     New Injury or illness:  No     Changes in diet or alcohol consumption:  No     Upcoming surgery, procedure or cardioversion:  No    Anticoagulation Episode Summary     Current INR goal:  2.0-3.0   TTR:  48.5 % (1 y)   Next  INR check:  2/17/2021   INR from last check:  3.30 (2/10/2021)   Weekly max warfarin dose:     Target end date:  Indefinite   INR check location:     Preferred lab:     Send INR reminders to:  [facility]    Indications    Chronic atrial fibrillation (H) [I48.20]           Comments:           Anticoagulation Care Providers     Provider Role Specialty Phone number    Teagan Martel MD  Cardiology [phone]

## 2021-06-17 NOTE — ANESTHESIA PROCEDURE NOTES
Arterial Line  Reason for Procedure: hemodynamic monitoring  Patient location during procedure: Pre-op  Start time: 4/25/2018 7:56 AM  End time: 4/25/2018 8:01 AM  Staffing:  Performing  Anesthesiologist: JASON BARRERA  Sterile Precautions:  sterile barriers used during insertion: cap, mask, sterile gloves, large sheet, and hand hygiene used.  Arterial Line:   Immediately prior to procedure a time out was called to verify the correct patient, procedure, equipment, support staff and site/side marked as required  Laterality: left  Location: radial  Prepped with: ChloroPrep    Needle gauge: 20 G  Number of Attempts: 1  Secured with: tape, transparent dressing and pressure dressing  Flushed with: saline  1% lidocaine local anesthesia used for skin prep.   See MAR for additional medications given.  Ultrasound evaluation of access site: yes  Vessel patent by US exam    Concurrent real time visualization of needle entry

## 2021-06-17 NOTE — TELEPHONE ENCOUNTER
Telephone Encounter by Terri Cornell RN at 11/13/2020 12:10 PM     Author: Terri Cornell RN Service: -- Author Type: Registered Nurse    Filed: 11/13/2020 12:11 PM Encounter Date: 11/13/2020 Status: Signed    : Terri Cornell RN (Registered Nurse)       Teagan Martel MD  You 7 minutes ago (12:02 PM)     Provider Review: Anticoagulation Annual Referral Renewal     ACM Renewal Decision:  Renew ACM warfarin management     INR Range:   Change INR goal range to 2.0-3.0   Anticipated Duration of Therapy (from today):  Long-term anticoagulation       Teagan Martel MD   12:02 PM     Message text

## 2021-06-18 NOTE — PROGRESS NOTES
[facility] Discharge Summary  Patient Name: Zach Garcia  Date: 8/7/2018  Referral Diagnosis: Cervical myelopathy (H)  Referring provider: Jacklyn Carbajal CNP  Visit Diagnosis:   1. Cervicalgia     2. Neck stiffness     3. Abnormal posture     4. Decreased strength         Goals:  Pt. will demonstrate/verbalize independence in self-management of condition in : 12 weeks  Pt. will be independent with home exercise program in : 12 weeks  Pt. will improve posture : and demonstrate posture with minimal to no cuing;in 6 weeks  Patient Turn Head: for driving;wiith no pain;with less difficulty;in 6 weeks  Patient will look up / down: for reading;with no pain;with less difficulty;for shaving;in 6 weeks  No Data Recorded    Patient was seen for 1 visits physical therapy.    The patient attended therapy initially, but did not finish the therapy sessions prescribed.  Goals were not fully achieved. Explanation for goals not achieved: The patient discontinued therapy, did not return.    Therapy will be discontinued at this time.  Please see progress note dated 6/12/18 for patient status.      Thank you for your referral.  Chuck Ulloa, PT  8/7/2018  7:29 AM               [facility] Certification Request    June 12, 2018      Patient: Zach Garcia  MR Number: [id number]  YOB: 1950  Date of Visit: 6/12/2018      Dear Jacklyn Hopson, CNP:    Thank you for this referral.   We are seeing Zach Garcia in Physical Therapy for Cervical myelopathy.    Medicare and/or Medicaid requires physician review and approval of the treatment plan. Please review the plan of care and verify that you agree with the therapy plan of care by co-signing this note.      Plan of Care  Communication with: Referral Source  Patient Related Instruction: Nature of Condition;Treatment plan and rationale;Self Care instruction;Basis of treatment;Expected outcome;Next steps;Precautions;Posture;Body  mechanics  Times per Week: 2-1  Number of Weeks: 12  Number of Visits: 16  Discharge Planning: self care and HEP  Precautions / Restrictions : left upper chest patient has implanted pacemaker/defibulator, HTN, Systolic heart failure, CAD, shoulder impingement  Therapeutic Exercise: ROM;Stretching;Strengthening (MedX as deemed appropriate.)  Neuromuscular Reeducation: posture;TNE;core  Manual Therapy: soft tissue mobilization;myofascial release;joint mobilization    Goals:  Pt. will demonstrate/verbalize independence in self-management of condition in : 12 weeks  Pt. will be independent with home exercise program in : 12 weeks  Pt. will improve posture : and demonstrate posture with minimal to no cuing;in 6 weeks  Patient Turn Head: for driving;wiith no pain;with less difficulty;in 6 weeks  Patient will look up / down: for reading;with no pain;with less difficulty;for shaving;in 6 weeks  No Data Recorded      If you have any questions or concerns, please don't hesitate to call.    Sincerely,      Chuck Ulloa, PT      Physician:    For Medicare/MA patients:      Physician recommendation:     _x__ Follow therapist's recommendation        ___ Modify therapy      *Physician co-signature indicates they certify the need for these services furnished within this plan and while under their care.              [facility]   Cervical Thoracic Initial Evaluation    Patient Name: Zach Garcia  Date of evaluation: 6/12/2018  Referral Diagnosis: Cervical myelopathy  Referring provider: Jacklyn Carbajal CNP  Visit Diagnosis:     ICD-10-CM    1. Cervicalgia M54.2    2. Neck stiffness M43.6    3. Abnormal posture R29.3    4. Decreased strength R53.1        Assessment:      Impairments in  pain, posture, ROM, joint mobility, strength  Patient's signs and symptoms are consistent with post op recovery.  Patient responded well to manual therapy and HEP.  Prognosis to achieve goals is  good   Pt. is appropriate for  "skilled PT intervention as outlined in the Plan of Care (POC).    Goals:  Pt. will demonstrate/verbalize independence in self-management of condition in : 12 weeks  Pt. will be independent with home exercise program in : 12 weeks  Pt. will improve posture : and demonstrate posture with minimal to no cuing;in 6 weeks  Patient Turn Head: for driving;wiith no pain;with less difficulty;in 6 weeks  Patient will look up / down: for reading;with no pain;with less difficulty;for shaving;in 6 weeks  No Data Recorded    Patient's expectations/goals are realistic.    Barriers to Learning or Achieving Goals:  No Barriers.       Plan / Patient Instructions:        Plan of Care:   Communication with: Referral Source  Patient Related Instruction: Nature of Condition;Treatment plan and rationale;Self Care instruction;Basis of treatment;Expected outcome;Next steps;Precautions;Posture;Body mechanics  Times per Week: 2-1  Number of Weeks: 12  Number of Visits: 16  Discharge Planning: self care and HEP  Precautions / Restrictions : left upper chest patient has implanted pacemaker/defibulator, HTN, Systolic heart failure, CAD, shoulder impingement  Therapeutic Exercise: ROM;Stretching;Strengthening (MedX as deemed appropriate.)  Neuromuscular Reeducation: posture;TNE;core  Manual Therapy: soft tissue mobilization;myofascial release;joint mobilization    POC and pathology of condition were reviewed with patient.  Pt. is in agreement with the Plan of Care  A Home Exercise Program (HEP) was initiated today.  Pt. was instructed in exercises by PT and patient was given a handout with detailed instructions.    Plan for next visit: review HEP,continue manual therapy, progress cervical isometric strengthening.     Subjective:           History of Present Illness:    Zach \"Makenzie" is a 68 y.o. male who presents to therapy today with complaints of neck pain and stiffness associated with recovery from cervical surgery. Date of onset:  4/25/18. " Initial onset of cervical myelopathy was gradual. Symptoms are getting better. He denies history of similar symptoms. He describes their previous level of function as not limited.    Pain Ratin  Pain rating at best: 0  Pain rating at worst: 5  Pain description: aching, pain and soreness    Functional limitations are described as occurring with:   lifting  looking up and looking down  reaching at shoulder height, overhead and behind back         Objective:      Note: Items left blank indicates the item was not performed or not indicated at the time of the evaluation.    Patient Outcome Measures :    Neck Disability Score in %: 8.89   Scores range from 0-100%, where a score of 0% represents minimal pain and maximal function. The minmal clinically important difference is a score reduction of 10%.    Cervical Thoracic Examination  1. Cervicalgia     2. Neck stiffness     3. Abnormal posture     4. Decreased strength       Precautions / Restrictions : left upper chest patient has implanted pacemaker/defibulator, HTN, Systolic heart failure, CAD, shoulder impingement   Involved side: Bilateral  Posture Observation:      Cervical:  Severe forward head  Shoulder/Thoracic complex: Severe bilateral scapular protraction     Cervical ROM:    Date: 18     *Indicate scale AROM AROM AROM   Cervical Flexion Min to nil loss     Cervical Extension 10      Right Left Right Left Right Left   Cervical Sidebending 30 20       Cervical Rotation 40 40       Cervical Protraction nil     Cervical Retraction mod     Thoracic Flexion      Thoracic Extension      Thoracic Sidebending         Thoracic Rotation           Strength     Date: 18     Cervical Myotomes/5 Right Left Right Left Right Left   Cervical Flexion (C1-2) 5 5       Cervical Sidebending (C3) 5 5       Shoulder Elevation (C4) 5 5       Shoulder Abduction (C5) 5 4-       Elbow Flexion (C6) 5 5       Elbow Extension (C7) 5 5       Wrist Flexion (C7) 5 5       Wrist  Extension (C6) 5 5       Thumb abduction (C8) 5 5       Finger Abduction (T1) 5 5         Sensation    NT      Reflex Testing  NT  Cervical Dermatomes Right Left UE Reflexes Right Left   Back of the Head (C2)   Biceps (C5-6)     Supraclavicular Fossa (C3)   Brachioradialis (C5-6)     AC Joint (C4)   Triceps (C7-8)     Lateral Biceps (C5)   Mayra s test     Palmar Thumb (C6)   LE Reflexes     Palmar 3rd Finger (C7)   Patellar (L3-4)     Palmar 5th Finger (C8)   Achilles (S1-2)     Ulnar Forearm (T1)   Babinski Response         Flexibility & Palpation:  Restricted pec minor, scalenes, and SCM bilateral    Passive Mobility-Joint Integrity: Not indicated.    Cervical Special Tests        Cervical Special Tests Right Left UE Nerve Mobility Right Left   Cervical compression   Median nerve     Cervical distraction   Ulnar nerve     Spurling s test   Radial nerve     Shoulder abduction sign   Thoracic outlet     Deep neck flexor endurance test   Shanta     Upper cervical rotation   Adson s     Sharper-Odilon   Cervical rotation lateral flexion     Alar ligament test   Other:     Other:   Other:       UE Screen: possible left shoulder impingement or rotator cuff tear.    Treatment Today     Exercises:  Exercise #1: cervical AROM exercises:  flexion, extension, bilateral side bending, bilateral rotation, retraction  Comment #1: 10 each  Exercise #2: scapular retraction  Comment #2: 10     MFR layers 1-3 bilateral: upper trap, scalenes, left pec major, deltoid, biceps.  TREATMENT MINUTES COMMENTS   Evaluation 20    Self-care/ Home management     Manual therapy 20    Neuromuscular Re-education     Therapeutic Activity     Therapeutic Exercises 10    Gait training     Modality__________________                Total 55    Blank areas are intentional and mean the treatment did not include these items.       PT Evaluation Code: (Please list factors)  Patient History/Comorbidities:   Patient Active Problem List   Diagnosis      Essential hypertension with goal blood pressure less than 130/80     Systolic heart failure     Chronic atrial fibrillation     Coronary artery disease due to calcified coronary lesion     S/P right coronary artery (RCA) stent placement     Cardiomyopathy, dilated, nonischemic     Dyslipidemia     Frequent unifocal premature ventricular contractions     Insomnia     Chronic low back pain     ICD (implantable cardioverter-defibrillator), single, in situ     Tachycardia     Cervical myelopathy     Urethral trauma, initial encounter     Shoulder impingement      Examination: as above  Clinical Presentation: stable  Clinical Decision Making: low complexity    Patient History/  Comorbidities Examination  (body structures and functions, activity limitations, and/or participation restrictions) Clinical Presentation Clinical Decision Making (Complexity)   No documented Comorbidities or personal factors 1-2 Elements Stable and/or uncomplicated Low   1-2 documented comorbidities or personal factor 3 Elements Evolving clinical presentation with changing characteristics Moderate   3-4 documented comorbidities or personal factors 4 or more Unstable and unpredictable High Chuck Ulloa, PT  6/12/2018  8:15 AM

## 2021-06-18 NOTE — PROGRESS NOTES
"Pt is here for STAPLE REMOVAL S/P  CERVICAL LAMINOPLASTY CERVICAL 3-4-5-6-7 CUT LEFT HINGE RIGHT FORAMINOTOMIES CERVICAL 4-5 AND CERVICAL 5-6 AND CERVICAL 6-7 LEFT on 4-25-18 by Dr. Patel.  Before surgery, he reports he had mid neck pain that radiated down both arms to hands and no  in either hand with \"trigger finger\" 3rd digit bilaterally.     Today, he reports he has improved strength in both hands and no trigger fingers. Right arm is \"fine\" and just has some residual numbnbess around the left wrist The neck pain pain is significantly improved except some post op pain and muscle spasms in the shoulders/scapula area for which he takes tylenol.    Surgical wound WNL - CDI, no signs of infection or skin breakdown.  Incision well-healed: good skin approximation, no redness or visible/palpable edema, no tenderness to palpation.  PT. AF, denies fever, chills or sweats.  Pt. reports that the symptoms are improved from pre-op.    Staples - intact removed without difficulty. Wound prepped with alcohol  before and after removal.  Surrounding skin has no signs of breakdown.  Verbal instructions regarding incision care are given.  Pt. advised to call us if any s/s of infection noted - all discussed in detail.      Debra Virk RN      "

## 2021-06-18 NOTE — PROGRESS NOTES
"CHART NOTE     DATE OF SERVICE:  2018     : 1950   68 y.o.     ASSESSMENT : Doing well with improvement in symptoms and function.  L Shoulder      PLAN: Loosen restrictions. Refer to PT. RTC prn. Enc to call with any new questions or concerns.     HPI:  Zach Garcia is status post CERVICAL LAMINOPLASTY CERVICAL 3-4-5-6-7 CUT LEFT HINGE RIGHT FORAMINOTOMIES CERVICAL 4-5 AND CERVICAL 5-6 AND CERVICAL 6-7 LEFT on 18 by Dr. Patel. Presented with  mid neck pain that radiated down both arms to hands and no  in either hand with \"trigger finger\" 3rd digit bilaterally. Pt had intraop cystoscopy for oates placement by Dr. Cheko Encarnacion.  Home with oates and has appt at [facility] on 18 for removal and voiding trial    TODAY, Zach Garcia reports no neck, min arm pain, residual numbness in L hand.  Fell in Feb before surgery sustaining L shoulder injury which limits his ROM, pain is gone, has not yet seen Ortho. Oates out and no issues with voiding.        PAST MEDICAL HISTORY, SURGICAL HISTORY, REVIEW OF SYMPTOMS, MEDICATIONS AND ALLERGIES:  Past medical history, surgical history, ROS, medications and allergies reviewed with patient and remain unchanged from previous visit.    Past Medical History:   Diagnosis Date     Ankle fracture     wore boot- right      Anxiety      Arrhythmia     atrial fibrillation     Atrial fibrillation      Cardiomyopathy      Cervical stenosis of spine      Chronic atrial fibrillation     2 cardioversions in      Chronic kidney disease      Congestive heart failure     systolic heart failure     COPD (chronic obstructive pulmonary disease)     pt unsure, but recent told he may have this by MD and started on an inhaler     Coronary artery disease      Essential hypertension with goal blood pressure less than 130/80      Hyperlipidemia      ICD (implantable cardioverter-defibrillator) in place 2017    [place] [facility]     Insomnia      Kidney stones     per " pt, has had in the past. none recently     Lung nodule      Pulmonary hypertension      Shortness of breath     resolved per pt. report     Subdural hematoma 05/02/2015    due to trauma       PHYSICAL EXAM:    /74  Pulse 78  Resp 18      Neurological exam reveals:  Respirations easy, non-labored.   Skin: W/D/I. No rashes, lesions or breaks in integrity.   Recent and remote memory intact, fund of knowledge wnl.    Counts by Serial 7s with ease, Spells WORLD forward and in reverse.   PERRL, EOMI, No nystagmus,   Face symmetric, tongue midline, Uvula midline,  palate rises with phonation   Shoulder shrug equal  Arm strength bilateral grasp, biceps, triceps, and deltoids 5/5   LUE with Limited ROM in extension/abduction. NTTP over shoulder capsule.  No extremity edema noted.   Muscle Bulk and tone wnl.   Reflexes: No pathological reflexes   Gait and station:Normal  Incision: CDI without erythema or edema  NDI/FLORIAN: NA  No pain     RADIOGRAPHIC IMAGING: XR: Laminoplasty hardware intact, alignment maintained.   Films personally reviewed and interpreted.     Reviewed imaging with patient and family.

## 2021-06-18 NOTE — PROGRESS NOTES
INR 4.4 pt had neck surgery and is off steroids and antibiotics. Will hold today's dose of Warfarin and then dose at 5 mg daily. Retest in 2 weeks. After talking with pt and discussing history of greens/salads and medication change. Pt will  continue  with current diet and dosing of Warfarin.  Continue with moderation of Vit K and green leafy vegetables. Cautioned to call with increase bruising or bleeding. Reminded to call with medication change especially antibiotic. Call with any questions or concerns or any up coming procedures. Cautioned about using Herbal medication.

## 2021-06-18 NOTE — LETTER
Letter by Gali King EPS at      Author: Gali King EPS Service: -- Author Type: --    Filed:  Encounter Date: 1/21/2019 Status: (Other)       Zach Garcia  [address] [place] [address]      January 21, 2019      Dear Mr. Garcia,    RE: Remote Results    We are writing to you regarding your recent Remote ICD check from home. Your transmission was received successfully. Battery status is satisfactory at this time.     Your results are within normal limits.    Your next device appointment will be a remote check on April 24th, 2019; this will occur automatically.    To schedule or reschedule, please call [phone] and press 1.    NOTE: If you would like to do an extra transmission, please call [phone] and press 3 to speak to a nurse BEFORE transmitting. This ensures that the Device Clinic staff is aware of the reason you are sending a transmission, and can follow-up with you after it has been reviewed.    We will be checking your implanted device from home (remotely) every three months unless otherwise instructed. We will need to see you in the clinic at least once a year. You may need to be seen in the clinic sooner depending on the results of your check.    Please be aware:    The follow-up schedule is like a Physician prescription.    Your remote monitor is paired to your specific implanted device.      Sincerely,    [facility]

## 2021-06-19 NOTE — LETTER
Letter by May Harris RN at      Author: May Harris RN Service: -- Author Type: --    Filed:  Encounter Date: 9/19/2019 Status: (Other)         Zach Garcia  [address] [place] [address]             September 19, 2019         Dear Mr. Garcia,    Below are the results from your recent visit:    Resulted Orders   US Arterial Pressures With Exercise Legs Bilateral    Narrative    [facility] [place]  EXAM: RESTING AND POSTEXERCISE ANKLE-BRACHIAL INDICES (ABIs)  9/16/2019 8:38 AM    INDICATION: Bilateral leg pain and numbness. Previous smoker. Hypertension. Nondiabetic.  COMPARISON: None.    FINDINGS:  RIGHT  Brachial: 114  Ankle (PT): 115 Index: 1.01  Ankle (DP): 106 Index: 0.93  Digit: 79 Index: 0.69    LEFT  Brachial: 109  Ankle (PT): 115 Index: 1.01  Ankle (DP): 124 Index: 1.09  Digit: 71 Index: 0.62    The patient was exercised on a treadmill at 1.5 mph at a 10% incline for 5 minutes total. Patient reported mild lower extremity discomfort with the exercise performed.    Resting and immediate postexercise ABIs are 1.01 and 1.27 on the right.    Resting and immediate postexercise ABIs are 1.09 and 1.17 on the left.    WAVEFORMS: Normal continuous Doppler waveforms within the posterior tibial and dorsalis pedis arteries. Pulse volume waveforms are within normal limits..      Impression    1.  ABIs at rest and with the exercise performed are within normal limits.     Please see message below from Dr. Martel regarding your results.     From: Teagan Martel MD  Sent: 9/16/2019   9:33 AM  Please inform him that ultrasounds of legs suggest no significant blockages and the numbness and weakness is coming from his cervical spine issues.  LF      Please call with questions or contact us using [employer].    Sincerely,        Electronically signed by May MOFFETT RN

## 2021-06-19 NOTE — LETTER
Letter by Rebekah Randolph at      Author: Rebekah Randolph Service: -- Author Type: --    Filed:  Encounter Date: 11/19/2019 Status: Signed         Zach Garica  [address] [place] [address]      November 19, 2019      Dear Mr. Monsalvearty,    RE: Remote Results    We are writing to you regarding your recent Remote ICD check from home. Your transmission was received successfully. Battery status is satisfactory at this time.     Your results are within normal limits.    Your next device appointment will be a remote check on February 19, 2020; this will occur automatically.    To schedule or reschedule, please call [phone] and press 1.    NOTE: If you would like to do an extra transmission, please call [phone] and press 3 to speak to a nurse BEFORE transmitting. This ensures that the Device Clinic staff is aware of the reason you are sending a transmission, and can follow-up with you after it has been reviewed.    We will be checking your implanted device from home (remotely) every three months unless otherwise instructed. We will need to see you in the clinic at least once a year. You may need to be seen in the clinic sooner depending on the results of your check.    Please be aware:    The follow-up schedule is like a Physician prescription.    Your remote monitor is paired to your specific implanted device.      Sincerely,    [facility]

## 2021-06-19 NOTE — PROGRESS NOTES
[facility] Follow-up Note    Assessment & Plan        1. Coronary artery disease due to calcified coronary lesion -angiography November 1, 2016 showed normal left main and mid left anterior descending 30% lesion, normal circumflex and right coronary artery with a proximal 99% lesion which received a drug-coated stent.  He was on Plavix for 6 months, which was discontinued and back on aspirin and Coumadin therapy. This was a drug-coated stent.  No symptoms and work on prevention.   2. Cardiomyopathy, dilated, nonischemic (H) -initial ejection fraction of 15% and most recently on stress test prior to cervical spine surgery 45%.  On appropriate furosemide, lisinopril, Toprol and Aldactone and continue current therapy.   3. Chronic atrial fibrillation (H) -permanent, asymptomatic, not valvular and on anticoagulation with Coumadin with INR adjusted by primary.  Most recent INR is 2.3.   4. Dyslipidemia -cholesterol 144 with an LDL of 67 from February 2018 which is acceptable.   5. Essential hypertension with goal blood pressure less than 130/80    6. ICD (implantable cardioverter-defibrillator), single, in situ -this is a [employer] single lead device programmed at VVI mode with less than 10% ventricular pacing.   7. Tachycardia -still has some mild tachycardia 50-20% of the time with heart rates greater than 120.  On beta-blocker.  Blood pressure borderline low.  Will back off on dose of lisinopril to just 20 mg once a day.  This should give me more room to increase beta-blocker if needed.     Plan  1.  Decrease lisinopril to 20 mg once a day.  2.  Follow-up with me in 1 year or sooner if needed.  3.  If tachycardia persists will need to increase Toprol further.    Subjective  CC: 68-year-old white gentleman being seen in follow-up today.  Since I seen him he had cervical spine surgery.  He still has some numbness involving his left upper extremity but strength is equal.  He now has some left  "shoulder discomfort from a fall on the ice in the middle of winter.  He is going for MRI of this.  Heart wise there is no chest discomfort, palpitations, shortness breath, PND, orthopnea, peripheral edema, syncope or dizziness.    Medications  Current Outpatient Prescriptions   Medication Sig     acetaminophen (TYLENOL) 500 MG tablet Take 1 tablet (500 mg total) by mouth every 4 (four) hours.     aspirin 81 MG EC tablet Take 1 tablet (81 mg total) by mouth daily.     furosemide (LASIX) 40 MG tablet Take 0.5 tablets (20 mg total) by mouth daily.     lisinopril (PRINIVIL,ZESTRIL) 20 MG tablet TAKE 1 TABLET BY MOUTH TWO  TIMES DAILY     metoprolol succinate (TOPROL-XL) 100 MG 24 hr tablet TAKE 1 AND 1/2 TABLETS BY  MOUTH DAILY     multivitamin with minerals tablet Take 1 tablet by mouth daily.     rosuvastatin (CRESTOR) 20 MG tablet Take 1 tablet by mouth at  bedtime     spironolactone (ALDACTONE) 25 MG tablet Take 0.5 tablets (12.5 mg total) by mouth daily.     warfarin (COUMADIN) 5 MG tablet Take 7.5 mg Sun and 5 mg all other days.     zolpidem (AMBIEN) 5 MG tablet Take 5-10 mg by mouth at bedtime as needed.      cyclobenzaprine (FLEXERIL) 5 MG tablet Take 1 tablet (5 mg total) by mouth 3 (three) times a day as needed for muscle spasms.       Objective  /72 (Patient Site: Right Arm, Patient Position: Sitting, Cuff Size: Adult Large)  Pulse 80  Resp 16  Ht 5' 11.25\" (1.81 m)  Wt (!) 240 lb (108.9 kg)  BMI 33.24 kg/m2    General Appearance:    Alert, cooperative, no distress, appears stated age   Head:    Normocephalic, without obvious abnormality, atraumatic   Throat:   Lips, mucosa, and tongue normal; teeth and gums normal   Neck:   Supple, symmetrical, trachea midline, no adenopathy;        thyroid:  No enlargement/tenderness/nodules; no carotid    bruit or JVD   Back:     Symmetric, no curvature, ROM normal, no CVA tenderness   Lungs:     Clear to auscultation bilaterally, respirations unlabored   Chest " wall:    No tenderness, left-sided ICD noted   Heart:   Irregularly irregular, S1 and S2 normal, no murmur, rub   or gallop   Abdomen:     Soft, non-tender, bowel sounds active all four quadrants,     no masses, no organomegaly   Extremities:   Normal, atraumatic, no cyanosis or edema   Pulses:   2+ and symmetric all extremities   Skin:   Skin color, texture, turgor normal, no rashes or lesions     Results    Lab Results personally reviewed   Lab Results   Component Value Date    CHOL 144 02/27/2018    CHOL 154 08/26/2016     Lab Results   Component Value Date    HDL 39 (L) 02/27/2018    HDL 35 (L) 08/26/2016     Lab Results   Component Value Date    LDLCALC 67 02/27/2018    LDLCALC 90 08/26/2016     Lab Results   Component Value Date    TRIG 189 (H) 02/27/2018    TRIG 145 08/26/2016     Lab Results   Component Value Date    WBC 6.6 04/16/2018    HGB 16.2 04/16/2018    HCT 48.3 04/16/2018     04/16/2018     Lab Results   Component Value Date    CREATININE 0.91 04/16/2018    BUN 24 (H) 04/16/2018     04/16/2018    K 4.4 04/16/2018    CO2 29 04/16/2018     Review of Systems:   General: WNL  Eyes: WNL  Ears/Nose/Throat: WNL  Lungs: WNL  Heart: WNL  Stomach: WNL  Bladder: WNL  Muscle/Joints: WNL  Skin: WNL  Nervous System: WNL  Mental Health: Anxiety     Blood: Easy Bruising

## 2021-06-19 NOTE — LETTER
Letter by Rebekah Randolph at      Author: Rebekah Randolph Service: -- Author Type: --    Filed:  Encounter Date: 4/24/2019 Status: (Other)         Zach Garcia  [address] [place] [address]      April 24, 2019      Dear Mr. Pagey,    RE: Remote Results    We are writing to you regarding your recent Remote ICD check from home. Your transmission was received successfully. Battery status is satisfactory at this time.     Your results are within normal limits.    Your next device appointment will be a clinic visit.  Please call in June to schedule.      To schedule or reschedule, please call [phone] and press 1.    NOTE: If you would like to do an extra transmission, please call [phone] and press 3 to speak to a nurse BEFORE transmitting. This ensures that the Device Clinic staff is aware of the reason you are sending a transmission, and can follow-up with you after it has been reviewed.    We will be checking your implanted device from home (remotely) every three months unless otherwise instructed. We will need to see you in the clinic at least once a year. You may need to be seen in the clinic sooner depending on the results of your check.    Please be aware:    The follow-up schedule is like a Physician prescription.    Your remote monitor is paired to your specific implanted device.      Sincerely,    [facility]

## 2021-06-20 NOTE — PROGRESS NOTES
INR 4.3 hold warfarin today and then 5 mg daily. Retest in 2 weeks. After talking with pt and discussing history of greens/salads and medication change. Pt will  continue  with current diet and dosing of Warfarin.  Continue with moderation of Vit K and green leafy vegetables. Cautioned to call with increase bruising or bleeding. Reminded to call with medication change especially antibiotic. Call with any questions or concerns or any up coming procedures. Cautioned about using Herbal medication.

## 2021-06-20 NOTE — PROGRESS NOTES
INR 3.1 INR stable. Discussed continuing management of dose of Warfarin and returning in one month . No changes to diet needed at this time. Continue moderate intake of Vitamin K and call if increase bruising or unexplained bleeding. Call with medication changes or upcoming procedures.

## 2021-06-20 NOTE — LETTER
Letter by Rebekah Randolph at      Author: Rebekah Randolph Service: -- Author Type: --    Filed:  Encounter Date: 8/24/2020 Status: (Other)         Zach Garcia  [address] [place] [address]      August 24, 2020      Dear Mr. Pagey,    RE: Remote Results    We are writing to you regarding your recent Remote ICD check from home. Your transmission was received successfully. Battery status is satisfactory at this time.     Your results are being reviewed.  You will be contacted if further information is necessary.     Your next device appointment will be a remote check on November 24, 2020; this will occur automatically.    To schedule or reschedule, please call [phone] and press 1.    NOTE: If you would like to do an extra transmission, please call [phone] and press 3 to speak to a nurse BEFORE transmitting. This ensures that the Device Clinic staff is aware of the reason you are sending a transmission, and can follow-up with you after it has been reviewed.    We will be checking your implanted device from home (remotely) every three months unless otherwise instructed. We will need to see you in the clinic at least once a year. You may need to be seen in the clinic sooner depending on the results of your check.    Please be aware:    The follow-up schedule is like a Physician prescription.    Your remote monitor is paired to your specific implanted device.      Sincerely,    [facility]

## 2021-06-20 NOTE — LETTER
Letter by Jenna Givens RN at      Author: Jenna Givens RN Service: -- Author Type: --    Filed:  Encounter Date: 9/10/2020 Status: (Other)         Zach Garcia  [address] [place] [address]             September 10, 2020         Dear Mr. Garcia,    Below are the results from your recent visit:    Resulted Orders   Basic Metabolic Panel   Result Value Ref Range    Sodium 140 136 - 145 mmol/L    Potassium 4.2 3.5 - 5.0 mmol/L    Chloride 103 98 - 107 mmol/L    CO2 23 22 - 31 mmol/L    Anion Gap, Calculation 14 5 - 18 mmol/L    Glucose 124 70 - 125 mg/dL    Calcium 8.3 (L) 8.5 - 10.5 mg/dL    BUN 18 8 - 28 mg/dL    Creatinine 1.06 0.70 - 1.30 mg/dL    GFR MDRD Af Amer >60 >60 mL/min/1.73m2    GFR MDRD Non Af Amer >60 >60 mL/min/1.73m2    Narrative    Fasting Glucose reference range is 70-99 mg/dL per  American Diabetes Association (ADA) guidelines.   Magnesium   Result Value Ref Range    Magnesium 1.8 1.8 - 2.6 mg/dL     The test results show that your blood work is stable. We await the results of your future testing.    Teagan Martel MD Caswell, Mallory J, RN               Let him know blood s all normal and awiat other tests.   LF         Please call with questions or contact us using [employer].    Sincerely,    Jenna SPIVEY

## 2021-06-20 NOTE — LETTER
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 We have discussed antifungal coverage with infectious disease. 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 Pt now off steroids  - chimerics from marrow done 6/21 show CD3 of 90% donor and 10% recipient, but CD34 of 5% donor and 95% recipient   - repeat chimerics still pending from marrow done 7/10    Letter by Lisa Ernst RDCS at      Author: Lisa Ernst RDCS Service: -- Author Type: --    Filed:  Encounter Date: 2/19/2020 Status: (Other)         Zach Garcia  [address] [place] [address]      February 19, 2020      Dear Mr. Garcia,    RE: Remote Results    We are writing to you regarding your recent Remote ICD check from home. Your transmission was received successfully. Battery status is satisfactory at this time.     Your results are showing no significant changes.    Your next device appointment will be a remote check on May 20, 2020; this will occur automatically.    To schedule or reschedule, please call [phone] and press 1.    NOTE: If you would like to do an extra transmission, please call [phone] and press 3 to speak to a nurse BEFORE transmitting. This ensures that the Device Clinic staff is aware of the reason you are sending a transmission, and can follow-up with you after it has been reviewed.    We will be checking your implanted device from home (remotely) every three months unless otherwise instructed. We will need to see you in the clinic at least once a year. You may need to be seen in the clinic sooner depending on the results of your check.    Please be aware:    The follow-up schedule is like a Physician prescription.    Your remote monitor is paired to your specific implanted device.      Sincerely,    [facility]

## 2021-06-20 NOTE — LETTER
Letter by Lisa Ernst RDCS at      Author: Lisa Ernst RDCS Service: -- Author Type: --    Filed:  Encounter Date: 5/20/2020 Status: (Other)         Zach Garcia  [address] [place] [address]      May 20, 2020      Dear Mr. Garcia,    RE: Remote Results    We are writing to you regarding your recent Remote ICD check from home. Your transmission was received successfully. Battery status is satisfactory at this time.     Your results are showing no significant changes.    Your next device appointment will be a remote check on August 24, 2020; this will occur automatically.    To schedule or reschedule, please call [phone] and press 1.    NOTE: If you would like to do an extra transmission, please call [phone] and press 3 to speak to a nurse BEFORE transmitting. This ensures that the Device Clinic staff is aware of the reason you are sending a transmission, and can follow-up with you after it has been reviewed.    We will be checking your implanted device from home (remotely) every three months unless otherwise instructed. We will need to see you in the clinic at least once a year. You may need to be seen in the clinic sooner depending on the results of your check.    Please be aware:    The follow-up schedule is like a Physician prescription.    Your remote monitor is paired to your specific implanted device.      Sincerely,    [facility]

## 2021-06-21 NOTE — LETTER
Letter by Lisa Ernst RDCS at      Author: Lisa Ernst RDCS Service: -- Author Type: --    Filed:  Encounter Date: 11/24/2020 Status: (Other)         Zach Garcia  [address] [place] [address]      November 24, 2020      Dear Mr. Garcia,    RE: Remote Results    We are writing to you regarding your recent Remote ICD check from home. Your transmission was received successfully. Battery status is satisfactory at this time.     Your results are showing no significant changes.    Your next device appointment will be a remote check on February 23, 2021; this will occur automatically.    To schedule or reschedule, please call [phone] and press 1.    NOTE: If you would like to do an extra transmission, please call [phone] and press 3 to speak to a nurse BEFORE transmitting. This ensures that the Device Clinic staff is aware of the reason you are sending a transmission, and can follow-up with you after it has been reviewed.    We will be checking your implanted device from home (remotely) every three months unless otherwise instructed. We will need to see you in the clinic at least once a year. You may need to be seen in the clinic sooner depending on the results of your check.    Please be aware:    The follow-up schedule is like a Physician prescription.    Your remote monitor is paired to your specific implanted device.      Sincerely,    [facility]

## 2021-06-21 NOTE — PROGRESS NOTES
INR 4.9 hold Warfarin today and then 2.5 mg Wed, Fri, Mon and retest on tue 11/13. After talking with pt and discussing history of greens/salads and medication change. Pt will  continue  with current diet and dosing of Warfarin.  Continue with moderation of Vit K and green leafy vegetables. Cautioned to call with increase bruising or bleeding. Reminded to call with medication change especially antibiotic. Call with any questions or concerns or any up coming procedures. Cautioned about using Herbal medication.

## 2021-06-21 NOTE — LETTER
Letter by Rebekah Randolph at      Author: Rebekah Randolph Service: -- Author Type: --    Filed:  Encounter Date: 2/23/2021 Status: (Other)         Zach Garcia  [address] [place] [address]      February 23, 2021      Dear Mr. Monsalvearty,    RE: Remote Results    We are writing to you regarding your recent Remote ICD check from home. Your transmission was received successfully. Battery status is satisfactory at this time.     Your results are showing no significant changes.    Your next device appointment will be a remote check on May 25, 2021; this will occur automatically.    To schedule or reschedule, please call [phone] and press 1.    NOTE: If you would like to do an extra transmission, please call [phone] and press 3 to speak to a nurse BEFORE transmitting. This ensures that the Device Clinic staff is aware of the reason you are sending a transmission, and can follow-up with you after it has been reviewed.    We will be checking your implanted device from home (remotely) every three months unless otherwise instructed. We will need to see you in the clinic at least once a year. You may need to be seen in the clinic sooner depending on the results of your check.    Please be aware:    The follow-up schedule is like a Physician prescription.    Your remote monitor is paired to your specific implanted device.      Sincerely,    [facility]

## 2021-06-21 NOTE — PROGRESS NOTES
INR  1.9 pt will return to current dose of 2.5 mg  M,W,F and 5 mg all other days. Continue current management dosing of Warfarin. Continue  diet of moderate Vitamin K intake. Discussed with pt the need to call with questions or concerns or any change in medication especially herbal medication or OTC. Call with increased bleeding or bruising or any upcoming procedures.

## 2021-06-22 NOTE — PROGRESS NOTES
INR 3.5. Decrease dose to 5 mg sun, tue, thurs and 2.5 mg all other days. After talking with pt and discussing history of greens/salads and medication change. Pt will  continue  with current diet and dosing of Warfarin.  Continue with moderation of Vit K and green leafy vegetables. Cautioned to call with increase bruising or bleeding. Reminded to call with medication change especially antibiotic. Call with any questions or concerns or any up coming procedures. Cautioned about using Herbal medication.

## 2021-06-22 NOTE — PROGRESS NOTES
INR 3.2 Continue current management dosing of Warfarin. Continue  diet of moderate Vitamin K intake. Discussed with pt the need to call with questions or concerns or any change in medication especially herbal medication or OTC. Call with increased bleeding or bruising or any upcoming procedures.

## 2021-06-23 NOTE — PROGRESS NOTES
INR 1.8 increase Warfarin to 2.5 mg M,w,F and 5 mg all other days. After talking with pt and discussing history of greens/salads and medication change. Pt will  continue  with current diet and dosing of Warfarin.  Continue with moderation of Vit K and green leafy vegetables. Cautioned to call with increase bruising or bleeding. Reminded to call with medication change especially antibiotic. Call with any questions or concerns or any up coming procedures. Cautioned about using Herbal medication.

## 2021-06-24 ENCOUNTER — AMBULATORY - HEALTHEAST (OUTPATIENT)
Dept: CARDIOLOGY | Facility: CLINIC | Age: 71
End: 2021-06-24

## 2021-06-24 ENCOUNTER — COMMUNICATION - HEALTHEAST (OUTPATIENT)
Dept: CARDIOLOGY | Facility: CLINIC | Age: 71
End: 2021-06-24

## 2021-06-24 DIAGNOSIS — I48.20 CHRONIC ATRIAL FIBRILLATION (H): ICD-10-CM

## 2021-06-24 LAB
INR PPP: 4.39 (ref 0.9–1.1)
POC INR - HE - HISTORICAL: 5.7 (ref 0.9–1.1)

## 2021-06-24 NOTE — PROGRESS NOTES
INR 1.8 increase dose to 2.5 mg M and F and 5 mg all other days. After talking with pt and discussing history of greens/salads and medication change. Pt will  continue  with current diet and dosing of Warfarin.  Continue with moderation of Vit K and green leafy vegetables. Cautioned to call with increase bruising or bleeding. Reminded to call with medication change especially antibiotic. Call with any questions or concerns or any up coming procedures. Cautioned about using Herbal medication.

## 2021-06-24 NOTE — PROGRESS NOTES
INR 2.5 continue current dose of 2.5 mg M and F and 5 mg all other days. Retest in 4 weeks. Continue current management dosing of Warfarin. Continue  diet of moderate Vitamin K intake. Discussed with pt the need to call with questions or concerns or any change in medication especially herbal medication or OTC. Call with increased bleeding or bruising or any upcoming procedures.

## 2021-06-25 NOTE — PROGRESS NOTES
Progress Notes by Ivan Tinajero MD at 2/10/2017  8:30 AM     Author: Ivan Tinajero MD Service: -- Author Type: Physician    Filed: 2/10/2017  3:31 PM Encounter Date: 2/10/2017 Status: Addendum    : Ivan Tinajero MD (Physician)    Related Notes: Original Note by Ivan Tinajero MD (Physician) filed at 2/10/2017  9:26 AM           Click to link to [facility] HEART CARE ELECTROPHYSIOLOGY CONSULTATION    Thank you, Dr. Pinzon, for asking the [facility] Heart Care team to see Mr. Zach Garcia to evaluate nonischemic cardiomyopathy, frequent unifocal ventricular premature beats, and chronic atrial fibrillation for consideration of prophylactic ICD placement.      Assessment/Recommendations   Clinic Problem List:  1. Cardiomyopathy     2. Chronic atrial fibrillation     3. Frequent unifocal premature ventricular contractions     4. Chronic systolic heart failure     5. Essential hypertension with goal blood pressure less than 130/80         Assessment:    Nonischemic cardiomyopathy out of proportion to known coronary artery disease, ejection fraction 30% on maximal medical management.  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  LifeVest will be turned in following the procedure  Follow up appointment:   Dr. Martel following the procedure     History of Present Illness     Zach Garcia is a 66 y.o. male with nonischemic  cardiomyopathy out of proportion to coronary artery disease.  He presented in April 2014 with atrial fibrillation and shortness of breath.  His ejection fraction was 30% at that time with global hypokinesia and heterogeneous uptake on stress nuclear imaging suggesting nonischemic cardiomyopathy.  He was treated medically.  He had attempts at DC cardioversion on amiodarone later in 2014 but had recurrent atrial fibrillation.  He has been in chronic atrial fibrillation with controlled ventricular response for at least the past year.  Repeat stress nuclear imaging in May 2016 showed an ejection fraction in the 20s 7% but no evidence for ischemia.  He subsequently had coronary angiography on November 1, 2016 there was a 30% LAD lesion and a proximal 99% right coronary lesion for which he received a drug-eluting stent.  Ejection fraction was 15%.  Follow-up echo to 1/2017 shows ejection fraction at 27% with moderate mitral insufficiency.  A Holter monitor on 12/1/2016 showed atrial fibrillation with controlled ventricular response averaging 88 bpm very frequent of ventricular premature beats were seen at 26,144/24 hours with occasional short runs of nonsustained VT.  PVCs were predominantly monitored for more frequent.. Past electrocardiogram shows sporadic of ventricular premature beats likely from right ventricular outflow tract dating back to 2015.  Patient activated monitor in May 2016 showed atrial fibrillation with controlled ventricular response and sporadic ventricular premature beats.  He is not been aware of palpitations or irregular heart beating.  Prior to his stent placement he had exertional angina and some dyspnea on exertion but those symptoms have resolved.  There is no history of syncope or presyncope.  Is a long history of hypertension.  Quit smoking over 30 years ago.  He has been intolerant to atorvastatin and rosuvastatin with myalgias but previously tolerated simvastatin.  He has been wearing a  LifeVest for the past 2 months.    ECG (personnaly reviewed): Personally reviewed.  Atrial fibrillation with controlled ventricular response 90 bpm and nonspecific IVCD QRS duration 110 ms.  Frequent unifocal ventricular premature beats were seen with left bundle configuration late transition and inferior axis consistent with right ventricular outflow tract ectopy.  A second morphology with right bundle branch block aberrancy and left ventricular origin was also noted.       Physical Examination Review of Systems   Vitals:    02/10/17 0825   BP: 127/78   Pulse: (!) 53   SpO2: 97%     Body mass index is 29.02 kg/(m^2).  Wt Readings from Last 3 Encounters:   02/10/17 (!) 223 lb (101.2 kg)   02/09/17 220 lb (99.8 kg)   02/07/17 221 lb (100.2 kg)        Appearance:   no distress,    HEENT: no scleral icterus, normal conjunctivae    Neck: no carotid bruits or thyromegaly   Chest/Lungs:   lungs are clear to auscultation, no rales or wheezing, LifeVest in place    Cardiovascular:   Jugular venous pressure 5 cm, Apical pulse is irregularly irregular. Normal S1,S2 with no murmurs or gallops,   Abdomen:  no  Hepatosplenomegaly., nontender,  bowel sounds are present   Extremities: no cyanosis or clubbing, No edema   Skin: no xanthelasma, warm.    Neurologic: No gross focal neurologic deficits   Mood/Affect: Alert, cooperative    General: WNL  Eyes: WNL  Ears/Nose/Throat: WNL  Lungs: WNL  Heart: Irregular Heartbeat, Arm Pain  Stomach: WNL  Bladder: WNL  Muscle/Joints: Joint Pain, Muscle Pain  Skin: WNL  Nervous System: WNL  Mental Health: Anxiety     Blood: Easy Bruising     Medical History  Surgical History Family History Social History   Past Medical History:   Diagnosis Date   ? Arrhythmia     atrial fibrillation   ? Atrial fibrillation    ? Cardiomyopathy    ? Chronic atrial fibrillation     2 cardioversions in 2014   ? Congestive heart failure    ? COPD (chronic obstructive pulmonary disease)     pt unsure, but recent told  he may have this by MD and started on an inhaler   ? Essential hypertension with goal blood pressure less than 130/80    ? Hyperlipidemia    ? Kidney stones     per pt, has had in the past. none recently   ? Subdural hematoma 5/2/2015    Past Surgical History:   Procedure Laterality Date   ? BACK SURGERY      laminectomies x 3 per pt   ? BETY HOLE OF CRANIUM Bilateral 5/3/2015    Procedure: BETY HOLES;  Surgeon: Tyrone Patel MD;  Location: [facility] OR;  Service:    ? Kidney Stone Removal     ? PICC  5/3/2015         Family History   Problem Relation Age of Onset   ? Coronary Stenting Mother    ? Coronary artery disease Mother    ? Heart failure Father    ? No Medical Problems Daughter    ? No Medical Problems Son        Social History     Social History   ? Marital status:      Spouse name: Mary   ? Number of children: 2   ? Years of education: N/A     Occupational History   ? [employer]  Retired     Social History Main Topics   ? Smoking status: Former Smoker     Packs/day: 1.00     Years: 45.00     Types: Cigarettes     Quit date: 4/23/2014   ? Smokeless tobacco: Never Used   ? Alcohol use Yes      Comment: occaionally - couple beers per week   ? Drug use: No   ? Sexual activity: Not on file     Other Topics Concern   ? Not on file     Social History Narrative          Medications  Allergies   Current Outpatient Prescriptions   Medication Sig Dispense Refill   ? ALPRAZolam (XANAX) 0.5 MG tablet Take 0.5 mg by mouth.     ? aspirin 81 MG EC tablet Take 1 tablet (81 mg total) by mouth daily. 30 tablet 0   ? clopidogrel (PLAVIX) 75 mg tablet Take 1 tablet (75 mg total) by mouth daily. 90 tablet 3   ? furosemide (LASIX) 40 MG tablet Take 0.5 tablets (20 mg total) by mouth daily. 90 tablet 1   ? lisinopril (PRINIVIL,ZESTRIL) 20 MG tablet Take 1 tablet (20 mg total) by mouth 2 (two) times a day. 180 tablet 3   ? metoprolol succinate (TOPROL-XL) 50 MG 24 hr tablet Take  1 tablet (50 mg total) by mouth daily. 90 tablet 3   ? multivitamin with minerals tablet Take 1 tablet by mouth daily.     ? nitroglycerin (NITROSTAT) 0.4 MG SL tablet Place 1 tablet (0.4 mg total) under the tongue every 5 (five) minutes as needed for chest pain. 25 tablet 2   ? spironolactone (ALDACTONE) 25 MG tablet Take 1 tablet (25 mg total) by mouth daily. 90 tablet 3   ? warfarin (COUMADIN) 5 MG tablet Take 1-1.5 tablets (5-7.5 mg total) by mouth daily. 5 mg three times a week on Tues, Thurs, and Sunday.  7.5 mg on the other four days. 110 tablet 3   ? acetaminophen (TYLENOL) 500 MG tablet Take 1 tablet (500 mg total) by mouth every 4 (four) hours as needed for pain or fever. 30 tablet 0   ? albuterol (PROVENTIL HFA;VENTOLIN HFA) 90 mcg/actuation inhaler Inhale 2 puffs every 6 (six) hours as needed for wheezing or shortness of breath.     ? albuterol (PROVENTIL) 2.5 mg /3 mL (0.083 %) nebulizer solution Take 2.5 mg by nebulization every 6 (six) hours as needed for wheezing.     ? rosuvastatin (CRESTOR) 20 MG tablet Take 1 tablet (20 mg total) by mouth bedtime. 90 tablet 3     No current facility-administered medications for this visit.       No Known Allergies

## 2021-06-25 NOTE — PROGRESS NOTES
Progress Notes by Ceci Camargo CNP at 3/7/2017  9:10 AM     Author: Ceci Camargo CNP Service: -- Author Type: Nurse Practitioner    Filed: 3/7/2017 10:10 AM Encounter Date: 3/7/2017 Status: Signed    : Ceci Camargo CNP (Nurse Practitioner)           Click to link to [facility] Heart Care     [facility] HEART CARE NOTE      Assessment/Recommendations   Assessment:    1. Nonischemic cardiomyopathy, heart failure with reduced ejection fraction, ejection fraction 27%: He has no symptoms of acute heart failure.  We discussed the goal of medication titration for decreased ejection fraction.  He is at maximum dose lisinopril.  Titration of metoprolol as previously limited due to bradycardia.  We discussed plan to titrate the metoprolol now that he has ICD.  We will continue to monitor percentage of RV pacing.    2.  ICD: See full device report.  Incision site mildly swollen with healing bruises.  Clean, dry, intact with no signs of infection.    3. Coronary artery disease: Status post drug-eluting stent to circumflex on November 1, 2016.  Aspirin was discontinued in January. He'll continue on Plavix until May 1 and then switch back to aspirin 81 mg. He has been intolerant to rosuvastatin and atorvastatin due to myalgia.  He has lingering muscle and joint pain only during the night.  He previously tolerated simvastatin and we will consider restarting.  He sees his primary care provider next week to further assess muscle and joint pain in his arms.    4.  Insomnia: He will see his primary care provider next week to discuss further.  He believes this is related to his muscle and joint pain in his arms or anxiety.  Sleep apnea questionnaire was given today and he scored low.  No plan to refer to sleep medicine at this time.    Plan:  1.   Heart failure medications:  - Beta blocker therapy with metroprolol succinate increased to 75 mg daily  - ACEI therapy with lisinopril 20 mg twice  daily  - Aldosterone blocker therapy with spironolactone 25 mg daily.   - Diuretic therapy with furosemide 20 mg daily      Zach Garcia will follow up in the heart failure clinic in 3 weeks..     History of Present Illness    Mr. Zach Garcia is a 66 y.o. male seen at [facility] heart failure clinic today for continued follow-up.  He was hospitalized in November 2016 with chest pain and shortness of breath. He had drug-eluting stent to RCA.  Echocardiogram showed an ejection fraction of 15%, moderate mitral regurgitation, moderate tricuspid regurgitation, moderate biatrial enlargement.  Repeat echocardiogram on February 1, 2017 showed improvement of ejection fraction to 27% and mild to moderate mitral regurgitation.  He had an ICD inserted on February 17, 2017 by Dr. Tinajero.  His past medical history is also significant for permanent atrial fibrillation.      He denies any symptoms of acute heart failure.  He continues to struggle with insomnia.  He describes pain in his shoulders and upper arms a few hours after falling asleep.  He also thinks that anxiety may be a component to his insomnia.  He has follow-up with his primary care provider next week and plans to discuss.  He denies lightheadedness, shortness of breath, dyspnea on exertion, orthopnea, chest pain and lower extremity edema.      He is monitoring home weights which are stable.  He is following a low sodium diet.  He goes to cardiac rehab 2 days a week and to the gym 4 days a week.  He exercises for about one hour and denies any symptoms.       Physical Examination Review of Systems   Vitals:    03/07/17 0755   BP: 108/60   Pulse: (!) 56   Resp: 18   Temp: 97.8  F (36.6  C)     Body mass index is 28.25 kg/(m^2).  Wt Readings from Last 3 Encounters:   03/07/17 220 lb (99.8 kg)   03/02/17 219 lb (99.3 kg)   02/28/17 218 lb (98.9 kg)       General Appearance:     Alert, cooperative and in no acute distress.   ENT/Mouth: membranes  moist, no oral lesions or bleeding gums.      EYES:  no scleral icterus, normal conjunctivae   Chest/Lungs:   lungs are clear to auscultation, no rales or wheezing, respirations unlabored   Cardiovascular:    irregularly irregular. Normal first and second heart sounds with no murmurs, rubs, or gallops; the radial and posterior tibial pulses are intact, no edema bilateral lower extremities    Abdomen:  Soft, nontender, nondistended, bowel sounds present   Extremities: no cyanosis or clubbing   Skin: warm, dry.  Left subclavian incision site mildly swollen, healing bruises.  No redness, warmth, or drainage.     Neurologic: mood and affect are appropriate, alert and oriented x3      General: WNL  Eyes: WNL  Ears/Nose/Throat: WNL  Lungs: WNL  Heart: Irregular Heartbeat  Stomach: WNL  Bladder: WNL  Muscle/Joints: Joint Pain, Muscle Pain  Skin: WNL  Nervous System: WNL  Mental Health: Anxiety     Blood: Easy Bruising     Device Check  See full device report from RN  Device: ICD  Presenting Rhythm: AF , frequent PVCs  : 2%  Histograms: Good rate distribution  AT/AF: Permanent atrial fibrillation  VT/VF: 2 NSVT (AF with RVR, not true VT)        Medical History  Surgical History Family History Social History   Past Medical History:   Diagnosis Date   ? Arrhythmia     atrial fibrillation   ? Atrial fibrillation    ? Cardiomyopathy    ? Chronic atrial fibrillation     2 cardioversions in 2014   ? Congestive heart failure    ? COPD (chronic obstructive pulmonary disease)     pt unsure, but recent told he may have this by MD and started on an inhaler   ? Essential hypertension with goal blood pressure less than 130/80    ? Hyperlipidemia    ? Kidney stones     per pt, has had in the past. none recently   ? Subdural hematoma 5/2/2015    Past Surgical History:   Procedure Laterality Date   ? BACK SURGERY      laminectomies x 3 per pt   ? BETY HOLE OF CRANIUM Bilateral 5/3/2015    Procedure: BETY HOLES;  Surgeon: Tyrone FIGUEREDO  MD Jorge;  Location: [facility] Main OR;  Service:    ? Kidney Stone Removal     ? PICC  5/3/2015        ? SC INSJ/RPLCMT PERM DFB W/TRNSVNS LDS 1/DUAL CHMBR Left 2/17/2017    Procedure: EP ICD Insert;  Surgeon: Ivan Tinajero MD;  Location: [facility] Cath Lab;  Service: Cardiology    Family History   Problem Relation Age of Onset   ? Coronary Stenting Mother    ? Coronary artery disease Mother    ? Heart failure Father    ? No Medical Problems Daughter    ? No Medical Problems Son     Social History     Social History   ? Marital status:      Spouse name: Mary   ? Number of children: 2   ? Years of education: N/A     Occupational History   ? [employer]  Retired     Social History Main Topics   ? Smoking status: Former Smoker     Packs/day: 1.00     Years: 45.00     Types: Cigarettes     Quit date: 4/23/2014   ? Smokeless tobacco: Never Used   ? Alcohol use Yes      Comment: occaionally - couple beers per week   ? Drug use: No   ? Sexual activity: Not on file     Other Topics Concern   ? Not on file     Social History Narrative          Medications  Allergies   Current Outpatient Prescriptions   Medication Sig Dispense Refill   ? acetaminophen (TYLENOL) 500 MG tablet Take 1 tablet (500 mg total) by mouth every 4 (four) hours as needed for pain or fever. 30 tablet 0   ? ALPRAZolam (XANAX) 0.5 MG tablet Take 0.5 mg by mouth.     ? clopidogrel (PLAVIX) 75 mg tablet Take 1 tablet (75 mg total) by mouth daily. 90 tablet 3   ? furosemide (LASIX) 40 MG tablet Take 0.5 tablet by mouth  daily 90 tablet 1   ? lisinopril (PRINIVIL,ZESTRIL) 20 MG tablet Take 1 tablet (20 mg total) by mouth 2 (two) times a day. 180 tablet 3   ? metoprolol succinate (TOPROL-XL) 50 MG 24 hr tablet Take 1.5 tablets (75 mg total) by mouth daily. 135 tablet 3   ? multivitamin with minerals tablet Take 1 tablet by mouth daily.     ? nitroglycerin (NITROSTAT) 0.4 MG SL tablet Place 1 tablet (0.4 mg total)  under the tongue every 5 (five) minutes as needed for chest pain. 25 tablet 2   ? spironolactone (ALDACTONE) 25 MG tablet Take 1 tablet (25 mg total) by mouth daily. 90 tablet 3   ? warfarin (COUMADIN) 5 MG tablet Take 1-1.5 tablets (5-7.5 mg total) by mouth daily. 5 mg three times a week on Tues, Thurs, and Sunday.  7.5 mg on the other four days. 110 tablet 3     No current facility-administered medications for this visit.       Allergies   Allergen Reactions   ? Atorvastatin Myalgia   ? Rosuvastatin Myalgia         Lab Results    Chemistry CBC BNP   Lab Results   Component Value Date    CREATININE 0.81 02/17/2017    BUN 24 (H) 02/17/2017     02/17/2017    K 4.5 02/17/2017     (H) 02/17/2017    CO2 18 (L) 02/17/2017     CREATININE (mg/dL)   Date Value   02/17/2017 0.81   01/11/2017 0.89   12/21/2016 1.27   11/18/2016 1.04    Lab Results   Component Value Date    WBC 7.1 02/17/2017    HGB 16.9 02/17/2017    HCT 50.6 02/17/2017    MCV 88 02/17/2017     02/17/2017    Lab Results   Component Value Date    BNP 1491 (H) 11/18/2016     BNP (pg/mL)   Date Value   11/18/2016 1491 (H)   11/01/2016 1412 (H)   09/18/2016 1151 (H)          25 minutes were spent with the patient with greater than 50% spent on education and counseling.      Ceci Camargo, [facility] Care   Heart Failure Clinic

## 2021-06-25 NOTE — PROGRESS NOTES
Progress Notes by Ceci Camargo CNP at 2/15/2017  9:10 AM     Author: Ceci Camargo CNP Service: -- Author Type: Nurse Practitioner    Filed: 2/15/2017  9:54 AM Encounter Date: 2/15/2017 Status: Signed    : Ceci Camargo CNP (Nurse Practitioner)           Click to link to [facility] HEART CARE NOTE      Assessment/Recommendations   Assessment:    1. Cardiomyopathy, heart failure with reduced ejection fraction: He continues to do well with no symptoms of acute fluid retention.  He continues to feel fatigued but denies any worsening.  He will have an ICD implanted by Dr. Tinajero and February 17.      2. Coronary artery disease: Status post drug-eluting stent to circumflex on November 1, 2016. He is no longer having chest pain.  Aspirin was discontinued in January.  He'll continue on Plavix until May 1 and then switch back to aspirin 81 mg.  He has been intolerant to rosuvastatin and atorvastatin due to myalgia.  He continues to have muscle and joint pain in his arms bilaterally after stopping Crestor 2 weeks ago.  He previously tolerated simvastatin and we will consider restarting once muscle aches resolve completely.    3.  Permanent atrial fibrillation: Heart rate well controlled.  INR check today.      Plan:  1.   Heart failure medications:  - Beta blocker therapy with metroprolol succinate 50 mg daily.  No further titration at this time due to bradycardia.  May consider titration after ICD.  - ACEI therapy with lisinopril 20 mg twice daily  - Aldosterone blocker therapy with spironolactone 25 mg daily.    - Diuretic therapy with furosemide 20 mg daily  2.  ICD on February 17    Pat will follow-up in the heart failure clinic following ICD implant.     History of Present Illness    Mr. Zach Garcia is a 66 y.o. male seen at [facility] [place] [facility] heart failure clinic today for continued follow-up.  He was hospitalized in November 2016 with chest pain  and shortness of breath.  He had drug-eluting stent to RCA.  Echocardiogram showed an ejection fraction of 15%, moderate mitral regurgitation, moderate tricuspid regurgitation, moderate biatrial enlargement.  Repeat echocardiogram on February 1, 2017 showed improvement of ejection fraction to 27% and mild to moderate mitral regurgitation.  His past medical history is also significant for permanent atrial fibrillation.     He continues to wear a Lifevest and will have an ICD implanted by Dr. Tinajero on February 17.  He continued to have muscle aches even on a decreased dose of Crestor.  He stopped taking Crestor 2 weeks ago and muscle aches are slowly improving.  He continues to feel fatigued and is not sleeping well at night due to muscle aches.  He denies any symptoms of acute heart failure.  He denies lightheadedness, shortness of breath, dyspnea on exertion, orthopnea, chest pain and lower extremity edema.      He is monitoring home weights which are stable around 219 pounds.  He is following a low sodium diet.  He continues to exercise 4-5 days a week and denies any symptoms.    ECHO 2/1/17:     Left ventricle ejection fraction is severely decreased. The calculated left ventricular ejection fraction is 27% with severe global hypokinesis.    Mild to moderate mitral regurgitation    Mild tricuspid regurgitation    When compared to the previous study dated 11/2/2016, there has been slight improvement in overall left ventricular performance. Degree of tricuspid regurgitation appears less.     Physical Examination Review of Systems   Vitals:    02/15/17 0916   BP: 108/68   Pulse: 70   Resp: 16   SpO2: 97%     Body mass index is 28.89 kg/(m^2).  Wt Readings from Last 3 Encounters:   02/15/17 222 lb (100.7 kg)   02/10/17 (!) 223 lb (101.2 kg)   02/09/17 220 lb (99.8 kg)       General Appearance:     Alert, cooperative and in no acute distress.   ENT/Mouth: membranes moist, no oral lesions or bleeding gums.      EYES:   no scleral icterus, normal conjunctivae   Chest/Lungs:   lungs are clear to auscultation, no rales or wheezing, respirations unlabored   Cardiovascular:    irregularly irregular. Normal first and second heart sounds with no murmurs, rubs, or gallops; the radial and posterior tibial pulses are intact, Jugular venous pressure normal, no edema bilateral lower extremities    Abdomen:  Soft, nontender, nondistended, bowel sounds present   Extremities: no cyanosis or clubbing   Skin: warm, dry.    Neurologic: mood and affect are appropriate, alert and oriented x3      General: WNL  Eyes: WNL  Ears/Nose/Throat: WNL  Lungs: WNL  Heart: Irregular Heartbeat  Stomach: WNL  Bladder: WNL  Muscle/Joints: Joint Pain, Muscle Pain  Skin: WNL  Nervous System: Daytime Sleepiness  Mental Health: Anxiety     Blood: Easy Bruising     Medical History  Surgical History Family History Social History   Past Medical History:   Diagnosis Date   ? Arrhythmia     atrial fibrillation   ? Atrial fibrillation    ? Cardiomyopathy    ? Chronic atrial fibrillation     2 cardioversions in 2014   ? Congestive heart failure    ? COPD (chronic obstructive pulmonary disease)     pt unsure, but recent told he may have this by MD and started on an inhaler   ? Essential hypertension with goal blood pressure less than 130/80    ? Hyperlipidemia    ? Kidney stones     per pt, has had in the past. none recently   ? Subdural hematoma 5/2/2015    Past Surgical History:   Procedure Laterality Date   ? BACK SURGERY      laminectomies x 3 per pt   ? BETY HOLE OF CRANIUM Bilateral 5/3/2015    Procedure: BETY HOLES;  Surgeon: Tyrone Patel MD;  Location: [facility];  Service:    ? Kidney Stone Removal     ? PICC  5/3/2015         Family History   Problem Relation Age of Onset   ? Coronary Stenting Mother    ? Coronary artery disease Mother    ? Heart failure Father    ? No Medical Problems Daughter    ? No Medical Problems Son     Social History      Social History   ? Marital status:      Spouse name: Mary   ? Number of children: 2   ? Years of education: N/A     Occupational History   ? [employer]  Retired     Social History Main Topics   ? Smoking status: Former Smoker     Packs/day: 1.00     Years: 45.00     Types: Cigarettes     Quit date: 4/23/2014   ? Smokeless tobacco: Never Used   ? Alcohol use Yes      Comment: occaionally - couple beers per week   ? Drug use: No   ? Sexual activity: Not on file     Other Topics Concern   ? Not on file     Social History Narrative          Medications  Allergies   Current Outpatient Prescriptions   Medication Sig Dispense Refill   ? acetaminophen (TYLENOL) 500 MG tablet Take 1 tablet (500 mg total) by mouth every 4 (four) hours as needed for pain or fever. 30 tablet 0   ? ALPRAZolam (XANAX) 0.5 MG tablet Take 0.5 mg by mouth.     ? clopidogrel (PLAVIX) 75 mg tablet Take 1 tablet (75 mg total) by mouth daily. 90 tablet 3   ? furosemide (LASIX) 40 MG tablet Take 0.5 tablets (20 mg total) by mouth daily. 90 tablet 1   ? lisinopril (PRINIVIL,ZESTRIL) 20 MG tablet Take 1 tablet (20 mg total) by mouth 2 (two) times a day. 180 tablet 3   ? metoprolol succinate (TOPROL-XL) 50 MG 24 hr tablet Take 1 tablet (50 mg total) by mouth daily. 90 tablet 3   ? multivitamin with minerals tablet Take 1 tablet by mouth daily.     ? nitroglycerin (NITROSTAT) 0.4 MG SL tablet Place 1 tablet (0.4 mg total) under the tongue every 5 (five) minutes as needed for chest pain. 25 tablet 2   ? spironolactone (ALDACTONE) 25 MG tablet Take 1 tablet (25 mg total) by mouth daily. 90 tablet 3   ? warfarin (COUMADIN) 5 MG tablet Take 1-1.5 tablets (5-7.5 mg total) by mouth daily. 5 mg three times a week on Tues, Thurs, and Sunday.  7.5 mg on the other four days. 110 tablet 3     No current facility-administered medications for this visit.       No Known Allergies      Lab Results    Chemistry CBC BNP   Lab Results    Component Value Date    CREATININE 0.89 01/11/2017    BUN 23 (H) 01/11/2017     01/11/2017    K 4.3 01/11/2017     (H) 01/11/2017    CO2 20 (L) 01/11/2017     CREATININE (mg/dL)   Date Value   01/11/2017 0.89   12/21/2016 1.27   11/18/2016 1.04   11/04/2016 1.09    Lab Results   Component Value Date    WBC 7.5 11/01/2016    HGB 15.1 11/02/2016    HCT 50.4 11/01/2016    MCV 89 11/01/2016     11/02/2016    Lab Results   Component Value Date    BNP 1491 (H) 11/18/2016     BNP (pg/mL)   Date Value   11/18/2016 1491 (H)   11/01/2016 1412 (H)   09/18/2016 1151 (H)          20 minutes were spent with the patient with greater than 50% spent on education and counseling.      Ceci Camargo, [facility]   Heart Failure Clinic

## 2021-06-25 NOTE — PROGRESS NOTES
Progress Notes by Ceci Camargo CNP at 3/29/2017  9:10 AM     Author: Ceci Camargo CNP Service: -- Author Type: Nurse Practitioner    Filed: 3/29/2017 10:06 AM Encounter Date: 3/29/2017 Status: Signed    : Ceci Camargo CNP (Nurse Practitioner)           Click to link to [facility] Heart Care     [facility] HEART CARE NOTE      Assessment/Recommendations   Assessment:    1. Nonischemic cardiomyopathy, heart failure with reduced ejection fraction: He is tolerating his heart failure medications with no side effects.  His weight is up 8 pounds but there is no evidence that this is fluid retention.  He has had an increased appetite.  He continues to watch sodium in his diet.    2.  Coronary artery disease: Status post drug-eluting stent to circumflex on November 1, 2016. Aspirin was discontinued in January. He'll continue on Plavix until May 1 and then switch back to aspirin 81 mg.  This is clarified with him again today.  He was previously believed that he was intolerant to atorvastatin and rosuvastatin due to muscle aches.  He continues to have a dull ache in his arms bilaterally since stopping statins 2 months ago.  His primary care provider believes this is related to nerve pain.  Pat would like to retry rosuvastatin.  He will contact the clinic if he has worsening muscle aches.      Plan:  1.   Heart failure medications:  - Beta blocker therapy with metroprolol succinate 75 mg daily  - ACEI therapy with lisinopril 20 mg twice daily  - Aldosterone blocker therapy with spironolactone 25 mg daily.   - Diuretic therapy with furosemide 20 mg daily  2.  Low-sodium diet and daily weights  3.  Retry rosuvastatin.  He will call if he develops worsening muscle aches.  4.  I recommended seeing his primary care provider again concerning aching in his arms and possible referral for physical therapy.    Zach Garcia will follow up with Dr. Martel on May 24 and in the heart failure  clinic in August.     History of Present Illness    Mr. Zach Garcia is a 67 y.o. male seen at [facility] heart failure clinic today for continued follow-up. He was hospitalized in November 2016 with chest pain and shortness of breath. He had drug-eluting stent to RCA. Echocardiogram showed an ejection fraction of 15%, moderate mitral regurgitation, moderate tricuspid regurgitation, moderate biatrial enlargement. Repeat echocardiogram on February 1, 2017 showed improvement of ejection fraction to 27% and mild to moderate mitral regurgitation.  He had an ICD inserted on February 17, 2017 by Dr. Tinajero. His past medical history is also significant for permanent atrial fibrillation.     His weight is up 8 pounds today.  He attributes this to increased appetite.  He denies any symptoms of acute heart failure.  His metoprolol was increased at his last appointment.  He has chronic fatigue but denies any worsening.  He continues to have problems with insomnia.  He saw his primary care provider 2 weeks ago about insomnia and anxiety and was started on clonazepam.  He continues to have a dull ache in his arms bilaterally.  This was initially thought to be related to statins but he has been off statins for 2 months.  His primary care provider believes this is related to nerve pain.  He denies lightheadedness, shortness of breath, dyspnea on exertion, orthopnea, chest pain, abdominal fullness/bloating and lower extremity edema.       Physical Examination Review of Systems   Vitals:    03/29/17 0906   BP: 131/79   Pulse: 68     Body mass index is 29.27 kg/(m^2).  Wt Readings from Last 3 Encounters:   03/29/17 (!) 228 lb (103.4 kg)   03/07/17 220 lb (99.8 kg)   03/02/17 219 lb (99.3 kg)       General Appearance:     Alert, cooperative and in no acute distress.   ENT/Mouth: membranes moist, no oral lesions or bleeding gums.      EYES:  no scleral icterus, normal conjunctivae   Chest/Lungs:   lungs are clear to  auscultation, no rales or wheezing, respirations unlabored   Cardiovascular:    irregularly irregular. Normal first and second heart sounds with no murmurs, rubs, or gallops; the radial and posterior tibial pulses are intact, Jugular venous pressure normal, no HJR, no edema bilateral lower extremities    Abdomen:  Soft, nontender, nondistended, bowel sounds present   Extremities: no cyanosis or clubbing   Skin: warm, dry.    Neurologic: mood and affect are appropriate, alert and oriented x3      General: WNL  Eyes: WNL  Ears/Nose/Throat: WNL  Lungs: WNL  Heart: Irregular Heartbeat  Stomach: WNL  Bladder: WNL  Muscle/Joints: Joint Pain  Skin: WNL  Nervous System: Daytime Sleepiness  Mental Health: Anxiety     Blood: WNL     Medical History  Surgical History Family History Social History   Past Medical History:   Diagnosis Date   ? Arrhythmia     atrial fibrillation   ? Atrial fibrillation    ? Cardiomyopathy    ? Chronic atrial fibrillation     2 cardioversions in 2014   ? Congestive heart failure    ? COPD (chronic obstructive pulmonary disease)     pt unsure, but recent told he may have this by MD and started on an inhaler   ? Essential hypertension with goal blood pressure less than 130/80    ? Hyperlipidemia    ? Kidney stones     per pt, has had in the past. none recently   ? Subdural hematoma 5/2/2015    Past Surgical History:   Procedure Laterality Date   ? BACK SURGERY      laminectomies x 3 per pt   ? BETY HOLE OF CRANIUM Bilateral 5/3/2015    Procedure: BETY HOLES;  Surgeon: Tyrone Patel MD;  Location: [facility] OR;  Service:    ? Kidney Stone Removal     ? PICC  5/3/2015        ? TX INSJ/RPLCMT PERM DFB W/TRNSVNS LDS 1/DUAL CHMBR Left 2/17/2017    Procedure: EP ICD Insert;  Surgeon: Ivan Tinajero MD;  Location: [facility];  Service: Cardiology    Family History   Problem Relation Age of Onset   ? Coronary Stenting Mother    ? Coronary artery disease Mother    ? Heart failure  Father    ? No Medical Problems Daughter    ? No Medical Problems Son     Social History     Social History   ? Marital status:      Spouse name: Mary   ? Number of children: 2   ? Years of education: N/A     Occupational History   ? [employer]  Retired     Social History Main Topics   ? Smoking status: Former Smoker     Packs/day: 1.00     Years: 45.00     Types: Cigarettes     Quit date: 4/23/2014   ? Smokeless tobacco: Never Used   ? Alcohol use Yes      Comment: occaionally - couple beers per week   ? Drug use: No   ? Sexual activity: Not on file     Other Topics Concern   ? Not on file     Social History Narrative          Medications  Allergies   Current Outpatient Prescriptions   Medication Sig Dispense Refill   ? acetaminophen (TYLENOL) 500 MG tablet Take 1 tablet (500 mg total) by mouth every 4 (four) hours as needed for pain or fever. 30 tablet 0   ? clonazePAM (KLONOPIN) 1 MG tablet Take 1 mg by mouth 2 (two) times a day.  0   ? clopidogrel (PLAVIX) 75 mg tablet Take 1 tablet (75 mg total) by mouth daily. 90 tablet 3   ? furosemide (LASIX) 40 MG tablet Take 0.5 tablet by mouth  daily 90 tablet 1   ? lisinopril (PRINIVIL,ZESTRIL) 20 MG tablet Take 1 tablet (20 mg total) by mouth 2 (two) times a day. 180 tablet 3   ? metoprolol succinate (TOPROL-XL) 50 MG 24 hr tablet Take 1.5 tablets (75 mg total) by mouth daily. 135 tablet 3   ? multivitamin with minerals tablet Take 1 tablet by mouth daily.     ? nitroglycerin (NITROSTAT) 0.4 MG SL tablet Place 1 tablet (0.4 mg total) under the tongue every 5 (five) minutes as needed for chest pain. 25 tablet 2   ? spironolactone (ALDACTONE) 25 MG tablet Take 1 tablet (25 mg total) by mouth daily. 90 tablet 3   ? warfarin (COUMADIN) 5 MG tablet Take 1-1.5 tablets (5-7.5 mg total) by mouth daily. 5 mg three times a week on Tues, Thurs, and Sunday.  7.5 mg on the other four days. 110 tablet 3   ? rosuvastatin (CRESTOR) 20 MG tablet  Take 1 tablet (20 mg total) by mouth at bedtime. 30 tablet 11     No current facility-administered medications for this visit.       Allergies   Allergen Reactions   ? Atorvastatin Myalgia         Lab Results    Chemistry CBC BNP   Lab Results   Component Value Date    CREATININE 0.81 02/17/2017    BUN 24 (H) 02/17/2017     02/17/2017    K 4.5 02/17/2017     (H) 02/17/2017    CO2 18 (L) 02/17/2017     Creatinine (mg/dL)   Date Value   02/17/2017 0.81   01/11/2017 0.89   12/21/2016 1.27   11/18/2016 1.04    Lab Results   Component Value Date    WBC 7.1 02/17/2017    HGB 16.9 02/17/2017    HCT 50.6 02/17/2017    MCV 88 02/17/2017     02/17/2017    Lab Results   Component Value Date    BNP 1491 (H) 11/18/2016     BNP (pg/mL)   Date Value   11/18/2016 1491 (H)   11/01/2016 1412 (H)   09/18/2016 1151 (H)          25 minutes were spent with the patient with greater than 50% spent on education and counseling.      Ceci Camargo, [facility]   Heart Failure Clinic

## 2021-06-25 NOTE — PROGRESS NOTES
Progress Notes by Ceci Camargo CNP at 1/11/2017  7:50 AM     Author: Ceci Camargo CNP Service: -- Author Type: Nurse Practitioner    Filed: 1/11/2017  8:38 AM Encounter Date: 1/11/2017 Status: Signed    : Ceci Camargo CNP (Nurse Practitioner)           Click to link to [facility] HEART CARE NOTE      Assessment/Recommendations   Assessment:    1. Cardiomyopathy, heart failure with reduced ejection fraction, NYHA class I:  He has no symptoms of acute heart failure area he denies any shortness of breath or chest pain.  He continues to wear a life vest.  He is on maximum dose of lisinopril and no further titration of metoprolol at this time due to bradycardia seen on Holter monitor.    2. Coronary artery disease: Status post drug-eluting stent to circumflex on November 1, 2016. He is no longer having chest pain. He continues to take aspirin and Plavix.  His atorvastatin was changed to Crestor due to muscle aches which have improved.    Plan:  1.   Heart failure medications:  - Beta blocker therapy with metroprolol succinate 50 mg daily.  No further titration at this time due to low heart rates on Holter  - ACEI therapy with lisinopril 20 mg twice daily  - Aldosterone blocker therapy with spironolactone 25 mg daily.    - Diuretic therapy with furosemide 20 mg daily  2.  BMP pending  3.  Echocardiogram in early February to reassess ejection fraction.  If ejection fraction remains low, will refer to EP for ICD.    Pat will follow up with Dr. Martel on January 18 and in the heart failure clinic in mid February.     History of Present Illness    Mr. Zach Garcia is a 66 y.o. male seen at [facility] heart failure clinic today for continued follow-up. He was hospitalized November 1 to November 4 with chest pain and shortness of breath. Coronary angiogram on November 1 showed severe proximal disease of nondominant RCA and he received one  drug-eluting stent. Echocardiogram showed an ejection fraction of 15%, moderate mitral regurgitation, moderate tricuspid regurgitation, moderate biatrial enlargement. His past medical history is also significant for permanent atrial fibrillation.     During the last clinic visit, his lisinopril was increased to 20 mg twice daily and Lasix decreased to 20 mg daily.  He denies any problems with these medications changes.  His Lipitor was also discontinued due to muscle aches and he was started on Crestor.  His muscle aches have improved, but he still has some mild ache in his biceps bilaterally.  Pain may also be related to light weightlifting.  He denies any symptoms of acute heart failure.  He denies lightheadedness, shortness of breath, dyspnea on exertion, orthopnea, chest pain and lower extremity edema.      He gained a few pounds over the past month related to the holidays.  His weight has now been stable around 219 pounds.  He is following a low sodium diet.  He participates in regular physical activity including cardiac rehab 2 days a week and going to the gym on his own a few days a week.  He continues to wear a LifeVest and denies any problems.       Physical Examination Review of Systems   Vitals:    01/11/17 0759   BP: 136/80   Pulse: 78   Resp: 16     Body mass index is 29.03 kg/(m^2).  Wt Readings from Last 3 Encounters:   01/11/17 220 lb (99.8 kg)   01/10/17 219 lb (99.3 kg)   01/05/17 219 lb (99.3 kg)       General Appearance:     Alert, cooperative and in no acute distress.   ENT/Mouth: membranes moist, no oral lesions or bleeding gums.      EYES:  no scleral icterus, normal conjunctivae   Neck: no carotid bruits or thyromegaly   Chest/Lungs:   lungs are clear to auscultation, no rales or wheezing, respirations unlabored   Cardiovascular:    irregularly irregular. Normal first and second heart sounds with no murmurs, rubs, or gallops; the radial and posterior tibial pulses are intact, Jugular venous  pressure normal, no edema bilateral lower extremities    Abdomen:  Soft, nontender, nondistended, bowel sounds present   Extremities: no cyanosis or clubbing   Skin: warm, dry.    Neurologic: mood and affect are appropriate, alert and oriented x3      General: WNL  Eyes: WNL  Ears/Nose/Throat: WNL  Lungs: WNL  Heart: Irregular Heartbeat  Stomach: WNL  Bladder: WNL  Muscle/Joints: WNL  Skin: WNL  Nervous System: WNL  Mental Health: Anxiety     Blood: Easy Bruising     Medical History  Surgical History Family History Social History   Past Medical History   Diagnosis Date   ? Arrhythmia      atrial fibrillation   ? Atrial fibrillation    ? Cardiomyopathy    ? Chronic atrial fibrillation      2 cardioversions in 2014   ? Congestive heart failure    ? COPD (chronic obstructive pulmonary disease)      pt unsure, but recent told he may have this by MD and started on an inhaler   ? Essential hypertension with goal blood pressure less than 130/80    ? Hyperlipidemia    ? Kidney stones      per pt, has had in the past. none recently   ? Subdural hematoma 5/2/2015    Past Surgical History   Procedure Laterality Date   ? Kidney stone removal     ? Picc  5/3/2015         ? [place] hole of cranium Bilateral 5/3/2015     Procedure: BETY HOLES;  Surgeon: Tyrone Patel MD;  Location: [facility] OR;  Service:    ? Back surgery       laminectomies x 3 per pt    Family History   Problem Relation Age of Onset   ? Coronary Stenting Mother    ? Coronary artery disease Mother    ? Heart failure Father    ? No Medical Problems Daughter    ? No Medical Problems Son     Social History     Social History   ? Marital status:      Spouse name: Mary   ? Number of children: 2   ? Years of education: N/A     Occupational History   ? [employer]  Retired     Social History Main Topics   ? Smoking status: Former Smoker     Packs/day: 1.00     Years: 45.00     Types: Cigarettes     Quit date: 4/23/2014   ?  Smokeless tobacco: Never Used   ? Alcohol use Yes      Comment: occaionally - couple beers per week   ? Drug use: No   ? Sexual activity: Not on file     Other Topics Concern   ? Not on file     Social History Narrative          Medications  Allergies   Current Outpatient Prescriptions   Medication Sig Dispense Refill   ? acetaminophen (TYLENOL) 500 MG tablet Take 1 tablet (500 mg total) by mouth every 4 (four) hours as needed for pain or fever. 30 tablet 0   ? albuterol (PROVENTIL) 2.5 mg /3 mL (0.083 %) nebulizer solution Take 2.5 mg by nebulization every 6 (six) hours as needed for wheezing.     ? ALPRAZolam (XANAX) 0.5 MG tablet Take 0.5 mg by mouth.     ? aspirin 81 MG EC tablet Take 1 tablet (81 mg total) by mouth daily. 30 tablet 0   ? clopidogrel (PLAVIX) 75 mg tablet Take 1 tablet (75 mg total) by mouth daily. 90 tablet 3   ? furosemide (LASIX) 40 MG tablet Take 0.5 tablets (20 mg total) by mouth daily. 90 tablet 1   ? lisinopril (PRINIVIL,ZESTRIL) 20 MG tablet Take 1 tablet (20 mg total) by mouth 2 (two) times a day. 180 tablet 3   ? metoprolol succinate (TOPROL-XL) 50 MG 24 hr tablet Take 1 tablet (50 mg total) by mouth daily. 90 tablet 3   ? multivitamin with minerals tablet Take 1 tablet by mouth daily.     ? nitroglycerin (NITROSTAT) 0.4 MG SL tablet Place 1 tablet (0.4 mg total) under the tongue every 5 (five) minutes as needed for chest pain. 25 tablet 2   ? rosuvastatin (CRESTOR) 20 MG tablet Take 1 tablet (20 mg total) by mouth bedtime. 90 tablet 3   ? spironolactone (ALDACTONE) 25 MG tablet Take 1 tablet (25 mg total) by mouth daily. 90 tablet 3   ? warfarin (COUMADIN) 5 MG tablet Take 1-1.5 tablets (5-7.5 mg total) by mouth daily. 5 mg three times a week on Tues, Thurs, and Sunday.  7.5 mg on the other four days. 110 tablet 3   ? albuterol (PROVENTIL HFA;VENTOLIN HFA) 90 mcg/actuation inhaler Inhale 2 puffs every 6 (six) hours as needed for wheezing or shortness of breath.       No current  facility-administered medications for this visit.       No Known Allergies      Lab Results    Chemistry CBC BNP   Lab Results   Component Value Date    CREATININE 1.27 12/21/2016    BUN 36 (H) 12/21/2016     12/21/2016    K 4.8 12/21/2016     12/21/2016    CO2 24 12/21/2016     CREATININE (mg/dL)   Date Value   12/21/2016 1.27   11/18/2016 1.04   11/04/2016 1.09   11/02/2016 0.98    Lab Results   Component Value Date    WBC 7.5 11/01/2016    HGB 15.1 11/02/2016    HCT 50.4 11/01/2016    MCV 89 11/01/2016     11/02/2016    Lab Results   Component Value Date    BNP 1491 (H) 11/18/2016     BNP (pg/mL)   Date Value   11/18/2016 1491 (H)   11/01/2016 1412 (H)   09/18/2016 1151 (H)          20 minutes were spent with the patient with greater than 50% spent on education and counseling.      Ceci Camargo, [facility]   Heart Failure Clinic

## 2021-06-25 NOTE — TELEPHONE ENCOUNTER
Lab Results   Component Value Date    INR 1.50 (!) 06/09/2021    INR 5.20 (!) 06/04/2021    INR 2.80 (!) 05/20/2021         Next INR advised: 6/18    Current warfarin dose per anticoagulation flowsheet:   Outpatient Anticoagulation Plan Latest Ref Rng & Units 6/9/2021   ANTICOAG FULL INSTRUCTIONS  6/9: 5 mg; Otherwise 5 mg every Tue, Thu, Sat; 2.5 mg all other days       Last health maintenance OV/physical exam: 9/9/20    Patient is up to date.  Warfarin refilled per protocol.

## 2021-06-26 NOTE — TELEPHONE ENCOUNTER
"ANTICOAGULATION  MANAGEMENT    Zach Garcia is on warfarin and had a point of care INR result > 5.5 or an \"error message\" on point of care meter today.    Has not taken his warfarin dose yet.  (takes in evenings)    Morning INR; STAT venous INR processing requested from lab    Spoke with Pat via phone while at the lab and reviewed triage questions for potential signs and symptoms of bleeding.     Patient Response     Have you had any bleeding in the last week?   No   Have you passed any red, black, or tarry stools in last week?   No   Have you vomited/spit up any red or coffee ground material in last week?   No   Have you had any new, severe abdominal pain or bloating develop in last week?   No   Have you fallen or had any injuries in last week?   No   Do you currently have a severe, sudden onset headache?   No   Do you currently have any severe sudden changes in your vision?   No   Do you currently have any new onset numbness, weakness/paralysis?   No     Assessment/Plan:     Zach's responses were negative for signs and symptoms of bleeding; may discharge from clinic    Zach instructed to:       Hold warfarin today until venous INR result returns and receives follow up call from [facility]    Follow safety precautions to avoid any serious injuries.    Seek medical attention for new signs and symptoms of bleeding or a fall with injury.    Verbalized understanding and agreed with plan.          "

## 2021-06-26 NOTE — TELEPHONE ENCOUNTER
ANTICOAGULATION  MANAGEMENT    Assessment     Today's INR result of 2.6 is Therapeutic (goal INR of 2.0-3.0)        Warfarin taken as previously instructed    No new diet changes affecting INR    No new medication/supplements affecting INR    Continues to tolerate warfarin with no reported s/s of bleeding or thromboembolism     Previous INR was Subtherapeutic    Plan:     Spoke on phone with Pat regarding INR result and instructed:      Warfarin Dosing Instructions:  Continue current warfarin dose    2.5 mg every Mon, Thu, Sat; 5 mg all other days        (0 % change)    Instructed patient to follow up no later than: 1-2 weeks, patient prefers to call to make appt.    Education provided: importance of therapeutic range, target INR goal and significance of current INR result, importance of following up for INR monitoring at instructed interval and importance of taking warfarin as instructed    Pat verbalizes understanding and agrees to warfarin dosing plan.    Instructed to call the [facility] for any changes, questions or concerns. (#[phone])   ?   Terri Cornell RN    Subjective/Objective:      Zach Garcia, a 71 y.o. male is on warfarin. Pat      Pat reports:     Home warfarin dose: verbally confirmed home dose with Pat and updated on anticoagulation calendar     Missed doses: No     Medication changes:  No     S/S of bleeding or thromboembolism:  No     New Injury or illness:  No     Changes in diet or alcohol consumption:  No     Upcoming surgery, procedure or cardioversion:  No    Anticoagulation Episode Summary     Current INR goal:  2.0-3.0   TTR:  42.6 % (1 y)   Next INR check:  7/1/2021   INR from last check:  2.60 (6/17/2021)   Weekly max warfarin dose:     Target end date:  Indefinite   INR check location:     Preferred lab:     Send INR reminders to:  [facility] HEART CARE    Indications    Chronic atrial fibrillation (H) [I48.20]           Comments:           Anticoagulation Care  Providers     Provider Role Specialty Phone number    Teagan Martel MD  Cardiology [phone]

## 2021-06-26 NOTE — TELEPHONE ENCOUNTER
INR result is   - Critical value 5.7  POC INR   Date Value Ref Range Status   06/17/2021 2.60 (!) 0.90 - 1.10 Final

## 2021-06-26 NOTE — TELEPHONE ENCOUNTER
ACN called and updated patient that venous result is still not available- he stated that he is leaving in an hour and stated that he does not have a cellphone or any contact information once he leaves for his trip.    He will be leaving out of town and will not be back until Tues 6/29.  Made patient aware that ACN will call him back once venous result is available. He will be leaving in an hour.

## 2021-06-28 NOTE — PROGRESS NOTES
Progress Notes by Lainey Torres, RN at 3/11/2020  1:49 PM     Author: Lainey Torres RN Service: -- Author Type: Registered Nurse    Filed: 3/11/2020  1:54 PM Encounter Date: 3/11/2020 Status: Signed    : Lainey Torres, RN (Registered Nurse)

## 2021-06-29 ENCOUNTER — COMMUNICATION - HEALTHEAST (OUTPATIENT)
Dept: ANTICOAGULATION | Facility: CLINIC | Age: 71
End: 2021-06-29

## 2021-06-29 ENCOUNTER — AMBULATORY - HEALTHEAST (OUTPATIENT)
Dept: CARDIOLOGY | Facility: CLINIC | Age: 71
End: 2021-06-29

## 2021-06-29 DIAGNOSIS — I48.20 CHRONIC ATRIAL FIBRILLATION (H): ICD-10-CM

## 2021-06-29 LAB — POC INR - HE - HISTORICAL: 3.5 (ref 0.9–1.1)

## 2021-06-29 NOTE — PROGRESS NOTES
Progress Notes by Teagan Martel MD at 9/9/2020  9:50 AM     Author: Teagan Martel MD Service: -- Author Type: Physician    Filed: 9/9/2020 10:31 AM Encounter Date: 9/9/2020 Status: Signed    : Teagan Martel MD (Physician)           [facility]  Heart Care Clinic Follow-up Note    Assessment & Plan        1. Chronic atrial fibrillation (H) -asymptomatic not valvular and on chronic anticoagulation given advanced CHADS 2 VASC score.  I will check echo and if significant left ventricular dysfunction uptitrate beta-blocker.   2. Coronary artery disease due to calcified coronary lesion -angiography November 1, 2016 showed normal left main and mid left anterior descending 30% lesion, normal circumflex and right coronary artery with a proximal 99% lesion which received a drug-coated stent. He was on Plavix for 6 months, which was discontinued and back on aspirin and Coumadin therapy. This was a drug-coated stent.  No symptoms and work on prevention.  Given that he had 8 seconds of possible ventricular fibrillation with appropriate ATP pacing, will perform repeat pharmacological nuclear stress test.  If medium or large sized area of ischemia repeat angiography.   3. Dyslipidemia -good cholesterol 158 with an LDL of 69 from May 2019.  Recheck in a year.   4. Essential hypertension with goal blood pressure less than 130/80 -under good control currently.   5. ICD (implantable cardioverter-defibrillator), single, in situ -[employer] device with [employer] lead placed in 2017, approximately 1% ventricular pacing, however 8 seconds apparently of ventricular fibrillation July 22 of this year.  He was asymptomatic and apparently was ATP pacing.  I will have EP review the strips, very hard to say with single-lead device whether this was truly V. fib or AF.  In any event, echocardiogram, stress test, blood work today.   6. Chronic systolic heart failure (H) -no significant signs or symptoms  currently.     Plan  1.  Proceed with neurological evaluation is doing given neuropathy.  2.  Weight loss.  3.  Check echo and address of cardiomyopathy.  4.  Check pharmacological stress nuclear, if significant ischemia angiography.  5.  Check lab work, correct if abnormal.  6.  Follow-up with me in 1 year or sooner if needed.    Subjective  CC: 70-year-old white gentleman here for yearly follow-up today.  Since I seen him he had colonoscopy which just repeat every 3 years due to adenomatous polyps.  In addition, he is seeing neurosurgery again as well as neurology given significant neuropathy following his recent cervical surgery.  Heart wise, no syncope, dizziness, chest discomfort, palpitations, PND, orthopnea or peripheral edema.  He does admit to shortness of breath and heavy activity given his weight.  Due to this, stress testing as above.    Medications  Current Outpatient Medications   Medication Sig Note   ? acetaminophen (TYLENOL) 500 MG tablet Take 1 tablet (500 mg total) by mouth every 4 (four) hours.    ? aspirin 81 MG EC tablet Take 1 tablet (81 mg total) by mouth daily.    ? furosemide (LASIX) 40 MG tablet TAKE ONE-HALF TABLET BY  MOUTH DAILY 1/3/2020: Takes in the evening   ? gabapentin (NEURONTIN) 300 MG capsule Take 600 mg by mouth at bedtime.    ? lisinopril (PRINIVIL,ZESTRIL) 20 MG tablet TAKE 1 TABLET BY MOUTH  DAILY    ? metoprolol succinate (TOPROL-XL) 100 MG 24 hr tablet TAKE 1 AND 1/2 TABLETS BY  MOUTH DAILY (Patient taking differently: Take 200 mg by mouth at bedtime. )    ? multivitamin with minerals tablet Take 1 tablet by mouth daily.    ? rosuvastatin (CRESTOR) 20 MG tablet TAKE 1 TABLET BY MOUTH AT  BEDTIME    ? spironolactone (ALDACTONE) 25 MG tablet TAKE ONE-HALF TABLET BY  MOUTH DAILY    ? warfarin ANTICOAGULANT (COUMADIN/JANTOVEN) 5 MG tablet Take 0.5-1 tablets (2.5-5 mg total) by mouth daily. Adjust dose per INR results as instructed.    ? zolpidem (AMBIEN) 5 MG tablet Take 5-10  "mg by mouth at bedtime as needed.         Objective  /70 (Patient Site: Left Arm, Patient Position: Sitting, Cuff Size: Adult Regular)   Pulse 72   Resp 16   Ht 5' 10.25\" (1.784 m)   Wt (!) 249 lb (112.9 kg) Comment: With shoes.  SpO2 96%   BMI 35.47 kg/m      General Appearance:    Alert, cooperative, no distress, appears stated age   Head:    Normocephalic, without obvious abnormality, atraumatic   Throat:   Lips, mucosa, and tongue normal; teeth and gums normal   Neck:   Supple, symmetrical, trachea midline, no adenopathy;        thyroid:  No enlargement/tenderness/nodules; no carotid    bruit or JVD   Back:     Symmetric, no curvature, ROM normal, no CVA tenderness   Lungs:     Clear to auscultation bilaterally, respirations unlabored   Chest wall:    No tenderness, left-sided ICD noted   Heart:    Regular rate and rhythm, S1 and S2 normal, no murmur, rub   or gallop   Abdomen:     Soft, non-tender, bowel sounds active all four quadrants,     no masses, no organomegaly   Extremities:   Normal, atraumatic, no cyanosis or edema   Pulses:   2+ and symmetric all extremities   Skin:   Skin color, texture, turgor normal, no rashes or lesions     Results    Lab Results personally reviewed   Lab Results   Component Value Date    CHOL 158 05/22/2019    CHOL 144 02/27/2018     Lab Results   Component Value Date    HDL 46 05/22/2019    HDL 39 (L) 02/27/2018     Lab Results   Component Value Date    LDLCALC 69 05/22/2019    LDLCALC 67 02/27/2018     Lab Results   Component Value Date    TRIG 215 (H) 05/22/2019    TRIG 189 (H) 02/27/2018     Lab Results   Component Value Date    WBC 6.3 05/22/2019    HGB 15.3 05/22/2019    HCT 45.8 05/22/2019     05/22/2019     Lab Results   Component Value Date    CREATININE 1.09 12/18/2019    BUN 20 12/18/2019     (L) 12/18/2019    K 4.6 12/18/2019    CO2 25 12/18/2019     Review of Systems:   General: WNL  Eyes: WNL  Ears/Nose/Throat: WNL  Lungs: WNL  Heart: " WNL  Stomach: WNL  Bladder: WNL  Muscle/Joints: Joint Pain, Muscle Weakness, Muscle Pain  Skin: WNL  Nervous System: Loss of Balance  Mental Health: Anxiety     Blood: WNL

## 2021-06-29 NOTE — PROGRESS NOTES
Progress Notes by Ivan Tinajero MD at 9/16/2020  9:30 AM     Author: Ivan Tinajero MD Service: -- Author Type: Physician    Filed: 9/16/2020 10:21 AM Encounter Date: 9/16/2020 Status: Signed    : Ivan Tinajero MD (Physician)                ELECTROPHYSIOLOGY NOTE    Today I had the opportunity to see  Zach Garcia for follow-up evaluation of cardiomyopathy and paroxysmal ventricular tachycardia.      Assessment/Recommendations   Clinic Problem List:  1. Cardiomyopathy, unspecified type (H)     2. Cardiomyopathy (H)  metoprolol succinate (TOPROL-XL) 200 MG 24 hr tablet   3. Paroxysmal ventricular tachycardia (H)     4. Coronary artery disease due to calcified coronary lesion     5. Essential hypertension with goal blood pressure less than 130/80         Assessment:    Paroxysmal ventricular tachycardia, asymptomatic with a single episode treated with antitachycardia pacing  Chronic atrial fibrillation with ventricular response which is fairly well controlled  Coronary artery disease asymptomatic right coronary artery stent  Cardiomyopathy no overt evidence for heart failure    Plan:  Your defibrillator was adjusted to better discriminate between atrial fibrillation and ventricular tachycardia  Continue current medications  Perscription for metoprolol succinate 200 mg daily was sent  Cardiac echo and stress nuclear imaging as ordered by Dr. Martel  Follow up appointment:   Dr. Martel as scheduled, Dr. Tinajero in 6 months in device clinic       History of Present Illness     Zach Garcia is a 70 y.o. male with an long history of a cardiomyopathy and chronic atrial fibrillation.  He had a single-chamber ICD implanted for primary prophylaxis in February 2017.  Patient did have coronary angiography in November 2016 and had a drug-eluting stent placed to the right coronary artery.   He has been doing reasonably well but remote device interrogation indicated that he had ventricular tachycardia  "treated with antitachycardia pacing July 22, 2020.  Patient had no symptoms at that time.  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  He is being followed by a neurologist.  He denies exertional chest pain or dyspnea on exertion.  There is no history of palpitations syncope presyncope or shocks from his defibrillator.    Personally reviewed.  ICD interrogation showed atrial fibrillation with controlled ventricular response on July 22 followed by rapid monomorphic ventricular tachycardia rate 205 bpm will be treated with a single burst of antitachycardia pacing.  Shock was deferred.  There have been no further episodes.  There were several episodes of \"nonsustained ventricular tachycardia\" in June related to atrial fibrillation with rapid ventricular response transiently rates over 200 bpm.  No ventricular tachycardia was seen therapies delivered.  RV pacing and sensing thresholds are excellent he seldom paces.  Overall ventricular response during atrial fibrillation is well controlled.  Estimated battery longevity is 13 years.  ICD was reprogrammed to a 3 zone device to allow better discrimination with morphology to with heart rates between 180 and 210 bpm.  Aggressive antitachycardia pacing was programmed in that zone.  Results from 9 9 showed potassium 4.2 magnesium 1.8.  INR was elevated at 4.4.     Physical Examination Review of Systems   Vitals:    09/16/20 0910   BP: 122/72   Pulse: 71   Resp: 16   SpO2: 95%     Body mass index is 35.33 kg/m .  Wt Readings from Last 3 Encounters:   09/16/20 (!) 248 lb (112.5 kg)   09/09/20 (!) 249 lb (112.9 kg)   03/06/20 (!) 242 lb (109.8 kg)        Appearance:   no distress,   HEENT:   no scleral icterus, normal conjunctivae    Neck: no carotid bruits or thyromegaly   Chest/Lungs:   lungs are clear to auscultation, no rales or wheezing, ICD left subclavian pocket   Cardiovascular:   Jugular venous pressure 5 " cm, Apical pulse is regular at 84 beats per. Normal S1,S2 with no murmurs or gallops,   Abdomen:  no  Hepatosplenomegaly., nontender,  bowel sounds are present   Extremities: no cyanosis or clubbing, No edema   Skin: no xanthelasma, warm.    Neurologic: No gross focal neurologic deficits   Mood/Affect: Alert, cooperative    General: WNL  Eyes: WNL  Ears/Nose/Throat: WNL  Lungs: WNL  Heart: WNL  Stomach: WNL  Bladder: WNL  Muscle/Joints: WNL  Skin: WNL  Nervous System: WNL  Mental Health: WNL     Blood: WNL     Medical History  Surgical History Family History Social History   Past Medical History:   Diagnosis Date   ? Ankle fracture     wore boot- right    ? Anxiety    ? Arrhythmia     atrial fibrillation   ? Atrial fibrillation (H)    ? Cardiomyopathy (H)    ? Cervical stenosis of spine    ? Chronic atrial fibrillation (H)     2 cardioversions in 2014   ? Chronic kidney disease    ? Congestive heart failure (H)     systolic heart failure   ? COPD (chronic obstructive pulmonary disease) (H)     pt unsure, but recent told he may have this by MD and started on an inhaler   ? Coronary artery disease    ? Essential hypertension with goal blood pressure less than 130/80    ? Hyperlipidemia    ? ICD (implantable cardioverter-defibrillator) in place 02/2017    [facility]   ? Insomnia    ? Kidney stones     per pt, has had in the past. none recently   ? Lung nodule    ? Pulmonary hypertension (H)    ? Shortness of breath     resolved per pt. report   ? Subdural hematoma (H) 05/02/2015    due to trauma    Past Surgical History:   Procedure Laterality Date   ? BACK SURGERY      laminectomies x 3 per pt   ? BETY HOLE OF CRANIUM Bilateral 5/3/2015    Procedure: BETY HOLES;  Surgeon: Tyrone Patel MD;  Location: [facility];  Service:    ? CARDIAC SURGERY     ? CARDIOVERSION     ? COLONOSCOPY N/A 1/3/2020    Procedure: COLONOSCOPY with Polypectomies;  Surgeon: Yinka Root MD;  Location: [facility];   Service: Gastroenterology   ? CORONARY STENT PLACEMENT  2016   ? CYSTOSCOPY N/A 2018    Procedure: CYSTOSCOPY; GODFREY CATHETER PLACEMENT;  Surgeon: Cheko Encarnacion MD;  Location: [facility];  Service:    ? Kidney Stone Removal Left 1995    stent   ? LAMINOPLASTY Bilateral 2018    Procedure: CERVICAL LAMINOPLASTY CERVICAL 3-4-5-6-7 CUT LEFT HINGE RIGHT FORAMINOTOMIES CERVICAL 4-5 AND CERVICAL 5-6 AND CERVICAL 6-7 LEFT;  Surgeon: Tyrone Patel MD;  Location: [facility] OR;  Service:    ? PICC  5/3/2015        ? [place] INSJ/RPLCMT PERM DFB W/TRNSVNS LDS 1/DUAL CHMBR Left 2017    Procedure: EP ICD Insert;  Surgeon: Ivan Tinajero MD;  Location: [facility];  Service: Cardiology    Family History   Problem Relation Age of Onset   ? Coronary Stenting Mother    ? Coronary artery disease Mother    ? Heart failure Father    ? No Medical Problems Daughter    ? No Medical Problems Son     Social History     Socioeconomic History   ? Marital status:      Spouse name: Mary   ? Number of children: 2   ? Years of education: Not on file   ? Highest education level: Not on file   Occupational History   ? Occupation: [employer]      Employer: RETIRED   Social Needs   ? Financial resource strain: Not on file   ? Food insecurity     Worry: Not on file     Inability: Not on file   ? Transportation needs     Medical: Not on file     Non-medical: Not on file   Tobacco Use   ? Smoking status: Former Smoker     Packs/day: 1.00     Years: 45.00     Pack years: 45.00     Types: Cigarettes     Last attempt to quit: 2014     Years since quittin.4   ? Smokeless tobacco: Never Used   Substance and Sexual Activity   ? Alcohol use: Yes     Comment: occasionally, socially   ? Drug use: No   ? Sexual activity: Not on file   Lifestyle   ? Physical activity     Days per week: Not on file     Minutes per session: Not on file   ? Stress: Not on file   Relationships    ? Social connections     Talks on phone: Not on file     Gets together: Not on file     Attends Denominational service: Not on file     Active member of club or organization: Not on file     Attends meetings of clubs or organizations: Not on file     Relationship status: Not on file   ? Intimate partner violence     Fear of current or ex partner: Not on file     Emotionally abused: Not on file     Physically abused: Not on file     Forced sexual activity: Not on file   Other Topics Concern   ? Not on file   Social History Narrative   ? Not on file          Medications  Allergies   Current Outpatient Medications   Medication Sig Dispense Refill   ? acetaminophen (TYLENOL) 500 MG tablet Take 1 tablet (500 mg total) by mouth every 4 (four) hours.  0   ? aspirin 81 MG EC tablet Take 1 tablet (81 mg total) by mouth daily.  0   ? furosemide (LASIX) 20 MG tablet Take 1 tablet (20 mg total) by mouth 2 (two) times a day. 90 tablet 3   ? gabapentin (NEURONTIN) 300 MG capsule Take 600 mg by mouth at bedtime.     ? lisinopriL (PRINIVIL,ZESTRIL) 20 MG tablet TAKE 1 TABLET BY MOUTH  DAILY 90 tablet 3   ? metoprolol succinate (TOPROL-XL) 200 MG 24 hr tablet Take 1 tablet (200 mg total) by mouth daily. 90 tablet 3   ? multivitamin with minerals tablet Take 1 tablet by mouth daily.     ? rosuvastatin (CRESTOR) 20 MG tablet TAKE 1 TABLET BY MOUTH AT  BEDTIME 90 tablet 3   ? spironolactone (ALDACTONE) 25 MG tablet TAKE ONE-HALF TABLET BY  MOUTH DAILY 45 tablet 1   ? warfarin ANTICOAGULANT (COUMADIN/JANTOVEN) 5 MG tablet Take 0.5-1 tablets (2.5-5 mg total) by mouth daily. Adjust dose per INR results as instructed. 120 tablet 0   ? zolpidem (AMBIEN) 5 MG tablet Take 5-10 mg by mouth at bedtime as needed.   1     No current facility-administered medications for this visit.       Allergies   Allergen Reactions   ? Atorvastatin Myalgia   ? Iodine Other (See Comments)     Age 4- topically , blistered

## 2021-07-03 NOTE — ADDENDUM NOTE
Addendum Note by Oseas Cody CMA at 9/9/2020  9:50 AM     Author: Oseas Cody CMA Service: -- Author Type: Certified Medical Assistant    Filed: 9/9/2020 10:32 AM Encounter Date: 9/9/2020 Status: Signed    : Oseas Cody CMA (Certified Medical Assistant)    Addended by: OSEAS CODY on: 9/9/2020 10:32 AM        Modules accepted: Orders

## 2021-07-04 NOTE — TELEPHONE ENCOUNTER
Telephone Encounter by Terri Cornell RN at 6/24/2021  9:33 AM     Author: Terri Cornell RN Service: -- Author Type: Registered Nurse    Filed: 6/24/2021 10:19 AM Encounter Date: 6/24/2021 Status: Addendum    : Terri Cornell RN (Registered Nurse)    Related Notes: Original Note by Terri Cornell RN (Registered Nurse) filed at 6/24/2021  9:41 AM       ANTICOAGULATION MANAGEMENT     Zach Garcia 71 y.o., male is on warfarin with Supratherapeutic INR result (goal range 2.0-3.0)    Recent labs: (last 7 days)     06/24/21  0821   INR 5.70*   Venous result 4.39    ASSESSMENT     Source: Patient/Caregiver Call      Warfarin dosing taken: Warfarin taken as instructed    Diet: Change in alcohol intake may be affecting INR. drank on Monday during grad party and is planning to have alcohol this weekend while out of town     Illness, Injury or hospitalization: No    Medication changes: None    Signs or symptoms of bleeding or clotting: No    Previous INR: therapeutic last visit; previously outside of goal range    Additional findings: None     PLAN     Recommended plan for ongoing change(s) affecting INR:     Dosing instructions:Hold warfarin dose today then 2.5 mg lower dose tomorrow Decrease your warfarin dose to 5 mg daily on Sun, Wed, Fri; and 2.5 mg daily rest of week (9% change)    Follow up no later than: 6/29     Telephone call with Pat who verbalizes understanding and agrees to plan    Lab visit scheduled    Education provided: potential interaction between warfarin and alcohol, importance of therapeutic range, target INR goal and significance of current INR result, importance of following up for INR monitoring at instructed interval, monitoring for bleeding signs and symptoms and when to seek medical attention/emergency care    Plan made per [facility] anticoagulation protocol    Terri Cornell  Anticoagulation Clinic   [phone]    Anticoagulation Episode Summary     Current INR goal:   2.0-3.0   TTR:  42.0 % (1 y)   Next INR check:  6/29/2021   INR from last check:  4.39 (6/24/2021)   Weekly max warfarin dose:     Target end date:  Indefinite   INR check location:     Preferred lab:     Send INR reminders to:  [facility]    Indications    Chronic atrial fibrillation (H) [I48.20]           Comments:           Anticoagulation Care Providers     Provider Role Specialty Phone number    Teagan Martel MD  Cardiology [phone]

## 2021-07-04 NOTE — TELEPHONE ENCOUNTER
Telephone Encounter by Terri Cornell RN at 6/4/2021 10:20 AM     Author: Terri Cornell RN Service: -- Author Type: Registered Nurse    Filed: 6/4/2021 10:50 AM Encounter Date: 6/4/2021 Status: Signed    : Terri Cornell RN (Registered Nurse)       ANTICOAGULATION  MANAGEMENT    Assessment     Today's INR result of 5.2 is Supratherapeutic (goal INR of 2.0-3.0)        Warfarin taken as previously instructed    Change in alcohol intake may be affecting INR    No new medication/supplements affecting INR    Continues to tolerate warfarin with no reported s/s of bleeding or thromboembolism     Previous INR was Therapeutic after 2 days hold for previous high INR due to alcohol intake    Plan:     Spoke on phone with Pat regarding INR result and instructed:      Warfarin Dosing Instructions:  hold warfarin doses today and tomorrow then change warfarin dose to    5 mg every Tue, Thu, Sat; 2.5 mg all other days      (17 % change)    Instructed patient to follow up no later than: 6/7, per patient he will not be able to come in until 6/8 due to he is arriving back home later in evening on Monday- appointment made for 6/8    Education provided: potential interaction between warfarin and alcohol, importance of therapeutic range, target INR goal and significance of current INR result, importance of following up for INR monitoring at instructed interval, importance of taking warfarin as instructed, monitoring for bleeding signs and symptoms and when to seek medical attention/emergency care    Pat verbalizes understanding and agrees to warfarin dosing plan.    Instructed to call the [facility] for any changes, questions or concerns. (#[phone])   ?   Terri Cornell RN    Subjective/Objective:      Zach STALLWORTH Radha, a 71 y.o. male is on warfarin. Pat      Pat reports:     Home warfarin dose: as updated on anticoagulation calendar per template     Missed doses: No     Medication changes:  No     S/S of  bleeding or thromboembolism:  No     New Injury or illness:  No     Changes in diet or alcohol consumption:  Yes had alcohol on Tues- per patient he drinks when he is with company- noted that INR was also high 5/18 due to alcohol interaction with warfarin, patient is agreeable to change warfarin dose at this time.     Upcoming surgery, procedure or cardioversion:  No    Anticoagulation Episode Summary     Current INR goal:  2.0-3.0   TTR:  44.1 % (1 y)   Next INR check:  6/7/2021   INR from last check:  5.20 (6/4/2021)   Weekly max warfarin dose:     Target end date:  Indefinite   INR check location:     Preferred lab:     Send INR reminders to:  [facility]    Indications    Chronic atrial fibrillation (H) [I48.20]           Comments:           Anticoagulation Care Providers     Provider Role Specialty Phone number    Teagan Martel MD  Cardiology [phone]

## 2021-07-04 NOTE — LETTER
Letter by Lisa Ernst RDCS at      Author: Lisa Ernst RDCS Service: -- Author Type: --    Filed:  Encounter Date: 5/25/2021 Status: (Other)         Zach Garcia  [address] [place] [address]      May 25, 2021      Dear Mr. Garcia,    RE: Remote Results    We are writing to you regarding your recent Remote ICD check from home. Your transmission was received successfully. Battery status is satisfactory at this time.     Your results are showing no significant changes.    Your next device appointment will be a clinic visit.  Please call in July to schedule.      To schedule or reschedule, please call [phone] and press 1.    NOTE: If you would like to do an extra transmission, please call [phone] and press 3 to speak to a nurse BEFORE transmitting. This ensures that the Device Clinic staff is aware of the reason you are sending a transmission, and can follow-up with you after it has been reviewed.    We will be checking your implanted device from home (remotely) every three months unless otherwise instructed. We will need to see you in the clinic at least once a year. You may need to be seen in the clinic sooner depending on the results of your check.    Please be aware:    The follow-up schedule is like a Physician prescription.    Your remote monitor is paired to your specific implanted device.      Sincerely,    [facility]

## 2021-07-04 NOTE — TELEPHONE ENCOUNTER
Telephone Encounter by Kailey Samaniego RN at 6/9/2021 12:30 PM     Author: Kailey Samaniego RN Service: -- Author Type: Registered Nurse    Filed: 6/9/2021 12:48 PM Encounter Date: 6/9/2021 Status: Signed    : Kailey Samaniego RN (Registered Nurse)       ANTICOAGULATION  MANAGEMENT    Assessment     Today's INR result of 1.5 is Subtherapeutic (goal INR of 2.0-3.0)        Less warfarin taken than instructed which may be affecting INR    No new diet changes affecting INR    No new medication/supplements affecting INR    Continues to tolerate warfarin with no reported s/s of bleeding or thromboembolism     Previous INR was Supratherapeutic likely d/t intermittent alcohol usage    Plan:     Spoke on phone with Pat regarding INR result and instructed:      Warfarin Dosing Instructions:  Change warfarin dose to    2.5 mg every Mon, Thu, Sat; 5 mg all other days         Instructed patient to follow up no later than: 1 week    Education provided: target INR goal and significance of current INR result, importance of taking warfarin as instructed, monitoring for bleeding signs and symptoms and monitoring for clotting signs and symptoms    Pat verbalizes understanding and agrees to warfarin dosing plan.    Instructed to call the [facility] for any changes, questions or concerns. (#[phone])   ?   Kailey Samaniego RN    Subjective/Objective:      Zach Garcia, a 71 y.o. male is on warfarin. Pat      Pat reports:     Home warfarin dose: template incorrect; verbally confirmed home dose with Pat and updated on anticoagulation calendar     Missed doses: No     Medication changes:  No     S/S of bleeding or thromboembolism:  No     New Injury or illness:  No     Changes in diet or alcohol consumption:  No     Upcoming surgery, procedure or cardioversion:  No    Anticoagulation Episode Summary     Current INR goal:  2.0-3.0   TTR:  43.6 % (1 y)   Next INR check:  6/18/2021   INR from last check:  1.50  (6/9/2021)   Weekly max warfarin dose:     Target end date:  Indefinite   INR check location:     Preferred lab:     Send INR reminders to:  [facility]    Indications    Chronic atrial fibrillation (H) [I48.20]           Comments:           Anticoagulation Care Providers     Provider Role Specialty Phone number    Teagan Martel MD  Cardiology [phone]

## 2021-07-07 ENCOUNTER — AMBULATORY - HEALTHEAST (OUTPATIENT)
Dept: CARDIOLOGY | Facility: CLINIC | Age: 71
End: 2021-07-07

## 2021-07-07 ENCOUNTER — COMMUNICATION - HEALTHEAST (OUTPATIENT)
Dept: ANTICOAGULATION | Facility: CLINIC | Age: 71
End: 2021-07-07

## 2021-07-07 DIAGNOSIS — I48.20 CHRONIC ATRIAL FIBRILLATION (H): ICD-10-CM

## 2021-07-07 DIAGNOSIS — I48.20 CHRONIC ATRIAL FIBRILLATION (H): Primary | ICD-10-CM

## 2021-07-07 LAB — POC INR - HE - HISTORICAL: 3 (ref 0.9–1.1)

## 2021-07-07 NOTE — TELEPHONE ENCOUNTER
Telephone Encounter by Terri Cornell RN at 6/29/2021 10:41 AM     Author: Terri Cornell RN Service: -- Author Type: Registered Nurse    Filed: 6/29/2021 11:06 AM Encounter Date: 6/29/2021 Status: Signed    : Terri Cornell RN (Registered Nurse)       ANTICOAGULATION MANAGEMENT     Zach Garcia 71 y.o., male is on warfarin with Supratherapeutic INR result (goal range 2.0-3.0)    Recent labs: (last 7 days)     06/29/21  0821   INR 3.50*       ASSESSMENT     Source: Template      Warfarin dosing taken: Warfarin recently held for Supratherapeutic INR which may be affecting INR    Diet: Change in alcohol intake may be affecting INR. had 2 beers Sunday     Illness, Injury or hospitalization: No    Medication changes: None    Signs or symptoms of bleeding or clotting: No    Previous INR: supratherapeutic    Additional findings: None     PLAN     Recommended plan for temporary change(s) affecting INR:     Dosing instructions: hold warfarin today then continue your current warfarin dose 5 mg on Sun,Wed, Fri; and 2.5 mg daily rest of week (0% change)    Follow up no later than: 7-10 days     Telephone call with Pat who verbalizes understanding and agrees to plan    Lab visit scheduled    Education provided: potential interaction between warfarin and alcohol, importance of therapeutic range, target INR goal and significance of current INR result, importance of following up for INR monitoring at instructed interval, importance of taking warfarin as instructed and monitoring for bleeding signs and symptoms    Plan made per [facility] anticoagulation protocol    Terri Cornell  Anticoagulation Clinic   [phone]    Anticoagulation Episode Summary     Current INR goal:  2.0-3.0   TTR:  42.0 % (1 y)   Next INR check:  7/9/2021   INR from last check:  3.50 (6/29/2021)   Weekly max warfarin dose:     Target end date:  Indefinite   INR check location:     Preferred lab:     Send INR reminders to:  [facility]    Indications    Chronic atrial fibrillation (H) [I48.20]           Comments:           Anticoagulation Care Providers     Provider Role Specialty Phone number    Teagan Martel MD  Cardiology [phone]

## 2021-07-07 NOTE — TELEPHONE ENCOUNTER
Telephone Encounter by Terri Cornell RN at 7/7/2021 10:22 AM     Author: Terri Cornell RN Service: -- Author Type: Registered Nurse    Filed: 7/7/2021 10:33 AM Encounter Date: 7/7/2021 Status: Signed    : Terri Cornell RN (Registered Nurse)       ANTICOAGULATION MANAGEMENT     Zach Garcia 71 y.o., male is on warfarin with Therapeutic INR result (goal range 2.0-3.0)    Recent labs: (last 7 days)     07/07/21  0846   INR 3.00*       ASSESSMENT     Source: Template      Warfarin dosing taken: Warfarin taken as instructed    Diet: No new diet changes affecting INR    Illness, Injury or hospitalization: No    Medication changes: None    Signs or symptoms of bleeding or clotting: No    Previous INR: supratherapeutic    Additional findings: None     PLAN     Recommended plan for no diet, medication or health factor changes affecting INR:     Dosing instructions: Continue your current warfarin dose 5 mg daily on Sun,Wed,Fri; and 2.5 mg daily rest of week (0% change)    Follow up no later than: 2 weeks     Telephone call with Pat who verbalizes understanding and agrees to plan    Lab visit scheduled    Education provided: potential interaction between warfarin and alcohol, importance of therapeutic range, target INR goal and significance of current INR result, importance of taking warfarin as instructed and monitoring for bleeding signs and symptoms    Plan made per [facility] anticoagulation protocol    Terri Cornell  Anticoagulation Clinic   [phone]    Anticoagulation Episode Summary     Current INR goal:  2.0-3.0   TTR:  42.0 % (1 y)   Next INR check:  7/21/2021   INR from last check:  3.00 (7/7/2021)   Weekly max warfarin dose:     Target end date:  Indefinite   INR check location:     Preferred lab:     [facility]    Indications    Chronic atrial fibrillation (H) [I48.20]           Comments:           Anticoagulation Care Providers     Provider Role  Specialty Phone number    Teagan Martel MD  Cardiology [phone]

## 2021-07-21 ENCOUNTER — ANTICOAGULATION THERAPY VISIT (OUTPATIENT)
Dept: ANTICOAGULATION | Facility: CLINIC | Age: 71
End: 2021-07-21

## 2021-07-21 ENCOUNTER — LAB (OUTPATIENT)
Dept: CARDIOLOGY | Facility: CLINIC | Age: 71
End: 2021-07-21
Payer: MEDICARE

## 2021-07-21 DIAGNOSIS — I48.20 CHRONIC ATRIAL FIBRILLATION (H): ICD-10-CM

## 2021-07-21 DIAGNOSIS — I48.20 CHRONIC ATRIAL FIBRILLATION (H): Primary | ICD-10-CM

## 2021-07-21 LAB — INR BLD: 2.7 (ref 2–3)

## 2021-07-21 PROCEDURE — 85610 PROTHROMBIN TIME: CPT

## 2021-07-21 PROCEDURE — 36416 COLLJ CAPILLARY BLOOD SPEC: CPT

## 2021-07-21 NOTE — PROGRESS NOTES
ANTICOAGULATION MANAGEMENT     Zach Garcia 71 year old male is on warfarin with therapeutic INR result. (Goal INR 2.0-3.0)    Recent labs: (last 7 days)     07/21/21  0809   INR 2.7       ASSESSMENT     Source(s): Chart Review and Template       Warfarin doses taken: Warfarin taken as instructed    Diet: No new diet changes identified    New illness, injury, or hospitalization: No    Medication/supplement changes: None noted    Signs or symptoms of bleeding or clotting: No    Previous INR: Therapeutic last visit; previously outside of goal range    Additional findings: None     PLAN     Recommended plan for no diet, medication or health factor changes affecting INR     Dosing Instructions: Continue your current warfarin dose with next INR in 4 weeks       Summary  As of 7/21/2021    Full warfarin instructions:  5 mg every Sun, Wed, Fri; 2.5 mg all other days   Next INR check:  8/18/2021             Detailed voice message left for Zach with dosing instructions and follow up date.     Contact [phone] to schedule and with any changes, questions or concerns.     Education provided: Importance of therapeutic range, Importance of following up for INR monitoring at instructed interval and Importance of taking warfarin as instructed    Plan made per [facility] anticoagulation protocol    Terri Cornell RN  Anticoagulation Clinic  7/21/2021    _______________________________________________________________________     Anticoagulation Episode Summary     Current INR goal:  2.0-3.0   TTR:  17.8 % (3.7 wk)   Target end date:  Indefinite   Send INR reminders to:  [facility] HEART [facility]    Indications    Chronic atrial fibrillation (H) [I48.20]           Comments:           Anticoagulation Care Providers     Provider Role Specialty Phone number    Aram Martel MD  Cardiovascular Disease [phone]

## 2021-07-26 DIAGNOSIS — I48.20 CHRONIC ATRIAL FIBRILLATION (H): Primary | ICD-10-CM

## 2021-07-26 RX ORDER — LISINOPRIL 20 MG/1
40 TABLET ORAL DAILY
Qty: 180 TABLET | Refills: 1 | Status: SHIPPED | OUTPATIENT
Start: 2021-07-26 | End: 2021-11-11

## 2021-07-28 DIAGNOSIS — I48.20 CHRONIC ATRIAL FIBRILLATION (H): Primary | ICD-10-CM

## 2021-07-28 RX ORDER — ROSUVASTATIN CALCIUM 20 MG/1
20 TABLET, COATED ORAL DAILY
Qty: 90 TABLET | Refills: 0 | Status: SHIPPED | OUTPATIENT
Start: 2021-07-28 | End: 2021-10-13

## 2021-08-09 DIAGNOSIS — I42.0 CARDIOMYOPATHY, DILATED, NONISCHEMIC (H): Primary | ICD-10-CM

## 2021-08-09 RX ORDER — FUROSEMIDE 40 MG
20 TABLET ORAL 2 TIMES DAILY
Qty: 90 TABLET | Refills: 1 | Status: SHIPPED | OUTPATIENT
Start: 2021-08-09 | End: 2023-02-06

## 2021-08-18 ENCOUNTER — LAB (OUTPATIENT)
Dept: CARDIOLOGY | Facility: CLINIC | Age: 71
End: 2021-08-18
Payer: MEDICARE

## 2021-08-18 ENCOUNTER — ANTICOAGULATION THERAPY VISIT (OUTPATIENT)
Dept: ANTICOAGULATION | Facility: CLINIC | Age: 71
End: 2021-08-18

## 2021-08-18 DIAGNOSIS — I48.20 CHRONIC ATRIAL FIBRILLATION (H): ICD-10-CM

## 2021-08-18 DIAGNOSIS — I48.20 CHRONIC ATRIAL FIBRILLATION (H): Primary | ICD-10-CM

## 2021-08-18 LAB — INR BLD: 2.3 (ref 2–3)

## 2021-08-18 PROCEDURE — 36416 COLLJ CAPILLARY BLOOD SPEC: CPT

## 2021-08-18 PROCEDURE — 85610 PROTHROMBIN TIME: CPT

## 2021-08-18 NOTE — PROGRESS NOTES
ANTICOAGULATION MANAGEMENT     Zach Garcia 71 year old male is on warfarin with therapeutic INR result. (Goal INR 2.0-3.0)    Recent labs: (last 7 days)     08/18/21  0741   INR 2.3       ASSESSMENT     Source(s): Chart Review and Template       Warfarin doses taken: Warfarin taken as instructed    Diet: No new diet changes identified    New illness, injury, or hospitalization: No    Medication/supplement changes: None noted    Signs or symptoms of bleeding or clotting: No    Previous INR: Therapeutic last visit; previously outside of goal range    Additional findings: None     PLAN     Recommended plan for no diet, medication or health factor changes affecting INR     Dosing Instructions: Continue your current warfarin dose with next INR in 4 weeks       Summary  As of 8/18/2021    Full warfarin instructions:  5 mg every Sun, Wed, Fri; 2.5 mg all other days   Next INR check:  9/15/2021             Detailed voice message left for Zach with dosing instructions and follow up date.     Contact [phone] to schedule and with any changes, questions or concerns.     Education provided: None required    Plan made per [facility] anticoagulation protocol    James Lawrence RN  Anticoagulation Clinic  8/18/2021    _______________________________________________________________________     Anticoagulation Episode Summary     Current INR goal:  2.0-3.0   TTR:  59.6 % (1.8 mo)   Target end date:  Indefinite   Send INR reminders to:  [facility]    Indications    Chronic atrial fibrillation (H) [I48.20]           Comments:           Anticoagulation Care Providers     Provider Role Specialty Phone number    Aram Martel MD  Cardiovascular Disease [phone]

## 2021-08-25 ENCOUNTER — OFFICE VISIT (OUTPATIENT)
Dept: CARDIOLOGY | Facility: CLINIC | Age: 71
End: 2021-08-25
Payer: MEDICARE

## 2021-08-25 ENCOUNTER — ANCILLARY PROCEDURE (OUTPATIENT)
Dept: CARDIOLOGY | Facility: CLINIC | Age: 71
End: 2021-08-25
Attending: INTERNAL MEDICINE
Payer: MEDICARE

## 2021-08-25 VITALS
HEART RATE: 68 BPM | WEIGHT: 246 LBS | RESPIRATION RATE: 16 BRPM | BODY MASS INDEX: 35.05 KG/M2 | SYSTOLIC BLOOD PRESSURE: 116 MMHG | DIASTOLIC BLOOD PRESSURE: 70 MMHG

## 2021-08-25 DIAGNOSIS — I42.0 CARDIOMYOPATHY, DILATED, NONISCHEMIC (H): ICD-10-CM

## 2021-08-25 DIAGNOSIS — I48.20 CHRONIC ATRIAL FIBRILLATION (H): ICD-10-CM

## 2021-08-25 DIAGNOSIS — I10 ESSENTIAL HYPERTENSION WITH GOAL BLOOD PRESSURE LESS THAN 130/80: Chronic | ICD-10-CM

## 2021-08-25 DIAGNOSIS — I50.20 SYSTOLIC HEART FAILURE, UNSPECIFIED HF CHRONICITY (H): Chronic | ICD-10-CM

## 2021-08-25 DIAGNOSIS — Z95.810 ICD (IMPLANTABLE CARDIOVERTER-DEFIBRILLATOR) IN PLACE: Primary | ICD-10-CM

## 2021-08-25 DIAGNOSIS — I25.10 CORONARY ARTERY DISEASE DUE TO CALCIFIED CORONARY LESION: Primary | ICD-10-CM

## 2021-08-25 DIAGNOSIS — Z95.810 ICD (IMPLANTABLE CARDIOVERTER-DEFIBRILLATOR), SINGLE, IN SITU: ICD-10-CM

## 2021-08-25 DIAGNOSIS — I25.84 CORONARY ARTERY DISEASE DUE TO CALCIFIED CORONARY LESION: Primary | ICD-10-CM

## 2021-08-25 PROBLEM — I42.9 CARDIOMYOPATHY, UNSPECIFIED TYPE (H): Status: RESOLVED | Noted: 2020-02-19 | Resolved: 2021-08-25

## 2021-08-25 LAB
MDC_IDC_LEAD_IMPLANT_DT: NORMAL
MDC_IDC_LEAD_LOCATION: NORMAL
MDC_IDC_LEAD_LOCATION_DETAIL_1: NORMAL
MDC_IDC_LEAD_MFG: NORMAL
MDC_IDC_LEAD_MODEL: NORMAL
MDC_IDC_LEAD_POLARITY_TYPE: NORMAL
MDC_IDC_LEAD_SERIAL: NORMAL
MDC_IDC_LEAD_SPECIAL_FUNCTION: NORMAL
MDC_IDC_MSMT_BATTERY_DTM: NORMAL
MDC_IDC_MSMT_BATTERY_REMAINING_LONGEVITY: 138 MO
MDC_IDC_MSMT_BATTERY_REMAINING_PERCENTAGE: 100 %
MDC_IDC_MSMT_BATTERY_STATUS: NORMAL
MDC_IDC_MSMT_CAP_CHARGE_DTM: NORMAL
MDC_IDC_MSMT_CAP_CHARGE_TIME: 10.1 S
MDC_IDC_MSMT_CAP_CHARGE_TYPE: NORMAL
MDC_IDC_MSMT_LEADCHNL_RV_IMPEDANCE_VALUE: 663 OHM
MDC_IDC_MSMT_LEADCHNL_RV_LEAD_CHANNEL_STATUS: NORMAL
MDC_IDC_MSMT_LEADCHNL_RV_PACING_THRESHOLD_AMPLITUDE: 0.7 V
MDC_IDC_MSMT_LEADCHNL_RV_PACING_THRESHOLD_PULSEWIDTH: 0.4 MS
MDC_IDC_PG_IMPLANT_DTM: NORMAL
MDC_IDC_PG_MFG: NORMAL
MDC_IDC_PG_MODEL: NORMAL
MDC_IDC_PG_SERIAL: NORMAL
MDC_IDC_PG_TYPE: NORMAL
MDC_IDC_SESS_CLINIC_NAME: NORMAL
MDC_IDC_SESS_DTM: NORMAL
MDC_IDC_SESS_TYPE: NORMAL
MDC_IDC_SET_BRADY_LOWRATE: 50 {BEATS}/MIN
MDC_IDC_SET_BRADY_MODE: NORMAL
MDC_IDC_SET_LEADCHNL_RV_PACING_AMPLITUDE: 1.5 V
MDC_IDC_SET_LEADCHNL_RV_PACING_POLARITY: NORMAL
MDC_IDC_SET_LEADCHNL_RV_PACING_PULSEWIDTH: 0.4 MS
MDC_IDC_SET_LEADCHNL_RV_SENSING_ADAPTATION_MODE: NORMAL
MDC_IDC_SET_LEADCHNL_RV_SENSING_POLARITY: NORMAL
MDC_IDC_SET_LEADCHNL_RV_SENSING_SENSITIVITY: 0.6 MV
MDC_IDC_SET_ZONE_DETECTION_INTERVAL: 286 MS
MDC_IDC_SET_ZONE_DETECTION_INTERVAL: 333 MS
MDC_IDC_SET_ZONE_DETECTION_INTERVAL: 400 MS
MDC_IDC_SET_ZONE_TYPE: NORMAL
MDC_IDC_SET_ZONE_VENDOR_TYPE: NORMAL
MDC_IDC_STAT_BRADY_DTM_END: NORMAL
MDC_IDC_STAT_BRADY_DTM_START: NORMAL
MDC_IDC_STAT_BRADY_RV_PERCENT_PACED: 2 %
MDC_IDC_STAT_EPISODE_RECENT_COUNT: 0
MDC_IDC_STAT_EPISODE_RECENT_COUNT: 157
MDC_IDC_STAT_EPISODE_RECENT_COUNT: 2198
MDC_IDC_STAT_EPISODE_RECENT_COUNT_DTM_END: NORMAL
MDC_IDC_STAT_EPISODE_RECENT_COUNT_DTM_START: NORMAL
MDC_IDC_STAT_EPISODE_TYPE: NORMAL
MDC_IDC_STAT_EPISODE_VENDOR_TYPE: NORMAL
MDC_IDC_STAT_TACHYTHERAPY_ATP_DELIVERED_RECENT: 0
MDC_IDC_STAT_TACHYTHERAPY_ATP_DELIVERED_TOTAL: 1
MDC_IDC_STAT_TACHYTHERAPY_RECENT_DTM_END: NORMAL
MDC_IDC_STAT_TACHYTHERAPY_RECENT_DTM_START: NORMAL
MDC_IDC_STAT_TACHYTHERAPY_SHOCKS_ABORTED_RECENT: 0
MDC_IDC_STAT_TACHYTHERAPY_SHOCKS_ABORTED_TOTAL: 1
MDC_IDC_STAT_TACHYTHERAPY_SHOCKS_DELIVERED_RECENT: 0
MDC_IDC_STAT_TACHYTHERAPY_SHOCKS_DELIVERED_TOTAL: 0
MDC_IDC_STAT_TACHYTHERAPY_TOTAL_DTM_END: NORMAL
MDC_IDC_STAT_TACHYTHERAPY_TOTAL_DTM_START: NORMAL

## 2021-08-25 PROCEDURE — 93282 PRGRMG EVAL IMPLANTABLE DFB: CPT | Performed by: INTERNAL MEDICINE

## 2021-08-25 PROCEDURE — 99214 OFFICE O/P EST MOD 30 MIN: CPT | Performed by: INTERNAL MEDICINE

## 2021-08-25 NOTE — PATIENT INSTRUCTIONS
Mr Zach STALLWORTH Radha,  I enjoyed visiting with you again today.  I am glad to hear you are doing well.  Per our conversation keep up the same medications and good luck with the neck.  We will recheck around next summer before you see me.  Your next ICD check will be on 12/1/2021, this will occur automatically with your home monitor.  I will plan on seeing you 1 year or sooner if needed.  Aram Martel

## 2021-08-25 NOTE — PROGRESS NOTES
[facility]  Heart Care Clinic Follow-up Note    Assessment & Plan        (I25.10,  I25.84) Coronary artery disease due to calcified coronary lesion  (primary encounter diagnosis)  Comment: Angiography November 1, 2016 showed normal left main, mid LAD 30% lesion, normal circumflex and right coronary artery with a proximal 99% lesion which received a drug-coated stent.  Repeat pharmacological stress test September 2020 secondary to ventricular arrhythmias showed no major ischemia.    (I42.0) Cardiomyopathy, dilated, nonischemic (H)  Comment: asymptomatic not valvular and on chronic anticoagulation given advanced CHADS 2 VASC score.  I will recheck echo and if significant left ventricular dysfunction uptitrate meds to possibly Entresto.    (I10) Essential hypertension with goal blood pressure less than 130/80  Comment: Under good control currently.    (I48.20) Chronic atrial fibrillation (H)  Comment: asymptomatic not valvular and on chronic anticoagulation given advanced CHADS 2 VASC score.  I will check echo and if significant left ventricular dysfunction uptitrate medications.    (I50.20) Systolic heart failure, unspecified HF chronicity (H)  Comment: No significant signs or symptoms currently, ejection fraction 36% by nuclear stress test 40% by echo and will recheck echo next year.    (Z95.810) ICD (implantable cardioverter-defibrillator), single, in situ  Comment: [employer] device with [employer] lead placed in 2017, approximately 2% ventricular pacing, however fair amount of tachycardia.  Beta-blocker uptitrated.   He was asymptomatic     Plan  1.  Continue current medications.  2.  Recheck echocardiogram in a little less than a year and follow-up with me in 1 year or sooner if needed.    Subjective  CC: 71-year-old white gentleman here for yearly follow-up today.  Still having significant upper back and neck discomfort.  Does have shortness of breath and heavy activity, no snoring  according to his wife.  No chest pains, palpitations, PND, orthopnea or significant peripheral edema.    Medications  Current Outpatient Medications   Medication Sig Dispense Refill     acetaminophen (GOODSENSE PAIN RELIEF EXTRA ST) 500 MG tablet as needed       ASPIRIN 81 PO daily       furosemide (LASIX) 40 MG tablet Take 0.5 tablets (20 mg) by mouth 2 times daily (Patient taking differently: Take 20 mg by mouth daily ) 90 tablet 1     gabapentin (NEURONTIN) 400 MG capsule Take 1 capsule (400 mg) by mouth 3 times daily (Patient taking differently: Take 400 mg by mouth 3 times daily ) 90 capsule 11     lisinopril (ZESTRIL) 20 MG tablet Take 2 tablets (40 mg) by mouth daily 180 tablet 1     metoprolol succinate ER (TOPROL-XL) 100 MG 24 hr tablet Take 200 mg by mouth daily       rosuvastatin (CRESTOR) 20 MG tablet Take 1 tablet (20 mg) by mouth daily 90 tablet 0     spironolactone (ALDACTONE) 25 MG tablet 12.5 mg daily       warfarin ANTICOAGULANT (COUMADIN) 5 MG tablet Taking 5 mg 6 days per week and 2.5 mg 1 day per week.       zolpidem (AMBIEN) 5 MG tablet 5-10 mg nightly as needed          Objective  /70 (BP Location: Right arm, Patient Position: Sitting, Cuff Size: Adult Large)   Pulse 68   Resp 16   Wt 111.6 kg (246 lb)   BMI 35.05 kg/m      General Appearance:    Alert, cooperative, no distress, appears stated age   Head:    Normocephalic, without obvious abnormality, atraumatic   Throat:   Lips, mucosa, and tongue normal; teeth and gums normal   Neck:   Supple, symmetrical, trachea midline, no adenopathy;        thyroid:  No enlargement/tenderness/nodules; no carotid    bruit or JVD   Back:     Symmetric, no curvature, ROM normal, no CVA tenderness   Lungs:     Clear to auscultation bilaterally, respirations unlabored   Chest wall:    No tenderness, left-sided ICD   Heart:   Regularly irregular, S1 and S2 normal, no murmur, rub   or gallop   Abdomen:     Soft, non-tender, bowel sounds active all  four quadrants,     no masses, no organomegaly   Extremities:   Normal, atraumatic, no cyanosis or edema   Pulses:   2+ and symmetric all extremities   Skin:   Skin color, texture, turgor normal, no rashes or lesions     Results    Lab Results personally reviewed   Lab Results   Component Value Date    CHOL 158 05/22/2019    CHOL 144 02/27/2018     Lab Results   Component Value Date    HDL 46 05/22/2019    HDL 39 (L) 02/27/2018     No components found for: LDLCALC  Lab Results   Component Value Date    TRIG 215 (H) 05/22/2019    TRIG 189 (H) 02/27/2018     Lab Results   Component Value Date    WBC 6.3 05/22/2019    HGB 15.3 05/22/2019    HCT 45.8 05/22/2019     05/22/2019     Lab Results   Component Value Date    BUN 18 09/09/2020     09/09/2020    CO2 23 09/09/2020     Review of Systems:   Enc Vitals  BP: 116/70  Pulse: 68  Resp: 16  Weight: 111.6 kg (246 lb)

## 2021-08-25 NOTE — LETTER
8/25/2021    Tyrone Patel MD  [address]  [place] [address]    RE: Zach Garcia       Dear Colleague,    I had the pleasure of seeing Zach Garcia in the [facility].        [facility]  Heart Care Clinic Follow-up Note    Assessment & Plan        (I25.10,  I25.84) Coronary artery disease due to calcified coronary lesion  (primary encounter diagnosis)  Comment: Angiography November 1, 2016 showed normal left main, mid LAD 30% lesion, normal circumflex and right coronary artery with a proximal 99% lesion which received a drug-coated stent.  Repeat pharmacological stress test September 2020 secondary to ventricular arrhythmias showed no major ischemia.    (I42.0) Cardiomyopathy, dilated, nonischemic (H)  Comment: asymptomatic not valvular and on chronic anticoagulation given advanced CHADS 2 VASC score.  I will recheck echo and if significant left ventricular dysfunction uptitrate meds to possibly Entresto.    (I10) Essential hypertension with goal blood pressure less than 130/80  Comment: Under good control currently.    (I48.20) Chronic atrial fibrillation (H)  Comment: asymptomatic not valvular and on chronic anticoagulation given advanced CHADS 2 VASC score.  I will check echo and if significant left ventricular dysfunction uptitrate medications.    (I50.20) Systolic heart failure, unspecified HF chronicity (H)  Comment: No significant signs or symptoms currently, ejection fraction 36% by nuclear stress test 40% by echo and will recheck echo next year.    (Z95.810) ICD (implantable cardioverter-defibrillator), single, in situ  Comment: [employer] device with [employer] lead placed in 2017, approximately 2% ventricular pacing, however fair amount of tachycardia.  Beta-blocker uptitrated.   He was asymptomatic     Plan  1.  Continue current medications.  2.  Recheck echocardiogram in a little less than a year  and follow-up with me in 1 year or sooner if needed.    Subjective  CC: 71-year-old white gentleman here for yearly follow-up today.  Still having significant upper back and neck discomfort.  Does have shortness of breath and heavy activity, no snoring according to his wife.  No chest pains, palpitations, PND, orthopnea or significant peripheral edema.    Medications  Current Outpatient Medications   Medication Sig Dispense Refill     acetaminophen (GOODSENSE PAIN RELIEF EXTRA ST) 500 MG tablet as needed       ASPIRIN 81 PO daily       furosemide (LASIX) 40 MG tablet Take 0.5 tablets (20 mg) by mouth 2 times daily (Patient taking differently: Take 20 mg by mouth daily ) 90 tablet 1     gabapentin (NEURONTIN) 400 MG capsule Take 1 capsule (400 mg) by mouth 3 times daily (Patient taking differently: Take 400 mg by mouth 3 times daily ) 90 capsule 11     lisinopril (ZESTRIL) 20 MG tablet Take 2 tablets (40 mg) by mouth daily 180 tablet 1     metoprolol succinate ER (TOPROL-XL) 100 MG 24 hr tablet Take 200 mg by mouth daily       rosuvastatin (CRESTOR) 20 MG tablet Take 1 tablet (20 mg) by mouth daily 90 tablet 0     spironolactone (ALDACTONE) 25 MG tablet 12.5 mg daily       warfarin ANTICOAGULANT (COUMADIN) 5 MG tablet Taking 5 mg 6 days per week and 2.5 mg 1 day per week.       zolpidem (AMBIEN) 5 MG tablet 5-10 mg nightly as needed          Objective  /70 (BP Location: Right arm, Patient Position: Sitting, Cuff Size: Adult Large)   Pulse 68   Resp 16   Wt 111.6 kg (246 lb)   BMI 35.05 kg/m      General Appearance:    Alert, cooperative, no distress, appears stated age   Head:    Normocephalic, without obvious abnormality, atraumatic   Throat:   Lips, mucosa, and tongue normal; teeth and gums normal   Neck:   Supple, symmetrical, trachea midline, no adenopathy;        thyroid:  No enlargement/tenderness/nodules; no carotid    bruit or JVD   Back:     Symmetric, no curvature, ROM normal, no CVA tenderness    Lungs:     Clear to auscultation bilaterally, respirations unlabored   Chest wall:    No tenderness, left-sided ICD   Heart:   Regularly irregular, S1 and S2 normal, no murmur, rub   or gallop   Abdomen:     Soft, non-tender, bowel sounds active all four quadrants,     no masses, no organomegaly   Extremities:   Normal, atraumatic, no cyanosis or edema   Pulses:   2+ and symmetric all extremities   Skin:   Skin color, texture, turgor normal, no rashes or lesions     Results    Lab Results personally reviewed   Lab Results   Component Value Date    CHOL 158 05/22/2019    CHOL 144 02/27/2018     Lab Results   Component Value Date    HDL 46 05/22/2019    HDL 39 (L) 02/27/2018     No components found for: LDLCALC  Lab Results   Component Value Date    TRIG 215 (H) 05/22/2019    TRIG 189 (H) 02/27/2018     Lab Results   Component Value Date    WBC 6.3 05/22/2019    HGB 15.3 05/22/2019    HCT 45.8 05/22/2019     05/22/2019     Lab Results   Component Value Date    BUN 18 09/09/2020     09/09/2020    CO2 23 09/09/2020     Review of Systems:   Enc Vitals  BP: 116/70  Pulse: 68  Resp: 16  Weight: 111.6 kg (246 lb)                                              Thank you for allowing me to participate in the care of your patient.      Sincerely,     MAYTE PELAYO MD     [facility] Heart Care  cc:   No referring provider defined for this encounter.

## 2021-09-09 DIAGNOSIS — I42.0 CARDIOMYOPATHY, DILATED, NONISCHEMIC (H): Primary | ICD-10-CM

## 2021-09-09 RX ORDER — SPIRONOLACTONE 25 MG/1
12.5 TABLET ORAL DAILY
Qty: 45 TABLET | Refills: 1 | Status: SHIPPED | OUTPATIENT
Start: 2021-09-09 | End: 2022-01-03

## 2021-09-17 ENCOUNTER — LAB (OUTPATIENT)
Dept: CARDIOLOGY | Facility: CLINIC | Age: 71
End: 2021-09-17
Payer: MEDICARE

## 2021-09-17 ENCOUNTER — TELEPHONE (OUTPATIENT)
Dept: NEUROSURGERY | Facility: CLINIC | Age: 71
End: 2021-09-17

## 2021-09-17 ENCOUNTER — ANTICOAGULATION THERAPY VISIT (OUTPATIENT)
Dept: NEUROSURGERY | Facility: CLINIC | Age: 71
End: 2021-09-17

## 2021-09-17 DIAGNOSIS — Z79.01 LONG TERM CURRENT USE OF ANTICOAGULANT THERAPY: Primary | ICD-10-CM

## 2021-09-17 DIAGNOSIS — I48.20 CHRONIC ATRIAL FIBRILLATION (H): Primary | ICD-10-CM

## 2021-09-17 LAB — INR BLD: 6.7 (ref 2–3)

## 2021-09-17 PROCEDURE — 36416 COLLJ CAPILLARY BLOOD SPEC: CPT

## 2021-09-17 PROCEDURE — 85610 PROTHROMBIN TIME: CPT

## 2021-09-17 NOTE — TELEPHONE ENCOUNTER
[facility]          [address] [place] [address]                            [phone]  Fax: [phone]    Corinne Escalona  [address] [place] [address]    [phone]    April 18, 2017      To whom it may concern    Corinne Escalona is a patient of my practice. She would be unable to workout from 01/01/2017 to 11/30/2017 (January to Novemeber of 2017) due to illness and travel plans. Please put her membership on hold for now.        If you have any other questions or concerns please feel free to contact me at anytime.        Sincerely,      Nilesh Smith M.D.       ACN called and spoke with patient.    See Anticoagulation Therapy encounter dated 9/17/2021      Terri Cornell RN [facility] Anticoagulation Clinic    [phone]

## 2021-09-17 NOTE — PROGRESS NOTES
ACN called and left a message for the patient requesting for call back to discuss INR result.    Instructed not take warfarin dose today.    Waiting for call back.    Terri Cornell RN [facility] Anticoagulation Clinic

## 2021-09-17 NOTE — PROGRESS NOTES
ANTICOAGULATION MANAGEMENT     Zach Garcia 71 year old male is on warfarin with supratherapeutic INR result. (Goal INR 2.0-3.0)    Recent labs: (last 7 days)     09/17/21  0818   INR 6.7*       ASSESSMENT     Source(s): Chart Review, Patient/Caregiver Call and Template       Warfarin doses taken: Warfarin taken as instructed    Diet: No new diet changes identified    New illness, injury, or hospitalization: tremors affecting daily activities.    Medication/supplement changes: Propranolol 60 mg daily started on 9/9 which has potential for interaction; increasing INR    Baclofen started on 9/9 No interaction anticipated    Signs or symptoms of bleeding or clotting: No    Previous INR: Therapeutic last 2(+) visits    Additional findings: Patient confirmed no signs and symptoms bleeding noted.     PLAN     Recommended plan for ongoing change(s) affecting INR     Dosing Instructions: Hold Fri and Sat doses then 2.5 mg on Sun ( patient only given doses until Sun)   Plan is to Decrease  warfarin dose (10% change) with next INR in 4 days       Summary  As of 9/17/2021    Full warfarin instructions:  9/17: Hold; 9/18: Hold; Otherwise 5 mg every Tue, Fri; 2.5 mg all other days   Next INR check:  9/20/2021             Telephone call with Zach who verbalizes understanding and agrees to plan    Lab visit scheduled    Education provided: Importance of therapeutic range, Importance of following up at instructed interval, Importance of taking warfarin as instructed, Potential interaction between warfarin and propranolol, Monitoring for bleeding signs and symptoms and When to seek medical attention/emergency care    Plan made with [facility] Pharmacist Vania Cornell, RN  Anticoagulation Clinic  9/17/2021    _______________________________________________________________________     Anticoagulation Episode Summary     Current INR goal:  2.0-3.0   TTR:  44.2 % (2.8 mo)   Target end date:  Indefinite   Send INR  reminders to:  [facility]    Indications    Chronic atrial fibrillation (H) [I48.20]           Comments:           Anticoagulation Care Providers     Provider Role Specialty Phone number    Aram Martel MD  Cardiovascular Disease [phone]

## 2021-09-17 NOTE — PROGRESS NOTES
Critical lab called to Mireya for patient.     6.7 & 6.4 on POCT in Clinic.     Gerda Gandhi, RN, BSN, PHN  Anticoagulation Nurse  [phone]

## 2021-09-20 ENCOUNTER — ANTICOAGULATION THERAPY VISIT (OUTPATIENT)
Dept: CARDIOLOGY | Facility: CLINIC | Age: 71
End: 2021-09-20

## 2021-09-20 ENCOUNTER — LAB (OUTPATIENT)
Dept: CARDIOLOGY | Facility: CLINIC | Age: 71
End: 2021-09-20
Payer: MEDICARE

## 2021-09-20 DIAGNOSIS — I48.20 CHRONIC ATRIAL FIBRILLATION (H): ICD-10-CM

## 2021-09-20 DIAGNOSIS — I48.20 CHRONIC ATRIAL FIBRILLATION (H): Primary | ICD-10-CM

## 2021-09-20 LAB — INR BLD: 2.1 (ref 2–3)

## 2021-09-20 PROCEDURE — 36416 COLLJ CAPILLARY BLOOD SPEC: CPT

## 2021-09-20 PROCEDURE — 85610 PROTHROMBIN TIME: CPT

## 2021-09-20 NOTE — PROGRESS NOTES
ANTICOAGULATION MANAGEMENT     Zach Garcia 71 year old male is on warfarin with therapeutic INR result. (Goal INR 2.0-3.0)    Recent labs: (last 7 days)     09/20/21  0845   INR 2.1       ASSESSMENT     Source(s): Chart Review, Patient/Caregiver Call and Template       Warfarin doses taken: Warfarin held and taken as instructed    Diet: No new diet changes identified    New illness, injury, or hospitalization: No    Medication/supplement changes: recently started propranolol    Signs or symptoms of bleeding or clotting: No    Previous INR: Supratherapeutic    Additional findings: None     PLAN     Recommended plan for no diet, medication or health factor changes affecting INR     Dosing Instructions: Continue your current warfarin dose with next INR in 5-7 days       Summary  As of 9/20/2021    Full warfarin instructions:  5 mg every Tue, Fri; 2.5 mg all other days   Next INR check:  9/27/2021             Telephone call with Zach who verbalizes understanding and agrees to plan    Lab visit scheduled    Education provided: Goal range and significance of current result, Monitoring for bleeding signs and symptoms and Monitoring for clotting signs and symptoms    Plan made per [facility] anticoagulation protocol    Kailey Samaniego RN  Anticoagulation Clinic  9/20/2021    _______________________________________________________________________     Anticoagulation Episode Summary     Current INR goal:  2.0-3.0   TTR:  43.3 % (2.9 mo)   Target end date:  Indefinite   Send INR reminders to:  [facility]    Indications    Chronic atrial fibrillation (H) [I48.20]           Comments:           Anticoagulation Care Providers     Provider Role Specialty Phone number    Aram Martel MD  Cardiovascular Disease [phone]

## 2021-09-20 NOTE — PROGRESS NOTES
Reason for Call:  Other INR    Detailed comments: Pt returning phone call and would like a phone call back regarding his INR results.     Phone Number Patient can be reached at: Home number on file [phone] (home)    Best Time: anytime    Can we leave a detailed message on this number? YES    Call taken on 9/20/2021 at 10:58 AM by Van Lamar

## 2021-09-27 ENCOUNTER — ANTICOAGULATION THERAPY VISIT (OUTPATIENT)
Dept: ANTICOAGULATION | Facility: CLINIC | Age: 71
End: 2021-09-27

## 2021-09-27 ENCOUNTER — LAB (OUTPATIENT)
Dept: CARDIOLOGY | Facility: CLINIC | Age: 71
End: 2021-09-27
Payer: MEDICARE

## 2021-09-27 DIAGNOSIS — I48.20 CHRONIC ATRIAL FIBRILLATION (H): Primary | ICD-10-CM

## 2021-09-27 DIAGNOSIS — I48.20 CHRONIC ATRIAL FIBRILLATION (H): ICD-10-CM

## 2021-09-27 LAB — INR BLD: 2.1 (ref 2–3)

## 2021-09-27 PROCEDURE — 85610 PROTHROMBIN TIME: CPT

## 2021-09-27 PROCEDURE — 36416 COLLJ CAPILLARY BLOOD SPEC: CPT

## 2021-09-27 NOTE — PROGRESS NOTES
ANTICOAGULATION MANAGEMENT     Zach Garcia 71 year old male is on warfarin with therapeutic INR result. (Goal INR 2.0-3.0)    Recent labs: (last 7 days)     09/27/21  1100   INR 2.1       ASSESSMENT     Source(s): Chart Review, Patient/Caregiver Call and Template       Warfarin doses taken: Template incorrect; verbally confirmed home dose with Alea     Diet: No new diet changes identified    New illness, injury, or hospitalization: No    Medication/supplement changes: None noted    Signs or symptoms of bleeding or clotting: No    Previous INR: Therapeutic last visit; previously outside of goal range    Additional findings: None     PLAN     Recommended plan for no diet, medication or health factor changes affecting INR     Dosing Instructions: Continue your current warfarin dose with next INR in 1- 2 weeks       Summary  As of 9/27/2021    Full warfarin instructions:  5 mg every Tue, Fri; 2.5 mg all other days   Next INR check:  10/11/2021             Telephone call with Zach who verbalizes understanding and agrees to plan    Patient elected to schedule next visit 10/13/21    Education provided: Importance of therapeutic range, Importance of following up at instructed interval, Importance of taking warfarin as instructed and Contact [phone] with any changes, questions or concerns.     Plan made per [facility] anticoagulation protocol    Terri Cornell RN  Anticoagulation Clinic  9/27/2021    _______________________________________________________________________     Anticoagulation Episode Summary     Current INR goal:  2.0-3.0   TTR:  47.6 % (3.2 mo)   Target end date:  Indefinite   Send INR reminders to:  [facility] HEART [facility]    Indications    Chronic atrial fibrillation (H) [I48.20]           Comments:           Anticoagulation Care Providers     Provider Role Specialty Phone number    Aram Martel MD  Cardiovascular Disease [phone]

## 2021-10-13 ENCOUNTER — LAB (OUTPATIENT)
Dept: CARDIOLOGY | Facility: CLINIC | Age: 71
End: 2021-10-13
Payer: MEDICARE

## 2021-10-13 ENCOUNTER — DOCUMENTATION ONLY (OUTPATIENT)
Dept: ANTICOAGULATION | Facility: CLINIC | Age: 71
End: 2021-10-13

## 2021-10-13 ENCOUNTER — ANTICOAGULATION THERAPY VISIT (OUTPATIENT)
Dept: ANTICOAGULATION | Facility: CLINIC | Age: 71
End: 2021-10-13

## 2021-10-13 DIAGNOSIS — I48.20 CHRONIC ATRIAL FIBRILLATION (H): ICD-10-CM

## 2021-10-13 DIAGNOSIS — I48.20 CHRONIC ATRIAL FIBRILLATION (H): Primary | ICD-10-CM

## 2021-10-13 LAB — INR BLD: 1.4 (ref 2–3)

## 2021-10-13 PROCEDURE — 85610 PROTHROMBIN TIME: CPT

## 2021-10-13 RX ORDER — ROSUVASTATIN CALCIUM 20 MG/1
20 TABLET, COATED ORAL DAILY
Qty: 90 TABLET | Refills: 2 | Status: SHIPPED | OUTPATIENT
Start: 2021-10-13 | End: 2022-05-23

## 2021-10-13 NOTE — PROGRESS NOTES
ANTICOAGULATION MANAGEMENT      Zach Garcia due for annual renewal of referral to anticoagulation monitoring. Order pended for your review and signature.      ANTICOAGULATION SUMMARY      Warfarin indication(s)     Atrial fibrillation    Heart valve present?  NO       Current goal range   INR: 2.0-3.0     Goal appropriate for indication? Yes, INR 2-3 appropriate for hx of DVT, PE, hypercoagulable state, Afib, LVAD, or bileaflet AVR without risk factors     Current duration of therapy Indefinite/long term therapy   Time in Therapeutic Range (TTR)  (Goal > 60%) 42.8%      Office visit with referring provider's group within last year yes on 8/25/2021       Terri Cornell RN

## 2021-10-13 NOTE — PROGRESS NOTES
ANTICOAGULATION MANAGEMENT     Zach Garcia 71 year old male is on warfarin with subtherapeutic INR result. (Goal INR 2.0-3.0)    Recent labs: (last 7 days)     10/13/21  0818   INR 1.4*       ASSESSMENT     Source(s): Chart Review, Patient/Caregiver Call and Template       Warfarin doses taken: Warfarin taken as instructed    Diet: No new diet changes identified    New illness, injury, or hospitalization: No    Medication/supplement changes: None noted    Signs or symptoms of bleeding or clotting: No    Previous INR: Therapeutic last 2(+) visits    Additional findings: None     PLAN     Recommended plan for no diet, medication or health factor changes affecting INR     Dosing Instructions:  Increase your warfarin dose (11% change) with next INR in 5-7 days       Summary  As of 10/13/2021    Full warfarin instructions:  5 mg every Mon, Wed, Fri; 2.5 mg all other days   Next INR check:  10/20/2021             Telephone call with Zach who verbalizes understanding and agrees to plan    Lab visit scheduled    Education provided: Goal range and significance of current result, Importance of therapeutic range, Importance of following up at instructed interval, Importance of taking warfarin as instructed, Monitoring for clotting signs and symptoms, When to seek medical attention/emergency care and Contact [phone] with any changes, questions or concerns.     Plan made per [facility] anticoagulation protocol    Terri Cornell RN  Anticoagulation Clinic  10/13/2021    _______________________________________________________________________     Anticoagulation Episode Summary     Current INR goal:  2.0-3.0   TTR:  42.8 % (3.7 mo)   Target end date:  Indefinite   Send INR reminders to:  [facility] HEART [facility]    Indications    Chronic atrial fibrillation (H) [I48.20]           Comments:           Anticoagulation Care Providers     Provider Role Specialty Phone number    Aram Martel MD  Cardiovascular Disease  [phone]

## 2021-10-20 ENCOUNTER — ANTICOAGULATION THERAPY VISIT (OUTPATIENT)
Dept: ANTICOAGULATION | Facility: CLINIC | Age: 71
End: 2021-10-20

## 2021-10-20 ENCOUNTER — LAB (OUTPATIENT)
Dept: CARDIOLOGY | Facility: CLINIC | Age: 71
End: 2021-10-20
Payer: MEDICARE

## 2021-10-20 DIAGNOSIS — I48.20 CHRONIC ATRIAL FIBRILLATION (H): Primary | ICD-10-CM

## 2021-10-20 LAB — INR BLD: 2.7 (ref 2–3)

## 2021-10-20 PROCEDURE — 36416 COLLJ CAPILLARY BLOOD SPEC: CPT

## 2021-10-20 PROCEDURE — 85610 PROTHROMBIN TIME: CPT

## 2021-10-20 NOTE — PROGRESS NOTES
ANTICOAGULATION MANAGEMENT     Zach Garcia 71 year old male is on warfarin with therapeutic INR result. (Goal INR 2.0-3.0)    Recent labs: (last 7 days)     10/20/21  0807   INR 2.7       ASSESSMENT     Source(s): Chart Review and Template       Warfarin doses taken: Warfarin taken as instructed    Diet: No new diet changes identified    New illness, injury, or hospitalization: No    Medication/supplement changes: None noted    Signs or symptoms of bleeding or clotting: No    Previous INR: Subtherapeutic    Additional findings: None     PLAN     Recommended plan for no diet, medication or health factor changes affecting INR     Dosing Instructions: Continue your current warfarin dose with next INR in 1 week d/t large increase in INR this last week following dose increase    Summary  As of 10/20/2021    Full warfarin instructions:  5 mg every Mon, Wed, Fri; 2.5 mg all other days   Next INR check:  10/27/2021             Detailed voice message left for Zach with dosing instructions and follow up date.     Contact [phone] to schedule and with any changes, questions or concerns.     Education provided: Goal range and significance of current result    Plan made per [facility] anticoagulation protocol    Kailey Samaniego RN  Anticoagulation Clinic  10/20/2021    _______________________________________________________________________     Anticoagulation Episode Summary     Current INR goal:  2.0-3.0   TTR:  43.4 % (3.9 mo)   Target end date:  Indefinite   Send INR reminders to:  [facility] HEART [facility]    Indications    Chronic atrial fibrillation (H) [I48.20]           Comments:           Anticoagulation Care Providers     Provider Role Specialty Phone number    Aram Martel MD  Cardiovascular Disease [phone]

## 2021-10-27 ENCOUNTER — LAB (OUTPATIENT)
Dept: CARDIOLOGY | Facility: CLINIC | Age: 71
End: 2021-10-27
Payer: MEDICARE

## 2021-10-27 ENCOUNTER — ANTICOAGULATION THERAPY VISIT (OUTPATIENT)
Dept: ANTICOAGULATION | Facility: CLINIC | Age: 71
End: 2021-10-27

## 2021-10-27 DIAGNOSIS — I48.20 CHRONIC ATRIAL FIBRILLATION (H): ICD-10-CM

## 2021-10-27 DIAGNOSIS — I48.20 CHRONIC ATRIAL FIBRILLATION (H): Primary | ICD-10-CM

## 2021-10-27 LAB — INR POINT OF CARE: 2.1 (ref 0.86–1.14)

## 2021-10-27 PROCEDURE — 36416 COLLJ CAPILLARY BLOOD SPEC: CPT | Performed by: INTERNAL MEDICINE

## 2021-10-27 PROCEDURE — 85610 PROTHROMBIN TIME: CPT

## 2021-10-27 NOTE — PROGRESS NOTES
ANTICOAGULATION MANAGEMENT     Zach Garcia 71 year old male is on warfarin with therapeutic INR result. (Goal INR 2.0-3.0)    Recent labs: (last 7 days)     10/27/21  0903   INR 2.1*       ASSESSMENT     Source(s): Chart Review and Template       Warfarin doses taken: Warfarin taken as instructed    Diet: No new diet changes identified    New illness, injury, or hospitalization: No    Medication/supplement changes: None noted    Signs or symptoms of bleeding or clotting: No    Previous INR: Therapeutic last visit; previously outside of goal range    Additional findings: None     PLAN     Recommended plan for no diet, medication or health factor changes affecting INR     Dosing Instructions: Continue your current warfarin dose with next INR in 2 weeks       Summary  As of 10/27/2021    Full warfarin instructions:  5 mg every Mon, Wed, Fri; 2.5 mg all other days   Next INR check:  11/10/2021             Telephone call with Zach who verbalizes understanding and agrees to plan    Lab visit scheduled    Education provided: Goal range and significance of current result, Importance of therapeutic range and Importance of following up at instructed interval    Plan made per [facility] anticoagulation protocol    Terri Cornell RN  Anticoagulation Clinic  10/27/2021    _______________________________________________________________________     Anticoagulation Episode Summary     Current INR goal:  2.0-3.0   TTR:  46.6 % (4.2 mo)   Target end date:  Indefinite   Send INR reminders to:  [facility]    Indications    Chronic atrial fibrillation (H) [I48.20]           Comments:           Anticoagulation Care Providers     Provider Role Specialty Phone number    Aram Martel MD  Cardiovascular Disease [phone]

## 2021-11-10 ENCOUNTER — ANTICOAGULATION THERAPY VISIT (OUTPATIENT)
Dept: ANTICOAGULATION | Facility: CLINIC | Age: 71
End: 2021-11-10

## 2021-11-10 ENCOUNTER — LAB (OUTPATIENT)
Dept: CARDIOLOGY | Facility: CLINIC | Age: 71
End: 2021-11-10
Payer: MEDICARE

## 2021-11-10 DIAGNOSIS — I48.20 CHRONIC ATRIAL FIBRILLATION (H): ICD-10-CM

## 2021-11-10 DIAGNOSIS — I48.20 CHRONIC ATRIAL FIBRILLATION (H): Primary | ICD-10-CM

## 2021-11-10 LAB — INR POINT OF CARE: 2.3 (ref 0.86–1.14)

## 2021-11-10 PROCEDURE — 36416 COLLJ CAPILLARY BLOOD SPEC: CPT

## 2021-11-10 PROCEDURE — 85610 PROTHROMBIN TIME: CPT

## 2021-11-10 NOTE — PROGRESS NOTES
ANTICOAGULATION MANAGEMENT     Zach Garcia 71 year old male is on warfarin with therapeutic INR result. (Goal INR 2.0-3.0)    Recent labs: (last 7 days)     11/10/21  0835   INR 2.3*       ASSESSMENT     Source(s): Chart Review and Template       Warfarin doses taken: Warfarin taken as instructed    Diet: No new diet changes identified    New illness, injury, or hospitalization: No    Medication/supplement changes: None noted    Signs or symptoms of bleeding or clotting: No    Previous INR: Therapeutic last 2(+) visits    Additional findings: None     PLAN     Recommended plan for no diet, medication or health factor changes affecting INR     Dosing Instructions: Continue your current warfarin dose with next INR in 4 weeks       Summary  As of 11/10/2021    Full warfarin instructions:  5 mg every Mon, Wed, Fri; 2.5 mg all other days   Next INR check:  12/8/2021             Detailed voice message left for Zach with dosing instructions and follow up date.     Contact [phone] to schedule and with any changes, questions or concerns.     Education provided: Goal range and significance of current result, Importance of therapeutic range and Importance of notifying clinic for changes in medications; a sooner lab recheck maybe needed.    Plan made per [facility] anticoagulation protocol    Terri Cornell RN  Anticoagulation Clinic  11/10/2021    _______________________________________________________________________     Anticoagulation Episode Summary     Current INR goal:  2.0-3.0   TTR:  52.0 % (4.6 mo)   Target end date:  Indefinite   Send INR reminders to:  [facility] HEART [facility]    Indications    Chronic atrial fibrillation (H) [I48.20]           Comments:           Anticoagulation Care Providers     Provider Role Specialty Phone number    Aram Martel MD  Cardiovascular Disease [phone]

## 2021-11-11 DIAGNOSIS — I48.20 CHRONIC ATRIAL FIBRILLATION (H): ICD-10-CM

## 2021-11-11 RX ORDER — LISINOPRIL 20 MG/1
40 TABLET ORAL DAILY
Qty: 180 TABLET | Refills: 1 | Status: SHIPPED | OUTPATIENT
Start: 2021-11-11 | End: 2022-03-09

## 2021-11-16 DIAGNOSIS — I49.3 FREQUENT UNIFOCAL PREMATURE VENTRICULAR CONTRACTIONS: ICD-10-CM

## 2021-11-16 DIAGNOSIS — I10 ESSENTIAL HYPERTENSION WITH GOAL BLOOD PRESSURE LESS THAN 130/80: Primary | ICD-10-CM

## 2021-11-16 DIAGNOSIS — R00.0 TACHYCARDIA: ICD-10-CM

## 2021-11-16 DIAGNOSIS — I25.10 CORONARY ARTERY DISEASE DUE TO CALCIFIED CORONARY LESION: ICD-10-CM

## 2021-11-16 DIAGNOSIS — I47.29 PAROXYSMAL VENTRICULAR TACHYCARDIA (H): ICD-10-CM

## 2021-11-16 DIAGNOSIS — I42.0 CARDIOMYOPATHY, DILATED, NONISCHEMIC (H): ICD-10-CM

## 2021-11-16 DIAGNOSIS — I48.20 CHRONIC ATRIAL FIBRILLATION (H): ICD-10-CM

## 2021-11-16 DIAGNOSIS — I50.20 SYSTOLIC HEART FAILURE, UNSPECIFIED HF CHRONICITY (H): ICD-10-CM

## 2021-11-16 DIAGNOSIS — I25.84 CORONARY ARTERY DISEASE DUE TO CALCIFIED CORONARY LESION: ICD-10-CM

## 2021-11-16 RX ORDER — METOPROLOL SUCCINATE 200 MG/1
200 TABLET, EXTENDED RELEASE ORAL DAILY
Qty: 90 TABLET | Refills: 2 | Status: SHIPPED | OUTPATIENT
Start: 2021-11-16 | End: 2022-06-28

## 2021-12-01 ENCOUNTER — ANCILLARY PROCEDURE (OUTPATIENT)
Dept: CARDIOLOGY | Facility: CLINIC | Age: 71
End: 2021-12-01
Attending: INTERNAL MEDICINE
Payer: MEDICARE

## 2021-12-01 DIAGNOSIS — I42.8 NON-ISCHEMIC CARDIOMYOPATHY (H): ICD-10-CM

## 2021-12-01 DIAGNOSIS — Z95.810 ICD (IMPLANTABLE CARDIOVERTER-DEFIBRILLATOR) IN PLACE: ICD-10-CM

## 2021-12-01 PROCEDURE — 93295 DEV INTERROG REMOTE 1/2/MLT: CPT | Performed by: INTERNAL MEDICINE

## 2021-12-01 PROCEDURE — 93296 REM INTERROG EVL PM/IDS: CPT | Performed by: INTERNAL MEDICINE

## 2021-12-08 LAB
MDC_IDC_EPISODE_DTM: NORMAL
MDC_IDC_EPISODE_DURATION: 103 S
MDC_IDC_EPISODE_DURATION: 12 S
MDC_IDC_EPISODE_DURATION: 13 S
MDC_IDC_EPISODE_DURATION: 14 S
MDC_IDC_EPISODE_DURATION: 148 S
MDC_IDC_EPISODE_DURATION: 15 S
MDC_IDC_EPISODE_DURATION: 16 S
MDC_IDC_EPISODE_DURATION: 16 S
MDC_IDC_EPISODE_DURATION: 161 S
MDC_IDC_EPISODE_DURATION: 187 S
MDC_IDC_EPISODE_DURATION: 25 S
MDC_IDC_EPISODE_DURATION: 25 S
MDC_IDC_EPISODE_DURATION: 26 S
MDC_IDC_EPISODE_DURATION: 27 S
MDC_IDC_EPISODE_DURATION: 27 S
MDC_IDC_EPISODE_DURATION: 29 S
MDC_IDC_EPISODE_DURATION: 33 S
MDC_IDC_EPISODE_DURATION: 35 S
MDC_IDC_EPISODE_DURATION: 43 S
MDC_IDC_EPISODE_DURATION: 678 S
MDC_IDC_EPISODE_DURATION: 79 S
MDC_IDC_EPISODE_DURATION: 82 S
MDC_IDC_EPISODE_DURATION: 84 S
MDC_IDC_EPISODE_ID: NORMAL
MDC_IDC_EPISODE_TYPE: NORMAL
MDC_IDC_EPISODE_VENDOR_TYPE: NORMAL
MDC_IDC_LEAD_IMPLANT_DT: NORMAL
MDC_IDC_LEAD_LOCATION: NORMAL
MDC_IDC_LEAD_LOCATION_DETAIL_1: NORMAL
MDC_IDC_LEAD_MFG: NORMAL
MDC_IDC_LEAD_MODEL: NORMAL
MDC_IDC_LEAD_POLARITY_TYPE: NORMAL
MDC_IDC_LEAD_SERIAL: NORMAL
MDC_IDC_LEAD_SPECIAL_FUNCTION: NORMAL
MDC_IDC_MSMT_BATTERY_DTM: NORMAL
MDC_IDC_MSMT_BATTERY_REMAINING_LONGEVITY: 144 MO
MDC_IDC_MSMT_BATTERY_REMAINING_PERCENTAGE: 100 %
MDC_IDC_MSMT_BATTERY_STATUS: NORMAL
MDC_IDC_MSMT_CAP_CHARGE_DTM: NORMAL
MDC_IDC_MSMT_CAP_CHARGE_TIME: 10.1 S
MDC_IDC_MSMT_CAP_CHARGE_TYPE: NORMAL
MDC_IDC_MSMT_LEADCHNL_RV_IMPEDANCE_VALUE: 624 OHM
MDC_IDC_MSMT_LEADCHNL_RV_PACING_THRESHOLD_AMPLITUDE: 0.7 V
MDC_IDC_MSMT_LEADCHNL_RV_PACING_THRESHOLD_PULSEWIDTH: 0.4 MS
MDC_IDC_PG_IMPLANT_DTM: NORMAL
MDC_IDC_PG_MFG: NORMAL
MDC_IDC_PG_MODEL: NORMAL
MDC_IDC_PG_SERIAL: NORMAL
MDC_IDC_PG_TYPE: NORMAL
MDC_IDC_SESS_CLINIC_NAME: NORMAL
MDC_IDC_SESS_DTM: NORMAL
MDC_IDC_SESS_TYPE: NORMAL
MDC_IDC_SET_BRADY_LOWRATE: 50 {BEATS}/MIN
MDC_IDC_SET_BRADY_MODE: NORMAL
MDC_IDC_SET_LEADCHNL_RV_PACING_AMPLITUDE: 1.5 V
MDC_IDC_SET_LEADCHNL_RV_PACING_POLARITY: NORMAL
MDC_IDC_SET_LEADCHNL_RV_PACING_PULSEWIDTH: 0.4 MS
MDC_IDC_SET_LEADCHNL_RV_SENSING_ADAPTATION_MODE: NORMAL
MDC_IDC_SET_LEADCHNL_RV_SENSING_POLARITY: NORMAL
MDC_IDC_SET_LEADCHNL_RV_SENSING_SENSITIVITY: 0.6 MV
MDC_IDC_SET_ZONE_DETECTION_INTERVAL: 286 MS
MDC_IDC_SET_ZONE_DETECTION_INTERVAL: 333 MS
MDC_IDC_SET_ZONE_DETECTION_INTERVAL: 400 MS
MDC_IDC_SET_ZONE_TYPE: NORMAL
MDC_IDC_SET_ZONE_VENDOR_TYPE: NORMAL
MDC_IDC_STAT_BRADY_DTM_END: NORMAL
MDC_IDC_STAT_BRADY_DTM_START: NORMAL
MDC_IDC_STAT_BRADY_RV_PERCENT_PACED: 3 %
MDC_IDC_STAT_EPISODE_RECENT_COUNT: 0
MDC_IDC_STAT_EPISODE_RECENT_COUNT: 6
MDC_IDC_STAT_EPISODE_RECENT_COUNT: 92
MDC_IDC_STAT_EPISODE_RECENT_COUNT_DTM_END: NORMAL
MDC_IDC_STAT_EPISODE_RECENT_COUNT_DTM_START: NORMAL
MDC_IDC_STAT_EPISODE_TYPE: NORMAL
MDC_IDC_STAT_EPISODE_VENDOR_TYPE: NORMAL
MDC_IDC_STAT_TACHYTHERAPY_ATP_DELIVERED_RECENT: 0
MDC_IDC_STAT_TACHYTHERAPY_ATP_DELIVERED_TOTAL: 1
MDC_IDC_STAT_TACHYTHERAPY_RECENT_DTM_END: NORMAL
MDC_IDC_STAT_TACHYTHERAPY_RECENT_DTM_START: NORMAL
MDC_IDC_STAT_TACHYTHERAPY_SHOCKS_ABORTED_RECENT: 0
MDC_IDC_STAT_TACHYTHERAPY_SHOCKS_ABORTED_TOTAL: 1
MDC_IDC_STAT_TACHYTHERAPY_SHOCKS_DELIVERED_RECENT: 0
MDC_IDC_STAT_TACHYTHERAPY_SHOCKS_DELIVERED_TOTAL: 0
MDC_IDC_STAT_TACHYTHERAPY_TOTAL_DTM_END: NORMAL
MDC_IDC_STAT_TACHYTHERAPY_TOTAL_DTM_START: NORMAL

## 2021-12-14 ENCOUNTER — ANTICOAGULATION THERAPY VISIT (OUTPATIENT)
Dept: ANTICOAGULATION | Facility: CLINIC | Age: 71
End: 2021-12-14

## 2021-12-14 ENCOUNTER — LAB (OUTPATIENT)
Dept: CARDIOLOGY | Facility: CLINIC | Age: 71
End: 2021-12-14
Payer: MEDICARE

## 2021-12-14 DIAGNOSIS — I48.20 CHRONIC ATRIAL FIBRILLATION (H): Primary | ICD-10-CM

## 2021-12-14 DIAGNOSIS — Z79.01 LONG TERM (CURRENT) USE OF ANTICOAGULANTS: Primary | ICD-10-CM

## 2021-12-14 LAB — INR BLD: 1.9 (ref 2–3)

## 2021-12-14 PROCEDURE — 36416 COLLJ CAPILLARY BLOOD SPEC: CPT

## 2021-12-14 PROCEDURE — 85610 PROTHROMBIN TIME: CPT

## 2021-12-14 NOTE — PROGRESS NOTES
ANTICOAGULATION MANAGEMENT     Zach Garcia 71 year old male is on warfarin with subtherapeutic INR result. (Goal INR 2.0-3.0)    Recent labs: (last 7 days)     12/14/21  0819   INR 1.9*       ASSESSMENT     Source(s): Chart Review and Template       Warfarin doses taken: Warfarin taken as instructed    Diet: No new diet changes identified    New illness, injury, or hospitalization: No    Medication/supplement changes: None noted    Signs or symptoms of bleeding or clotting: No    Previous INR: Therapeutic last 2(+) visits    Additional findings: None     PLAN     Recommended plan for no diet, medication or health factor changes affecting INR     Dosing Instructions: Booster dose then continue your current warfarin dose with next INR in 2 weeks       Summary  As of 12/14/2021    Full warfarin instructions:  12/14: 5 mg; Otherwise 5 mg every Mon, Wed, Fri; 2.5 mg all other days   Next INR check:  12/28/2021             Detailed voice message left for Zach with dosing instructions and follow up date.     Contact [phone] to schedule and with any changes, questions or concerns.     Education provided: Goal range and significance of current result    Plan made per [facility] anticoagulation protocol    Terri Cornell RN  Anticoagulation Clinic  12/14/2021    _______________________________________________________________________     Anticoagulation Episode Summary     Current INR goal:  2.0-3.0   TTR:  56.5 % (5.8 mo)   Target end date:  Indefinite   Send INR reminders to:  [facility]    Indications    Chronic atrial fibrillation (H) [I48.20]           Comments:           Anticoagulation Care Providers     Provider Role Specialty Phone number    Aram Martel MD  Cardiovascular Disease [phone]

## 2021-12-28 ENCOUNTER — LAB (OUTPATIENT)
Dept: CARDIOLOGY | Facility: CLINIC | Age: 71
End: 2021-12-28
Payer: COMMERCIAL

## 2021-12-28 ENCOUNTER — ANTICOAGULATION THERAPY VISIT (OUTPATIENT)
Dept: ANTICOAGULATION | Facility: CLINIC | Age: 71
End: 2021-12-28

## 2021-12-28 DIAGNOSIS — I48.20 CHRONIC ATRIAL FIBRILLATION (H): Primary | ICD-10-CM

## 2021-12-28 DIAGNOSIS — Z79.01 LONG TERM CURRENT USE OF ANTICOAGULANT THERAPY: Primary | ICD-10-CM

## 2021-12-28 LAB — INR BLD: 2.5 (ref 2–3)

## 2021-12-28 PROCEDURE — 36416 COLLJ CAPILLARY BLOOD SPEC: CPT

## 2021-12-28 PROCEDURE — 85610 PROTHROMBIN TIME: CPT

## 2021-12-28 NOTE — PROGRESS NOTES
ANTICOAGULATION MANAGEMENT     Zach Garcia 71 year old male is on warfarin with therapeutic INR result. (Goal INR 2.0-3.0)    Recent labs: (last 7 days)     12/28/21  0811   INR 2.5       ASSESSMENT     Source(s): Chart Review and Template       Warfarin doses taken: Warfarin taken as instructed    Diet: No new diet changes identified    New illness, injury, or hospitalization: No    Medication/supplement changes: None noted    Signs or symptoms of bleeding or clotting: No    Previous INR: Subtherapeutic    Additional findings: None     PLAN     Recommended plan for no diet, medication or health factor changes affecting INR     Dosing Instructions: Continue your current warfarin dose with next INR in 2 weeks       Summary  As of 12/28/2021    Full warfarin instructions:  5 mg every Mon, Wed, Fri; 2.5 mg all other days   Next INR check:  1/11/2022             Detailed voice message left for Zach with dosing instructions and follow up date.     Contact [phone] to schedule and with any changes, questions or concerns.     Education provided: None required    Plan made per [facility] anticoagulation protocol    Terri Cornell RN  Anticoagulation Clinic  12/28/2021    _______________________________________________________________________     Anticoagulation Episode Summary     Current INR goal:  2.0-3.0   TTR:  58.5 % (6.2 mo)   Target end date:  Indefinite   Send INR reminders to:  Replaced by [facility]    Indications    Chronic atrial fibrillation (H) [I48.20]           Comments:           Anticoagulation Care Providers     Provider Role Specialty Phone number    Aram Martel MD  Cardiovascular Disease [phone]

## 2022-01-13 ENCOUNTER — LAB (OUTPATIENT)
Dept: CARDIOLOGY | Facility: CLINIC | Age: 72
End: 2022-01-13
Payer: COMMERCIAL

## 2022-01-13 ENCOUNTER — ANTICOAGULATION THERAPY VISIT (OUTPATIENT)
Dept: ANTICOAGULATION | Facility: CLINIC | Age: 72
End: 2022-01-13

## 2022-01-13 DIAGNOSIS — I48.91 A-FIB (H): Primary | ICD-10-CM

## 2022-01-13 DIAGNOSIS — I48.20 CHRONIC ATRIAL FIBRILLATION (H): Primary | ICD-10-CM

## 2022-01-13 LAB — INR POINT OF CARE: 1.2 (ref 0.86–1.14)

## 2022-01-13 PROCEDURE — 85610 PROTHROMBIN TIME: CPT

## 2022-01-13 PROCEDURE — 36416 COLLJ CAPILLARY BLOOD SPEC: CPT

## 2022-01-13 NOTE — PROGRESS NOTES
ANTICOAGULATION MANAGEMENT     Zach Garcia 71 year old male is on warfarin with subtherapeutic INR result. (Goal INR 2.0-3.0)    Recent labs: (last 7 days)     01/13/22  0934   INR 1.2*       ASSESSMENT     Source(s): Chart Review, Patient/Caregiver Call and Template     Warfarin doses taken: Warfarin taken as instructed  Diet: No new diet changes identified  New illness, injury, or hospitalization: No  Medication/supplement changes: Ran out of propranolol missed taking for about 3 days which has potential for interaction; decreasing INR  Signs or symptoms of bleeding or clotting: No  Previous INR: Therapeutic last 2(+) visits  Additional findings: None     PLAN     Recommended plan for temporary change(s) affecting INR     Dosing Instructions: Booster dose then continue your current warfarin dose with next INR in 5-7 days       Summary  As of 1/13/2022    Full warfarin instructions:  1/13: 5 mg; Otherwise 5 mg every Mon, Wed, Fri; 2.5 mg all other days   Next INR check:  1/20/2022             Telephone call with Zach who verbalizes understanding and agrees to plan    Lab visit scheduled    Education provided: Importance of therapeutic range, Potential interaction between warfarin and propranolol, Monitoring for clotting signs and symptoms and When to seek medical attention/emergency care    Plan made per [facility] anticoagulation protocol    Terri Cornell RN  Anticoagulation Clinic  1/13/2022    _______________________________________________________________________     Anticoagulation Episode Summary     Current INR goal:  2.0-3.0   TTR:  56.9 % (6.8 mo)   Target end date:  Indefinite   Send INR reminders to:  [facility] HEART [facility]    Indications    Chronic atrial fibrillation (H) [I48.20]           Comments:           Anticoagulation Care Providers     Provider Role Specialty Phone number    Aram Martel MD  Cardiovascular Disease [phone]

## 2022-01-20 ENCOUNTER — LAB (OUTPATIENT)
Dept: CARDIOLOGY | Facility: CLINIC | Age: 72
End: 2022-01-20
Payer: COMMERCIAL

## 2022-01-20 ENCOUNTER — TELEPHONE (OUTPATIENT)
Dept: NEUROSURGERY | Facility: CLINIC | Age: 72
End: 2022-01-20

## 2022-01-20 ENCOUNTER — ANTICOAGULATION THERAPY VISIT (OUTPATIENT)
Dept: ANTICOAGULATION | Facility: CLINIC | Age: 72
End: 2022-01-20

## 2022-01-20 DIAGNOSIS — Z79.01 LONG TERM (CURRENT) USE OF ANTICOAGULANTS: Primary | ICD-10-CM

## 2022-01-20 DIAGNOSIS — I48.20 CHRONIC ATRIAL FIBRILLATION (H): Primary | ICD-10-CM

## 2022-01-20 LAB — INR BLD: 1.7 (ref 2–3)

## 2022-01-20 PROCEDURE — 36416 COLLJ CAPILLARY BLOOD SPEC: CPT

## 2022-01-20 PROCEDURE — 85610 PROTHROMBIN TIME: CPT

## 2022-01-20 NOTE — PROGRESS NOTES
Anticoagulation Management    Unable to reach Pat today.    Today's INR result of 1.7 is subtherapeutic (goal INR of 2.0-3.0).  Result received from: Clinic Lab    Follow up required to confirm warfarin dose taken and assess for changes     Propanolol increased at neurology appointment yesterday (to 80mg daily for one month, then 120 mg daily). Per note, propanolol has increased pts INR in the past.     Left message to take a booster dose of warfarin,  5 mg tonight. Request call back for assessment.      Anticoagulation clinic to follow up    Mone Kent RN

## 2022-01-20 NOTE — TELEPHONE ENCOUNTER
Reason for Call:  Other returning call    Detailed comments: Pt returning call, tried calling the # nurse left but it was just busy signal.     Phone Number Patient can be reached at: Home number on file [phone] (home)    Best Time: anytime    Can we leave a detailed message on this number? YES, ok to leave message with wife.     Call taken on 1/20/2022 at 12:32 PM by Jessie Mustafa

## 2022-01-20 NOTE — TELEPHONE ENCOUNTER
Will route to [facility] Heart [facility]

## 2022-01-21 NOTE — PROGRESS NOTES
ANTICOAGULATION MANAGEMENT     Zach Garcia 71 year old male is on warfarin with subtherapeutic INR result. (Goal INR 2.0-3.0)    Recent labs: (last 7 days)     01/20/22  0815   INR 1.7*       ASSESSMENT     Source(s): Chart Review and Patient/Caregiver Call     Warfarin doses taken: Warfarin taken as instructed  Diet: No new diet changes identified  New illness, injury, or hospitalization: No  Medication/supplement changes: Currently taking Propranolol 60 mg and will increase to 80 mg daily starting today  for one month then 120 mg daily which has potential interaction of increasing INR  Signs or symptoms of bleeding or clotting: No  Previous INR: Subtherapeutic  Additional findings: Patient confirmed that he took warfarin 5 mg dose yesterday.     PLAN     Recommended plan for ongoing change(s) affecting INR     Dosing Instructions: Continue your current warfarin dose with next INR in 1 week       Summary  As of 1/20/2022    Full warfarin instructions:  1/20: 5 mg; Otherwise 5 mg every Mon, Wed, Fri; 2.5 mg all other days   Next INR check:  1/27/2022             Telephone call with Zach who verbalizes understanding and agrees to plan    Lab visit scheduled    Education provided: Potential interaction between warfarin and propranolol ( increased dose)    Plan made per [facility] anticoagulation protocol    Terri Cornell RN  Anticoagulation Clinic  1/21/2022    _______________________________________________________________________     Anticoagulation Episode Summary     Current INR goal:  2.0-3.0   TTR:  55.1 % (7 mo)   Target end date:  Indefinite   Send INR reminders to:  [facility] HEART [facility]    Indications    Chronic atrial fibrillation (H) [I48.20]           Comments:           Anticoagulation Care Providers     Provider Role Specialty Phone number    Aram Martel MD  Cardiovascular Disease [phone]

## 2022-01-27 ENCOUNTER — LAB (OUTPATIENT)
Dept: CARDIOLOGY | Facility: CLINIC | Age: 72
End: 2022-01-27
Payer: COMMERCIAL

## 2022-01-27 ENCOUNTER — ANTICOAGULATION THERAPY VISIT (OUTPATIENT)
Dept: ANTICOAGULATION | Facility: CLINIC | Age: 72
End: 2022-01-27

## 2022-01-27 DIAGNOSIS — Z79.01 LONG TERM (CURRENT) USE OF ANTICOAGULANTS: Primary | ICD-10-CM

## 2022-01-27 DIAGNOSIS — I48.20 CHRONIC ATRIAL FIBRILLATION (H): Primary | ICD-10-CM

## 2022-01-27 LAB — INR BLD: 2.4 (ref 2–3)

## 2022-01-27 PROCEDURE — 36416 COLLJ CAPILLARY BLOOD SPEC: CPT

## 2022-01-27 PROCEDURE — 85610 PROTHROMBIN TIME: CPT

## 2022-01-27 NOTE — PROGRESS NOTES
ANTICOAGULATION MANAGEMENT     Zach Garcia 71 year old male is on warfarin with therapeutic INR result. (Goal INR 2.0-3.0)    Recent labs: (last 7 days)     01/27/22  0821   INR 2.4       ASSESSMENT     Source(s): Chart Review and Template     Warfarin doses taken: Warfarin taken as instructed  Diet: No new diet changes identified  New illness, injury, or hospitalization: No  Medication/supplement changes: Propranolol  dose increased 80 mg on 1/20/22 for 1 month ( then increase to 120 mg thereafter ) which has potential for interaction; increasing INR  Signs or symptoms of bleeding or clotting: No  Previous INR: Subtherapeutic  Additional findings: None     PLAN     Recommended plan for ongoing change(s) affecting INR     Dosing Instructions: Continue your current warfarin dose with next INR in 2 weeks       Summary  As of 1/27/2022    Full warfarin instructions:  5 mg every Mon, Wed, Fri; 2.5 mg all other days   Next INR check:  2/10/2022           Telephone call with Zach who verbalizes understanding and agrees to plan.    Lab visit scheduled.      Education provided: Potential interaction between warfarin and Propranolol    Plan made per [facility] anticoagulation protocol    Terri Cornell RN  Anticoagulation Clinic  1/27/2022    _______________________________________________________________________     Anticoagulation Episode Summary     Current INR goal:  2.0-3.0   TTR:  55.1 % (7.2 mo)   Target end date:  Indefinite   Send INR reminders to:  [facility]    Indications    Chronic atrial fibrillation (H) [I48.20]           Comments:           Anticoagulation Care Providers     Provider Role Specialty Phone number    Aram Martel MD  Cardiovascular Disease [phone]

## 2022-02-10 ENCOUNTER — ANTICOAGULATION THERAPY VISIT (OUTPATIENT)
Dept: ANTICOAGULATION | Facility: CLINIC | Age: 72
End: 2022-02-10

## 2022-02-10 ENCOUNTER — LAB (OUTPATIENT)
Dept: CARDIOLOGY | Facility: CLINIC | Age: 72
End: 2022-02-10
Payer: COMMERCIAL

## 2022-02-10 DIAGNOSIS — I48.20 CHRONIC ATRIAL FIBRILLATION (H): Primary | ICD-10-CM

## 2022-02-10 LAB — INR POINT OF CARE: 2.7 (ref 0.86–1.14)

## 2022-02-10 PROCEDURE — 85610 PROTHROMBIN TIME: CPT

## 2022-02-10 PROCEDURE — 36416 COLLJ CAPILLARY BLOOD SPEC: CPT

## 2022-02-10 NOTE — PROGRESS NOTES
ANTICOAGULATION MANAGEMENT     Zach Garcia 71 year old male is on warfarin with therapeutic INR result. (Goal INR 2.0-3.0)    Recent labs: (last 7 days)     02/10/22  0838   INR 2.7*       ASSESSMENT     Source(s): Chart Review and Template     Warfarin doses taken: Warfarin taken as instructed  Diet: No new diet changes identified  New illness, injury, or hospitalization: No  Medication/supplement changes: Propranolol dose increased to 80 mg on 1/20 x one month then increase to 120 mg thereafter which has potential for interaction; increasing INR  Signs or symptoms of bleeding or clotting: No  Previous INR: Therapeutic last visit; previously outside of goal range  Additional findings: None     PLAN     Recommended plan for ongoing change(s) affecting INR     Dosing Instructions: Continue your current warfarin dose with next INR in 3 weeks       Summary  As of 2/10/2022    Full warfarin instructions:  5 mg every Mon, Wed, Fri; 2.5 mg all other days   Next INR check:  3/3/2022             Telephone call with Zach who verbalizes understanding and agrees to plan    Lab visit scheduled    Education provided: Monitoring for bleeding signs and symptoms advised to come in sooner if noted.    Plan made per [facility] anticoagulation protocol    Terri Cornell RN  Anticoagulation Clinic  2/10/2022    _______________________________________________________________________     Anticoagulation Episode Summary     Current INR goal:  2.0-3.0   TTR:  57.8 % (7.7 mo)   Target end date:  Indefinite   Send INR reminders to:  [facility] HEART [facility]    Indications    Chronic atrial fibrillation (H) [I48.20]           Comments:           Anticoagulation Care Providers     Provider Role Specialty Phone number    Aram Martel MD  Cardiovascular Disease [phone]

## 2022-02-17 ENCOUNTER — TELEPHONE (OUTPATIENT)
Dept: ANTICOAGULATION | Facility: CLINIC | Age: 72
End: 2022-02-17
Payer: COMMERCIAL

## 2022-02-17 DIAGNOSIS — I48.20 CHRONIC ATRIAL FIBRILLATION (H): Primary | ICD-10-CM

## 2022-02-17 RX ORDER — WARFARIN SODIUM 5 MG/1
2.5-5 TABLET ORAL DAILY
Qty: 80 TABLET | Refills: 1 | Status: SHIPPED | OUTPATIENT
Start: 2022-02-17 | End: 2022-08-15

## 2022-02-17 NOTE — TELEPHONE ENCOUNTER
Lab Results   Component Value Date    INR 2.7 (A) 02/10/2022    INR 2.4 01/27/2022    INR 1.7 (A) 01/20/2022       Patient's current Warfarin doses:    5 mg every Mon, Wed, Fri; 2.5 mg all other days           Next INR check is on 3/3/2022      Patient's last OV with PCP or HCC was on 8/25/2021    Warfarin prescription 3 month supply and one refill sent to patient's pharmacy today.      Terri Cornell RN [facility]    [phone]

## 2022-02-17 NOTE — TELEPHONE ENCOUNTER
----- Message from Tori Otero RN sent at 2/17/2022 11:09 AM CST -----  Regarding: Refill request  Received a fax for a warfarin refill request for patient from [facility]     Phone: [phone]  Fax: [phone]    Please address.    Thank you,  Tori SPIVEY

## 2022-03-03 ENCOUNTER — ANTICOAGULATION THERAPY VISIT (OUTPATIENT)
Dept: ANTICOAGULATION | Facility: CLINIC | Age: 72
End: 2022-03-03

## 2022-03-03 ENCOUNTER — LAB (OUTPATIENT)
Dept: CARDIOLOGY | Facility: CLINIC | Age: 72
End: 2022-03-03
Payer: COMMERCIAL

## 2022-03-03 DIAGNOSIS — Z79.01 LONG TERM CURRENT USE OF ANTICOAGULANT THERAPY: Primary | ICD-10-CM

## 2022-03-03 DIAGNOSIS — I48.20 CHRONIC ATRIAL FIBRILLATION (H): Primary | ICD-10-CM

## 2022-03-03 LAB — INR BLD: 2 (ref 2–3)

## 2022-03-03 PROCEDURE — 85610 PROTHROMBIN TIME: CPT

## 2022-03-03 PROCEDURE — 36416 COLLJ CAPILLARY BLOOD SPEC: CPT

## 2022-03-03 NOTE — PROGRESS NOTES
ANTICOAGULATION MANAGEMENT     Zach Garcia 71 year old male is on warfarin with therapeutic INR result. (Goal INR 2.0-3.0)    Recent labs: (last 7 days)     03/03/22  0817   INR 2.0       ASSESSMENT     Source(s): Chart Review and Template     Warfarin doses taken: Warfarin taken as instructed  Diet: No new diet changes identified  New illness, injury, or hospitalization: No  Medication/supplement changes: None noted  Signs or symptoms of bleeding or clotting: No  Previous INR: Therapeutic last 2(+) visits  Additional findings: None       PLAN     Recommended plan for no diet, medication or health factor changes affecting INR     Dosing Instructions: Continue your current warfarin dose with next INR in 4 weeks       Summary  As of 3/3/2022    Full warfarin instructions:  5 mg every Mon, Wed, Fri; 2.5 mg all other days   Next INR check:  3/31/2022             Detailed voice message left for Zach with dosing instructions and follow up date.     Contact [phone] to schedule and with any changes, questions or concerns.     Education provided: Please call back if any changes to your diet, medications or how you've been taking warfarin    Plan made per [facility] anticoagulation protocol    Mone Kent RN  Anticoagulation Clinic  3/3/2022    _______________________________________________________________________     Anticoagulation Episode Summary     Current INR goal:  2.0-3.0   TTR:  61.4 % (8.4 mo)   Target end date:  Indefinite   Send INR reminders to:  [facility]    Indications    Chronic atrial fibrillation (H) [I48.20]           Comments:           Anticoagulation Care Providers     Provider Role Specialty Phone number    Aram Martel MD  Cardiovascular Disease [phone]

## 2022-03-18 ENCOUNTER — ANCILLARY PROCEDURE (OUTPATIENT)
Dept: CARDIOLOGY | Facility: CLINIC | Age: 72
End: 2022-03-18
Attending: INTERNAL MEDICINE
Payer: COMMERCIAL

## 2022-03-18 DIAGNOSIS — I42.8 NON-ISCHEMIC CARDIOMYOPATHY (H): ICD-10-CM

## 2022-03-18 DIAGNOSIS — Z95.810 ICD (IMPLANTABLE CARDIOVERTER-DEFIBRILLATOR) IN PLACE: ICD-10-CM

## 2022-03-18 LAB
MDC_IDC_EPISODE_DTM: NORMAL
MDC_IDC_EPISODE_DURATION: 14 S
MDC_IDC_EPISODE_ID: NORMAL
MDC_IDC_EPISODE_TYPE: NORMAL
MDC_IDC_LEAD_IMPLANT_DT: NORMAL
MDC_IDC_LEAD_LOCATION: NORMAL
MDC_IDC_LEAD_LOCATION_DETAIL_1: NORMAL
MDC_IDC_LEAD_MFG: NORMAL
MDC_IDC_LEAD_MODEL: NORMAL
MDC_IDC_LEAD_POLARITY_TYPE: NORMAL
MDC_IDC_LEAD_SERIAL: NORMAL
MDC_IDC_LEAD_SPECIAL_FUNCTION: NORMAL
MDC_IDC_MSMT_BATTERY_DTM: NORMAL
MDC_IDC_MSMT_BATTERY_REMAINING_LONGEVITY: 144 MO
MDC_IDC_MSMT_BATTERY_REMAINING_PERCENTAGE: 100 %
MDC_IDC_MSMT_BATTERY_STATUS: NORMAL
MDC_IDC_MSMT_CAP_CHARGE_DTM: NORMAL
MDC_IDC_MSMT_CAP_CHARGE_TIME: 10.2 S
MDC_IDC_MSMT_CAP_CHARGE_TYPE: NORMAL
MDC_IDC_MSMT_LEADCHNL_RV_IMPEDANCE_VALUE: 616 OHM
MDC_IDC_MSMT_LEADCHNL_RV_PACING_THRESHOLD_AMPLITUDE: 0.7 V
MDC_IDC_MSMT_LEADCHNL_RV_PACING_THRESHOLD_PULSEWIDTH: 0.4 MS
MDC_IDC_PG_IMPLANT_DTM: NORMAL
MDC_IDC_PG_MFG: NORMAL
MDC_IDC_PG_MODEL: NORMAL
MDC_IDC_PG_SERIAL: NORMAL
MDC_IDC_PG_TYPE: NORMAL
MDC_IDC_SESS_CLINIC_NAME: NORMAL
MDC_IDC_SESS_DTM: NORMAL
MDC_IDC_SESS_TYPE: NORMAL
MDC_IDC_SET_BRADY_LOWRATE: 50 {BEATS}/MIN
MDC_IDC_SET_BRADY_MODE: NORMAL
MDC_IDC_SET_LEADCHNL_RV_PACING_AMPLITUDE: 1.5 V
MDC_IDC_SET_LEADCHNL_RV_PACING_POLARITY: NORMAL
MDC_IDC_SET_LEADCHNL_RV_PACING_PULSEWIDTH: 0.4 MS
MDC_IDC_SET_LEADCHNL_RV_SENSING_ADAPTATION_MODE: NORMAL
MDC_IDC_SET_LEADCHNL_RV_SENSING_POLARITY: NORMAL
MDC_IDC_SET_LEADCHNL_RV_SENSING_SENSITIVITY: 0.6 MV
MDC_IDC_SET_ZONE_DETECTION_INTERVAL: 286 MS
MDC_IDC_SET_ZONE_DETECTION_INTERVAL: 333 MS
MDC_IDC_SET_ZONE_DETECTION_INTERVAL: 400 MS
MDC_IDC_SET_ZONE_TYPE: NORMAL
MDC_IDC_SET_ZONE_VENDOR_TYPE: NORMAL
MDC_IDC_STAT_BRADY_DTM_END: NORMAL
MDC_IDC_STAT_BRADY_DTM_START: NORMAL
MDC_IDC_STAT_BRADY_RV_PERCENT_PACED: 5 %
MDC_IDC_STAT_EPISODE_RECENT_COUNT: 0
MDC_IDC_STAT_EPISODE_RECENT_COUNT: 1
MDC_IDC_STAT_EPISODE_RECENT_COUNT_DTM_END: NORMAL
MDC_IDC_STAT_EPISODE_RECENT_COUNT_DTM_START: NORMAL
MDC_IDC_STAT_EPISODE_TYPE: NORMAL
MDC_IDC_STAT_EPISODE_VENDOR_TYPE: NORMAL
MDC_IDC_STAT_TACHYTHERAPY_ATP_DELIVERED_RECENT: 0
MDC_IDC_STAT_TACHYTHERAPY_ATP_DELIVERED_TOTAL: 1
MDC_IDC_STAT_TACHYTHERAPY_RECENT_DTM_END: NORMAL
MDC_IDC_STAT_TACHYTHERAPY_RECENT_DTM_START: NORMAL
MDC_IDC_STAT_TACHYTHERAPY_SHOCKS_ABORTED_RECENT: 0
MDC_IDC_STAT_TACHYTHERAPY_SHOCKS_ABORTED_TOTAL: 1
MDC_IDC_STAT_TACHYTHERAPY_SHOCKS_DELIVERED_RECENT: 0
MDC_IDC_STAT_TACHYTHERAPY_SHOCKS_DELIVERED_TOTAL: 0
MDC_IDC_STAT_TACHYTHERAPY_TOTAL_DTM_END: NORMAL
MDC_IDC_STAT_TACHYTHERAPY_TOTAL_DTM_START: NORMAL

## 2022-03-18 PROCEDURE — 93296 REM INTERROG EVL PM/IDS: CPT | Performed by: INTERNAL MEDICINE

## 2022-03-18 PROCEDURE — 93295 DEV INTERROG REMOTE 1/2/MLT: CPT | Performed by: INTERNAL MEDICINE

## 2022-04-05 ENCOUNTER — LAB (OUTPATIENT)
Dept: CARDIOLOGY | Facility: CLINIC | Age: 72
End: 2022-04-05
Payer: COMMERCIAL

## 2022-04-05 ENCOUNTER — ANTICOAGULATION THERAPY VISIT (OUTPATIENT)
Dept: ANTICOAGULATION | Facility: CLINIC | Age: 72
End: 2022-04-05

## 2022-04-05 DIAGNOSIS — I48.20 CHRONIC ATRIAL FIBRILLATION (H): ICD-10-CM

## 2022-04-05 DIAGNOSIS — I48.20 CHRONIC ATRIAL FIBRILLATION (H): Primary | ICD-10-CM

## 2022-04-05 LAB — INR POINT OF CARE: 3 (ref 0.9–1.1)

## 2022-04-05 PROCEDURE — 85610 PROTHROMBIN TIME: CPT

## 2022-04-05 PROCEDURE — 36416 COLLJ CAPILLARY BLOOD SPEC: CPT

## 2022-04-05 NOTE — PROGRESS NOTES
ANTICOAGULATION MANAGEMENT     Zach Garcia 72 year old male is on warfarin with therapeutic INR result. (Goal INR 2.0-3.0)    Recent labs: (last 7 days)     04/05/22  0837   INR 3.0*       ASSESSMENT     Source(s): Chart Review and Template     Warfarin doses taken: Warfarin taken as instructed  Diet: No new diet changes identified  New illness, injury, or hospitalization: No  Medication/supplement changes: None noted  Signs or symptoms of bleeding or clotting: No  Previous INR: Therapeutic last 2(+) visits  Additional findings: None       PLAN     Recommended plan for no diet, medication or health factor changes affecting INR     Dosing Instructions: continue your current warfarin dose with next INR in 4 weeks       Summary  As of 4/5/2022    Full warfarin instructions:  5 mg every Mon, Wed, Fri; 2.5 mg all other days   Next INR check:  5/3/2022             Detailed voice message left for Pat with dosing instructions and follow up date.     Contact [phone] to schedule and with any changes, questions or concerns.     Education provided: None required    Plan made per [facility] anticoagulation protocol    Terri Cornell RN  Anticoagulation Clinic  4/5/2022    _______________________________________________________________________     Anticoagulation Episode Summary     Current INR goal:  2.0-3.0   TTR:  65.8 % (9.5 mo)   Target end date:  Indefinite   Send INR reminders to:  [facility]    Indications    Chronic atrial fibrillation (H) [I48.20]           Comments:           Anticoagulation Care Providers     Provider Role Specialty Phone number    Aram Martel MD  Cardiovascular Disease [phone]

## 2022-05-05 ENCOUNTER — LAB (OUTPATIENT)
Dept: CARDIOLOGY | Facility: CLINIC | Age: 72
End: 2022-05-05
Payer: COMMERCIAL

## 2022-05-05 ENCOUNTER — ANTICOAGULATION THERAPY VISIT (OUTPATIENT)
Dept: ANTICOAGULATION | Facility: CLINIC | Age: 72
End: 2022-05-05

## 2022-05-05 ENCOUNTER — TELEPHONE (OUTPATIENT)
Dept: NEUROSURGERY | Facility: CLINIC | Age: 72
End: 2022-05-05

## 2022-05-05 DIAGNOSIS — I48.20 CHRONIC ATRIAL FIBRILLATION (H): Primary | ICD-10-CM

## 2022-05-05 DIAGNOSIS — Z79.01 LONG TERM (CURRENT) USE OF ANTICOAGULANTS: Primary | ICD-10-CM

## 2022-05-05 LAB — INR POINT OF CARE: 1.8 (ref 0.9–1.1)

## 2022-05-05 PROCEDURE — 36416 COLLJ CAPILLARY BLOOD SPEC: CPT

## 2022-05-05 PROCEDURE — 85610 PROTHROMBIN TIME: CPT

## 2022-05-05 NOTE — TELEPHONE ENCOUNTER
Reason for call:  Other   Patient called regarding (reason for call): call back  Additional comments: Patient calling back.    Phone number to reach patient:  Other phone number:  [phone]    Best Time:  Anytime    Can we leave a detailed message on this number?  YES    Travel screening: Not Applicable

## 2022-05-05 NOTE — PROGRESS NOTES
ANTICOAGULATION MANAGEMENT     Zach Garcia 72 year old male is on warfarin with subtherapeutic INR result. (Goal INR 2.0-3.0)    Recent labs: (last 7 days)     05/05/22  0816   INR 1.8*       ASSESSMENT     Source(s): Chart Review and Template     Warfarin doses taken: Warfarin taken as instructed  Diet: No new diet changes identified  New illness, injury, or hospitalization: Yes: 4/8 ED visit for fall from ladder; acute confusion  Medication/supplement changes: None noted  Signs or symptoms of bleeding or clotting: No  Previous INR: Therapeutic last 2(+) visits  Additional findings: None       PLAN     Recommended plan for no diet, medication or health factor changes affecting INR     Dosing Instructions: booster dose then continue your current warfarin dose with next INR in 2-3 weeks       Summary  As of 5/5/2022    Full warfarin instructions:  5/5: 5 mg; Otherwise 5 mg every Mon, Wed, Fri; 2.5 mg all other days   Next INR check:  5/26/2022             Telephone call with Pat who verbalizes understanding and agrees to plan and who agrees to plan and repeated back plan correctly    Patient offered & declined to schedule next visit    Education provided: Importance of therapeutic range and Contact [phone] with any changes, questions or concerns.     Plan made per [facility] anticoagulation protocol    Terri Cornell RN  Anticoagulation Clinic  5/5/2022    _______________________________________________________________________     Anticoagulation Episode Summary     Current INR goal:  2.0-3.0   TTR:  67.5 % (10.5 mo)   Target end date:  Indefinite   Send INR reminders to:  [facility]    Indications    Chronic atrial fibrillation (H) [I48.20]           Comments:           Anticoagulation Care Providers     Provider Role Specialty Phone number    Aram Martel MD  Cardiovascular Disease [phone]

## 2022-05-05 NOTE — TELEPHONE ENCOUNTER
Reason for Call:  INR    Who is calling?  Patient     Phone number:  [phone]    Fax number:      Name of caller: Zach Garcia     INR Value:      Are there any other concerns:  Yes: Returning call     Can we leave a detailed message on this number? YES    Phone number patient can be reached at: Cell number on file:    Telephone Information:   Mobile [phone]         Call taken on 5/5/2022 at 12:36 PM by Paige Dawn

## 2022-05-26 ENCOUNTER — LAB (OUTPATIENT)
Dept: CARDIOLOGY | Facility: CLINIC | Age: 72
End: 2022-05-26
Payer: COMMERCIAL

## 2022-05-26 ENCOUNTER — ANTICOAGULATION THERAPY VISIT (OUTPATIENT)
Dept: ANTICOAGULATION | Facility: CLINIC | Age: 72
End: 2022-05-26
Payer: COMMERCIAL

## 2022-05-26 DIAGNOSIS — I48.20 CHRONIC ATRIAL FIBRILLATION (H): Primary | ICD-10-CM

## 2022-05-26 DIAGNOSIS — Z79.01 LONG TERM (CURRENT) USE OF ANTICOAGULANTS: Primary | ICD-10-CM

## 2022-05-26 LAB — INR POINT OF CARE: 1.6 (ref 0.9–1.1)

## 2022-05-26 PROCEDURE — 36416 COLLJ CAPILLARY BLOOD SPEC: CPT

## 2022-05-26 PROCEDURE — 85610 PROTHROMBIN TIME: CPT

## 2022-05-26 NOTE — PROGRESS NOTES
ANTICOAGULATION MANAGEMENT     Zach Garcia 72 year old male is on warfarin with subtherapeutic INR result. (Goal INR 2.0-3.0)    Recent labs: (last 7 days)     05/26/22  0802   INR 1.6*       ASSESSMENT     Source(s): Chart Review and Template     Warfarin doses taken: Warfarin taken as instructed  Diet: No new diet changes identified  New illness, injury, or hospitalization: No  Medication/supplement changes: None noted  Signs or symptoms of bleeding or clotting: No  Previous INR: Subtherapeutic  Additional findings: None       PLAN     Recommended plan for no diet, medication or health factor changes affecting INR     Dosing Instructions: booster dose then Increase your warfarin dose (10% change) with next INR in 2 weeks       Summary  As of 5/26/2022    Full warfarin instructions:  5/26: 5 mg; Otherwise 2.5 mg every Sun, Tue, Thu; 5 mg all other days   Next INR check:  6/9/2022             Telephone call with Pat who verbalizes understanding and agrees to plan    Patient offered & declined to schedule next visit    Education provided: Goal range and significance of current result, Importance of therapeutic range, Monitoring for bleeding signs and symptoms and When to seek medical attention/emergency care    Plan made per [facility] anticoagulation protocol    Terri Cornell RN  Anticoagulation Clinic  5/26/2022    _______________________________________________________________________     Anticoagulation Episode Summary     Current INR goal:  2.0-3.0   TTR:  63.3 % (11.2 mo)   Target end date:  Indefinite   Send INR reminders to:  [facility]    Indications    Chronic atrial fibrillation (H) [I48.20]           Comments:           Anticoagulation Care Providers     Provider Role Specialty Phone number    Aram Martel MD  Cardiovascular Disease [phone]

## 2022-06-09 ENCOUNTER — LAB (OUTPATIENT)
Dept: CARDIOLOGY | Facility: CLINIC | Age: 72
End: 2022-06-09
Payer: COMMERCIAL

## 2022-06-09 ENCOUNTER — ANTICOAGULATION THERAPY VISIT (OUTPATIENT)
Dept: ANTICOAGULATION | Facility: CLINIC | Age: 72
End: 2022-06-09

## 2022-06-09 DIAGNOSIS — I48.20 CHRONIC ATRIAL FIBRILLATION (H): Primary | ICD-10-CM

## 2022-06-09 DIAGNOSIS — Z79.01 LONG TERM (CURRENT) USE OF ANTICOAGULANTS: Primary | ICD-10-CM

## 2022-06-09 LAB — INR POINT OF CARE: 2.6 (ref 0.9–1.1)

## 2022-06-09 PROCEDURE — 36416 COLLJ CAPILLARY BLOOD SPEC: CPT

## 2022-06-09 PROCEDURE — 85610 PROTHROMBIN TIME: CPT

## 2022-06-09 NOTE — PROGRESS NOTES
ANTICOAGULATION MANAGEMENT     Zach Garcia 72 year old male is on warfarin with therapeutic INR result. (Goal INR 2.0-3.0)    Recent labs: (last 7 days)     06/09/22  0734   INR 2.6*       ASSESSMENT     Source(s): Chart Review, Patient/Caregiver Call and Template     Warfarin doses taken: Warfarin taken as instructed  Diet: No new diet changes identified  New illness, injury, or hospitalization: No  Medication/supplement changes: None noted  Signs or symptoms of bleeding or clotting: No  Previous INR: Subtherapeutic  Additional findings: None       PLAN     Recommended plan for no diet, medication or health factor changes affecting INR     Dosing Instructions: continue your current warfarin dose with next INR in 3 weeks       Summary  As of 6/9/2022    Full warfarin instructions:  2.5 mg every Sun, Tue, Thu; 5 mg all other days   Next INR check:  7/7/2022             Telephone call with Alea who verbalizes understanding and agrees to plan    Patient elected to schedule next visit 7/7    Education provided: Goal range and significance of current result    Plan made per [facility] anticoagulation protocol    Mone Kent RN  Anticoagulation Clinic  6/9/2022    _______________________________________________________________________     Anticoagulation Episode Summary     Current INR goal:  2.0-3.0   TTR:  63.1 % (11.6 mo)   Target end date:  Indefinite   Send INR reminders to:  [facility]    Indications    Chronic atrial fibrillation (H) [I48.20]           Comments:           Anticoagulation Care Providers     Provider Role Specialty Phone number    Aram Martel MD  Cardiovascular Disease [phone]

## 2022-06-27 ENCOUNTER — TELEPHONE (OUTPATIENT)
Dept: CARDIOLOGY | Facility: CLINIC | Age: 72
End: 2022-06-27

## 2022-06-27 DIAGNOSIS — I42.0 CARDIOMYOPATHY, DILATED, NONISCHEMIC (H): ICD-10-CM

## 2022-06-27 DIAGNOSIS — I47.29 PAROXYSMAL VENTRICULAR TACHYCARDIA (H): ICD-10-CM

## 2022-06-27 DIAGNOSIS — I49.3 FREQUENT UNIFOCAL PREMATURE VENTRICULAR CONTRACTIONS: ICD-10-CM

## 2022-06-27 DIAGNOSIS — I25.84 CORONARY ARTERY DISEASE DUE TO CALCIFIED CORONARY LESION: ICD-10-CM

## 2022-06-27 DIAGNOSIS — I50.20 SYSTOLIC HEART FAILURE, UNSPECIFIED HF CHRONICITY (H): ICD-10-CM

## 2022-06-27 DIAGNOSIS — I25.10 CORONARY ARTERY DISEASE DUE TO CALCIFIED CORONARY LESION: ICD-10-CM

## 2022-06-27 DIAGNOSIS — I10 ESSENTIAL HYPERTENSION WITH GOAL BLOOD PRESSURE LESS THAN 130/80: ICD-10-CM

## 2022-06-27 DIAGNOSIS — R00.0 TACHYCARDIA: ICD-10-CM

## 2022-06-27 DIAGNOSIS — I48.20 CHRONIC ATRIAL FIBRILLATION (H): ICD-10-CM

## 2022-06-27 NOTE — TELEPHONE ENCOUNTER
----- Message from Candy Miranda sent at 6/27/2022  2:56 PM CDT -----  Regarding: [facility] PT  General phone call:    [facility]   Primary cardiologist: CARRILLO  Detailed reason for call: Metoprolol was prescribed by [facility] and neurologist prescribed Propanolol.  [facility] said to reach out to Neurology and  they have tried and been on hold several times   They could not reach someone at the Neurologist office - can we advise them to cancel the Propanolol since they cannot reach the Neurology office?  Or should they fill both?       Best phone number: [phone]   Best time to contact: any  Okay to leave a detailedmessage? yes  Device?       Additional Info:           ==Called over to [facility]. See other encounter dated 6/24- [facility] was updated on this as well. Pt takes Propranolol for Tremor. He has been on Metoprolol succinate for many years from Dr. Martel. Advised pharmacy to check with Neurology to make sure they are aware of Metoprolol use. Will await update from [facility] but [facility] just wanted both parties to know for awareness. Of note, it looks like patient has been taking the propranolol since January. [place] [place]

## 2022-06-28 ENCOUNTER — ANCILLARY PROCEDURE (OUTPATIENT)
Dept: CARDIOLOGY | Facility: CLINIC | Age: 72
End: 2022-06-28
Attending: INTERNAL MEDICINE
Payer: COMMERCIAL

## 2022-06-28 DIAGNOSIS — Z95.810 ICD (IMPLANTABLE CARDIOVERTER-DEFIBRILLATOR) IN PLACE: ICD-10-CM

## 2022-06-28 DIAGNOSIS — I42.8 NON-ISCHEMIC CARDIOMYOPATHY (H): ICD-10-CM

## 2022-06-28 LAB
MDC_IDC_EPISODE_DTM: NORMAL
MDC_IDC_EPISODE_ID: NORMAL
MDC_IDC_EPISODE_TYPE: NORMAL
MDC_IDC_LEAD_IMPLANT_DT: NORMAL
MDC_IDC_LEAD_LOCATION: NORMAL
MDC_IDC_LEAD_LOCATION_DETAIL_1: NORMAL
MDC_IDC_LEAD_MFG: NORMAL
MDC_IDC_LEAD_MODEL: NORMAL
MDC_IDC_LEAD_POLARITY_TYPE: NORMAL
MDC_IDC_LEAD_SERIAL: NORMAL
MDC_IDC_LEAD_SPECIAL_FUNCTION: NORMAL
MDC_IDC_MSMT_BATTERY_DTM: NORMAL
MDC_IDC_MSMT_BATTERY_REMAINING_LONGEVITY: 144 MO
MDC_IDC_MSMT_BATTERY_REMAINING_PERCENTAGE: 100 %
MDC_IDC_MSMT_BATTERY_STATUS: NORMAL
MDC_IDC_MSMT_CAP_CHARGE_DTM: NORMAL
MDC_IDC_MSMT_CAP_CHARGE_TIME: 10.2 S
MDC_IDC_MSMT_CAP_CHARGE_TYPE: NORMAL
MDC_IDC_MSMT_LEADCHNL_RV_IMPEDANCE_VALUE: 624 OHM
MDC_IDC_MSMT_LEADCHNL_RV_PACING_THRESHOLD_AMPLITUDE: 0.7 V
MDC_IDC_MSMT_LEADCHNL_RV_PACING_THRESHOLD_PULSEWIDTH: 0.4 MS
MDC_IDC_PG_IMPLANT_DTM: NORMAL
MDC_IDC_PG_MFG: NORMAL
MDC_IDC_PG_MODEL: NORMAL
MDC_IDC_PG_SERIAL: NORMAL
MDC_IDC_PG_TYPE: NORMAL
MDC_IDC_SESS_CLINIC_NAME: NORMAL
MDC_IDC_SESS_DTM: NORMAL
MDC_IDC_SESS_TYPE: NORMAL
MDC_IDC_SET_BRADY_LOWRATE: 50 {BEATS}/MIN
MDC_IDC_SET_BRADY_MODE: NORMAL
MDC_IDC_SET_LEADCHNL_RV_PACING_AMPLITUDE: 1.5 V
MDC_IDC_SET_LEADCHNL_RV_PACING_POLARITY: NORMAL
MDC_IDC_SET_LEADCHNL_RV_PACING_PULSEWIDTH: 0.4 MS
MDC_IDC_SET_LEADCHNL_RV_SENSING_ADAPTATION_MODE: NORMAL
MDC_IDC_SET_LEADCHNL_RV_SENSING_POLARITY: NORMAL
MDC_IDC_SET_LEADCHNL_RV_SENSING_SENSITIVITY: 0.6 MV
MDC_IDC_SET_ZONE_DETECTION_INTERVAL: 286 MS
MDC_IDC_SET_ZONE_DETECTION_INTERVAL: 333 MS
MDC_IDC_SET_ZONE_DETECTION_INTERVAL: 400 MS
MDC_IDC_SET_ZONE_TYPE: NORMAL
MDC_IDC_SET_ZONE_VENDOR_TYPE: NORMAL
MDC_IDC_STAT_BRADY_DTM_END: NORMAL
MDC_IDC_STAT_BRADY_DTM_START: NORMAL
MDC_IDC_STAT_BRADY_RV_PERCENT_PACED: 6 %
MDC_IDC_STAT_EPISODE_RECENT_COUNT: 0
MDC_IDC_STAT_EPISODE_RECENT_COUNT: 6
MDC_IDC_STAT_EPISODE_RECENT_COUNT_DTM_END: NORMAL
MDC_IDC_STAT_EPISODE_RECENT_COUNT_DTM_START: NORMAL
MDC_IDC_STAT_EPISODE_TYPE: NORMAL
MDC_IDC_STAT_EPISODE_VENDOR_TYPE: NORMAL
MDC_IDC_STAT_TACHYTHERAPY_ATP_DELIVERED_RECENT: 0
MDC_IDC_STAT_TACHYTHERAPY_ATP_DELIVERED_TOTAL: 1
MDC_IDC_STAT_TACHYTHERAPY_RECENT_DTM_END: NORMAL
MDC_IDC_STAT_TACHYTHERAPY_RECENT_DTM_START: NORMAL
MDC_IDC_STAT_TACHYTHERAPY_SHOCKS_ABORTED_RECENT: 0
MDC_IDC_STAT_TACHYTHERAPY_SHOCKS_ABORTED_TOTAL: 1
MDC_IDC_STAT_TACHYTHERAPY_SHOCKS_DELIVERED_RECENT: 0
MDC_IDC_STAT_TACHYTHERAPY_SHOCKS_DELIVERED_TOTAL: 0
MDC_IDC_STAT_TACHYTHERAPY_TOTAL_DTM_END: NORMAL
MDC_IDC_STAT_TACHYTHERAPY_TOTAL_DTM_START: NORMAL

## 2022-06-28 PROCEDURE — 93296 REM INTERROG EVL PM/IDS: CPT | Performed by: INTERNAL MEDICINE

## 2022-06-28 PROCEDURE — 93295 DEV INTERROG REMOTE 1/2/MLT: CPT | Performed by: INTERNAL MEDICINE

## 2022-06-28 RX ORDER — METOPROLOL SUCCINATE 200 MG/1
200 TABLET, EXTENDED RELEASE ORAL DAILY
Qty: 7 TABLET | Refills: 0 | Status: SHIPPED | OUTPATIENT
Start: 2022-06-28 | End: 2022-10-17

## 2022-06-28 RX ORDER — PROPRANOLOL HYDROCHLORIDE 120 MG/1
120 CAPSULE, EXTENDED RELEASE ORAL DAILY
COMMUNITY
End: 2024-08-21

## 2022-06-28 NOTE — TELEPHONE ENCOUNTER
Received a call from Alea. He was see other encounter as well dated 6/24. He has been on both Propranolol and Metoprolol succinate since January and tolerating this just fine. He denies any low BP or dizziness/headedness. He has been on the Metoprolol succinate 200 mg for many years.     He was prescribed Propranolol in January by his neurology team for his tremor. They have slowly titrated up to 120 mg (max dose for him). He has been monitoring routinely and carefully. From a neurological standpoint, he needs both of these medications. They are fine with him being on both along with the patient. Will update LBF accordingly.     Writer also called in 7 day supply of Metoprolol for him to local [facility] as he is completely out. He will  and we used a good rx coupon. His mail order will be coming in shortly. -[facility] [place]        Dr. Martel,  Dr. Jesus is aware Alea is on Metoprolol succinate long term and has placed him on Propranolol 120 mg (max dose) after he was slowly titrated up from 60 mg. He has been on combination since January without any adverse effects. I think this turned into more of an issue than needed due to [facility] flagging it. If you are not ok with this combination, let me know otherwise the patient is tolerating per his report and documentation from Neuro. The propranolol is not actually helping his tremor much so he will have follow up with Neuro in the Fall to further address.   Mal

## 2022-06-29 NOTE — TELEPHONE ENCOUNTER
Message  Received: Yesterday  Aram Martel MD Caswell, Mallory J, RN  Caller: Unspecified (2 days ago,  3:41 PM)  Okay to continue both if no major estuardo or hypotension.   LF                   Noted. -[facility] [place]

## 2022-07-07 ENCOUNTER — LAB (OUTPATIENT)
Dept: CARDIOLOGY | Facility: CLINIC | Age: 72
End: 2022-07-07
Payer: COMMERCIAL

## 2022-07-07 ENCOUNTER — ANTICOAGULATION THERAPY VISIT (OUTPATIENT)
Dept: ANTICOAGULATION | Facility: CLINIC | Age: 72
End: 2022-07-07

## 2022-07-07 DIAGNOSIS — I48.20 CHRONIC ATRIAL FIBRILLATION (H): Primary | ICD-10-CM

## 2022-07-07 DIAGNOSIS — Z79.01 LONG TERM (CURRENT) USE OF ANTICOAGULANTS: Primary | ICD-10-CM

## 2022-07-07 LAB — INR POINT OF CARE: 2.4 (ref 0.9–1.1)

## 2022-07-07 PROCEDURE — 85610 PROTHROMBIN TIME: CPT

## 2022-07-07 PROCEDURE — 36416 COLLJ CAPILLARY BLOOD SPEC: CPT

## 2022-07-07 NOTE — PROGRESS NOTES
ANTICOAGULATION MANAGEMENT     Zach Garcia 72 year old male is on warfarin with therapeutic INR result. (Goal INR 2.0-3.0)    Recent labs: (last 7 days)     07/07/22  0815   INR 2.4*       ASSESSMENT     Source(s): Chart Review, Patient/Caregiver Call and Template     Warfarin doses taken: Warfarin taken as instructed  Diet: No new diet changes identified  New illness, injury, or hospitalization: No  Medication/supplement changes: None noted  Signs or symptoms of bleeding or clotting: No  Previous INR: Therapeutic last visit; previously outside of goal range  Additional findings: None       PLAN     Recommended plan for no diet, medication or health factor changes affecting INR     Dosing Instructions: continue your current warfarin dose with next INR in 4-5 weeks       Summary  As of 7/7/2022    Full warfarin instructions:  2.5 mg every Sun, Tue, Thu; 5 mg all other days   Next INR check:  8/11/2022             Telephone call with Pat who verbalizes understanding and agrees to plan    Patient offered & declined to schedule next visit    Education provided: Goal range and significance of current result and Importance of therapeutic range    Plan made per [facility] anticoagulation protocol    Mone Kent RN  Anticoagulation Clinic  7/7/2022    _______________________________________________________________________     Anticoagulation Episode Summary     Current INR goal:  2.0-3.0   TTR:  68.2 % (1 y)   Target end date:  Indefinite   Send INR reminders to:  [facility]    Indications    Chronic atrial fibrillation (H) [I48.20]           Comments:           Anticoagulation Care Providers     Provider Role Specialty Phone number    Aram Martel MD  Cardiovascular Disease [phone]

## 2022-08-09 ENCOUNTER — LAB (OUTPATIENT)
Dept: CARDIOLOGY | Facility: CLINIC | Age: 72
End: 2022-08-09
Payer: COMMERCIAL

## 2022-08-09 ENCOUNTER — ANTICOAGULATION THERAPY VISIT (OUTPATIENT)
Dept: ANTICOAGULATION | Facility: CLINIC | Age: 72
End: 2022-08-09

## 2022-08-09 DIAGNOSIS — Z79.01 LONG TERM (CURRENT) USE OF ANTICOAGULANTS: Primary | ICD-10-CM

## 2022-08-09 DIAGNOSIS — I48.20 CHRONIC ATRIAL FIBRILLATION (H): Primary | ICD-10-CM

## 2022-08-09 LAB — INR POINT OF CARE: 2.4 (ref 0.9–1.1)

## 2022-08-09 PROCEDURE — 36416 COLLJ CAPILLARY BLOOD SPEC: CPT

## 2022-08-09 PROCEDURE — 85610 PROTHROMBIN TIME: CPT

## 2022-08-09 NOTE — PROGRESS NOTES
ANTICOAGULATION MANAGEMENT     Zach Garcia 72 year old male is on warfarin with therapeutic INR result. (Goal INR 2.0-3.0)    Recent labs: (last 7 days)     08/09/22  0807   INR 2.4*       ASSESSMENT     Source(s): Chart Review and Patient/Caregiver Call (template was not scanned at time of call)    Warfarin doses taken: Warfarin taken as instructed  Diet: No new diet changes identified  New illness, injury, or hospitalization: No  Medication/supplement changes: None noted  Signs or symptoms of bleeding or clotting: No  Previous INR: Therapeutic last 2(+) visits  Additional findings: None       PLAN     Recommended plan for no diet, medication or health factor changes affecting INR     Dosing Instructions: Continue your current warfarin dose with next INR in 5 weeks       Summary  As of 8/9/2022    Full warfarin instructions:  2.5 mg every Sun, Tue, Thu; 5 mg all other days   Next INR check:  9/13/2022             Telephone call with Pat who verbalizes understanding and agrees to plan, who agrees to plan and repeated back plan correctly and ; made aware of patient survey and encouraged to participate.    Patient offered & declined to schedule next visit    Education provided: Contact [phone] with any changes, questions or concerns.     Plan made per [facility] anticoagulation protocol    Terri Cornell RN  Anticoagulation Clinic  8/9/2022    _______________________________________________________________________     Anticoagulation Episode Summary     Current INR goal:  2.0-3.0   TTR:  70.8 % (1 y)   Target end date:  Indefinite   Send INR reminders to:  [facility]ART [facility]    Indications    Chronic atrial fibrillation (H) [I48.20]           Comments:           Anticoagulation Care Providers     Provider Role Specialty Phone number    Aram Martel MD  Cardiovascular Disease [phone]

## 2022-08-12 DIAGNOSIS — I48.20 CHRONIC ATRIAL FIBRILLATION (H): ICD-10-CM

## 2022-08-15 RX ORDER — WARFARIN SODIUM 5 MG/1
TABLET ORAL
Qty: 70 TABLET | Refills: 1 | Status: SHIPPED | OUTPATIENT
Start: 2022-08-15 | End: 2022-11-15

## 2022-08-15 NOTE — TELEPHONE ENCOUNTER
ANTICOAGULATION MANAGEMENT:  Medication Refill    Anticoagulation Summary  As of 8/9/2022    Warfarin maintenance plan:  2.5 mg (5 mg x 0.5) every Sun, Tue, Thu; 5 mg (5 mg x 1) all other days   Next INR check:  9/13/2022   Target end date:  Indefinite    Indications    Chronic atrial fibrillation (H) [I48.20]             Anticoagulation Care Providers     Provider Role Specialty Phone number    Aram Martel MD  Cardiovascular Disease [phone]          Visit with referring provider/group within last year: Yes    [facility] referral signed within last year: Yes    Zach meets all criteria for refill (current [facility] referral, office visit with referring provider/group in last year, lab monitoring up to date or not exceeding 2 weeks overdue). Rx instructions and quantity supplied updated to match patient's current dosing plan. Warfarin 90 day supply with 1 refill granted per [facility] protocol     Latosha Junior RN  Anticoagulation Clinic

## 2022-08-31 ENCOUNTER — OFFICE VISIT (OUTPATIENT)
Dept: CARDIOLOGY | Facility: CLINIC | Age: 72
End: 2022-08-31

## 2022-08-31 ENCOUNTER — ANCILLARY PROCEDURE (OUTPATIENT)
Dept: CARDIOLOGY | Facility: CLINIC | Age: 72
End: 2022-08-31
Attending: INTERNAL MEDICINE
Payer: COMMERCIAL

## 2022-08-31 VITALS
HEART RATE: 72 BPM | SYSTOLIC BLOOD PRESSURE: 122 MMHG | RESPIRATION RATE: 16 BRPM | DIASTOLIC BLOOD PRESSURE: 70 MMHG | BODY MASS INDEX: 33.13 KG/M2 | HEIGHT: 73 IN | WEIGHT: 250 LBS

## 2022-08-31 DIAGNOSIS — Z95.810 ICD (IMPLANTABLE CARDIOVERTER-DEFIBRILLATOR), SINGLE, IN SITU: ICD-10-CM

## 2022-08-31 DIAGNOSIS — I42.0 CARDIOMYOPATHY, DILATED, NONISCHEMIC (H): ICD-10-CM

## 2022-08-31 DIAGNOSIS — Z95.810 ICD (IMPLANTABLE CARDIOVERTER-DEFIBRILLATOR) IN PLACE: ICD-10-CM

## 2022-08-31 DIAGNOSIS — I47.29 PAROXYSMAL VENTRICULAR TACHYCARDIA (H): ICD-10-CM

## 2022-08-31 DIAGNOSIS — I25.10 CORONARY ARTERY DISEASE DUE TO CALCIFIED CORONARY LESION: Primary | ICD-10-CM

## 2022-08-31 DIAGNOSIS — F51.01 PRIMARY INSOMNIA: Chronic | ICD-10-CM

## 2022-08-31 DIAGNOSIS — I25.84 CORONARY ARTERY DISEASE DUE TO CALCIFIED CORONARY LESION: Primary | ICD-10-CM

## 2022-08-31 DIAGNOSIS — I10 ESSENTIAL HYPERTENSION WITH GOAL BLOOD PRESSURE LESS THAN 130/80: Chronic | ICD-10-CM

## 2022-08-31 DIAGNOSIS — I48.20 CHRONIC ATRIAL FIBRILLATION (H): ICD-10-CM

## 2022-08-31 DIAGNOSIS — E78.5 DYSLIPIDEMIA: ICD-10-CM

## 2022-08-31 DIAGNOSIS — I48.20 CHRONIC ATRIAL FIBRILLATION (H): Primary | ICD-10-CM

## 2022-08-31 DIAGNOSIS — I50.22 CHRONIC SYSTOLIC (CONGESTIVE) HEART FAILURE (H): ICD-10-CM

## 2022-08-31 PROCEDURE — 99214 OFFICE O/P EST MOD 30 MIN: CPT | Performed by: INTERNAL MEDICINE

## 2022-08-31 RX ORDER — BACLOFEN 10 MG/1
10 TABLET ORAL PRN
COMMUNITY
Start: 2022-08-14

## 2022-08-31 RX ORDER — ZOLPIDEM TARTRATE 10 MG/1
TABLET ORAL
COMMUNITY
Start: 2022-06-09 | End: 2023-08-31

## 2022-08-31 RX ORDER — NORTRIPTYLINE HYDROCHLORIDE 50 MG/1
50 CAPSULE ORAL AT BEDTIME
COMMUNITY
Start: 2022-08-14

## 2022-08-31 RX ORDER — PREGABALIN 150 MG/1
150 CAPSULE ORAL 3 TIMES DAILY
COMMUNITY
Start: 2022-08-14

## 2022-08-31 NOTE — LETTER
8/31/2022    Tyrone Patel MD  [facility] [address]  [facility] [address]    RE: Zach Garcia       Dear Colleague,     I had the pleasure of seeing Zach Garcia in the [facility].      [facility] Follow-up Note    Assessment & Plan        (I25.10,  I25.84) Coronary artery disease due to calcified coronary lesion  (primary encounter diagnosis)  Comment:  Angiography November 1, 2016 showed normal left main, mid LAD 30% lesion, normal circumflex and right coronary artery with a proximal 99% lesion which received a drug-coated stent.  Repeat pharmacological stress test September 2020 secondary to ventricular arrhythmias showed no major ischemia.  No symptoms currently and work on prevention.    (I48.20) Chronic atrial fibrillation (H)  Comment: Asymptomatic not valvular and on chronic anticoagulation given advanced CHADS 2 VASC score.  I will check echo and if significant left ventricular dysfunction uptitrate medications.  Most recent INR 2.4.    (I42.0) Cardiomyopathy, dilated, nonischemic (H)  Comment: Ejection fraction in past 40% by echo and 36% by nuclear stress test.  We will recheck echo.  No significant signs or symptoms of heart failure and does take furosemide.    (I10) Essential hypertension with goal blood pressure less than 130/80  Comment: Under good control on metoprolol, propranolol, furosemide, Aldactone, and lisinopril.    (E78.5) Dyslipidemia  Comment: On statin with total cholesterol 158 and LDL 69 which are excellent.    (Z95.810) ICD (implantable cardioverter-defibrillator), single, in situ  Comment: [employer] device with [employer] right ventricular lead placed in 2017, approximately 6% ventricular pacing, no significant ventricular arrhythmias.      (I47.2) Paroxysmal ventricular tachycardia (H)  Comment: As above no recurrences.    (F51.01) Primary insomnia  Comment: Significant neurological issues  with insomnia, neuropathy, this involves both upper and lower extremities.  Also has cold hands.  Defer to neurology.    Plan  1.  Check echocardiogram.  2.  Work on weight loss.  3.  Follow-up in 1 year or sooner if needed.  4.  Try to coordinate device checks with my annual visits as well.    Subjective  CC: 72-year-old white gentleman here for yearly follow-up.  Due to neuropathy he is unable to function well at home.  He tells me he can still go for walks every day but does have significant tremor as well.  Slightly better on propranolol, interfering with eating soup.  No significant chest pains, palpitations, shortness of breath, PND, orthopnea or peripheral edema.    Medications  Current Outpatient Medications   Medication Sig Dispense Refill     acetaminophen (TYLENOL) 500 MG tablet as needed       ASPIRIN 81 PO daily       baclofen (LIORESAL) 10 MG tablet        furosemide (LASIX) 40 MG tablet Take 0.5 tablets (20 mg) by mouth 2 times daily (Patient taking differently: Take 20 mg by mouth daily) 90 tablet 1     gabapentin (NEURONTIN) 400 MG capsule Take 1 capsule (400 mg) by mouth 3 times daily (Patient taking differently: Take 400 mg by mouth 3 times daily) 90 capsule 11     lisinopril (ZESTRIL) 20 MG tablet TAKE 2 TABLETS BY MOUTH  DAILY 180 tablet 1     metoprolol succinate ER (TOPROL XL) 200 MG 24 hr tablet Take 1 tablet (200 mg) by mouth daily 7 tablet 0     nortriptyline (PAMELOR) 50 MG capsule        pregabalin (LYRICA) 150 MG capsule        propranolol ER (INDERAL LA) 120 MG 24 hr capsule Take 120 mg by mouth daily       rosuvastatin (CRESTOR) 20 MG tablet TAKE 1 TABLET BY MOUTH  DAILY 90 tablet 1     spironolactone (ALDACTONE) 25 MG tablet TAKE ONE-HALF TABLET BY  MOUTH DAILY 45 tablet 1     warfarin ANTICOAGULANT (COUMADIN) 5 MG tablet 1/2 tab on Sunday, Tuesday, Thursday and 1 tab all other day per INR clinic 70 tablet 1     zolpidem (AMBIEN) 10 MG tablet          Objective  /70 (BP  "Location: Left arm, Patient Position: Sitting, Cuff Size: Adult Regular)   Pulse 72   Resp 16   Ht 1.854 m (6' 1\")   Wt 113.4 kg (250 lb)   BMI 32.98 kg/m      General Appearance:    Alert, cooperative, no distress, appears stated age   Head:    Normocephalic, without obvious abnormality, atraumatic   Throat:   Lips, mucosa, and tongue normal; teeth and gums normal   Neck:   Supple, symmetrical, trachea midline, no adenopathy;        thyroid:  No enlargement/tenderness/nodules; no carotid    bruit or JVD   Back:     Symmetric, no curvature, ROM normal, no CVA tenderness   Lungs:     Clear to auscultation bilaterally, respirations unlabored   Chest wall:    No tenderness, left-sided ICD   Heart:    Regular rate and rhythm, S1 and S2 normal, 1/6 systolic ejection murmur, no rub   or gallop   Abdomen:     Soft, non-tender, bowel sounds active all four quadrants,     no masses, no organomegaly   Extremities:   Normal, atraumatic, no cyanosis or edema   Pulses:   2+ and symmetric all extremities   Skin:   Skin color, texture, turgor normal, no rashes or lesions     Results    Lab Results personally reviewed   Lab Results   Component Value Date    CHOL 158 05/22/2019    CHOL 144 02/27/2018     Lab Results   Component Value Date    HDL 46 05/22/2019    HDL 39 (L) 02/27/2018     No components found for: LDLCALC  Lab Results   Component Value Date    TRIG 215 (H) 05/22/2019    TRIG 189 (H) 02/27/2018     Lab Results   Component Value Date    WBC 6.3 05/22/2019    HGB 15.3 05/22/2019    HCT 45.8 05/22/2019     05/22/2019     Lab Results   Component Value Date    BUN 18 09/09/2020     09/09/2020    CO2 23 09/09/2020               Thank you for allowing me to participate in the care of your patient.      Sincerely,     MAYTE PELAYO MD     [facility]  cc:   No referring provider defined for this encounter.    "

## 2022-08-31 NOTE — PROGRESS NOTES
[facility]  Heart Care Clinic Follow-up Note    Assessment & Plan        (I25.10,  I25.84) Coronary artery disease due to calcified coronary lesion  (primary encounter diagnosis)  Comment:  Angiography November 1, 2016 showed normal left main, mid LAD 30% lesion, normal circumflex and right coronary artery with a proximal 99% lesion which received a drug-coated stent.  Repeat pharmacological stress test September 2020 secondary to ventricular arrhythmias showed no major ischemia.  No symptoms currently and work on prevention.    (I48.20) Chronic atrial fibrillation (H)  Comment: Asymptomatic not valvular and on chronic anticoagulation given advanced CHADS 2 VASC score.  I will check echo and if significant left ventricular dysfunction uptitrate medications.  Most recent INR 2.4.    (I42.0) Cardiomyopathy, dilated, nonischemic (H)  Comment: Ejection fraction in past 40% by echo and 36% by nuclear stress test.  We will recheck echo.  No significant signs or symptoms of heart failure and does take furosemide.    (I10) Essential hypertension with goal blood pressure less than 130/80  Comment: Under good control on metoprolol, propranolol, furosemide, Aldactone, and lisinopril.    (E78.5) Dyslipidemia  Comment: On statin with total cholesterol 158 and LDL 69 which are excellent.    (Z95.810) ICD (implantable cardioverter-defibrillator), single, in situ  Comment: [employer] device with [employer] right ventricular lead placed in 2017, approximately 6% ventricular pacing, no significant ventricular arrhythmias.      (I47.2) Paroxysmal ventricular tachycardia (H)  Comment: As above no recurrences.    (F51.01) Primary insomnia  Comment: Significant neurological issues with insomnia, neuropathy, this involves both upper and lower extremities.  Also has cold hands.  Defer to neurology.    Plan  1.  Check echocardiogram.  2.  Work on weight loss.  3.  Follow-up in 1 year or sooner if needed.  4.  Try to  "coordinate device checks with my annual visits as well.    Subjective  CC: 72-year-old white gentleman here for yearly follow-up.  Due to neuropathy he is unable to function well at home.  He tells me he can still go for walks every day but does have significant tremor as well.  Slightly better on propranolol, interfering with eating soup.  No significant chest pains, palpitations, shortness of breath, PND, orthopnea or peripheral edema.    Medications  Current Outpatient Medications   Medication Sig Dispense Refill     acetaminophen (TYLENOL) 500 MG tablet as needed       ASPIRIN 81 PO daily       baclofen (LIORESAL) 10 MG tablet        furosemide (LASIX) 40 MG tablet Take 0.5 tablets (20 mg) by mouth 2 times daily (Patient taking differently: Take 20 mg by mouth daily) 90 tablet 1     gabapentin (NEURONTIN) 400 MG capsule Take 1 capsule (400 mg) by mouth 3 times daily (Patient taking differently: Take 400 mg by mouth 3 times daily) 90 capsule 11     lisinopril (ZESTRIL) 20 MG tablet TAKE 2 TABLETS BY MOUTH  DAILY 180 tablet 1     metoprolol succinate ER (TOPROL XL) 200 MG 24 hr tablet Take 1 tablet (200 mg) by mouth daily 7 tablet 0     nortriptyline (PAMELOR) 50 MG capsule        pregabalin (LYRICA) 150 MG capsule        propranolol ER (INDERAL LA) 120 MG 24 hr capsule Take 120 mg by mouth daily       rosuvastatin (CRESTOR) 20 MG tablet TAKE 1 TABLET BY MOUTH  DAILY 90 tablet 1     spironolactone (ALDACTONE) 25 MG tablet TAKE ONE-HALF TABLET BY  MOUTH DAILY 45 tablet 1     warfarin ANTICOAGULANT (COUMADIN) 5 MG tablet 1/2 tab on Sunday, Tuesday, Thursday and 1 tab all other day per INR clinic 70 tablet 1     zolpidem (AMBIEN) 10 MG tablet          Objective  /70 (BP Location: Left arm, Patient Position: Sitting, Cuff Size: Adult Regular)   Pulse 72   Resp 16   Ht 1.854 m (6' 1\")   Wt 113.4 kg (250 lb)   BMI 32.98 kg/m      General Appearance:    Alert, cooperative, no distress, appears stated age "   Head:    Normocephalic, without obvious abnormality, atraumatic   Throat:   Lips, mucosa, and tongue normal; teeth and gums normal   Neck:   Supple, symmetrical, trachea midline, no adenopathy;        thyroid:  No enlargement/tenderness/nodules; no carotid    bruit or JVD   Back:     Symmetric, no curvature, ROM normal, no CVA tenderness   Lungs:     Clear to auscultation bilaterally, respirations unlabored   Chest wall:    No tenderness, left-sided ICD   Heart:    Regular rate and rhythm, S1 and S2 normal, 1/6 systolic ejection murmur, no rub   or gallop   Abdomen:     Soft, non-tender, bowel sounds active all four quadrants,     no masses, no organomegaly   Extremities:   Normal, atraumatic, no cyanosis or edema   Pulses:   2+ and symmetric all extremities   Skin:   Skin color, texture, turgor normal, no rashes or lesions     Results    Lab Results personally reviewed   Lab Results   Component Value Date    CHOL 158 05/22/2019    CHOL 144 02/27/2018     Lab Results   Component Value Date    HDL 46 05/22/2019    HDL 39 (L) 02/27/2018     No components found for: LDLCALC  Lab Results   Component Value Date    TRIG 215 (H) 05/22/2019    TRIG 189 (H) 02/27/2018     Lab Results   Component Value Date    WBC 6.3 05/22/2019    HGB 15.3 05/22/2019    HCT 45.8 05/22/2019     05/22/2019     Lab Results   Component Value Date    BUN 18 09/09/2020     09/09/2020    CO2 23 09/09/2020

## 2022-08-31 NOTE — PATIENT INSTRUCTIONS
Mr Serrano FEDERICA Garcia,  I enjoyed visiting with you again today.  I am sorry to hear of the neuropathy.  Per our conversation let us check the echo.  I will plan on seeing you 1 year.  Aram Martel

## 2022-09-04 LAB
MDC_IDC_LEAD_IMPLANT_DT: NORMAL
MDC_IDC_LEAD_LOCATION: NORMAL
MDC_IDC_LEAD_LOCATION_DETAIL_1: NORMAL
MDC_IDC_LEAD_MFG: NORMAL
MDC_IDC_LEAD_MODEL: NORMAL
MDC_IDC_LEAD_POLARITY_TYPE: NORMAL
MDC_IDC_LEAD_SERIAL: NORMAL
MDC_IDC_LEAD_SPECIAL_FUNCTION: NORMAL
MDC_IDC_MSMT_BATTERY_DTM: NORMAL
MDC_IDC_MSMT_BATTERY_REMAINING_LONGEVITY: 138 MO
MDC_IDC_MSMT_BATTERY_REMAINING_PERCENTAGE: 100 %
MDC_IDC_MSMT_BATTERY_STATUS: NORMAL
MDC_IDC_MSMT_CAP_CHARGE_DTM: NORMAL
MDC_IDC_MSMT_CAP_CHARGE_TIME: 10.3 S
MDC_IDC_MSMT_CAP_CHARGE_TYPE: NORMAL
MDC_IDC_MSMT_LEADCHNL_RV_IMPEDANCE_VALUE: 609 OHM
MDC_IDC_MSMT_LEADCHNL_RV_PACING_THRESHOLD_AMPLITUDE: 0.7 V
MDC_IDC_MSMT_LEADCHNL_RV_PACING_THRESHOLD_PULSEWIDTH: 0.4 MS
MDC_IDC_MSMT_LEADCHNL_RV_SENSING_INTR_AMPL: 9.3 MV
MDC_IDC_PG_IMPLANT_DTM: NORMAL
MDC_IDC_PG_MFG: NORMAL
MDC_IDC_PG_MODEL: NORMAL
MDC_IDC_PG_SERIAL: NORMAL
MDC_IDC_PG_TYPE: NORMAL
MDC_IDC_SESS_CLINIC_NAME: NORMAL
MDC_IDC_SESS_DTM: NORMAL
MDC_IDC_SESS_TYPE: NORMAL
MDC_IDC_SET_BRADY_LOWRATE: 50 {BEATS}/MIN
MDC_IDC_SET_BRADY_MODE: NORMAL
MDC_IDC_SET_LEADCHNL_RV_PACING_AMPLITUDE: 1.5 V
MDC_IDC_SET_LEADCHNL_RV_PACING_POLARITY: NORMAL
MDC_IDC_SET_LEADCHNL_RV_PACING_PULSEWIDTH: 0.4 MS
MDC_IDC_SET_LEADCHNL_RV_SENSING_ADAPTATION_MODE: NORMAL
MDC_IDC_SET_LEADCHNL_RV_SENSING_POLARITY: NORMAL
MDC_IDC_SET_LEADCHNL_RV_SENSING_SENSITIVITY: 0.6 MV
MDC_IDC_SET_ZONE_DETECTION_INTERVAL: 286 MS
MDC_IDC_SET_ZONE_DETECTION_INTERVAL: 333 MS
MDC_IDC_SET_ZONE_DETECTION_INTERVAL: 400 MS
MDC_IDC_SET_ZONE_TYPE: NORMAL
MDC_IDC_SET_ZONE_VENDOR_TYPE: NORMAL
MDC_IDC_STAT_BRADY_DTM_END: NORMAL
MDC_IDC_STAT_BRADY_DTM_START: NORMAL
MDC_IDC_STAT_BRADY_RV_PERCENT_PACED: 8 %
MDC_IDC_STAT_EPISODE_RECENT_COUNT: 0
MDC_IDC_STAT_EPISODE_RECENT_COUNT: 174
MDC_IDC_STAT_EPISODE_RECENT_COUNT: 6
MDC_IDC_STAT_EPISODE_RECENT_COUNT_DTM_END: NORMAL
MDC_IDC_STAT_EPISODE_RECENT_COUNT_DTM_START: NORMAL
MDC_IDC_STAT_EPISODE_TYPE: NORMAL
MDC_IDC_STAT_EPISODE_VENDOR_TYPE: NORMAL
MDC_IDC_STAT_TACHYTHERAPY_ATP_DELIVERED_RECENT: 0
MDC_IDC_STAT_TACHYTHERAPY_ATP_DELIVERED_TOTAL: 1
MDC_IDC_STAT_TACHYTHERAPY_RECENT_DTM_END: NORMAL
MDC_IDC_STAT_TACHYTHERAPY_RECENT_DTM_START: NORMAL
MDC_IDC_STAT_TACHYTHERAPY_SHOCKS_ABORTED_RECENT: 0
MDC_IDC_STAT_TACHYTHERAPY_SHOCKS_ABORTED_TOTAL: 1
MDC_IDC_STAT_TACHYTHERAPY_SHOCKS_DELIVERED_RECENT: 0
MDC_IDC_STAT_TACHYTHERAPY_SHOCKS_DELIVERED_TOTAL: 0
MDC_IDC_STAT_TACHYTHERAPY_TOTAL_DTM_END: NORMAL
MDC_IDC_STAT_TACHYTHERAPY_TOTAL_DTM_START: NORMAL

## 2022-09-04 PROCEDURE — 93282 PRGRMG EVAL IMPLANTABLE DFB: CPT | Performed by: INTERNAL MEDICINE

## 2022-09-14 ENCOUNTER — ANTICOAGULATION THERAPY VISIT (OUTPATIENT)
Dept: ANTICOAGULATION | Facility: CLINIC | Age: 72
End: 2022-09-14

## 2022-09-14 ENCOUNTER — LAB (OUTPATIENT)
Dept: CARDIOLOGY | Facility: CLINIC | Age: 72
End: 2022-09-14
Payer: COMMERCIAL

## 2022-09-14 ENCOUNTER — DOCUMENTATION ONLY (OUTPATIENT)
Dept: ANTICOAGULATION | Facility: CLINIC | Age: 72
End: 2022-09-14

## 2022-09-14 DIAGNOSIS — I48.20 CHRONIC ATRIAL FIBRILLATION (H): ICD-10-CM

## 2022-09-14 DIAGNOSIS — I48.20 CHRONIC ATRIAL FIBRILLATION (H): Primary | ICD-10-CM

## 2022-09-14 LAB — INR POINT OF CARE: 2.6 (ref 0.9–1.1)

## 2022-09-14 PROCEDURE — 36416 COLLJ CAPILLARY BLOOD SPEC: CPT

## 2022-09-14 PROCEDURE — 85610 PROTHROMBIN TIME: CPT

## 2022-09-14 NOTE — PROGRESS NOTES
ANTICOAGULATION MANAGEMENT     Zach Garcia 72 year old male is on warfarin with therapeutic INR result. (Goal INR 2.0-3.0)    Recent labs: (last 7 days)     09/14/22  0855   INR 2.6*       ASSESSMENT     Source(s): Chart Review and Template     Warfarin doses taken: Warfarin taken as instructed  Diet: No new diet changes identified  New illness, injury, or hospitalization: No  Medication/supplement changes: None noted  Signs or symptoms of bleeding or clotting: No  Previous INR: Therapeutic last 2(+) visits  Additional findings: None       PLAN     Recommended plan for no diet, medication or health factor changes affecting INR     Dosing Instructions: Continue your current warfarin dose with next INR in 6 weeks       Summary  As of 9/14/2022    Full warfarin instructions:  2.5 mg every Sun, Tue, Thu; 5 mg all other days   Next INR check:  10/26/2022             Telephone call with Alea who verbalizes understanding and agrees to plan    Lab visit scheduled    Education provided: Importance of therapeutic range, Importance of following up at instructed interval and Contact [phone] with any changes, questions or concerns.     Plan made per [facility] anticoagulation protocol    Terri Cornell RN  Anticoagulation Clinic  9/14/2022    _______________________________________________________________________     Anticoagulation Episode Summary     Current INR goal:  2.0-3.0   TTR:  76.8 % (1 y)   Target end date:  Indefinite   Send INR reminders to:  [facility]    Indications    Chronic atrial fibrillation (H) [I48.20]           Comments:           Anticoagulation Care Providers     Provider Role Specialty Phone number    Aram Martel MD  Cardiovascular Disease [phone]

## 2022-09-14 NOTE — PROGRESS NOTES
ANTICOAGULATION CLINIC REFERRAL RENEWAL REQUEST       An annual renewal order is required for all patients referred to [facility].?  Please review and sign the pended referral order for Zach Garcia.       ANTICOAGULATION SUMMARY      Warfarin indication(s)   Atrial Fibrillation    Mechanical heart valve present?  NO       Current goal range   INR: 2.0-3.0     Goal appropriate for indication? Goal INR 2-3, standard for indication(s) above     Time in Therapeutic Range (TTR)  (Goal > 60%) 77%       Office visit with referring provider's group within last year yes on 8/31/2022       Terri Cornell RN  [facility]ticoagulation [facility]

## 2022-09-19 ENCOUNTER — HOSPITAL ENCOUNTER (OUTPATIENT)
Dept: CARDIOLOGY | Facility: HOSPITAL | Age: 72
Discharge: HOME | End: 2022-09-19
Attending: INTERNAL MEDICINE | Admitting: INTERNAL MEDICINE
Payer: COMMERCIAL

## 2022-09-19 DIAGNOSIS — I42.0 CARDIOMYOPATHY, DILATED, NONISCHEMIC (H): ICD-10-CM

## 2022-09-19 DIAGNOSIS — I48.20 CHRONIC ATRIAL FIBRILLATION (H): ICD-10-CM

## 2022-09-19 DIAGNOSIS — I25.84 CORONARY ARTERY DISEASE DUE TO CALCIFIED CORONARY LESION: ICD-10-CM

## 2022-09-19 DIAGNOSIS — I25.10 CORONARY ARTERY DISEASE DUE TO CALCIFIED CORONARY LESION: ICD-10-CM

## 2022-09-19 LAB — LVEF ECHO: NORMAL

## 2022-09-19 PROCEDURE — 93306 TTE W/DOPPLER COMPLETE: CPT | Mod: 26 | Performed by: INTERNAL MEDICINE

## 2022-09-19 PROCEDURE — 255N000002 HC RX 255 OP 636: Performed by: INTERNAL MEDICINE

## 2022-09-19 PROCEDURE — 999N000208 ECHOCARDIOGRAM COMPLETE

## 2022-09-19 RX ADMIN — PERFLUTREN 2 ML: 6.52 INJECTION, SUSPENSION INTRAVENOUS at 13:37

## 2022-10-21 DIAGNOSIS — I48.20 CHRONIC ATRIAL FIBRILLATION (H): ICD-10-CM

## 2022-10-21 DIAGNOSIS — I10 ESSENTIAL HYPERTENSION WITH GOAL BLOOD PRESSURE LESS THAN 130/80: ICD-10-CM

## 2022-10-21 DIAGNOSIS — I42.0 CARDIOMYOPATHY, DILATED, NONISCHEMIC (H): ICD-10-CM

## 2022-10-21 DIAGNOSIS — I49.3 FREQUENT UNIFOCAL PREMATURE VENTRICULAR CONTRACTIONS: ICD-10-CM

## 2022-10-21 DIAGNOSIS — I25.84 CORONARY ARTERY DISEASE DUE TO CALCIFIED CORONARY LESION: ICD-10-CM

## 2022-10-21 DIAGNOSIS — I47.29 PAROXYSMAL VENTRICULAR TACHYCARDIA (H): ICD-10-CM

## 2022-10-21 DIAGNOSIS — I25.10 CORONARY ARTERY DISEASE DUE TO CALCIFIED CORONARY LESION: ICD-10-CM

## 2022-10-21 DIAGNOSIS — R00.0 TACHYCARDIA: ICD-10-CM

## 2022-10-21 DIAGNOSIS — I50.20 SYSTOLIC HEART FAILURE, UNSPECIFIED HF CHRONICITY (H): ICD-10-CM

## 2022-10-21 RX ORDER — METOPROLOL SUCCINATE 200 MG/1
200 TABLET, EXTENDED RELEASE ORAL DAILY
Qty: 90 TABLET | Refills: 3 | Status: SHIPPED | OUTPATIENT
Start: 2022-10-21 | End: 2023-09-28

## 2022-10-21 NOTE — TELEPHONE ENCOUNTER
Received a voicemail from patient that he needs a refill of his Metoprolol succinate. This was granted to [facility] for 90 day supply. LM with patient that this was done and to call back only if he needs anything else. -[facility] [place]

## 2022-10-25 ENCOUNTER — LAB (OUTPATIENT)
Dept: CARDIOLOGY | Facility: CLINIC | Age: 72
End: 2022-10-25
Payer: COMMERCIAL

## 2022-10-25 ENCOUNTER — ANTICOAGULATION THERAPY VISIT (OUTPATIENT)
Dept: ANTICOAGULATION | Facility: CLINIC | Age: 72
End: 2022-10-25

## 2022-10-25 DIAGNOSIS — I48.20 CHRONIC ATRIAL FIBRILLATION (H): ICD-10-CM

## 2022-10-25 DIAGNOSIS — I48.20 CHRONIC ATRIAL FIBRILLATION (H): Primary | ICD-10-CM

## 2022-10-25 LAB — INR POINT OF CARE: 2.6 (ref 0.9–1.1)

## 2022-10-25 PROCEDURE — 85610 PROTHROMBIN TIME: CPT

## 2022-10-25 PROCEDURE — 36416 COLLJ CAPILLARY BLOOD SPEC: CPT

## 2022-10-25 NOTE — PROGRESS NOTES
ANTICOAGULATION MANAGEMENT     Zach Garcia 72 year old male is on warfarin with therapeutic INR result. (Goal INR 2.0-3.0)    Recent labs: (last 7 days)     10/25/22  0801   INR 2.6*       ASSESSMENT     Source(s): Chart Review, Patient/Caregiver Call and Template     Warfarin doses taken: Warfarin taken as instructed  Diet: No new diet changes identified  New illness, injury, or hospitalization: No  Medication/supplement changes: None noted  Signs or symptoms of bleeding or clotting: No  Previous INR: Therapeutic last 2(+) visits  Additional findings: None       PLAN     Recommended plan for no diet, medication or health factor changes affecting INR     Dosing Instructions: Continue your current warfarin dose with next INR in 6 weeks       Summary  As of 10/25/2022    Full warfarin instructions:  2.5 mg every Sun, Tue, Thu; 5 mg all other days; Starting 10/25/2022   Next INR check:  12/6/2022             Telephone call with Pat who verbalizes understanding and agrees to plan    Patient offered & declined to schedule next visit    Education provided:   Contact [phone] with any changes, questions or concerns.     Plan made per [facility] anticoagulation protocol    Terri Cornell RN  Anticoagulation Clinic  10/25/2022    _______________________________________________________________________     Anticoagulation Episode Summary     Current INR goal:  2.0-3.0   TTR:  83.0 % (1 y)   Target end date:  Indefinite   Send INR reminders to:  [facility]    Indications    Chronic atrial fibrillation (H) [I48.20]           Comments:           Anticoagulation Care Providers     Provider Role Specialty Phone number    Aram Martel MD  Cardiovascular Disease [phone]

## 2022-11-15 DIAGNOSIS — I48.20 CHRONIC ATRIAL FIBRILLATION (H): ICD-10-CM

## 2022-11-15 RX ORDER — WARFARIN SODIUM 5 MG/1
TABLET ORAL
Qty: 95 TABLET | Refills: 1 | Status: SHIPPED | OUTPATIENT
Start: 2022-11-15 | End: 2023-06-14

## 2022-11-15 NOTE — TELEPHONE ENCOUNTER
ANTICOAGULATION MANAGEMENT:  Medication Refill    Anticoagulation Summary  As of 10/25/2022    Warfarin maintenance plan:  2.5 mg (5 mg x 0.5) every Sun, Tue, Thu; 5 mg (5 mg x 1) all other days; Starting 10/25/2022   Next INR check:  12/6/2022   Target end date:  Indefinite    Indications    Chronic atrial fibrillation (H) [I48.20]             Anticoagulation Care Providers     Provider Role Specialty Phone number    Aram Martel MD  Cardiovascular Disease [phone]          Visit with referring provider/group within last year: Yes    [facility] referral signed within last year: Yes    Zach meets all criteria for refill (current [facility] referral, office visit with referring provider/group in last year, lab monitoring up to date or not exceeding 2 weeks overdue). Rx instructions and quantity match patient's current dosing plan. Warfarin 90 day supply with 1 refill granted per [facility] protocol     Terri Cornell RN  Anticoagulation Clinic

## 2022-12-02 ENCOUNTER — ANCILLARY PROCEDURE (OUTPATIENT)
Dept: CARDIOLOGY | Facility: CLINIC | Age: 72
End: 2022-12-02
Attending: INTERNAL MEDICINE
Payer: COMMERCIAL

## 2022-12-02 DIAGNOSIS — I42.8 NON-ISCHEMIC CARDIOMYOPATHY (H): ICD-10-CM

## 2022-12-02 DIAGNOSIS — Z95.810 ICD (IMPLANTABLE CARDIOVERTER-DEFIBRILLATOR) IN PLACE: ICD-10-CM

## 2022-12-02 DIAGNOSIS — I50.22 CHRONIC SYSTOLIC (CONGESTIVE) HEART FAILURE (H): ICD-10-CM

## 2022-12-06 ENCOUNTER — LAB (OUTPATIENT)
Dept: CARDIOLOGY | Facility: CLINIC | Age: 72
End: 2022-12-06
Payer: COMMERCIAL

## 2022-12-06 ENCOUNTER — ANTICOAGULATION THERAPY VISIT (OUTPATIENT)
Dept: ANTICOAGULATION | Facility: CLINIC | Age: 72
End: 2022-12-06

## 2022-12-06 DIAGNOSIS — I48.20 CHRONIC ATRIAL FIBRILLATION (H): Primary | ICD-10-CM

## 2022-12-06 DIAGNOSIS — I48.20 CHRONIC ATRIAL FIBRILLATION (H): ICD-10-CM

## 2022-12-06 LAB — INR POINT OF CARE: 2.3 (ref 0.9–1.1)

## 2022-12-06 PROCEDURE — 85610 PROTHROMBIN TIME: CPT

## 2022-12-06 PROCEDURE — 36416 COLLJ CAPILLARY BLOOD SPEC: CPT

## 2022-12-06 NOTE — PROGRESS NOTES
ANTICOAGULATION MANAGEMENT     Zach Garcia 72 year old male is on warfarin with therapeutic INR result. (Goal INR 2.0-3.0)    Recent labs: (last 7 days)     12/06/22  0752   INR 2.3*       ASSESSMENT     Source(s): Chart Review and Patient/Caregiver Call     Warfarin doses taken: Reviewed in chart  Diet: No new diet changes identified  New illness, injury, or hospitalization: No  Medication/supplement changes: None noted  Signs or symptoms of bleeding or clotting: No  Previous INR: Therapeutic last 2(+) visits  Additional findings: None       PLAN     Recommended plan for no diet, medication or health factor changes affecting INR     Dosing Instructions: Continue your current warfarin dose with next INR in 6 weeks       Summary  As of 12/6/2022    Full warfarin instructions:  2.5 mg every Sun, Tue, Thu; 5 mg all other days; Starting 12/6/2022   Next INR check:  1/17/2023             Detailed voice message left for Pat with dosing instructions and follow up date.     Contact [phone] to schedule and with any changes, questions or concerns.     Education provided:   Goal range and lab monitoring: goal range and significance of current result  Contact [phone] with any changes, questions or concerns.     Plan made per [facility] anticoagulation protocol    Latosha Junior, RN  Anticoagulation Clinic  12/6/2022    _______________________________________________________________________     Anticoagulation Episode Summary     Current INR goal:  2.0-3.0   TTR:  83.1 % (1 y)   Target end date:  Indefinite   Send INR reminders to:  [facility] HEART [facility]    Indications    Chronic atrial fibrillation (H) [I48.20]           Comments:           Anticoagulation Care Providers     Provider Role Specialty Phone number    Aram Martel MD  Cardiovascular Disease [phone]

## 2022-12-20 LAB
MDC_IDC_EPISODE_DTM: NORMAL
MDC_IDC_EPISODE_DURATION: 12 S
MDC_IDC_EPISODE_DURATION: 13 S
MDC_IDC_EPISODE_DURATION: 13 S
MDC_IDC_EPISODE_DURATION: 14 S
MDC_IDC_EPISODE_DURATION: 14 S
MDC_IDC_EPISODE_DURATION: 15 S
MDC_IDC_EPISODE_DURATION: 16 S
MDC_IDC_EPISODE_DURATION: 16 S
MDC_IDC_EPISODE_DURATION: 21 S
MDC_IDC_EPISODE_DURATION: 21 S
MDC_IDC_EPISODE_DURATION: 22 S
MDC_IDC_EPISODE_DURATION: 25 S
MDC_IDC_EPISODE_DURATION: 26 S
MDC_IDC_EPISODE_DURATION: 26 S
MDC_IDC_EPISODE_DURATION: 27 S
MDC_IDC_EPISODE_DURATION: 27 S
MDC_IDC_EPISODE_DURATION: 29 S
MDC_IDC_EPISODE_DURATION: 29 S
MDC_IDC_EPISODE_DURATION: 349 S
MDC_IDC_EPISODE_DURATION: 37 S
MDC_IDC_EPISODE_DURATION: 40 S
MDC_IDC_EPISODE_DURATION: 50 S
MDC_IDC_EPISODE_DURATION: 52 S
MDC_IDC_EPISODE_DURATION: 67 S
MDC_IDC_EPISODE_DURATION: 87 S
MDC_IDC_EPISODE_ID: NORMAL
MDC_IDC_EPISODE_TYPE: NORMAL
MDC_IDC_EPISODE_VENDOR_TYPE: NORMAL
MDC_IDC_LEAD_IMPLANT_DT: NORMAL
MDC_IDC_LEAD_LOCATION: NORMAL
MDC_IDC_LEAD_LOCATION_DETAIL_1: NORMAL
MDC_IDC_LEAD_MFG: NORMAL
MDC_IDC_LEAD_MODEL: NORMAL
MDC_IDC_LEAD_POLARITY_TYPE: NORMAL
MDC_IDC_LEAD_SERIAL: NORMAL
MDC_IDC_LEAD_SPECIAL_FUNCTION: NORMAL
MDC_IDC_MSMT_BATTERY_DTM: NORMAL
MDC_IDC_MSMT_BATTERY_REMAINING_LONGEVITY: 132 MO
MDC_IDC_MSMT_BATTERY_REMAINING_PERCENTAGE: 100 %
MDC_IDC_MSMT_BATTERY_STATUS: NORMAL
MDC_IDC_MSMT_CAP_CHARGE_DTM: NORMAL
MDC_IDC_MSMT_CAP_CHARGE_TIME: 10.3 S
MDC_IDC_MSMT_CAP_CHARGE_TYPE: NORMAL
MDC_IDC_MSMT_LEADCHNL_RV_IMPEDANCE_VALUE: 622 OHM
MDC_IDC_MSMT_LEADCHNL_RV_PACING_THRESHOLD_AMPLITUDE: 0.7 V
MDC_IDC_MSMT_LEADCHNL_RV_PACING_THRESHOLD_PULSEWIDTH: 0.4 MS
MDC_IDC_PG_IMPLANT_DTM: NORMAL
MDC_IDC_PG_MFG: NORMAL
MDC_IDC_PG_MODEL: NORMAL
MDC_IDC_PG_SERIAL: NORMAL
MDC_IDC_PG_TYPE: NORMAL
MDC_IDC_SESS_CLINIC_NAME: NORMAL
MDC_IDC_SESS_DTM: NORMAL
MDC_IDC_SESS_TYPE: NORMAL
MDC_IDC_SET_BRADY_LOWRATE: 50 {BEATS}/MIN
MDC_IDC_SET_BRADY_MODE: NORMAL
MDC_IDC_SET_LEADCHNL_RV_PACING_AMPLITUDE: 1.5 V
MDC_IDC_SET_LEADCHNL_RV_PACING_POLARITY: NORMAL
MDC_IDC_SET_LEADCHNL_RV_PACING_PULSEWIDTH: 0.4 MS
MDC_IDC_SET_LEADCHNL_RV_SENSING_ADAPTATION_MODE: NORMAL
MDC_IDC_SET_LEADCHNL_RV_SENSING_POLARITY: NORMAL
MDC_IDC_SET_LEADCHNL_RV_SENSING_SENSITIVITY: 0.6 MV
MDC_IDC_SET_ZONE_DETECTION_INTERVAL: 286 MS
MDC_IDC_SET_ZONE_DETECTION_INTERVAL: 333 MS
MDC_IDC_SET_ZONE_DETECTION_INTERVAL: 400 MS
MDC_IDC_SET_ZONE_TYPE: NORMAL
MDC_IDC_SET_ZONE_VENDOR_TYPE: NORMAL
MDC_IDC_STAT_BRADY_DTM_END: NORMAL
MDC_IDC_STAT_BRADY_DTM_START: NORMAL
MDC_IDC_STAT_BRADY_RV_PERCENT_PACED: 8 %
MDC_IDC_STAT_EPISODE_RECENT_COUNT: 0
MDC_IDC_STAT_EPISODE_RECENT_COUNT: 2
MDC_IDC_STAT_EPISODE_RECENT_COUNT: 47
MDC_IDC_STAT_EPISODE_RECENT_COUNT_DTM_END: NORMAL
MDC_IDC_STAT_EPISODE_RECENT_COUNT_DTM_START: NORMAL
MDC_IDC_STAT_EPISODE_TYPE: NORMAL
MDC_IDC_STAT_EPISODE_VENDOR_TYPE: NORMAL
MDC_IDC_STAT_TACHYTHERAPY_ATP_DELIVERED_RECENT: 0
MDC_IDC_STAT_TACHYTHERAPY_ATP_DELIVERED_TOTAL: 1
MDC_IDC_STAT_TACHYTHERAPY_RECENT_DTM_END: NORMAL
MDC_IDC_STAT_TACHYTHERAPY_RECENT_DTM_START: NORMAL
MDC_IDC_STAT_TACHYTHERAPY_SHOCKS_ABORTED_RECENT: 0
MDC_IDC_STAT_TACHYTHERAPY_SHOCKS_ABORTED_TOTAL: 1
MDC_IDC_STAT_TACHYTHERAPY_SHOCKS_DELIVERED_RECENT: 0
MDC_IDC_STAT_TACHYTHERAPY_SHOCKS_DELIVERED_TOTAL: 0
MDC_IDC_STAT_TACHYTHERAPY_TOTAL_DTM_END: NORMAL
MDC_IDC_STAT_TACHYTHERAPY_TOTAL_DTM_START: NORMAL

## 2022-12-20 PROCEDURE — 93296 REM INTERROG EVL PM/IDS: CPT | Performed by: INTERNAL MEDICINE

## 2022-12-20 PROCEDURE — 93294 REM INTERROG EVL PM/LDLS PM: CPT | Performed by: INTERNAL MEDICINE

## 2023-01-17 ENCOUNTER — LAB (OUTPATIENT)
Dept: CARDIOLOGY | Facility: CLINIC | Age: 73
End: 2023-01-17
Payer: COMMERCIAL

## 2023-01-17 ENCOUNTER — ANTICOAGULATION THERAPY VISIT (OUTPATIENT)
Dept: ANTICOAGULATION | Facility: CLINIC | Age: 73
End: 2023-01-17

## 2023-01-17 DIAGNOSIS — I48.20 CHRONIC ATRIAL FIBRILLATION (H): Primary | ICD-10-CM

## 2023-01-17 DIAGNOSIS — I48.20 CHRONIC ATRIAL FIBRILLATION (H): ICD-10-CM

## 2023-01-17 LAB — INR POINT OF CARE: 5.5 (ref 0.9–1.1)

## 2023-01-17 PROCEDURE — 36416 COLLJ CAPILLARY BLOOD SPEC: CPT

## 2023-01-17 PROCEDURE — 85610 PROTHROMBIN TIME: CPT

## 2023-01-17 NOTE — PROGRESS NOTES
ANTICOAGULATION MANAGEMENT     Zach Garcia 72 year old male is on warfarin with supratherapeutic INR result. (Goal INR 2.0-3.0)    Recent labs: (last 7 days)     01/17/23  0821   INR 5.5*       ASSESSMENT       Source(s): Chart Review, Patient/Caregiver Call and Template       Warfarin doses taken: Template incorrect; verbally confirmed home dose with Pat     Diet: No new diet changes identified    New illness, injury, or hospitalization: Had a pretty bad cold 3-4 weeks ago- tested and negative for covid.    Medication/supplement changes: None noted    Signs or symptoms of bleeding or clotting: No    Previous INR: Therapeutic last 2(+) visits    Additional findings: None       PLAN     Recommended plan for no diet, medication or health factor changes affecting INR     Dosing Instructions: hold 2 doses then plan is to decrease warfarin dose (9% change) with next INR in 2 days   - patient only given instructions to hold warfarin today and tomorrow and will reevaluate plan on Thursday    Summary  As of 1/17/2023    Full warfarin instructions:  1/17: Hold; 1/18: Hold; Otherwise 5 mg every Mon, Wed, Fri; 2.5 mg all other days   Next INR check:  1/19/2023             Telephone call with Pat who verbalizes understanding and agrees to plan and who agrees to plan and repeated back plan correctly    Lab visit scheduled    Education provided:     Goal range and lab monitoring: goal range and significance of current result, Importance of therapeutic range and Importance of following up at instructed interval    Symptom monitoring: monitoring for bleeding signs and symptoms, when to seek medical attention/emergency care and if you hit your head or have a bad fall seek emergency care    Plan made per [facility] anticoagulation protocol    Terri Cornell RN  Anticoagulation Clinic  1/17/2023    _______________________________________________________________________     Anticoagulation Episode Summary     Current INR goal:  2.0-3.0    TTR:  80.6 % (1 y)   Target end date:  Indefinite   Send INR reminders to:  [facility]    Indications    Chronic atrial fibrillation (H) [I48.20]           Comments:           Anticoagulation Care Providers     Provider Role Specialty Phone number    Aram Martel MD  Cardiovascular Disease [phone]

## 2023-01-19 ENCOUNTER — ANTICOAGULATION THERAPY VISIT (OUTPATIENT)
Dept: ANTICOAGULATION | Facility: CLINIC | Age: 73
End: 2023-01-19

## 2023-01-19 ENCOUNTER — LAB (OUTPATIENT)
Dept: CARDIOLOGY | Facility: CLINIC | Age: 73
End: 2023-01-19
Payer: COMMERCIAL

## 2023-01-19 DIAGNOSIS — I48.20 CHRONIC ATRIAL FIBRILLATION (H): Primary | ICD-10-CM

## 2023-01-19 DIAGNOSIS — I48.20 CHRONIC ATRIAL FIBRILLATION (H): ICD-10-CM

## 2023-01-19 LAB — INR POINT OF CARE: 2.8 (ref 0.9–1.1)

## 2023-01-19 PROCEDURE — 36416 COLLJ CAPILLARY BLOOD SPEC: CPT

## 2023-01-19 PROCEDURE — 85610 PROTHROMBIN TIME: CPT

## 2023-01-19 NOTE — PROGRESS NOTES
ANTICOAGULATION MANAGEMENT     Zach Garcia 72 year old male is on warfarin with therapeutic INR result. (Goal INR 2.0-3.0)    Recent labs: (last 7 days)     01/19/23  0805   INR 2.8*       ASSESSMENT       Source(s): Chart Review, Patient/Caregiver Call and Template       Warfarin doses taken: Warfarin taken as instructed - held x2 for high INR    Diet: No new diet changes identified    New illness, injury, or hospitalization: Cold a few weeks ago    Medication/supplement changes: None noted    Signs or symptoms of bleeding or clotting: No    Previous INR: Supratherapeutic    Additional findings: None       PLAN     Recommended plan for temporary change(s) affecting INR     Dosing Instructions: Continue your current warfarin dose (9% decrease from MD prior to high INR) with next INR in 5-7 days       Summary  As of 1/19/2023    Full warfarin instructions:  5 mg every Mon, Wed, Fri; 2.5 mg all other days   Next INR check:  1/26/2023             Telephone call with Pat who agrees to plan and repeated back plan correctly    Lab visit scheduled    Education provided:     Goal range and lab monitoring: goal range and significance of current result, Importance of therapeutic range and Importance of following up at instructed interval    Symptom monitoring: monitoring for bleeding signs and symptoms    Plan made per [facility] anticoagulation protocol    Mone Kent RN  Anticoagulation Clinic  1/19/2023    _______________________________________________________________________     Anticoagulation Episode Summary     Current INR goal:  2.0-3.0   TTR:  80.7 % (1 y)   Target end date:  Indefinite   Send INR reminders to:  [facility] HEART [facility]    Indications    Chronic atrial fibrillation (H) [I48.20]           Comments:           Anticoagulation Care Providers     Provider Role Specialty Phone number    Aram Martel MD  Cardiovascular Disease [phone]

## 2023-01-26 ENCOUNTER — ANTICOAGULATION THERAPY VISIT (OUTPATIENT)
Dept: ANTICOAGULATION | Facility: CLINIC | Age: 73
End: 2023-01-26

## 2023-01-26 ENCOUNTER — LAB (OUTPATIENT)
Dept: CARDIOLOGY | Facility: CLINIC | Age: 73
End: 2023-01-26
Payer: COMMERCIAL

## 2023-01-26 DIAGNOSIS — I48.20 CHRONIC ATRIAL FIBRILLATION (H): Primary | ICD-10-CM

## 2023-01-26 DIAGNOSIS — I48.20 CHRONIC ATRIAL FIBRILLATION (H): ICD-10-CM

## 2023-01-26 LAB — INR POINT OF CARE: 2.1 (ref 0.9–1.1)

## 2023-01-26 PROCEDURE — 85610 PROTHROMBIN TIME: CPT

## 2023-01-26 PROCEDURE — 36416 COLLJ CAPILLARY BLOOD SPEC: CPT

## 2023-01-26 NOTE — PROGRESS NOTES
ANTICOAGULATION MANAGEMENT     Zach Garcia 72 year old male is on warfarin with therapeutic INR result. (Goal INR 2.0-3.0)    Recent labs: (last 7 days)     01/26/23  0816   INR 2.1*       ASSESSMENT       Source(s): Chart Review, Patient/Caregiver Call and Template       Warfarin doses taken: Warfarin taken as instructed    Diet: No new diet changes identified    New illness, injury, or hospitalization: No    Medication/supplement changes: None noted    Signs or symptoms of bleeding or clotting: No    Previous INR: Therapeutic last visit; previously outside of goal range    Additional findings: None       PLAN     Recommended plan for no diet, medication or health factor changes affecting INR     Dosing Instructions: Continue your current warfarin dose with next INR in 2 weeks       Summary  As of 1/26/2023    Full warfarin instructions:  5 mg every Mon, Wed, Fri; 2.5 mg all other days   Next INR check:  2/9/2023             Telephone call with Pat who verbalizes understanding and agrees to plan and who agrees to plan and repeated back plan correctly    Lab visit scheduled    Education provided:     Goal range and lab monitoring: goal range and significance of current result, Importance of therapeutic range and Importance of following up at instructed interval    Plan made per [facility] anticoagulation protocol    Terri Cornell RN  Anticoagulation Clinic  1/26/2023    _______________________________________________________________________     Anticoagulation Episode Summary     Current INR goal:  2.0-3.0   TTR:  81.9 % (1 y)   Target end date:  Indefinite   Send INR reminders to:  [facility]ART [facility]    Indications    Chronic atrial fibrillation (H) [I48.20]           Comments:           Anticoagulation Care Providers     Provider Role Specialty Phone number    Aram Martel MD  Cardiovascular Disease [phone]

## 2023-02-06 DIAGNOSIS — I42.0 CARDIOMYOPATHY, DILATED, NONISCHEMIC (H): ICD-10-CM

## 2023-02-06 RX ORDER — FUROSEMIDE 40 MG
20 TABLET ORAL 2 TIMES DAILY
Qty: 90 TABLET | Refills: 1 | Status: SHIPPED | OUTPATIENT
Start: 2023-02-06 | End: 2023-05-08

## 2023-02-09 ENCOUNTER — ANTICOAGULATION THERAPY VISIT (OUTPATIENT)
Dept: ANTICOAGULATION | Facility: CLINIC | Age: 73
End: 2023-02-09

## 2023-02-09 ENCOUNTER — LAB (OUTPATIENT)
Dept: CARDIOLOGY | Facility: CLINIC | Age: 73
End: 2023-02-09
Payer: COMMERCIAL

## 2023-02-09 DIAGNOSIS — I48.20 CHRONIC ATRIAL FIBRILLATION (H): ICD-10-CM

## 2023-02-09 DIAGNOSIS — I48.20 CHRONIC ATRIAL FIBRILLATION (H): Primary | ICD-10-CM

## 2023-02-09 LAB — INR POINT OF CARE: 1.8 (ref 0.9–1.1)

## 2023-02-09 PROCEDURE — 85610 PROTHROMBIN TIME: CPT

## 2023-02-09 PROCEDURE — 36416 COLLJ CAPILLARY BLOOD SPEC: CPT

## 2023-02-09 NOTE — PROGRESS NOTES
ANTICOAGULATION MANAGEMENT     Zach Garcia 72 year old male is on warfarin with subtherapeutic INR result. (Goal INR 2.0-3.0)    Recent labs: (last 7 days)     02/09/23  0808   INR 1.8*       ASSESSMENT       Source(s): Chart Review, Patient/Caregiver Call and Template       Warfarin doses taken: Warfarin taken as instructed    Diet: No new diet changes identified    New illness, injury, or hospitalization: No    Medication/supplement changes: None noted    Signs or symptoms of bleeding or clotting: No    Previous INR: Therapeutic last 2(+) visits    Additional findings: INRs have been trending down since dose adjustment 3 weeks ago       PLAN     Recommended plan for no diet, medication or health factor changes affecting INR     Dosing Instructions: Increase your warfarin dose (10% change) with next INR in 2 weeks       Summary  As of 2/9/2023    Full warfarin instructions:  2.5 mg every Sun, Tue, Thu; 5 mg all other days   Next INR check:  2/23/2023             Telephone call with Pat who verbalizes understanding and agrees to plan    Lab visit scheduled    Education provided:     Goal range and lab monitoring: goal range and significance of current result    Plan made per [facility] anticoagulation protocol    Mone Kent RN  Anticoagulation Clinic  2/9/2023    _______________________________________________________________________     Anticoagulation Episode Summary     Current INR goal:  2.0-3.0   TTR:  79.3 % (1 y)   Target end date:  Indefinite   Send INR reminders to:  [facility]ART [facility]    Indications    Chronic atrial fibrillation (H) [I48.20]           Comments:           Anticoagulation Care Providers     Provider Role Specialty Phone number    Aram Martel MD  Cardiovascular Disease [phone]

## 2023-02-17 NOTE — PROGRESS NOTES
INR 1.8 increase dose to 7..5 mg Sunday and 5 mg all other days. Retest in 4 weeks. After talking with pt and discussing history of greens/salads and medication change. Pt will  continue  with current diet and dosing of Warfarin.  Continue with moderation of Vit K and green leafy vegetables. Cautioned to call with increase bruising or bleeding. Reminded to call with medication change especially antibiotic. Call with any questions or concerns or any up coming procedures. Cautioned about using Herbal medication.     Eve Calle(Attending)

## 2023-02-27 ENCOUNTER — LAB (OUTPATIENT)
Dept: CARDIOLOGY | Facility: CLINIC | Age: 73
End: 2023-02-27
Payer: COMMERCIAL

## 2023-02-27 ENCOUNTER — ANTICOAGULATION THERAPY VISIT (OUTPATIENT)
Dept: ANTICOAGULATION | Facility: CLINIC | Age: 73
End: 2023-02-27

## 2023-02-27 DIAGNOSIS — I48.20 CHRONIC ATRIAL FIBRILLATION (H): ICD-10-CM

## 2023-02-27 DIAGNOSIS — I48.20 CHRONIC ATRIAL FIBRILLATION (H): Primary | ICD-10-CM

## 2023-02-27 LAB — INR POINT OF CARE: 1.9 (ref 0.9–1.1)

## 2023-02-27 PROCEDURE — 85610 PROTHROMBIN TIME: CPT

## 2023-02-27 PROCEDURE — 36416 COLLJ CAPILLARY BLOOD SPEC: CPT

## 2023-02-27 NOTE — PROGRESS NOTES
ANTICOAGULATION MANAGEMENT     Zach Garcia 72 year old male is on warfarin with subtherapeutic INR result. (Goal INR 2.0-3.0)    Recent labs: (last 7 days)     02/27/23  0804   INR 1.9*       ASSESSMENT       Source(s): Chart Review, Patient/Caregiver Call and Template       Warfarin doses taken: Warfarin taken as instructed    Diet: No new diet changes identified    New illness, injury, or hospitalization: No    Medication/supplement changes: None noted    Signs or symptoms of bleeding or clotting: No    Previous INR: Subtherapeutic, no change despite 10% dose increase with last INR    Additional findings: None         PLAN     Recommended plan for no diet, medication or health factor changes affecting INR     Dosing Instructions: Increase your warfarin dose (9% change) with next INR in 2 weeks       Summary  As of 2/27/2023    Full warfarin instructions:  2.5 mg every Sun, Thu; 5 mg all other days   Next INR check:  3/13/2023             Telephone call with Pat who verbalizes understanding and agrees to plan    Lab visit scheduled    Education provided:     Goal range and lab monitoring: goal range and significance of current result    Contact [phone] with any changes, questions or concerns.     Plan made per [facility] anticoagulation protocol    Kailey Samaniego, RN  Anticoagulation Clinic  2/27/2023    _______________________________________________________________________     Anticoagulation Episode Summary     Current INR goal:  2.0-3.0   TTR:  74.4 % (1 y)   Target end date:  Indefinite   Send INR reminders to:  [facility] HEART [facility]    Indications    Chronic atrial fibrillation (H) [I48.20]           Comments:           Anticoagulation Care Providers     Provider Role Specialty Phone number    Aram Martel MD  Cardiovascular Disease [phone]

## 2023-03-13 ENCOUNTER — LAB (OUTPATIENT)
Dept: CARDIOLOGY | Facility: CLINIC | Age: 73
End: 2023-03-13
Payer: COMMERCIAL

## 2023-03-13 ENCOUNTER — ANCILLARY PROCEDURE (OUTPATIENT)
Dept: CARDIOLOGY | Facility: CLINIC | Age: 73
End: 2023-03-13
Attending: INTERNAL MEDICINE
Payer: COMMERCIAL

## 2023-03-13 ENCOUNTER — ANTICOAGULATION THERAPY VISIT (OUTPATIENT)
Dept: ANTICOAGULATION | Facility: CLINIC | Age: 73
End: 2023-03-13

## 2023-03-13 DIAGNOSIS — I48.20 CHRONIC ATRIAL FIBRILLATION (H): ICD-10-CM

## 2023-03-13 DIAGNOSIS — Z95.810 ICD (IMPLANTABLE CARDIOVERTER-DEFIBRILLATOR) IN PLACE: ICD-10-CM

## 2023-03-13 DIAGNOSIS — I50.22 CHRONIC SYSTOLIC (CONGESTIVE) HEART FAILURE (H): ICD-10-CM

## 2023-03-13 DIAGNOSIS — I48.20 CHRONIC ATRIAL FIBRILLATION (H): Primary | ICD-10-CM

## 2023-03-13 DIAGNOSIS — I42.8 NON-ISCHEMIC CARDIOMYOPATHY (H): ICD-10-CM

## 2023-03-13 LAB
INR POINT OF CARE: 4.1 (ref 0.9–1.1)
MDC_IDC_EPISODE_DTM: NORMAL
MDC_IDC_EPISODE_DURATION: 103 S
MDC_IDC_EPISODE_DURATION: 11 S
MDC_IDC_EPISODE_DURATION: 12 S
MDC_IDC_EPISODE_DURATION: 12 S
MDC_IDC_EPISODE_DURATION: 125 S
MDC_IDC_EPISODE_DURATION: 13 S
MDC_IDC_EPISODE_DURATION: 14 S
MDC_IDC_EPISODE_DURATION: 14 S
MDC_IDC_EPISODE_DURATION: 15 S
MDC_IDC_EPISODE_DURATION: 15 S
MDC_IDC_EPISODE_DURATION: 16 S
MDC_IDC_EPISODE_DURATION: 164 S
MDC_IDC_EPISODE_DURATION: 17 S
MDC_IDC_EPISODE_DURATION: 18 S
MDC_IDC_EPISODE_DURATION: 25 S
MDC_IDC_EPISODE_DURATION: 277 S
MDC_IDC_EPISODE_DURATION: 32 S
MDC_IDC_EPISODE_DURATION: 33 S
MDC_IDC_EPISODE_DURATION: 368 S
MDC_IDC_EPISODE_DURATION: 38 S
MDC_IDC_EPISODE_DURATION: 38 S
MDC_IDC_EPISODE_DURATION: 42 S
MDC_IDC_EPISODE_DURATION: 444 S
MDC_IDC_EPISODE_DURATION: 96 S
MDC_IDC_EPISODE_DURATION: 99 S
MDC_IDC_EPISODE_ID: NORMAL
MDC_IDC_EPISODE_TYPE: NORMAL
MDC_IDC_EPISODE_VENDOR_TYPE: NORMAL
MDC_IDC_LEAD_IMPLANT_DT: NORMAL
MDC_IDC_LEAD_LOCATION: NORMAL
MDC_IDC_LEAD_LOCATION_DETAIL_1: NORMAL
MDC_IDC_LEAD_MFG: NORMAL
MDC_IDC_LEAD_MODEL: NORMAL
MDC_IDC_LEAD_POLARITY_TYPE: NORMAL
MDC_IDC_LEAD_SERIAL: NORMAL
MDC_IDC_LEAD_SPECIAL_FUNCTION: NORMAL
MDC_IDC_MSMT_BATTERY_DTM: NORMAL
MDC_IDC_MSMT_BATTERY_REMAINING_LONGEVITY: 132 MO
MDC_IDC_MSMT_BATTERY_REMAINING_PERCENTAGE: 100 %
MDC_IDC_MSMT_BATTERY_STATUS: NORMAL
MDC_IDC_MSMT_CAP_CHARGE_DTM: NORMAL
MDC_IDC_MSMT_CAP_CHARGE_TIME: 10.4 S
MDC_IDC_MSMT_CAP_CHARGE_TYPE: NORMAL
MDC_IDC_MSMT_LEADCHNL_RV_IMPEDANCE_VALUE: 625 OHM
MDC_IDC_MSMT_LEADCHNL_RV_PACING_THRESHOLD_AMPLITUDE: 0.7 V
MDC_IDC_MSMT_LEADCHNL_RV_PACING_THRESHOLD_PULSEWIDTH: 0.4 MS
MDC_IDC_PG_IMPLANT_DTM: NORMAL
MDC_IDC_PG_MFG: NORMAL
MDC_IDC_PG_MODEL: NORMAL
MDC_IDC_PG_SERIAL: NORMAL
MDC_IDC_PG_TYPE: NORMAL
MDC_IDC_SESS_CLINIC_NAME: NORMAL
MDC_IDC_SESS_DTM: NORMAL
MDC_IDC_SESS_TYPE: NORMAL
MDC_IDC_SET_BRADY_LOWRATE: 50 {BEATS}/MIN
MDC_IDC_SET_BRADY_MODE: NORMAL
MDC_IDC_SET_LEADCHNL_RV_PACING_AMPLITUDE: 1.5 V
MDC_IDC_SET_LEADCHNL_RV_PACING_POLARITY: NORMAL
MDC_IDC_SET_LEADCHNL_RV_PACING_PULSEWIDTH: 0.4 MS
MDC_IDC_SET_LEADCHNL_RV_SENSING_ADAPTATION_MODE: NORMAL
MDC_IDC_SET_LEADCHNL_RV_SENSING_POLARITY: NORMAL
MDC_IDC_SET_LEADCHNL_RV_SENSING_SENSITIVITY: 0.6 MV
MDC_IDC_SET_ZONE_DETECTION_INTERVAL: 286 MS
MDC_IDC_SET_ZONE_DETECTION_INTERVAL: 333 MS
MDC_IDC_SET_ZONE_DETECTION_INTERVAL: 400 MS
MDC_IDC_SET_ZONE_TYPE: NORMAL
MDC_IDC_SET_ZONE_VENDOR_TYPE: NORMAL
MDC_IDC_STAT_BRADY_DTM_END: NORMAL
MDC_IDC_STAT_BRADY_DTM_START: NORMAL
MDC_IDC_STAT_BRADY_RV_PERCENT_PACED: 6 %
MDC_IDC_STAT_EPISODE_RECENT_COUNT: 0
MDC_IDC_STAT_EPISODE_RECENT_COUNT: 20
MDC_IDC_STAT_EPISODE_RECENT_COUNT: 230
MDC_IDC_STAT_EPISODE_RECENT_COUNT_DTM_END: NORMAL
MDC_IDC_STAT_EPISODE_RECENT_COUNT_DTM_START: NORMAL
MDC_IDC_STAT_EPISODE_TYPE: NORMAL
MDC_IDC_STAT_EPISODE_VENDOR_TYPE: NORMAL
MDC_IDC_STAT_TACHYTHERAPY_ATP_DELIVERED_RECENT: 0
MDC_IDC_STAT_TACHYTHERAPY_ATP_DELIVERED_TOTAL: 1
MDC_IDC_STAT_TACHYTHERAPY_RECENT_DTM_END: NORMAL
MDC_IDC_STAT_TACHYTHERAPY_RECENT_DTM_START: NORMAL
MDC_IDC_STAT_TACHYTHERAPY_SHOCKS_ABORTED_RECENT: 0
MDC_IDC_STAT_TACHYTHERAPY_SHOCKS_ABORTED_TOTAL: 1
MDC_IDC_STAT_TACHYTHERAPY_SHOCKS_DELIVERED_RECENT: 0
MDC_IDC_STAT_TACHYTHERAPY_SHOCKS_DELIVERED_TOTAL: 0
MDC_IDC_STAT_TACHYTHERAPY_TOTAL_DTM_END: NORMAL
MDC_IDC_STAT_TACHYTHERAPY_TOTAL_DTM_START: NORMAL

## 2023-03-13 PROCEDURE — 99207 CARDIAC DEVICE CHECK - REMOTE: CPT | Performed by: INTERNAL MEDICINE

## 2023-03-13 PROCEDURE — 85610 PROTHROMBIN TIME: CPT

## 2023-03-13 PROCEDURE — 36416 COLLJ CAPILLARY BLOOD SPEC: CPT

## 2023-03-13 NOTE — PROGRESS NOTES
ANTICOAGULATION MANAGEMENT     Zach Garcia 73 year old male is on warfarin with supratherapeutic INR result. (Goal INR 2.0-3.0)    Recent labs: (last 7 days)     03/13/23  0804   INR 4.1*       ASSESSMENT       Source(s): Chart Review, Patient/Caregiver Call and Template       Warfarin doses taken: Warfarin taken as instructed    Diet: No new diet changes identified    New illness, injury, or hospitalization: No    Medication/supplement changes: None noted    Signs or symptoms of bleeding or clotting: No    Previous INR: Subtherapeutic    Additional findings: historically, pt most stable at 27.5 mg/wk.         PLAN     Recommended plan for no diet, medication or health factor changes affecting INR     Dosing Instructions: hold dose then decrease your warfarin dose (9% change) with next INR in 7-10 days       Summary  As of 3/13/2023    Full warfarin instructions:  3/13: Hold; Otherwise 2.5 mg every Sun, Tue, Thu; 5 mg all other days   Next INR check:  3/27/2023             Telephone call with Pat who agrees to plan and repeated back plan correctly    Patient elected to schedule next visit 3/27    Education provided:     Goal range and lab monitoring: goal range and significance of current result    Symptom monitoring: monitoring for bleeding signs and symptoms    Plan made per [facility] anticoagulation protocol    Mone Kent RN  Anticoagulation Clinic  3/13/2023    _______________________________________________________________________     Anticoagulation Episode Summary     Current INR goal:  2.0-3.0   TTR:  72.3 % (1 y)   Target end date:  Indefinite   Send INR reminders to:  [facility]ART [facility]    Indications    Chronic atrial fibrillation (H) [I48.20]           Comments:           Anticoagulation Care Providers     Provider Role Specialty Phone number    Aram Martel MD  Cardiovascular Disease [phone]

## 2023-03-27 ENCOUNTER — ANTICOAGULATION THERAPY VISIT (OUTPATIENT)
Dept: ANTICOAGULATION | Facility: CLINIC | Age: 73
End: 2023-03-27

## 2023-03-27 ENCOUNTER — LAB (OUTPATIENT)
Dept: CARDIOLOGY | Facility: CLINIC | Age: 73
End: 2023-03-27
Payer: COMMERCIAL

## 2023-03-27 DIAGNOSIS — I48.20 CHRONIC ATRIAL FIBRILLATION (H): ICD-10-CM

## 2023-03-27 DIAGNOSIS — I48.20 CHRONIC ATRIAL FIBRILLATION (H): Primary | ICD-10-CM

## 2023-03-27 LAB — INR POINT OF CARE: 4.7 (ref 0.9–1.1)

## 2023-03-27 PROCEDURE — 36416 COLLJ CAPILLARY BLOOD SPEC: CPT

## 2023-03-27 PROCEDURE — 85610 PROTHROMBIN TIME: CPT

## 2023-04-10 ENCOUNTER — LAB (OUTPATIENT)
Dept: CARDIOLOGY | Facility: CLINIC | Age: 73
End: 2023-04-10
Payer: COMMERCIAL

## 2023-04-10 ENCOUNTER — ANTICOAGULATION THERAPY VISIT (OUTPATIENT)
Dept: ANTICOAGULATION | Facility: CLINIC | Age: 73
End: 2023-04-10

## 2023-04-10 DIAGNOSIS — I48.20 CHRONIC ATRIAL FIBRILLATION (H): Primary | ICD-10-CM

## 2023-04-10 DIAGNOSIS — I48.20 CHRONIC ATRIAL FIBRILLATION (H): ICD-10-CM

## 2023-04-10 LAB — INR POINT OF CARE: 2.2 (ref 0.9–1.1)

## 2023-04-10 PROCEDURE — 85610 PROTHROMBIN TIME: CPT

## 2023-04-10 PROCEDURE — 36416 COLLJ CAPILLARY BLOOD SPEC: CPT

## 2023-05-01 ENCOUNTER — LAB (OUTPATIENT)
Dept: CARDIOLOGY | Facility: CLINIC | Age: 73
End: 2023-05-01
Payer: COMMERCIAL

## 2023-05-01 ENCOUNTER — ANTICOAGULATION THERAPY VISIT (OUTPATIENT)
Dept: ANTICOAGULATION | Facility: CLINIC | Age: 73
End: 2023-05-01

## 2023-05-01 DIAGNOSIS — I48.20 CHRONIC ATRIAL FIBRILLATION (H): Primary | ICD-10-CM

## 2023-05-01 DIAGNOSIS — I48.20 CHRONIC ATRIAL FIBRILLATION (H): ICD-10-CM

## 2023-05-01 LAB — INR POINT OF CARE: 1.5 (ref 0.9–1.1)

## 2023-05-01 PROCEDURE — 85610 PROTHROMBIN TIME: CPT | Mod: QW

## 2023-05-01 PROCEDURE — 36416 COLLJ CAPILLARY BLOOD SPEC: CPT

## 2023-05-01 NOTE — PROGRESS NOTES
ANTICOAGULATION MANAGEMENT     Zach Garcia 73 year old male is on warfarin with subtherapeutic INR result. (Goal INR 2.0-3.0)    Recent labs: (last 7 days)     05/01/23  0801   INR 1.5*       ASSESSMENT       Source(s): Chart Review and Patient/Caregiver Call       Warfarin doses taken: Warfarin taken as instructed    Diet: No new diet changes identified    Medication/supplement changes: None noted    New illness, injury, or hospitalization: No    Signs or symptoms of bleeding or clotting: No    Previous result: Therapeutic last visit; previously outside of goal range    Additional findings: None         PLAN     Recommended plan for no diet, medication or health factor changes affecting INR     Dosing Instructions: booster dose then Increase your warfarin dose (10% change) with next INR in 1-2 weeks       Summary  As of 5/1/2023    Full warfarin instructions:  5/1: 7.5 mg; Otherwise 2.5 mg every Sun, Tue, Thu; 5 mg all other days   Next INR check:  5/15/2023             Telephone call with Pat who verbalizes understanding and agrees to plan and who agrees to plan and repeated back plan correctly    Lab visit scheduled    Education provided:     Please call back if any changes to your diet, medications or how you've been taking warfarin    Goal range and lab monitoring: goal range and significance of current result and Importance of therapeutic range    Plan made per [facility] anticoagulation protocol    Irene Black RN  Anticoagulation Clinic  5/1/2023    _______________________________________________________________________     Anticoagulation Episode Summary     Current INR goal:  2.0-3.0   TTR:  61.9 % (1 y)   Target end date:  Indefinite   Send INR reminders to:  [facility] HEART [facility]    Indications    Chronic atrial fibrillation (H) [I48.20]           Comments:           Anticoagulation Care Providers     Provider Role Specialty Phone number    Aram Martel MD  Cardiovascular Disease  [phone]

## 2023-05-05 NOTE — PROGRESS NOTES
ANTICOAGULATION MANAGEMENT     Zach Garcia 73 year old male is on warfarin with therapeutic INR result. (Goal INR 2.0-3.0)    Recent labs: (last 7 days)     04/10/23  0805   INR 2.2*       ASSESSMENT       Source(s): Chart Review, Patient/Caregiver Call and Template       Warfarin doses taken: Warfarin taken as instructed    Diet: No new diet changes identified    New illness, injury, or hospitalization: No    Medication/supplement changes: None noted    Signs or symptoms of bleeding or clotting: No    Previous INR: Supratherapeutic    Additional findings: None         PLAN     Recommended plan for no diet, medication or health factor changes affecting INR     Dosing Instructions: Continue your current warfarin dose with next INR in 2-3 weeks   It has been 2 weeks since last check.    Summary  As of 4/10/2023    Full warfarin instructions:  5 mg every Mon, Wed, Fri; 2.5 mg all other days   Next INR check:  4/24/2023             Telephone call with Pat who verbalizes understanding and agrees to plan and who agrees to plan and repeated back plan correctly    Lab visit scheduled    Education provided:     Please call back if any changes to your diet, medications or how you've been taking warfarin    Goal range and lab monitoring: goal range and significance of current result and Importance of therapeutic range    Plan made per [facility] anticoagulation protocol    Irene Black, RN  Anticoagulation Clinic  4/10/2023    _______________________________________________________________________     Anticoagulation Episode Summary     Current INR goal:  2.0-3.0   TTR:  65.8 % (1 y)   Target end date:  Indefinite   Send INR reminders to:  [facility] HEART [facility]    Indications    Chronic atrial fibrillation (H) [I48.20]           Comments:           Anticoagulation Care Providers     Provider Role Specialty Phone number    Aram Martel MD  Cardiovascular Disease [phone]             No

## 2023-05-06 DIAGNOSIS — I42.0 CARDIOMYOPATHY, DILATED, NONISCHEMIC (H): ICD-10-CM

## 2023-05-08 RX ORDER — FUROSEMIDE 40 MG
TABLET ORAL
Qty: 90 TABLET | Refills: 0 | Status: SHIPPED | OUTPATIENT
Start: 2023-05-08 | End: 2023-06-13

## 2023-05-15 ENCOUNTER — LAB (OUTPATIENT)
Dept: CARDIOLOGY | Facility: CLINIC | Age: 73
End: 2023-05-15
Payer: COMMERCIAL

## 2023-05-15 ENCOUNTER — ANTICOAGULATION THERAPY VISIT (OUTPATIENT)
Dept: ANTICOAGULATION | Facility: CLINIC | Age: 73
End: 2023-05-15

## 2023-05-15 DIAGNOSIS — I48.20 CHRONIC ATRIAL FIBRILLATION (H): ICD-10-CM

## 2023-05-15 DIAGNOSIS — I48.20 CHRONIC ATRIAL FIBRILLATION (H): Primary | ICD-10-CM

## 2023-05-15 LAB — INR POINT OF CARE: 2.5 (ref 0.9–1.1)

## 2023-05-15 PROCEDURE — 85610 PROTHROMBIN TIME: CPT

## 2023-05-15 PROCEDURE — 36416 COLLJ CAPILLARY BLOOD SPEC: CPT

## 2023-05-15 NOTE — PROGRESS NOTES
ANTICOAGULATION MANAGEMENT     Zach Garcia 73 year old male is on warfarin with therapeutic INR result. (Goal INR 2.0-3.0)    Recent labs: (last 7 days)     05/15/23  0817   INR 2.5*       ASSESSMENT       Source(s): Chart Review, Patient/Caregiver Call and Template       Warfarin doses taken: Warfarin taken as instructed    Diet: No new diet changes identified    Medication/supplement changes: None noted    New illness, injury, or hospitalization: No    Signs or symptoms of bleeding or clotting: No    Previous result: Subtherapeutic    Additional findings: None         PLAN     Recommended plan for no diet, medication or health factor changes affecting INR     Dosing Instructions: Continue your current warfarin dose with next INR in 3 weeks       Summary  As of 5/15/2023    Full warfarin instructions:  2.5 mg every Sun, Tue, Thu; 5 mg all other days   Next INR check:  6/5/2023             Telephone call with Pat who verbalizes understanding and agrees to plan and who agrees to plan and repeated back plan correctly    Lab visit scheduled    Education provided:     Please call back if any changes to your diet, medications or how you've been taking warfarin    Goal range and lab monitoring: goal range and significance of current result and Importance of therapeutic range    Plan made per [facility] anticoagulation protocol    Irene Black, RN  Anticoagulation Clinic  5/15/2023    _______________________________________________________________________     Anticoagulation Episode Summary     Current INR goal:  2.0-3.0   TTR:  63.8 % (1 y)   Target end date:  Indefinite   Send INR reminders to:  [facility] HEART [facility]    Indications    Chronic atrial fibrillation (H) [I48.20]           Comments:           Anticoagulation Care Providers     Provider Role Specialty Phone number    Aram Martel MD  Cardiovascular Disease [phone]

## 2023-06-05 ENCOUNTER — ANTICOAGULATION THERAPY VISIT (OUTPATIENT)
Dept: ANTICOAGULATION | Facility: CLINIC | Age: 73
End: 2023-06-05

## 2023-06-05 ENCOUNTER — LAB (OUTPATIENT)
Dept: CARDIOLOGY | Facility: CLINIC | Age: 73
End: 2023-06-05
Payer: COMMERCIAL

## 2023-06-05 DIAGNOSIS — I48.20 CHRONIC ATRIAL FIBRILLATION (H): Primary | ICD-10-CM

## 2023-06-05 DIAGNOSIS — I48.20 CHRONIC ATRIAL FIBRILLATION (H): ICD-10-CM

## 2023-06-05 LAB — INR POINT OF CARE: 3 (ref 0.9–1.1)

## 2023-06-05 PROCEDURE — 85610 PROTHROMBIN TIME: CPT

## 2023-06-05 PROCEDURE — 36416 COLLJ CAPILLARY BLOOD SPEC: CPT

## 2023-06-05 NOTE — PROGRESS NOTES
ANTICOAGULATION MANAGEMENT     Zach Garcia 73 year old male is on warfarin with therapeutic INR result. (Goal INR 2.0-3.0)    Recent labs: (last 7 days)     06/05/23  0804   INR 3.0*       ASSESSMENT       Source(s): Chart Review, Patient/Caregiver Call and Template       Warfarin doses taken: Warfarin taken as instructed    Diet: Change in alcohol intake may be affecting INR. He had a few beers yesterday with the heat.     Medication/supplement changes: None noted    New illness, injury, or hospitalization: No    Signs or symptoms of bleeding or clotting: No    Previous result: Therapeutic last visit; previously outside of goal range    Additional findings: None         PLAN     Recommended plan for temporary change(s) affecting INR     Dosing Instructions: Continue your current warfarin dose with next INR in 4 weeks       Summary  As of 6/5/2023    Full warfarin instructions:  2.5 mg every Sun, Tue, Thu; 5 mg all other days   Next INR check:  7/3/2023             Telephone call with Pat who verbalizes understanding and agrees to plan and who agrees to plan and repeated back plan correctly    Lab visit scheduled    Education provided:     Please call back if any changes to your diet, medications or how you've been taking warfarin    Goal range and lab monitoring: goal range and significance of current result and Importance of therapeutic range    Plan made per [facility] anticoagulation protocol    Irene Black, RN  Anticoagulation Clinic  6/5/2023    _______________________________________________________________________     Anticoagulation Episode Summary     Current INR goal:  2.0-3.0   TTR:  68.5 % (1 y)   Target end date:  Indefinite   Send INR reminders to:  [facility]    Indications    Chronic atrial fibrillation (H) [I48.20]           Comments:           Anticoagulation Care Providers     Provider Role Specialty Phone number    Aram Martel MD  Cardiovascular Disease  [phone]

## 2023-06-12 DIAGNOSIS — I48.20 CHRONIC ATRIAL FIBRILLATION (H): ICD-10-CM

## 2023-06-13 DIAGNOSIS — I42.0 CARDIOMYOPATHY, DILATED, NONISCHEMIC (H): ICD-10-CM

## 2023-06-13 RX ORDER — FUROSEMIDE 40 MG
20 TABLET ORAL 2 TIMES DAILY
Qty: 90 TABLET | Refills: 3 | Status: SHIPPED | OUTPATIENT
Start: 2023-06-13 | End: 2024-04-12

## 2023-06-14 RX ORDER — WARFARIN SODIUM 5 MG/1
2.5-5 TABLET ORAL DAILY
Qty: 90 TABLET | Refills: 1 | Status: SHIPPED | OUTPATIENT
Start: 2023-06-14 | End: 2023-09-28

## 2023-06-14 NOTE — TELEPHONE ENCOUNTER
ANTICOAGULATION MANAGEMENT:  Medication Refill    Anticoagulation Summary  As of 6/5/2023    Warfarin maintenance plan:  2.5 mg (5 mg x 0.5) every Sun, Tue, Thu; 5 mg (5 mg x 1) all other days   Next INR check:  7/3/2023   Target end date:  Indefinite    Indications    Chronic atrial fibrillation (H) [I48.20]             Anticoagulation Care Providers     Provider Role Specialty Phone number    Aram Martel MD  Cardiovascular Disease [phone]          Visit with referring provider/group within last year: Yes    [facility] referral signed last signed: 09/14/2022; within last year: Yes    Zach meets all criteria for refill (current [facility] referral, office visit with referring provider/group in last year, lab monitoring up to date or not exceeding 2 weeks overdue). Rx instructions and quantity supplied updated to match patient's current dosing plan. Warfarin 90 day supply with 1 refill granted per [facility] protocol     Mone Kent RN  Anticoagulation Clinic

## 2023-06-16 ENCOUNTER — ANCILLARY PROCEDURE (OUTPATIENT)
Dept: CARDIOLOGY | Facility: CLINIC | Age: 73
End: 2023-06-16
Attending: INTERNAL MEDICINE
Payer: COMMERCIAL

## 2023-06-16 DIAGNOSIS — Z95.810 ICD (IMPLANTABLE CARDIOVERTER-DEFIBRILLATOR) IN PLACE: ICD-10-CM

## 2023-06-16 DIAGNOSIS — I50.22 CHRONIC SYSTOLIC (CONGESTIVE) HEART FAILURE (H): ICD-10-CM

## 2023-06-16 DIAGNOSIS — I42.8 NON-ISCHEMIC CARDIOMYOPATHY (H): ICD-10-CM

## 2023-07-01 LAB
MDC_IDC_EPISODE_DTM: NORMAL
MDC_IDC_EPISODE_DURATION: 13 S
MDC_IDC_EPISODE_DURATION: 14 S
MDC_IDC_EPISODE_DURATION: 15 S
MDC_IDC_EPISODE_DURATION: 15 S
MDC_IDC_EPISODE_DURATION: 16 S
MDC_IDC_EPISODE_DURATION: 16 S
MDC_IDC_EPISODE_DURATION: 17 S
MDC_IDC_EPISODE_DURATION: 23 S
MDC_IDC_EPISODE_DURATION: 24 S
MDC_IDC_EPISODE_DURATION: 24 S
MDC_IDC_EPISODE_DURATION: 25 S
MDC_IDC_EPISODE_ID: NORMAL
MDC_IDC_EPISODE_TYPE: NORMAL
MDC_IDC_EPISODE_VENDOR_TYPE: NORMAL
MDC_IDC_LEAD_IMPLANT_DT: NORMAL
MDC_IDC_LEAD_LOCATION: NORMAL
MDC_IDC_LEAD_LOCATION_DETAIL_1: NORMAL
MDC_IDC_LEAD_MFG: NORMAL
MDC_IDC_LEAD_MODEL: NORMAL
MDC_IDC_LEAD_POLARITY_TYPE: NORMAL
MDC_IDC_LEAD_SERIAL: NORMAL
MDC_IDC_LEAD_SPECIAL_FUNCTION: NORMAL
MDC_IDC_MSMT_BATTERY_DTM: NORMAL
MDC_IDC_MSMT_BATTERY_REMAINING_LONGEVITY: 132 MO
MDC_IDC_MSMT_BATTERY_REMAINING_PERCENTAGE: 100 %
MDC_IDC_MSMT_BATTERY_STATUS: NORMAL
MDC_IDC_MSMT_CAP_CHARGE_DTM: NORMAL
MDC_IDC_MSMT_CAP_CHARGE_TIME: 10.4 S
MDC_IDC_MSMT_CAP_CHARGE_TYPE: NORMAL
MDC_IDC_MSMT_LEADCHNL_RV_IMPEDANCE_VALUE: 586 OHM
MDC_IDC_MSMT_LEADCHNL_RV_PACING_THRESHOLD_AMPLITUDE: 0.7 V
MDC_IDC_MSMT_LEADCHNL_RV_PACING_THRESHOLD_PULSEWIDTH: 0.4 MS
MDC_IDC_PG_IMPLANT_DTM: NORMAL
MDC_IDC_PG_MFG: NORMAL
MDC_IDC_PG_MODEL: NORMAL
MDC_IDC_PG_SERIAL: NORMAL
MDC_IDC_PG_TYPE: NORMAL
MDC_IDC_SESS_CLINIC_NAME: NORMAL
MDC_IDC_SESS_DTM: NORMAL
MDC_IDC_SESS_TYPE: NORMAL
MDC_IDC_SET_BRADY_LOWRATE: 50 {BEATS}/MIN
MDC_IDC_SET_BRADY_MODE: NORMAL
MDC_IDC_SET_LEADCHNL_RV_PACING_AMPLITUDE: 1.5 V
MDC_IDC_SET_LEADCHNL_RV_PACING_POLARITY: NORMAL
MDC_IDC_SET_LEADCHNL_RV_PACING_PULSEWIDTH: 0.4 MS
MDC_IDC_SET_LEADCHNL_RV_SENSING_ADAPTATION_MODE: NORMAL
MDC_IDC_SET_LEADCHNL_RV_SENSING_POLARITY: NORMAL
MDC_IDC_SET_LEADCHNL_RV_SENSING_SENSITIVITY: 0.6 MV
MDC_IDC_SET_ZONE_DETECTION_INTERVAL: 286 MS
MDC_IDC_SET_ZONE_DETECTION_INTERVAL: 333 MS
MDC_IDC_SET_ZONE_DETECTION_INTERVAL: 400 MS
MDC_IDC_SET_ZONE_TYPE: NORMAL
MDC_IDC_SET_ZONE_VENDOR_TYPE: NORMAL
MDC_IDC_STAT_BRADY_DTM_END: NORMAL
MDC_IDC_STAT_BRADY_DTM_START: NORMAL
MDC_IDC_STAT_BRADY_RV_PERCENT_PACED: 5 %
MDC_IDC_STAT_EPISODE_RECENT_COUNT: 0
MDC_IDC_STAT_EPISODE_RECENT_COUNT: 3
MDC_IDC_STAT_EPISODE_RECENT_COUNT: 40
MDC_IDC_STAT_EPISODE_RECENT_COUNT_DTM_END: NORMAL
MDC_IDC_STAT_EPISODE_RECENT_COUNT_DTM_START: NORMAL
MDC_IDC_STAT_EPISODE_TYPE: NORMAL
MDC_IDC_STAT_EPISODE_VENDOR_TYPE: NORMAL
MDC_IDC_STAT_TACHYTHERAPY_ATP_DELIVERED_RECENT: 0
MDC_IDC_STAT_TACHYTHERAPY_ATP_DELIVERED_TOTAL: 1
MDC_IDC_STAT_TACHYTHERAPY_RECENT_DTM_END: NORMAL
MDC_IDC_STAT_TACHYTHERAPY_RECENT_DTM_START: NORMAL
MDC_IDC_STAT_TACHYTHERAPY_SHOCKS_ABORTED_RECENT: 0
MDC_IDC_STAT_TACHYTHERAPY_SHOCKS_ABORTED_TOTAL: 1
MDC_IDC_STAT_TACHYTHERAPY_SHOCKS_DELIVERED_RECENT: 0
MDC_IDC_STAT_TACHYTHERAPY_SHOCKS_DELIVERED_TOTAL: 0
MDC_IDC_STAT_TACHYTHERAPY_TOTAL_DTM_END: NORMAL
MDC_IDC_STAT_TACHYTHERAPY_TOTAL_DTM_START: NORMAL

## 2023-07-01 PROCEDURE — 93295 DEV INTERROG REMOTE 1/2/MLT: CPT | Performed by: INTERNAL MEDICINE

## 2023-07-01 PROCEDURE — 93296 REM INTERROG EVL PM/IDS: CPT | Performed by: INTERNAL MEDICINE

## 2023-07-03 ENCOUNTER — ANTICOAGULATION THERAPY VISIT (OUTPATIENT)
Dept: ANTICOAGULATION | Facility: CLINIC | Age: 73
End: 2023-07-03

## 2023-07-03 ENCOUNTER — LAB (OUTPATIENT)
Dept: CARDIOLOGY | Facility: CLINIC | Age: 73
End: 2023-07-03
Payer: COMMERCIAL

## 2023-07-03 DIAGNOSIS — I48.20 CHRONIC ATRIAL FIBRILLATION (H): Primary | ICD-10-CM

## 2023-07-03 DIAGNOSIS — I48.20 CHRONIC ATRIAL FIBRILLATION (H): ICD-10-CM

## 2023-07-03 LAB — INR POINT OF CARE: 3.6 (ref 0.9–1.1)

## 2023-07-03 PROCEDURE — 36416 COLLJ CAPILLARY BLOOD SPEC: CPT

## 2023-07-03 PROCEDURE — 85610 PROTHROMBIN TIME: CPT

## 2023-07-03 NOTE — PROGRESS NOTES
ANTICOAGULATION MANAGEMENT     Zach Garcia 73 year old male is on warfarin with supratherapeutic INR result. (Goal INR 2.0-3.0)    Recent labs: (last 7 days)     07/03/23  0812   INR 3.6*       ASSESSMENT       Source(s): Chart Review and Patient/Caregiver Call       Warfarin doses taken: Warfarin taken as instructed    Diet: No new diet changes identified - pt had a few beers yesterday, but states this is not abnormal for a weekend.     Medication/supplement changes: None noted    New illness, injury, or hospitalization: No    Signs or symptoms of bleeding or clotting: No    Previous result: Therapeutic last 2(+) visits    Additional findings: Pt prefers to keep dosing as is and check next INR a little later in the week instead of on Mondays as he often has alcohol during the weekend         PLAN     Recommended plan for temporary change(s) affecting INR     Dosing Instructions: hold dose then continue your current warfarin dose with next INR in 2 weeks       Summary  As of 7/3/2023    Full warfarin instructions:  7/3: Hold; Otherwise 2.5 mg every Sun, Tue, Thu; 5 mg all other days   Next INR check:  7/17/2023             Telephone call with Pat who verbalizes understanding and agrees to plan    Lab visit scheduled    Education provided:     Goal range and lab monitoring: goal range and significance of current result    Healthy lifestyle considerations: potential interaction between warfarin and alcohol    Plan made per [facility] anticoagulation protocol    Mone Kent RN  Anticoagulation Clinic  7/3/2023    _______________________________________________________________________     Anticoagulation Episode Summary     Current INR goal:  2.0-3.0   TTR:  60.8 % (1 y)   Target end date:  Indefinite   Send INR reminders to:  [facility] HEART [facility]    Indications    Chronic atrial fibrillation (H) [I48.20]           Comments:           Anticoagulation Care Providers     Provider Role Specialty Phone  number    Aram Martel MD  Cardiovascular Disease [phone]

## 2023-07-18 ENCOUNTER — LAB (OUTPATIENT)
Dept: CARDIOLOGY | Facility: CLINIC | Age: 73
End: 2023-07-18
Payer: COMMERCIAL

## 2023-07-18 ENCOUNTER — ANTICOAGULATION THERAPY VISIT (OUTPATIENT)
Dept: ANTICOAGULATION | Facility: CLINIC | Age: 73
End: 2023-07-18

## 2023-07-18 DIAGNOSIS — I48.20 CHRONIC ATRIAL FIBRILLATION (H): ICD-10-CM

## 2023-07-18 DIAGNOSIS — I48.20 CHRONIC ATRIAL FIBRILLATION (H): Primary | ICD-10-CM

## 2023-07-18 LAB — INR POINT OF CARE: 2.7 (ref 0.9–1.1)

## 2023-07-18 PROCEDURE — 36416 COLLJ CAPILLARY BLOOD SPEC: CPT

## 2023-07-18 PROCEDURE — 85610 PROTHROMBIN TIME: CPT | Mod: QW

## 2023-07-18 NOTE — PROGRESS NOTES
ANTICOAGULATION MANAGEMENT     Zach Garcia 73 year old male is on warfarin with therapeutic INR result. (Goal INR 2.0-3.0)    Recent labs: (last 7 days)     07/18/23  0804   INR 2.7*       ASSESSMENT       Source(s): Chart Review, Patient/Caregiver Call and Template       Warfarin doses taken: Warfarin taken as instructed    Diet: No new diet changes identified    Medication/supplement changes: None noted    New illness, injury, or hospitalization: No    Signs or symptoms of bleeding or clotting: No    Previous result: Supratherapeutic    Additional findings: None         PLAN     Recommended plan for no diet, medication or health factor changes affecting INR     Dosing Instructions: Continue your current warfarin dose with next INR in 3 weeks       Summary  As of 7/18/2023    Full warfarin instructions:  2.5 mg every Sun, Tue, Thu; 5 mg all other days   Next INR check:  8/8/2023             Telephone call with Alea who verbalizes understanding and agrees to plan    Lab visit scheduled    Education provided:     Goal range and lab monitoring: goal range and significance of current result    Plan made per [facility] anticoagulation protocol    Mone Kent RN  Anticoagulation Clinic  7/18/2023    _______________________________________________________________________     Anticoagulation Episode Summary     Current INR goal:  2.0-3.0   TTR:  58.1 % (1 y)   Target end date:  Indefinite   Send INR reminders to:  [facility]    Indications    Chronic atrial fibrillation (H) [I48.20]           Comments:           Anticoagulation Care Providers     Provider Role Specialty Phone number    Aram Martel MD  Cardiovascular Disease [phone]

## 2023-08-08 ENCOUNTER — DOCUMENTATION ONLY (OUTPATIENT)
Dept: ANTICOAGULATION | Facility: CLINIC | Age: 73
End: 2023-08-08

## 2023-08-08 ENCOUNTER — LAB (OUTPATIENT)
Dept: CARDIOLOGY | Facility: CLINIC | Age: 73
End: 2023-08-08
Payer: COMMERCIAL

## 2023-08-08 ENCOUNTER — ANTICOAGULATION THERAPY VISIT (OUTPATIENT)
Dept: ANTICOAGULATION | Facility: CLINIC | Age: 73
End: 2023-08-08

## 2023-08-08 DIAGNOSIS — I48.20 CHRONIC ATRIAL FIBRILLATION (H): Primary | ICD-10-CM

## 2023-08-08 DIAGNOSIS — I48.20 CHRONIC ATRIAL FIBRILLATION (H): ICD-10-CM

## 2023-08-08 LAB — INR POINT OF CARE: 1.8 (ref 0.9–1.1)

## 2023-08-08 PROCEDURE — 36416 COLLJ CAPILLARY BLOOD SPEC: CPT

## 2023-08-08 PROCEDURE — 85610 PROTHROMBIN TIME: CPT | Mod: QW

## 2023-08-08 NOTE — PROGRESS NOTES
ANTICOAGULATION MANAGEMENT     Zach Garcia 73 year old male is on warfarin with subtherapeutic INR result. (Goal INR 2.0-3.0)    Recent labs: (last 7 days)     08/08/23  0810   INR 1.8*       ASSESSMENT     Source(s): Chart Review and Template     Warfarin doses taken: Warfarin taken as instructed  Diet: No new diet changes identified  Medication/supplement changes: None noted  New illness, injury, or hospitalization: No  Signs or symptoms of bleeding or clotting: No  Previous result: Therapeutic last visit; previously outside of goal range - previously supratherapeutic on same dose  Additional findings: None       PLAN     Recommended plan for no diet, medication or health factor changes affecting INR     Dosing Instructions: Continue your current warfarin dose with next INR in 2 weeks       Summary  As of 8/8/2023      Full warfarin instructions:  2.5 mg every Sun, Tue, Thu; 5 mg all other days   Next INR check:  8/22/2023               Detailed voice message left for Pat with dosing instructions and follow up date.     Contact [phone] to schedule and with any changes, questions or concerns.     Education provided:   Please call back if any changes to your diet, medications or how you've been taking warfarin    Plan made per [facility] anticoagulation protocol    Mone Kent RN  Anticoagulation Clinic  8/8/2023    _______________________________________________________________________     Anticoagulation Episode Summary       Current INR goal:  2.0-3.0   TTR:  56.8 % (1 y)   Target end date:  Indefinite   Send INR reminders to:  [facility]ART [facility]    Indications    Chronic atrial fibrillation (H) [I48.20]             Comments:               Anticoagulation Care Providers       Provider Role Specialty Phone number    Aram Martel MD  Cardiovascular Disease [phone]

## 2023-08-08 NOTE — PROGRESS NOTES
ANTICOAGULATION CLINIC REFERRAL RENEWAL REQUEST       An annual renewal order is required for all patients referred to [facility].?  Please review and sign the pended referral order for Zach Garcia.       ANTICOAGULATION SUMMARY      Warfarin indication(s)   Atrial Fibrillation    Mechanical heart valve present?  NO       Current goal range   INR: 2.0-3.0     Goal appropriate for indication? Goal INR 2-3, standard for indication(s) above     Time in Therapeutic Range (TTR)  (Goal > 60%) 56.8%       Office visit with referring provider's group within last year yes on 8/31/22       Mone Kent RN  [facility]

## 2023-08-17 ENCOUNTER — ANTICOAGULATION THERAPY VISIT (OUTPATIENT)
Dept: ANTICOAGULATION | Facility: CLINIC | Age: 73
End: 2023-08-17

## 2023-08-17 ENCOUNTER — LAB (OUTPATIENT)
Dept: CARDIOLOGY | Facility: CLINIC | Age: 73
End: 2023-08-17
Payer: COMMERCIAL

## 2023-08-17 DIAGNOSIS — I48.20 CHRONIC ATRIAL FIBRILLATION (H): Primary | ICD-10-CM

## 2023-08-17 DIAGNOSIS — I48.20 CHRONIC ATRIAL FIBRILLATION (H): ICD-10-CM

## 2023-08-17 LAB — INR POINT OF CARE: 2.4 (ref 0.9–1.1)

## 2023-08-17 PROCEDURE — 85610 PROTHROMBIN TIME: CPT | Mod: QW

## 2023-08-17 PROCEDURE — 36416 COLLJ CAPILLARY BLOOD SPEC: CPT

## 2023-08-17 NOTE — PROGRESS NOTES
ANTICOAGULATION MANAGEMENT     Zach Garcia 73 year old male is on warfarin with therapeutic INR result. (Goal INR 2.0-3.0)    Recent labs: (last 7 days)     08/17/23  0925   INR 2.4*       ASSESSMENT     Source(s): Chart Review and Template     Warfarin doses taken: Warfarin taken as instructed  Diet: No new diet changes identified  Medication/supplement changes: None noted  New illness, injury, or hospitalization: No  Signs or symptoms of bleeding or clotting: No  Previous result: Subtherapeutic  Additional findings: None       PLAN     Recommended plan for no diet, medication or health factor changes affecting INR     Dosing Instructions: Continue your current warfarin dose with next INR in 2-3 weeks       Summary  As of 8/17/2023      Full warfarin instructions:  2.5 mg every Sun, Tue, Thu; 5 mg all other days   Next INR check:  9/7/2023               Detailed voice message left for Pat with dosing instructions and follow up date.     Contact [phone] to schedule and with any changes, questions or concerns.     Education provided:   Please call back if any changes to your diet, medications or how you've been taking warfarin    Plan made per [facility] anticoagulation protocol    Irene Black RN  Anticoagulation Clinic  8/17/2023    _______________________________________________________________________     Anticoagulation Episode Summary       Current INR goal:  2.0-3.0   TTR:  56.0 % (1 y)   Target end date:  Indefinite   Send INR reminders to:  [facility]ART [facility]    Indications    Chronic atrial fibrillation (H) [I48.20]             Comments:               Anticoagulation Care Providers       Provider Role Specialty Phone number    Aram Martel MD  Cardiovascular Disease [phone]

## 2023-08-28 ENCOUNTER — TELEPHONE (OUTPATIENT)
Dept: CARDIOLOGY | Facility: CLINIC | Age: 73
End: 2023-08-28
Payer: COMMERCIAL

## 2023-08-28 NOTE — TELEPHONE ENCOUNTER
Received a fax from [facility]. Pt will be having extraction of tooth #29 with Dr. Curtis. It will be with local anesthesia and they request INR be between 2.0-2.5 and drawn day of procedure.       Called Pat to discuss as he will see [facility] 8/31. He notes that he already had the tooth pulled and it actually all went through ACM team appropriately as they just needed an INR day of. Encounter closed. -[facility]

## 2023-08-31 ENCOUNTER — ANCILLARY PROCEDURE (OUTPATIENT)
Dept: CARDIOLOGY | Facility: CLINIC | Age: 73
End: 2023-08-31
Attending: INTERNAL MEDICINE
Payer: COMMERCIAL

## 2023-08-31 VITALS
SYSTOLIC BLOOD PRESSURE: 100 MMHG | RESPIRATION RATE: 16 BRPM | WEIGHT: 248 LBS | HEART RATE: 95 BPM | DIASTOLIC BLOOD PRESSURE: 60 MMHG | HEIGHT: 74 IN | BODY MASS INDEX: 31.83 KG/M2

## 2023-08-31 DIAGNOSIS — I25.83 CORONARY ARTERIOSCLEROSIS DUE TO LIPID RICH PLAQUE: Primary | ICD-10-CM

## 2023-08-31 DIAGNOSIS — I42.0 CARDIOMYOPATHY, DILATED, NONISCHEMIC (H): ICD-10-CM

## 2023-08-31 DIAGNOSIS — Z95.810 ICD (IMPLANTABLE CARDIOVERTER-DEFIBRILLATOR) IN PLACE: ICD-10-CM

## 2023-08-31 DIAGNOSIS — I50.22 CHRONIC SYSTOLIC (CONGESTIVE) HEART FAILURE (H): ICD-10-CM

## 2023-08-31 DIAGNOSIS — E66.09 CLASS 1 OBESITY DUE TO EXCESS CALORIES WITHOUT SERIOUS COMORBIDITY WITH BODY MASS INDEX (BMI) OF 31.0 TO 31.9 IN ADULT: ICD-10-CM

## 2023-08-31 DIAGNOSIS — E66.811 CLASS 1 OBESITY DUE TO EXCESS CALORIES WITHOUT SERIOUS COMORBIDITY WITH BODY MASS INDEX (BMI) OF 31.0 TO 31.9 IN ADULT: ICD-10-CM

## 2023-08-31 DIAGNOSIS — G25.0 ESSENTIAL TREMOR: ICD-10-CM

## 2023-08-31 DIAGNOSIS — E78.5 DYSLIPIDEMIA: ICD-10-CM

## 2023-08-31 DIAGNOSIS — Z95.810 ICD (IMPLANTABLE CARDIOVERTER-DEFIBRILLATOR), SINGLE, IN SITU: ICD-10-CM

## 2023-08-31 DIAGNOSIS — I48.20 CHRONIC ATRIAL FIBRILLATION (H): ICD-10-CM

## 2023-08-31 DIAGNOSIS — I10 ESSENTIAL HYPERTENSION WITH GOAL BLOOD PRESSURE LESS THAN 130/80: Chronic | ICD-10-CM

## 2023-08-31 DIAGNOSIS — I47.29 PAROXYSMAL VENTRICULAR TACHYCARDIA (H): ICD-10-CM

## 2023-08-31 LAB
MDC_IDC_LEAD_IMPLANT_DT: NORMAL
MDC_IDC_LEAD_LOCATION: NORMAL
MDC_IDC_LEAD_LOCATION_DETAIL_1: NORMAL
MDC_IDC_LEAD_MFG: NORMAL
MDC_IDC_LEAD_MODEL: NORMAL
MDC_IDC_LEAD_POLARITY_TYPE: NORMAL
MDC_IDC_LEAD_SERIAL: NORMAL
MDC_IDC_LEAD_SPECIAL_FUNCTION: NORMAL
MDC_IDC_MSMT_BATTERY_DTM: NORMAL
MDC_IDC_MSMT_BATTERY_REMAINING_LONGEVITY: 126 MO
MDC_IDC_MSMT_BATTERY_REMAINING_PERCENTAGE: 100 %
MDC_IDC_MSMT_BATTERY_STATUS: NORMAL
MDC_IDC_MSMT_CAP_CHARGE_DTM: NORMAL
MDC_IDC_MSMT_CAP_CHARGE_TIME: 10.4 S
MDC_IDC_MSMT_CAP_CHARGE_TYPE: NORMAL
MDC_IDC_MSMT_LEADCHNL_RV_IMPEDANCE_VALUE: 603 OHM
MDC_IDC_MSMT_LEADCHNL_RV_PACING_THRESHOLD_AMPLITUDE: 0.7 V
MDC_IDC_MSMT_LEADCHNL_RV_PACING_THRESHOLD_PULSEWIDTH: 0.4 MS
MDC_IDC_PG_IMPLANT_DTM: NORMAL
MDC_IDC_PG_MFG: NORMAL
MDC_IDC_PG_MODEL: NORMAL
MDC_IDC_PG_SERIAL: NORMAL
MDC_IDC_PG_TYPE: NORMAL
MDC_IDC_SESS_CLINIC_NAME: NORMAL
MDC_IDC_SESS_DTM: NORMAL
MDC_IDC_SESS_TYPE: NORMAL
MDC_IDC_SET_BRADY_LOWRATE: 50 {BEATS}/MIN
MDC_IDC_SET_BRADY_MODE: NORMAL
MDC_IDC_SET_LEADCHNL_RV_PACING_AMPLITUDE: 1.5 V
MDC_IDC_SET_LEADCHNL_RV_PACING_POLARITY: NORMAL
MDC_IDC_SET_LEADCHNL_RV_PACING_PULSEWIDTH: 0.4 MS
MDC_IDC_SET_LEADCHNL_RV_SENSING_ADAPTATION_MODE: NORMAL
MDC_IDC_SET_LEADCHNL_RV_SENSING_POLARITY: NORMAL
MDC_IDC_SET_LEADCHNL_RV_SENSING_SENSITIVITY: 0.6 MV
MDC_IDC_SET_ZONE_DETECTION_INTERVAL: 286 MS
MDC_IDC_SET_ZONE_DETECTION_INTERVAL: 333 MS
MDC_IDC_SET_ZONE_DETECTION_INTERVAL: 400 MS
MDC_IDC_SET_ZONE_TYPE: NORMAL
MDC_IDC_SET_ZONE_VENDOR_TYPE: NORMAL
MDC_IDC_STAT_BRADY_DTM_END: NORMAL
MDC_IDC_STAT_BRADY_DTM_START: NORMAL
MDC_IDC_STAT_BRADY_RV_PERCENT_PACED: 5 %
MDC_IDC_STAT_EPISODE_RECENT_COUNT: 0
MDC_IDC_STAT_EPISODE_RECENT_COUNT: 44
MDC_IDC_STAT_EPISODE_RECENT_COUNT: 603
MDC_IDC_STAT_EPISODE_RECENT_COUNT_DTM_END: NORMAL
MDC_IDC_STAT_EPISODE_RECENT_COUNT_DTM_START: NORMAL
MDC_IDC_STAT_EPISODE_TYPE: NORMAL
MDC_IDC_STAT_EPISODE_VENDOR_TYPE: NORMAL
MDC_IDC_STAT_TACHYTHERAPY_ATP_DELIVERED_RECENT: 0
MDC_IDC_STAT_TACHYTHERAPY_ATP_DELIVERED_TOTAL: 1
MDC_IDC_STAT_TACHYTHERAPY_RECENT_DTM_END: NORMAL
MDC_IDC_STAT_TACHYTHERAPY_RECENT_DTM_START: NORMAL
MDC_IDC_STAT_TACHYTHERAPY_SHOCKS_ABORTED_RECENT: 0
MDC_IDC_STAT_TACHYTHERAPY_SHOCKS_ABORTED_TOTAL: 1
MDC_IDC_STAT_TACHYTHERAPY_SHOCKS_DELIVERED_RECENT: 0
MDC_IDC_STAT_TACHYTHERAPY_SHOCKS_DELIVERED_TOTAL: 0
MDC_IDC_STAT_TACHYTHERAPY_TOTAL_DTM_END: NORMAL
MDC_IDC_STAT_TACHYTHERAPY_TOTAL_DTM_START: NORMAL

## 2023-08-31 PROCEDURE — 93282 PRGRMG EVAL IMPLANTABLE DFB: CPT | Performed by: INTERNAL MEDICINE

## 2023-08-31 PROCEDURE — 99214 OFFICE O/P EST MOD 30 MIN: CPT | Performed by: INTERNAL MEDICINE

## 2023-08-31 RX ORDER — TOPIRAMATE 25 MG/1
25 TABLET, FILM COATED ORAL DAILY
COMMUNITY
Start: 2023-08-21

## 2023-08-31 NOTE — LETTER
8/31/2023    Tyrone Patel MD  [facility] [address]  [facility] [address]    RE: Zach Garcia       Dear Colleague,     I had the pleasure of seeing Zach Garcia in the [facility].      [facility]  Heart Care Clinic Follow-up Note    Assessment & Plan        (I25.10,  I25.84) Coronary artery disease due to calcified coronary lesion  (primary encounter diagnosis)  Comment:  Angiography November 1, 2016 showed normal left main, mid LAD 30% lesion, normal circumflex and right coronary artery with a proximal 99% lesion which received a drug-coated stent.  Repeat pharmacological stress test September 2020 secondary to ventricular arrhythmias showed no major ischemia.  No symptoms currently and work on prevention.     (I48.20) Chronic atrial fibrillation (H)  Comment: Asymptomatic not valvular and on chronic anticoagulation given advanced CHADS 2 VASC score. Most recent INR 2.4.     (I42.0) Cardiomyopathy, dilated, nonischemic (H)  Comment: Ejection fraction in past 40% by echo and 36% by nuclear stress test.  On recheck echo 30 to 35%, no signs or symptoms of heart failure and takes furosemide twice daily.     (I10) Essential hypertension with goal blood pressure less than 130/80  Comment: Under good control on metoprolol, propranolol, furosemide, Aldactone, and lisinopril.     (E78.5) Dyslipidemia  Comment: On statin with total cholesterol 158 and LDL 69 which are excellent.  No recent blood test and will need to arrange for this.     (Z95.810) ICD (implantable cardioverter-defibrillator), single, in situ  Comment: [employer] device with [employer] right ventricular lead placed in 2017, approximately 5% ventricular pacing, no significant ventricular arrhythmias.       (I47.2) Paroxysmal ventricular tachycardia (H)  Comment: As above no recurrences.    (E66.09,  Z68.31) Class 1 obesity due to excess calories without serious comorbidity with body mass  "index (BMI) of 31.0 to 31.9 in adult  Comment: Work on weight loss.    (G25.0) Essential tremor  Comment: So noted and he is concerned this came on after neck surgery and defer to neurology.    Plan  1.  Work on weight loss.  2.  Speak with neurology concerning his tremor.  3.  Follow-up me in 1 year or sooner if needed.    Subjective  CC: 73-year-old white gentleman here for yearly follow-up.  He tells me his tremor is bad now to the point he cannot eat soup and cannot write.  There is no chest pain, palpitations, shortness of breath, PND, orthopnea, syncope, dizziness, or peripheral edema.    Medications  Current Outpatient Medications   Medication Sig Dispense Refill    acetaminophen (TYLENOL) 500 MG tablet as needed      ASPIRIN 81 PO daily      baclofen (LIORESAL) 10 MG tablet       furosemide (LASIX) 40 MG tablet Take 0.5 tablets (20 mg) by mouth 2 times daily 90 tablet 3    lisinopril (ZESTRIL) 20 MG tablet TAKE 2 TABLETS BY MOUTH  DAILY 240 tablet 3    metoprolol succinate ER (TOPROL XL) 200 MG 24 hr tablet Take 1 tablet (200 mg) by mouth daily 90 tablet 3    nortriptyline (PAMELOR) 50 MG capsule       pregabalin (LYRICA) 150 MG capsule       propranolol ER (INDERAL LA) 120 MG 24 hr capsule Take 120 mg by mouth daily      rosuvastatin (CRESTOR) 20 MG tablet TAKE 1 TABLET BY MOUTH  DAILY 90 tablet 1    spironolactone (ALDACTONE) 25 MG tablet TAKE ONE-HALF TABLET BY  MOUTH DAILY 60 tablet 11    topiramate (TOPAMAX) 25 MG tablet Take 25 mg by mouth daily      warfarin ANTICOAGULANT (COUMADIN) 5 MG tablet Take 0.5-1 tablets (2.5-5 mg) by mouth daily Adjust dose based on INR results as directed. 90 tablet 1       Objective  /60 (BP Location: Right arm, Patient Position: Sitting, Cuff Size: Adult Large)   Pulse 95   Resp 16   Ht 1.88 m (6' 2\")   Wt 112.5 kg (248 lb)   BMI 31.84 kg/m      General Appearance:    Alert, cooperative, no distress, appears stated age   Head:    Normocephalic, without obvious " abnormality, atraumatic   Throat:   Lips, mucosa, and tongue normal; teeth and gums normal   Neck:   Supple, symmetrical, trachea midline, no adenopathy;        thyroid:  No enlargement/tenderness/nodules; no carotid    bruit or JVD   Back:     Symmetric, no curvature, ROM normal, no CVA tenderness   Lungs:     Clear to auscultation bilaterally, respirations unlabored   Chest wall:    No tenderness, left-sided ICD   Heart:    Regular rate and rhythm, S1 and S2 normal, no murmur, rub   or gallop   Abdomen:     Soft, non-tender, bowel sounds active all four quadrants,     no masses, no organomegaly   Extremities:   Normal, atraumatic, no cyanosis or edema   Pulses:   2+ and symmetric all extremities   Skin:   Skin color, texture, turgor normal, no rashes or lesions     Results    Lab Results personally reviewed   Lab Results   Component Value Date    CHOL 158 05/22/2019    CHOL 144 02/27/2018     Lab Results   Component Value Date    HDL 46 05/22/2019    HDL 39 (L) 02/27/2018     No components found for: LDLCALC  Lab Results   Component Value Date    TRIG 215 (H) 05/22/2019    TRIG 189 (H) 02/27/2018     Lab Results   Component Value Date    WBC 6.3 05/22/2019    HGB 15.3 05/22/2019    HCT 45.8 05/22/2019     05/22/2019     Lab Results   Component Value Date    BUN 18 09/09/2020     09/09/2020    CO2 23 09/09/2020             Thank you for allowing me to participate in the care of your patient.      Sincerely,     MAYTE MARTEL MD     [facility]  cc:   Mayte Martel MD  [address], [address]  [place] [address]

## 2023-08-31 NOTE — PATIENT INSTRUCTIONS
Zach FEDERICA Garcia,  I enjoyed visiting with you again today.  I am glad to hear you are doing well.  Per our conversation keep up the same heart meds.  I will plan on seeing you 1 year or sooner if needed.  Aram Martel

## 2023-08-31 NOTE — PROGRESS NOTES
[facility]  Heart Care Clinic Follow-up Note    Assessment & Plan        (I25.10,  I25.84) Coronary artery disease due to calcified coronary lesion  (primary encounter diagnosis)  Comment:  Angiography November 1, 2016 showed normal left main, mid LAD 30% lesion, normal circumflex and right coronary artery with a proximal 99% lesion which received a drug-coated stent.  Repeat pharmacological stress test September 2020 secondary to ventricular arrhythmias showed no major ischemia.  No symptoms currently and work on prevention.     (I48.20) Chronic atrial fibrillation (H)  Comment: Asymptomatic not valvular and on chronic anticoagulation given advanced CHADS 2 VASC score. Most recent INR 2.4.     (I42.0) Cardiomyopathy, dilated, nonischemic (H)  Comment: Ejection fraction in past 40% by echo and 36% by nuclear stress test.  On recheck echo 30 to 35%, no signs or symptoms of heart failure and takes furosemide twice daily.     (I10) Essential hypertension with goal blood pressure less than 130/80  Comment: Under good control on metoprolol, propranolol, furosemide, Aldactone, and lisinopril.     (E78.5) Dyslipidemia  Comment: On statin with total cholesterol 158 and LDL 69 which are excellent.  No recent blood test and will need to arrange for this.     (Z95.810) ICD (implantable cardioverter-defibrillator), single, in situ  Comment: [employer] device with [employer] right ventricular lead placed in 2017, approximately 5% ventricular pacing, no significant ventricular arrhythmias.       (I47.2) Paroxysmal ventricular tachycardia (H)  Comment: As above no recurrences.    (E66.09,  Z68.31) Class 1 obesity due to excess calories without serious comorbidity with body mass index (BMI) of 31.0 to 31.9 in adult  Comment: Work on weight loss.    (G25.0) Essential tremor  Comment: So noted and he is concerned this came on after neck surgery and defer to neurology.    Plan  1.  Work on weight loss.  2.   "Speak with neurology concerning his tremor.  3.  Follow-up me in 1 year or sooner if needed.    Subjective  CC: 73-year-old white gentleman here for yearly follow-up.  He tells me his tremor is bad now to the point he cannot eat soup and cannot write.  There is no chest pain, palpitations, shortness of breath, PND, orthopnea, syncope, dizziness, or peripheral edema.    Medications  Current Outpatient Medications   Medication Sig Dispense Refill    acetaminophen (TYLENOL) 500 MG tablet as needed      ASPIRIN 81 PO daily      baclofen (LIORESAL) 10 MG tablet       furosemide (LASIX) 40 MG tablet Take 0.5 tablets (20 mg) by mouth 2 times daily 90 tablet 3    lisinopril (ZESTRIL) 20 MG tablet TAKE 2 TABLETS BY MOUTH  DAILY 240 tablet 3    metoprolol succinate ER (TOPROL XL) 200 MG 24 hr tablet Take 1 tablet (200 mg) by mouth daily 90 tablet 3    nortriptyline (PAMELOR) 50 MG capsule       pregabalin (LYRICA) 150 MG capsule       propranolol ER (INDERAL LA) 120 MG 24 hr capsule Take 120 mg by mouth daily      rosuvastatin (CRESTOR) 20 MG tablet TAKE 1 TABLET BY MOUTH  DAILY 90 tablet 1    spironolactone (ALDACTONE) 25 MG tablet TAKE ONE-HALF TABLET BY  MOUTH DAILY 60 tablet 11    topiramate (TOPAMAX) 25 MG tablet Take 25 mg by mouth daily      warfarin ANTICOAGULANT (COUMADIN) 5 MG tablet Take 0.5-1 tablets (2.5-5 mg) by mouth daily Adjust dose based on INR results as directed. 90 tablet 1       Objective  /60 (BP Location: Right arm, Patient Position: Sitting, Cuff Size: Adult Large)   Pulse 95   Resp 16   Ht 1.88 m (6' 2\")   Wt 112.5 kg (248 lb)   BMI 31.84 kg/m      General Appearance:    Alert, cooperative, no distress, appears stated age   Head:    Normocephalic, without obvious abnormality, atraumatic   Throat:   Lips, mucosa, and tongue normal; teeth and gums normal   Neck:   Supple, symmetrical, trachea midline, no adenopathy;        thyroid:  No enlargement/tenderness/nodules; no carotid    bruit or " JVD   Back:     Symmetric, no curvature, ROM normal, no CVA tenderness   Lungs:     Clear to auscultation bilaterally, respirations unlabored   Chest wall:    No tenderness, left-sided ICD   Heart:    Regular rate and rhythm, S1 and S2 normal, no murmur, rub   or gallop   Abdomen:     Soft, non-tender, bowel sounds active all four quadrants,     no masses, no organomegaly   Extremities:   Normal, atraumatic, no cyanosis or edema   Pulses:   2+ and symmetric all extremities   Skin:   Skin color, texture, turgor normal, no rashes or lesions     Results    Lab Results personally reviewed   Lab Results   Component Value Date    CHOL 158 05/22/2019    CHOL 144 02/27/2018     Lab Results   Component Value Date    HDL 46 05/22/2019    HDL 39 (L) 02/27/2018     No components found for: LDLCALC  Lab Results   Component Value Date    TRIG 215 (H) 05/22/2019    TRIG 189 (H) 02/27/2018     Lab Results   Component Value Date    WBC 6.3 05/22/2019    HGB 15.3 05/22/2019    HCT 45.8 05/22/2019     05/22/2019     Lab Results   Component Value Date    BUN 18 09/09/2020     09/09/2020    CO2 23 09/09/2020

## 2023-09-03 DIAGNOSIS — I48.20 CHRONIC ATRIAL FIBRILLATION (H): ICD-10-CM

## 2023-09-03 DIAGNOSIS — I42.0 CARDIOMYOPATHY, DILATED, NONISCHEMIC (H): ICD-10-CM

## 2023-09-04 DIAGNOSIS — I48.20 CHRONIC ATRIAL FIBRILLATION (H): ICD-10-CM

## 2023-09-05 RX ORDER — LISINOPRIL 20 MG/1
TABLET ORAL
Qty: 120 TABLET | Refills: 3 | Status: SHIPPED | OUTPATIENT
Start: 2023-09-05 | End: 2024-04-11

## 2023-09-05 RX ORDER — ROSUVASTATIN CALCIUM 20 MG/1
TABLET, COATED ORAL
Qty: 90 TABLET | Refills: 2 | Status: SHIPPED | OUTPATIENT
Start: 2023-09-05 | End: 2024-04-11

## 2023-09-05 RX ORDER — SPIRONOLACTONE 25 MG/1
TABLET ORAL
Qty: 45 TABLET | Refills: 2 | Status: SHIPPED | OUTPATIENT
Start: 2023-09-05 | End: 2024-04-11

## 2023-09-07 ENCOUNTER — LAB (OUTPATIENT)
Dept: CARDIOLOGY | Facility: CLINIC | Age: 73
End: 2023-09-07
Payer: COMMERCIAL

## 2023-09-07 ENCOUNTER — ANTICOAGULATION THERAPY VISIT (OUTPATIENT)
Dept: ANTICOAGULATION | Facility: CLINIC | Age: 73
End: 2023-09-07

## 2023-09-07 DIAGNOSIS — I48.20 CHRONIC ATRIAL FIBRILLATION (H): Primary | ICD-10-CM

## 2023-09-07 DIAGNOSIS — I48.20 CHRONIC ATRIAL FIBRILLATION (H): ICD-10-CM

## 2023-09-07 LAB — INR POINT OF CARE: 2 (ref 0.9–1.1)

## 2023-09-07 PROCEDURE — 85610 PROTHROMBIN TIME: CPT | Mod: QW

## 2023-09-07 PROCEDURE — 36416 COLLJ CAPILLARY BLOOD SPEC: CPT

## 2023-09-07 NOTE — PROGRESS NOTES
ANTICOAGULATION MANAGEMENT     Zach Garcia 73 year old male is on warfarin with therapeutic INR result. (Goal INR 2.0-3.0)    Recent labs: (last 7 days)     09/07/23  0808   INR 2.0*       ASSESSMENT     Source(s): Chart Review and Template     Warfarin doses taken: Warfarin taken as instructed  Diet: No new diet changes identified  Medication/supplement changes: None noted  New illness, injury, or hospitalization: No  Signs or symptoms of bleeding or clotting: No  Previous result: Therapeutic last visit; previously outside of goal range  Additional findings: None       PLAN     Recommended plan for no diet, medication or health factor changes affecting INR     Dosing Instructions: Continue your current warfarin dose with next INR in 3-4 weeks       Summary  As of 9/7/2023      Full warfarin instructions:  2.5 mg every Sun, Tue, Thu; 5 mg all other days   Next INR check:  10/5/2023               Detailed voice message left for Pat with dosing instructions and follow up date.     Contact [phone] to schedule and with any changes, questions or concerns.     Education provided:   Please call back if any changes to your diet, medications or how you've been taking warfarin    Plan made per [facility] anticoagulation protocol    Irene Black, RN  Anticoagulation Clinic  9/7/2023    _______________________________________________________________________     Anticoagulation Episode Summary       Current INR goal:  2.0-3.0   TTR:  56.0 % (1 y)   Target end date:  Indefinite   Send INR reminders to:  [facility]    Indications    Chronic atrial fibrillation (H) [I48.20]             Comments:               Anticoagulation Care Providers       Provider Role Specialty Phone number    Aram Martel MD  Cardiovascular Disease [phone]

## 2023-09-28 DIAGNOSIS — I10 ESSENTIAL HYPERTENSION WITH GOAL BLOOD PRESSURE LESS THAN 130/80: ICD-10-CM

## 2023-09-28 DIAGNOSIS — I48.20 CHRONIC ATRIAL FIBRILLATION (H): ICD-10-CM

## 2023-09-28 DIAGNOSIS — I25.84 CORONARY ARTERY DISEASE DUE TO CALCIFIED CORONARY LESION: ICD-10-CM

## 2023-09-28 DIAGNOSIS — I49.3 FREQUENT UNIFOCAL PREMATURE VENTRICULAR CONTRACTIONS: ICD-10-CM

## 2023-09-28 DIAGNOSIS — R00.0 TACHYCARDIA: ICD-10-CM

## 2023-09-28 DIAGNOSIS — I42.0 CARDIOMYOPATHY, DILATED, NONISCHEMIC (H): ICD-10-CM

## 2023-09-28 DIAGNOSIS — I25.10 CORONARY ARTERY DISEASE DUE TO CALCIFIED CORONARY LESION: ICD-10-CM

## 2023-09-28 DIAGNOSIS — I47.29 PAROXYSMAL VENTRICULAR TACHYCARDIA (H): ICD-10-CM

## 2023-09-28 DIAGNOSIS — I50.20 SYSTOLIC HEART FAILURE, UNSPECIFIED HF CHRONICITY (H): ICD-10-CM

## 2023-09-28 RX ORDER — METOPROLOL SUCCINATE 200 MG/1
200 TABLET, EXTENDED RELEASE ORAL DAILY
Qty: 120 TABLET | Refills: 2 | Status: SHIPPED | OUTPATIENT
Start: 2023-09-28 | End: 2024-06-03

## 2023-09-28 RX ORDER — WARFARIN SODIUM 5 MG/1
TABLET ORAL
Qty: 120 TABLET | Refills: 2 | Status: SHIPPED | OUTPATIENT
Start: 2023-09-28 | End: 2024-08-08

## 2023-10-10 ENCOUNTER — ANTICOAGULATION THERAPY VISIT (OUTPATIENT)
Dept: ANTICOAGULATION | Facility: CLINIC | Age: 73
End: 2023-10-10

## 2023-10-10 ENCOUNTER — LAB (OUTPATIENT)
Dept: CARDIOLOGY | Facility: CLINIC | Age: 73
End: 2023-10-10
Payer: COMMERCIAL

## 2023-10-10 DIAGNOSIS — I48.20 CHRONIC ATRIAL FIBRILLATION (H): Primary | ICD-10-CM

## 2023-10-10 DIAGNOSIS — I48.20 CHRONIC ATRIAL FIBRILLATION (H): ICD-10-CM

## 2023-10-10 LAB — INR POINT OF CARE: 3.8 (ref 0.9–1.1)

## 2023-10-10 PROCEDURE — 36416 COLLJ CAPILLARY BLOOD SPEC: CPT

## 2023-10-10 PROCEDURE — 85610 PROTHROMBIN TIME: CPT | Mod: QW

## 2023-10-10 NOTE — PROGRESS NOTES
ANTICOAGULATION MANAGEMENT     Zach Garcia 73 year old male is on warfarin with supratherapeutic INR result. (Goal INR 2.0-3.0)    Recent labs: (last 7 days)     10/10/23  0759   INR 3.8*       ASSESSMENT     Source(s): Chart Review, Patient/Caregiver Call, and Template     Warfarin doses taken: Warfarin taken as instructed  Diet: No new diet changes identified  Medication/supplement changes: None noted  New illness, injury, or hospitalization: No  Signs or symptoms of bleeding or clotting: No  Previous result: Therapeutic last 2(+) visits  Additional findings:  He prefers to hold today and continue. If high next time he will agree to the dose change.       PLAN     Recommended plan for no diet, medication or health factor changes affecting INR     Dosing Instructions: hold dose then continue your current warfarin dose with next INR in 2 weeks       Summary  As of 10/10/2023      Full warfarin instructions:  10/10: Hold; Otherwise 2.5 mg every Sun, Tue, Thu; 5 mg all other days   Next INR check:  10/24/2023               Telephone call with Pat who verbalizes understanding and agrees to plan and who agrees to plan and repeated back plan correctly    Patient elected to schedule next visit 10/31    Education provided:   Please call back if any changes to your diet, medications or how you've been taking warfarin  Goal range and lab monitoring: goal range and significance of current result and Importance of therapeutic range    Plan made per [facility] anticoagulation protocol    Irene Black RN  Anticoagulation Clinic  10/10/2023    _______________________________________________________________________     Anticoagulation Episode Summary       Current INR goal:  2.0-3.0   TTR:  52.0 % (1 y)   Target end date:  Indefinite   Send INR reminders to:  [facility]    Indications    Chronic atrial fibrillation (H) [I48.20]             Comments:               Anticoagulation Care Providers        Provider Role Specialty Phone number    Aram Martel MD  Cardiovascular Disease [phone]

## 2023-10-31 ENCOUNTER — LAB (OUTPATIENT)
Dept: CARDIOLOGY | Facility: CLINIC | Age: 73
End: 2023-10-31
Payer: COMMERCIAL

## 2023-10-31 ENCOUNTER — ANTICOAGULATION THERAPY VISIT (OUTPATIENT)
Dept: ANTICOAGULATION | Facility: CLINIC | Age: 73
End: 2023-10-31

## 2023-10-31 DIAGNOSIS — I48.20 CHRONIC ATRIAL FIBRILLATION (H): ICD-10-CM

## 2023-10-31 DIAGNOSIS — I48.20 CHRONIC ATRIAL FIBRILLATION (H): Primary | ICD-10-CM

## 2023-10-31 LAB
INR POINT OF CARE: 8 (ref 0.9–1.1)
INR PPP: 6.68 (ref 0.85–1.15)

## 2023-10-31 PROCEDURE — 85610 PROTHROMBIN TIME: CPT | Mod: QW

## 2023-10-31 PROCEDURE — 36415 COLL VENOUS BLD VENIPUNCTURE: CPT

## 2023-10-31 NOTE — PROGRESS NOTES
ANTICOAGULATION MANAGEMENT     Zach Garcia 73 year old male is on warfarin with supratherapeutic INR result. (Goal INR 2.0-3.0)    Recent labs: (last 7 days)     10/31/23  0817   INR 6.68*       ASSESSMENT     Source(s): Chart Review, Patient/Caregiver Call, and Template     Warfarin doses taken: Warfarin taken as instructed  Diet: Change in alcohol intake may be affecting INR. Had 4 beers yesterday between 11am and 7pm, would not expect, could be contributing to high INR today   Medication/supplement changes: None noted  New illness, injury, or hospitalization: No  Signs or symptoms of bleeding or clotting: No  Previous result: Supratherapeutic  Additional findings: None       PLAN     Recommended plan for temporary change(s) affecting INR     Dosing Instructions: hold 2 doses then decrease your warfarin dose (18% change) with next INR in 3 days       Summary  As of 10/31/2023      Full warfarin instructions:  10/31: Hold; 11/1: Hold; Otherwise 5 mg every Mon, Fri; 2.5 mg all other days   Next INR check:  11/3/2023               Telephone call with Pat who agrees to plan and repeated back plan correctly    Lab visit scheduled    Education provided:   Goal range and lab monitoring: goal range and significance of current result and Importance of therapeutic range  Healthy lifestyle considerations: potential interaction between warfarin and alcohol and avoid excessive alcohol drinking (binge drinking) while on warfarin due to increased risk of bleeding from effect on INR and fall risk  Symptom monitoring: monitoring for bleeding signs and symptoms    Plan made per [facility] anticoagulation protocol    Mone Kent RN  Anticoagulation Clinic  10/31/2023    _______________________________________________________________________     Anticoagulation Episode Summary       Current INR goal:  2.0-3.0   TTR:  46.2% (1 y)   Target end date:  Indefinite   Send INR reminders to:  [facility]     Indications    Chronic atrial fibrillation (H) [I48.20]             Comments:               Anticoagulation Care Providers       Provider Role Specialty Phone number    Aram Martel MD  Cardiovascular Disease [phone]

## 2023-11-06 ENCOUNTER — ANTICOAGULATION THERAPY VISIT (OUTPATIENT)
Dept: ANTICOAGULATION | Facility: CLINIC | Age: 73
End: 2023-11-06

## 2023-11-06 ENCOUNTER — LAB (OUTPATIENT)
Dept: CARDIOLOGY | Facility: CLINIC | Age: 73
End: 2023-11-06
Payer: COMMERCIAL

## 2023-11-06 DIAGNOSIS — I48.20 CHRONIC ATRIAL FIBRILLATION (H): Primary | ICD-10-CM

## 2023-11-06 DIAGNOSIS — I48.20 CHRONIC ATRIAL FIBRILLATION (H): ICD-10-CM

## 2023-11-06 LAB — INR POINT OF CARE: 4.3 (ref 0.9–1.1)

## 2023-11-06 PROCEDURE — 85610 PROTHROMBIN TIME: CPT | Mod: QW

## 2023-11-06 PROCEDURE — 36416 COLLJ CAPILLARY BLOOD SPEC: CPT

## 2023-11-06 NOTE — PROGRESS NOTES
ANTICOAGULATION MANAGEMENT     Zach Garcia 73 year old male is on warfarin with supratherapeutic INR result. (Goal INR 2.0-3.0)    Recent labs: (last 7 days)     11/06/23  0750   INR 4.3*       ASSESSMENT     Source(s): Chart Review, Patient/Caregiver Call, and Template     Warfarin doses taken: More warfarin taken than planned which may be affecting INR and Held for INR 6.68  recently which may be affecting INR  Diet: No new diet changes identified  Medication/supplement changes: None noted  New illness, injury, or hospitalization: No  Signs or symptoms of bleeding or clotting: No  Previous result: Supratherapeutic  Additional findings:  He had to cancel last Fridays INR so that is why here today. He will be going back up to [facility] now for a week.       PLAN     Recommended plan for temporary change(s) affecting INR     Dosing Instructions: Continue your current warfarin dose with next INR in 7-10 days       Summary  As of 11/6/2023      Full warfarin instructions:  11/6: Hold; Otherwise 5 mg every Mon, Fri; 2.5 mg all other days   Next INR check:  11/16/2023               Telephone call with Pat who verbalizes understanding and agrees to plan and who agrees to plan and repeated back plan correctly    Lab visit scheduled    Education provided:   Please call back if any changes to your diet, medications or how you've been taking warfarin  Goal range and lab monitoring: goal range and significance of current result and Importance of therapeutic range    Plan made per [facility] anticoagulation protocol    Irene Black RN  Anticoagulation Clinic  11/6/2023    _______________________________________________________________________     Anticoagulation Episode Summary       Current INR goal:  2.0-3.0   TTR:  44.6% (1 y)   Target end date:  Indefinite   Send INR reminders to:  [facility]    Indications    Chronic atrial fibrillation (H) [I48.20]             Comments:                Anticoagulation Care Providers       Provider Role Specialty Phone number    Aram Martel MD  Cardiovascular Disease [phone]

## 2023-11-16 ENCOUNTER — LAB (OUTPATIENT)
Dept: CARDIOLOGY | Facility: CLINIC | Age: 73
End: 2023-11-16
Payer: COMMERCIAL

## 2023-11-16 ENCOUNTER — ANTICOAGULATION THERAPY VISIT (OUTPATIENT)
Dept: ANTICOAGULATION | Facility: CLINIC | Age: 73
End: 2023-11-16

## 2023-11-16 DIAGNOSIS — I48.20 CHRONIC ATRIAL FIBRILLATION (H): ICD-10-CM

## 2023-11-16 DIAGNOSIS — I48.20 CHRONIC ATRIAL FIBRILLATION (H): Primary | ICD-10-CM

## 2023-11-16 LAB — INR POINT OF CARE: 3.7 (ref 0.9–1.1)

## 2023-11-16 PROCEDURE — 36416 COLLJ CAPILLARY BLOOD SPEC: CPT

## 2023-11-16 PROCEDURE — 85610 PROTHROMBIN TIME: CPT | Mod: QW

## 2023-11-16 NOTE — PROGRESS NOTES
ANTICOAGULATION MANAGEMENT     Zach Garcia 73 year old male is on warfarin with supratherapeutic INR result. (Goal INR 2.0-3.0)    Recent labs: (last 7 days)     11/16/23  0745   INR 3.7*       ASSESSMENT     Source(s): Chart Review, Patient/Caregiver Call, and Template     Warfarin doses taken: Warfarin taken as instructed  Diet: Change in alcohol intake may be affecting INR. Had 2 margaritas yesterday, not too uncommon for pt and would not anticipate it to keep INR this high   Medication/supplement changes: None noted  New illness, injury, or hospitalization: No  Signs or symptoms of bleeding or clotting: No  Previous result: Supratherapeutic  Additional findings: None       PLAN     Recommended plan for temporary change(s) affecting INR     Dosing Instructions: hold dose then decrease your warfarin dose (11% change) with next INR in 2 weeks       Summary  As of 11/16/2023      Full warfarin instructions:  11/16: Hold; Otherwise 5 mg every Mon; 2.5 mg all other days   Next INR check:  11/30/2023               Telephone call with Pat who agrees to plan and repeated back plan correctly    Lab visit scheduled    Education provided:   Goal range and lab monitoring: goal range and significance of current result  Healthy lifestyle considerations: potential interaction between warfarin and alcohol    Plan made per [facility] anticoagulation protocol    Mone Kent RN  Anticoagulation Clinic  11/16/2023    _______________________________________________________________________     Anticoagulation Episode Summary       Current INR goal:  2.0-3.0   TTR:  41.8% (1 y)   Target end date:  Indefinite   Send INR reminders to:  [facility]    Indications    Chronic atrial fibrillation (H) [I48.20]             Comments:               Anticoagulation Care Providers       Provider Role Specialty Phone number    Aram Martel MD  Cardiovascular Disease [phone]

## 2023-11-30 ENCOUNTER — ANTICOAGULATION THERAPY VISIT (OUTPATIENT)
Dept: ANTICOAGULATION | Facility: CLINIC | Age: 73
End: 2023-11-30

## 2023-11-30 ENCOUNTER — LAB (OUTPATIENT)
Dept: CARDIOLOGY | Facility: CLINIC | Age: 73
End: 2023-11-30
Payer: COMMERCIAL

## 2023-11-30 DIAGNOSIS — I48.20 CHRONIC ATRIAL FIBRILLATION (H): ICD-10-CM

## 2023-11-30 DIAGNOSIS — I48.20 CHRONIC ATRIAL FIBRILLATION (H): Primary | ICD-10-CM

## 2023-11-30 LAB — INR POINT OF CARE: 2.1 (ref 0.9–1.1)

## 2023-11-30 PROCEDURE — 36416 COLLJ CAPILLARY BLOOD SPEC: CPT

## 2023-11-30 PROCEDURE — 85610 PROTHROMBIN TIME: CPT | Mod: QW

## 2023-11-30 NOTE — PROGRESS NOTES
ANTICOAGULATION MANAGEMENT     Zach Garcia 73 year old male is on warfarin with therapeutic INR result. (Goal INR 2.0-3.0)    Recent labs: (last 7 days)     11/30/23  0817   INR 2.1*       ASSESSMENT     Source(s): Chart Review and Template     Warfarin doses taken: Reviewed in chart  Diet: No new diet changes identified  Medication/supplement changes: None noted  New illness, injury, or hospitalization: No  Signs or symptoms of bleeding or clotting: No  Previous result: Supratherapeutic  Additional findings: None       PLAN     Recommended plan for no diet, medication or health factor changes affecting INR     Dosing Instructions: Continue your current warfarin dose with next INR in 3 weeks       Summary  As of 11/30/2023      Full warfarin instructions:  5 mg every Mon; 2.5 mg all other days   Next INR check:  12/21/2023               Detailed voice message left for Pat with dosing instructions and follow up date.     Contact [phone] to schedule and with any changes, questions or concerns.     Education provided:   Please call back if any changes to your diet, medications or how you've been taking warfarin    Plan made per [facility] anticoagulation protocol    Mone Kent RN  Anticoagulation Clinic  11/30/2023    _______________________________________________________________________     Anticoagulation Episode Summary       Current INR goal:  2.0-3.0   TTR:  40.2% (1 y)   Target end date:  Indefinite   Send INR reminders to:  [facility]    Indications    Chronic atrial fibrillation (H) [I48.20]             Comments:               Anticoagulation Care Providers       Provider Role Specialty Phone number    Aram Martel MD  Cardiovascular Disease [phone]

## 2023-12-01 ENCOUNTER — ANCILLARY PROCEDURE (OUTPATIENT)
Dept: CARDIOLOGY | Facility: CLINIC | Age: 73
End: 2023-12-01
Attending: INTERNAL MEDICINE
Payer: COMMERCIAL

## 2023-12-01 DIAGNOSIS — Z95.810 ICD (IMPLANTABLE CARDIOVERTER-DEFIBRILLATOR) IN PLACE: ICD-10-CM

## 2023-12-01 DIAGNOSIS — I50.22 CHRONIC SYSTOLIC CONGESTIVE HEART FAILURE (H): ICD-10-CM

## 2023-12-01 DIAGNOSIS — I42.8 NON-ISCHEMIC CARDIOMYOPATHY (H): ICD-10-CM

## 2023-12-06 LAB
MDC_IDC_EPISODE_DTM: NORMAL
MDC_IDC_EPISODE_DURATION: 11 S
MDC_IDC_EPISODE_DURATION: 12 S
MDC_IDC_EPISODE_DURATION: 12 S
MDC_IDC_EPISODE_DURATION: 13 S
MDC_IDC_EPISODE_DURATION: 13 S
MDC_IDC_EPISODE_DURATION: 14 S
MDC_IDC_EPISODE_DURATION: 14 S
MDC_IDC_EPISODE_DURATION: 15 S
MDC_IDC_EPISODE_DURATION: 16 S
MDC_IDC_EPISODE_DURATION: 16 S
MDC_IDC_EPISODE_DURATION: 27 S
MDC_IDC_EPISODE_DURATION: 32 S
MDC_IDC_EPISODE_DURATION: 34 S
MDC_IDC_EPISODE_DURATION: 36 S
MDC_IDC_EPISODE_DURATION: 38 S
MDC_IDC_EPISODE_ID: NORMAL
MDC_IDC_EPISODE_TYPE: NORMAL
MDC_IDC_EPISODE_TYPE_INDUCED: NO
MDC_IDC_EPISODE_VENDOR_TYPE: NORMAL
MDC_IDC_LEAD_CONNECTION_STATUS: NORMAL
MDC_IDC_LEAD_IMPLANT_DT: NORMAL
MDC_IDC_LEAD_LOCATION: NORMAL
MDC_IDC_LEAD_LOCATION_DETAIL_1: NORMAL
MDC_IDC_LEAD_MFG: NORMAL
MDC_IDC_LEAD_MODEL: NORMAL
MDC_IDC_LEAD_POLARITY_TYPE: NORMAL
MDC_IDC_LEAD_SERIAL: NORMAL
MDC_IDC_LEAD_SPECIAL_FUNCTION: NORMAL
MDC_IDC_MSMT_BATTERY_DTM: NORMAL
MDC_IDC_MSMT_BATTERY_REMAINING_LONGEVITY: 126 MO
MDC_IDC_MSMT_BATTERY_REMAINING_PERCENTAGE: 100 %
MDC_IDC_MSMT_BATTERY_STATUS: NORMAL
MDC_IDC_MSMT_CAP_CHARGE_DTM: NORMAL
MDC_IDC_MSMT_CAP_CHARGE_TIME: 10.4 S
MDC_IDC_MSMT_CAP_CHARGE_TYPE: NORMAL
MDC_IDC_MSMT_LEADCHNL_RV_IMPEDANCE_VALUE: 575 OHM
MDC_IDC_MSMT_LEADCHNL_RV_PACING_THRESHOLD_AMPLITUDE: 0.7 V
MDC_IDC_MSMT_LEADCHNL_RV_PACING_THRESHOLD_PULSEWIDTH: 0.4 MS
MDC_IDC_PG_IMPLANT_DTM: NORMAL
MDC_IDC_PG_MFG: NORMAL
MDC_IDC_PG_MODEL: NORMAL
MDC_IDC_PG_SERIAL: NORMAL
MDC_IDC_PG_TYPE: NORMAL
MDC_IDC_SESS_CLINIC_NAME: NORMAL
MDC_IDC_SESS_DTM: NORMAL
MDC_IDC_SESS_TYPE: NORMAL
MDC_IDC_SET_BRADY_LOWRATE: 50 {BEATS}/MIN
MDC_IDC_SET_BRADY_MODE: NORMAL
MDC_IDC_SET_LEADCHNL_RV_PACING_AMPLITUDE: 1.5 V
MDC_IDC_SET_LEADCHNL_RV_PACING_POLARITY: NORMAL
MDC_IDC_SET_LEADCHNL_RV_PACING_PULSEWIDTH: 0.4 MS
MDC_IDC_SET_LEADCHNL_RV_SENSING_ADAPTATION_MODE: NORMAL
MDC_IDC_SET_LEADCHNL_RV_SENSING_POLARITY: NORMAL
MDC_IDC_SET_LEADCHNL_RV_SENSING_SENSITIVITY: 0.6 MV
MDC_IDC_SET_ZONE_DETECTION_INTERVAL: 286 MS
MDC_IDC_SET_ZONE_DETECTION_INTERVAL: 333 MS
MDC_IDC_SET_ZONE_DETECTION_INTERVAL: 400 MS
MDC_IDC_SET_ZONE_STATUS: NORMAL
MDC_IDC_SET_ZONE_TYPE: NORMAL
MDC_IDC_SET_ZONE_VENDOR_TYPE: NORMAL
MDC_IDC_STAT_BRADY_DTM_END: NORMAL
MDC_IDC_STAT_BRADY_DTM_START: NORMAL
MDC_IDC_STAT_BRADY_RV_PERCENT_PACED: 4 %
MDC_IDC_STAT_EPISODE_RECENT_COUNT: 0
MDC_IDC_STAT_EPISODE_RECENT_COUNT: 26
MDC_IDC_STAT_EPISODE_RECENT_COUNT_DTM_END: NORMAL
MDC_IDC_STAT_EPISODE_RECENT_COUNT_DTM_START: NORMAL
MDC_IDC_STAT_EPISODE_TYPE: NORMAL
MDC_IDC_STAT_EPISODE_VENDOR_TYPE: NORMAL
MDC_IDC_STAT_TACHYTHERAPY_ATP_DELIVERED_RECENT: 0
MDC_IDC_STAT_TACHYTHERAPY_ATP_DELIVERED_TOTAL: 1
MDC_IDC_STAT_TACHYTHERAPY_RECENT_DTM_END: NORMAL
MDC_IDC_STAT_TACHYTHERAPY_RECENT_DTM_START: NORMAL
MDC_IDC_STAT_TACHYTHERAPY_SHOCKS_ABORTED_RECENT: 0
MDC_IDC_STAT_TACHYTHERAPY_SHOCKS_ABORTED_TOTAL: 1
MDC_IDC_STAT_TACHYTHERAPY_SHOCKS_DELIVERED_RECENT: 0
MDC_IDC_STAT_TACHYTHERAPY_SHOCKS_DELIVERED_TOTAL: 0
MDC_IDC_STAT_TACHYTHERAPY_TOTAL_DTM_END: NORMAL
MDC_IDC_STAT_TACHYTHERAPY_TOTAL_DTM_START: NORMAL

## 2023-12-06 PROCEDURE — 93296 REM INTERROG EVL PM/IDS: CPT | Performed by: INTERNAL MEDICINE

## 2023-12-06 PROCEDURE — 93295 DEV INTERROG REMOTE 1/2/MLT: CPT | Performed by: INTERNAL MEDICINE

## 2023-12-21 ENCOUNTER — ANTICOAGULATION THERAPY VISIT (OUTPATIENT)
Dept: ANTICOAGULATION | Facility: CLINIC | Age: 73
End: 2023-12-21

## 2023-12-21 ENCOUNTER — LAB (OUTPATIENT)
Dept: CARDIOLOGY | Facility: CLINIC | Age: 73
End: 2023-12-21
Payer: COMMERCIAL

## 2023-12-21 DIAGNOSIS — I48.20 CHRONIC ATRIAL FIBRILLATION (H): Primary | ICD-10-CM

## 2023-12-21 DIAGNOSIS — I48.20 CHRONIC ATRIAL FIBRILLATION (H): ICD-10-CM

## 2023-12-21 LAB — INR POINT OF CARE: 2.8 (ref 0.9–1.1)

## 2023-12-21 PROCEDURE — 85610 PROTHROMBIN TIME: CPT | Mod: QW

## 2023-12-21 PROCEDURE — 36416 COLLJ CAPILLARY BLOOD SPEC: CPT

## 2023-12-21 NOTE — PROGRESS NOTES
ANTICOAGULATION MANAGEMENT     Zach Garcia 73 year old male is on warfarin with therapeutic INR result. (Goal INR 2.0-3.0)    Recent labs: (last 7 days)     12/21/23  0802   INR 2.8*       ASSESSMENT     Source(s): Chart Review and Template     Warfarin doses taken: Warfarin taken as instructed  Diet: No new diet changes identified  Medication/supplement changes: None noted  New illness, injury, or hospitalization: No  Signs or symptoms of bleeding or clotting: No  Previous result: Therapeutic last visit; previously outside of goal range  Additional findings: None       PLAN     Recommended plan for no diet, medication or health factor changes affecting INR     Dosing Instructions: Continue your current warfarin dose with next INR in 4 weeks       Summary  As of 12/21/2023      Full warfarin instructions:  5 mg every Mon; 2.5 mg all other days   Next INR check:  1/18/2024               Detailed voice message left for Pat with dosing instructions and follow up date.     Contact [phone] to schedule and with any changes, questions or concerns.     Education provided:   Please call back if any changes to your diet, medications or how you've been taking warfarin    Plan made per [facility] anticoagulation protocol    Irene Black RN  Anticoagulation Clinic  12/21/2023    _______________________________________________________________________     Anticoagulation Episode Summary       Current INR goal:  2.0-3.0   TTR:  41.7% (1 y)   Target end date:  Indefinite   Send INR reminders to:  [facility]    Indications    Chronic atrial fibrillation (H) [I48.20]             Comments:               Anticoagulation Care Providers       Provider Role Specialty Phone number    Aram Martel MD  Cardiovascular Disease [phone]

## 2024-01-18 ENCOUNTER — LAB (OUTPATIENT)
Dept: CARDIOLOGY | Facility: CLINIC | Age: 74
End: 2024-01-18
Payer: COMMERCIAL

## 2024-01-18 ENCOUNTER — ANTICOAGULATION THERAPY VISIT (OUTPATIENT)
Dept: ANTICOAGULATION | Facility: CLINIC | Age: 74
End: 2024-01-18

## 2024-01-18 DIAGNOSIS — I48.20 CHRONIC ATRIAL FIBRILLATION (H): Primary | ICD-10-CM

## 2024-01-18 DIAGNOSIS — I48.20 CHRONIC ATRIAL FIBRILLATION (H): ICD-10-CM

## 2024-01-18 LAB — INR POINT OF CARE: 2.9 (ref 0.9–1.1)

## 2024-01-18 PROCEDURE — 85610 PROTHROMBIN TIME: CPT | Mod: QW

## 2024-01-18 PROCEDURE — 36416 COLLJ CAPILLARY BLOOD SPEC: CPT

## 2024-01-18 NOTE — PROGRESS NOTES
ANTICOAGULATION MANAGEMENT     Zach Garcia 73 year old male is on warfarin with therapeutic INR result. (Goal INR 2.0-3.0)    Recent labs: (last 7 days)     01/18/24  0805   INR 2.9*       ASSESSMENT     Source(s): Chart Reviewed - patient filled out questionnaire form.    Diet: No new diet changes identified  Medication/supplement changes: None noted  New illness, injury, or hospitalization: No  Signs or symptoms of bleeding or clotting: No  Previous result: Therapeutic last 2 INR visits  Additional findings: None       PLAN     Recommended plan for no diet, medication or health factor changes affecting INR     Dosing Instructions: Continue your current warfarin dose with next INR in 4 weeks       Summary  As of 1/18/2024      Full warfarin instructions:  5 mg every Mon; 2.5 mg all other days   Next INR check:  2/15/2024               Detailed voice message left for Pat with dosing instructions and follow up date.     Contact [phone] to schedule and with any changes, questions or concerns.     Education provided:   Please call back if any changes to your diet, medications or how you've been taking warfarin  Taking warfarin: Importance of taking warfarin as instructed  Goal range and lab monitoring: goal range and significance of current result    Plan made per [facility] anticoagulation protocol    Layla Alvarado, RN  Anticoagulation Clinic  1/18/2024    _______________________________________________________________________     Anticoagulation Episode Summary       Current INR goal:  2.0-3.0   TTR:  49.3% (1 y)   Target end date:  Indefinite   Send INR reminders to:  [facility]    Indications    Chronic atrial fibrillation (H) [I48.20]             Comments:               Anticoagulation Care Providers       Provider Role Specialty Phone number    Aram Martel MD  Cardiovascular Disease [phone]

## 2024-02-15 ENCOUNTER — ANTICOAGULATION THERAPY VISIT (OUTPATIENT)
Dept: ANTICOAGULATION | Facility: CLINIC | Age: 74
End: 2024-02-15

## 2024-02-15 ENCOUNTER — LAB (OUTPATIENT)
Dept: CARDIOLOGY | Facility: CLINIC | Age: 74
End: 2024-02-15
Payer: COMMERCIAL

## 2024-02-15 DIAGNOSIS — I48.20 CHRONIC ATRIAL FIBRILLATION (H): ICD-10-CM

## 2024-02-15 DIAGNOSIS — I48.20 CHRONIC ATRIAL FIBRILLATION (H): Primary | ICD-10-CM

## 2024-02-15 LAB — INR POINT OF CARE: 2.8 (ref 0.9–1.1)

## 2024-02-15 PROCEDURE — 85610 PROTHROMBIN TIME: CPT | Mod: QW

## 2024-02-15 PROCEDURE — 36416 COLLJ CAPILLARY BLOOD SPEC: CPT

## 2024-02-15 NOTE — PROGRESS NOTES
ANTICOAGULATION MANAGEMENT     Zach Garcia 73 year old male is on warfarin with therapeutic INR result. (Goal INR 2.0-3.0)    Recent labs: (last 7 days)     02/15/24  0804   INR 2.8*       ASSESSMENT     Source(s): Chart Review, Patient/Caregiver Call, and Template     Warfarin doses taken: Warfarin taken as instructed  Diet: No new diet changes identified  Medication/supplement changes: None noted  New illness, injury, or hospitalization: No  Signs or symptoms of bleeding or clotting: No  Previous result: Therapeutic last 2(+) visits  Additional findings: None       PLAN     Recommended plan for no diet, medication or health factor changes affecting INR     Dosing Instructions: Continue your current warfarin dose with next INR in 6 weeks       Summary  As of 2/15/2024      Full warfarin instructions:  5 mg every Mon; 2.5 mg all other days   Next INR check:  3/28/2024               Telephone call with Pat who verbalizes understanding and agrees to plan    Lab visit scheduled    Education provided:   Goal range and lab monitoring: goal range and significance of current result    Plan made per [facility] anticoagulation protocol    Mone Kent RN  Anticoagulation Clinic  2/15/2024    _______________________________________________________________________     Anticoagulation Episode Summary       Current INR goal:  2.0-3.0   TTR:  53.7% (1 y)   Target end date:  Indefinite   Send INR reminders to:  [facility]    Indications    Chronic atrial fibrillation (H) [I48.20]             Comments:               Anticoagulation Care Providers       Provider Role Specialty Phone number    Aram Matrel MD  Cardiovascular Disease [phone]

## 2024-03-13 ENCOUNTER — ANCILLARY PROCEDURE (OUTPATIENT)
Dept: CARDIOLOGY | Facility: CLINIC | Age: 74
End: 2024-03-13
Attending: INTERNAL MEDICINE
Payer: COMMERCIAL

## 2024-03-13 DIAGNOSIS — I50.22 CHRONIC SYSTOLIC CONGESTIVE HEART FAILURE (H): ICD-10-CM

## 2024-03-13 DIAGNOSIS — I42.8 NON-ISCHEMIC CARDIOMYOPATHY (H): ICD-10-CM

## 2024-03-13 DIAGNOSIS — Z95.810 ICD (IMPLANTABLE CARDIOVERTER-DEFIBRILLATOR) IN PLACE: ICD-10-CM

## 2024-03-15 LAB
MDC_IDC_EPISODE_DTM: NORMAL
MDC_IDC_EPISODE_DURATION: 12 S
MDC_IDC_EPISODE_DURATION: 12 S
MDC_IDC_EPISODE_DURATION: 14 S
MDC_IDC_EPISODE_DURATION: 15 S
MDC_IDC_EPISODE_DURATION: 16 S
MDC_IDC_EPISODE_DURATION: 16 S
MDC_IDC_EPISODE_DURATION: 208 S
MDC_IDC_EPISODE_DURATION: 25 S
MDC_IDC_EPISODE_DURATION: 26 S
MDC_IDC_EPISODE_DURATION: 26 S
MDC_IDC_EPISODE_DURATION: 27 S
MDC_IDC_EPISODE_DURATION: 29 S
MDC_IDC_EPISODE_DURATION: 30 S
MDC_IDC_EPISODE_DURATION: 31 S
MDC_IDC_EPISODE_DURATION: 31 S
MDC_IDC_EPISODE_DURATION: 34 S
MDC_IDC_EPISODE_DURATION: 35 S
MDC_IDC_EPISODE_DURATION: 40 S
MDC_IDC_EPISODE_DURATION: 41 S
MDC_IDC_EPISODE_DURATION: 55 S
MDC_IDC_EPISODE_DURATION: 57 S
MDC_IDC_EPISODE_DURATION: 67 S
MDC_IDC_EPISODE_DURATION: 73 S
MDC_IDC_EPISODE_DURATION: 78 S
MDC_IDC_EPISODE_ID: NORMAL
MDC_IDC_EPISODE_TYPE: NORMAL
MDC_IDC_EPISODE_TYPE_INDUCED: NO
MDC_IDC_EPISODE_VENDOR_TYPE: NORMAL
MDC_IDC_LEAD_CONNECTION_STATUS: NORMAL
MDC_IDC_LEAD_IMPLANT_DT: NORMAL
MDC_IDC_LEAD_LOCATION: NORMAL
MDC_IDC_LEAD_LOCATION_DETAIL_1: NORMAL
MDC_IDC_LEAD_MFG: NORMAL
MDC_IDC_LEAD_MODEL: NORMAL
MDC_IDC_LEAD_POLARITY_TYPE: NORMAL
MDC_IDC_LEAD_SERIAL: NORMAL
MDC_IDC_LEAD_SPECIAL_FUNCTION: NORMAL
MDC_IDC_MSMT_BATTERY_DTM: NORMAL
MDC_IDC_MSMT_BATTERY_REMAINING_LONGEVITY: 114 MO
MDC_IDC_MSMT_BATTERY_REMAINING_PERCENTAGE: 100 %
MDC_IDC_MSMT_BATTERY_STATUS: NORMAL
MDC_IDC_MSMT_CAP_CHARGE_DTM: NORMAL
MDC_IDC_MSMT_CAP_CHARGE_TIME: 10.5 S
MDC_IDC_MSMT_CAP_CHARGE_TYPE: NORMAL
MDC_IDC_MSMT_LEADCHNL_RV_IMPEDANCE_VALUE: 594 OHM
MDC_IDC_MSMT_LEADCHNL_RV_PACING_THRESHOLD_AMPLITUDE: 0.7 V
MDC_IDC_MSMT_LEADCHNL_RV_PACING_THRESHOLD_PULSEWIDTH: 0.4 MS
MDC_IDC_PG_IMPLANT_DTM: NORMAL
MDC_IDC_PG_MFG: NORMAL
MDC_IDC_PG_MODEL: NORMAL
MDC_IDC_PG_SERIAL: NORMAL
MDC_IDC_PG_TYPE: NORMAL
MDC_IDC_SESS_CLINIC_NAME: NORMAL
MDC_IDC_SESS_DTM: NORMAL
MDC_IDC_SESS_TYPE: NORMAL
MDC_IDC_SET_BRADY_LOWRATE: 50 {BEATS}/MIN
MDC_IDC_SET_BRADY_MODE: NORMAL
MDC_IDC_SET_LEADCHNL_RV_PACING_AMPLITUDE: 1.5 V
MDC_IDC_SET_LEADCHNL_RV_PACING_POLARITY: NORMAL
MDC_IDC_SET_LEADCHNL_RV_PACING_PULSEWIDTH: 0.4 MS
MDC_IDC_SET_LEADCHNL_RV_SENSING_ADAPTATION_MODE: NORMAL
MDC_IDC_SET_LEADCHNL_RV_SENSING_POLARITY: NORMAL
MDC_IDC_SET_LEADCHNL_RV_SENSING_SENSITIVITY: 0.6 MV
MDC_IDC_SET_ZONE_DETECTION_INTERVAL: 286 MS
MDC_IDC_SET_ZONE_DETECTION_INTERVAL: 333 MS
MDC_IDC_SET_ZONE_DETECTION_INTERVAL: 400 MS
MDC_IDC_SET_ZONE_STATUS: NORMAL
MDC_IDC_SET_ZONE_TYPE: NORMAL
MDC_IDC_SET_ZONE_VENDOR_TYPE: NORMAL
MDC_IDC_STAT_BRADY_DTM_END: NORMAL
MDC_IDC_STAT_BRADY_DTM_START: NORMAL
MDC_IDC_STAT_BRADY_RV_PERCENT_PACED: 6 %
MDC_IDC_STAT_EPISODE_RECENT_COUNT: 0
MDC_IDC_STAT_EPISODE_RECENT_COUNT: 0
MDC_IDC_STAT_EPISODE_RECENT_COUNT: 12
MDC_IDC_STAT_EPISODE_RECENT_COUNT: 165
MDC_IDC_STAT_EPISODE_RECENT_COUNT_DTM_END: NORMAL
MDC_IDC_STAT_EPISODE_RECENT_COUNT_DTM_START: NORMAL
MDC_IDC_STAT_EPISODE_TYPE: NORMAL
MDC_IDC_STAT_EPISODE_VENDOR_TYPE: NORMAL
MDC_IDC_STAT_TACHYTHERAPY_ATP_DELIVERED_RECENT: 0
MDC_IDC_STAT_TACHYTHERAPY_ATP_DELIVERED_TOTAL: 1
MDC_IDC_STAT_TACHYTHERAPY_RECENT_DTM_END: NORMAL
MDC_IDC_STAT_TACHYTHERAPY_RECENT_DTM_START: NORMAL
MDC_IDC_STAT_TACHYTHERAPY_SHOCKS_ABORTED_RECENT: 0
MDC_IDC_STAT_TACHYTHERAPY_SHOCKS_ABORTED_TOTAL: 1
MDC_IDC_STAT_TACHYTHERAPY_SHOCKS_DELIVERED_RECENT: 0
MDC_IDC_STAT_TACHYTHERAPY_SHOCKS_DELIVERED_TOTAL: 0
MDC_IDC_STAT_TACHYTHERAPY_TOTAL_DTM_END: NORMAL
MDC_IDC_STAT_TACHYTHERAPY_TOTAL_DTM_START: NORMAL

## 2024-03-15 PROCEDURE — 93295 DEV INTERROG REMOTE 1/2/MLT: CPT | Performed by: INTERNAL MEDICINE

## 2024-03-15 PROCEDURE — 93296 REM INTERROG EVL PM/IDS: CPT | Performed by: INTERNAL MEDICINE

## 2024-03-28 ENCOUNTER — LAB (OUTPATIENT)
Dept: CARDIOLOGY | Facility: CLINIC | Age: 74
End: 2024-03-28
Payer: COMMERCIAL

## 2024-03-28 ENCOUNTER — ANTICOAGULATION THERAPY VISIT (OUTPATIENT)
Dept: ANTICOAGULATION | Facility: CLINIC | Age: 74
End: 2024-03-28

## 2024-03-28 DIAGNOSIS — I48.20 CHRONIC ATRIAL FIBRILLATION (H): ICD-10-CM

## 2024-03-28 DIAGNOSIS — I48.20 CHRONIC ATRIAL FIBRILLATION (H): Primary | ICD-10-CM

## 2024-03-28 LAB — INR POINT OF CARE: 4.4 (ref 0.9–1.1)

## 2024-03-28 PROCEDURE — 36416 COLLJ CAPILLARY BLOOD SPEC: CPT

## 2024-03-28 PROCEDURE — 85610 PROTHROMBIN TIME: CPT | Mod: QW

## 2024-03-28 NOTE — PROGRESS NOTES
ANTICOAGULATION MANAGEMENT     Zach Garcia 74 year old male is on warfarin with supratherapeutic INR result. (Goal INR 2.0-3.0)    Recent labs: (last 7 days)     03/28/24  0822   INR 4.4*       ASSESSMENT     Source(s): Chart Review, Patient/Caregiver Call, and Template     Warfarin doses taken: Warfarin taken as instructed  Diet: No new diet changes identified - confirms no more alcohol than usual   Medication/supplement changes:  topamax dose increased on 3/15 No interaction anticipated  New illness, injury, or hospitalization: No  Signs or symptoms of bleeding or clotting: No  Previous result: Therapeutic last 2(+) visits  Additional findings: None       PLAN     Recommended plan for no diet, medication or health factor changes affecting INR     Dosing Instructions: hold dose then decrease your warfarin dose (12.5% change) with next INR in 10 days       Summary  As of 3/28/2024      Full warfarin instructions:  3/28: Hold; Otherwise 2.5 mg every day   Next INR check:  4/11/2024               Telephone call with Pat who verbalizes understanding and agrees to plan    Patient elected to schedule next visit 4/11    Education provided:   Goal range and lab monitoring: goal range and significance of current result    Plan made per [facility] anticoagulation protocol    Mone Kent RN  Anticoagulation Clinic  3/28/2024    _______________________________________________________________________     Anticoagulation Episode Summary       Current INR goal:  2.0-3.0   TTR:  53.4% (1 y)   Target end date:  Indefinite   Send INR reminders to:  [facility]    Indications    Chronic atrial fibrillation (H) [I48.20]             Comments:               Anticoagulation Care Providers       Provider Role Specialty Phone number    Aram Martel MD  Cardiovascular Disease [phone]

## 2024-04-10 DIAGNOSIS — I42.0 CARDIOMYOPATHY, DILATED, NONISCHEMIC (H): ICD-10-CM

## 2024-04-10 DIAGNOSIS — I48.20 CHRONIC ATRIAL FIBRILLATION (H): ICD-10-CM

## 2024-04-11 ENCOUNTER — LAB (OUTPATIENT)
Dept: CARDIOLOGY | Facility: CLINIC | Age: 74
End: 2024-04-11
Payer: COMMERCIAL

## 2024-04-11 ENCOUNTER — ANTICOAGULATION THERAPY VISIT (OUTPATIENT)
Dept: ANTICOAGULATION | Facility: CLINIC | Age: 74
End: 2024-04-11

## 2024-04-11 DIAGNOSIS — I48.20 CHRONIC ATRIAL FIBRILLATION (H): ICD-10-CM

## 2024-04-11 DIAGNOSIS — I48.20 CHRONIC ATRIAL FIBRILLATION (H): Primary | ICD-10-CM

## 2024-04-11 LAB — INR POINT OF CARE: 2.4 (ref 0.9–1.1)

## 2024-04-11 PROCEDURE — 36416 COLLJ CAPILLARY BLOOD SPEC: CPT

## 2024-04-11 PROCEDURE — 85610 PROTHROMBIN TIME: CPT | Mod: QW

## 2024-04-11 RX ORDER — SPIRONOLACTONE 25 MG/1
TABLET ORAL
Qty: 60 TABLET | Refills: 2 | Status: SHIPPED | OUTPATIENT
Start: 2024-04-11 | End: 2024-06-03

## 2024-04-11 RX ORDER — LISINOPRIL 20 MG/1
TABLET ORAL
Qty: 240 TABLET | Refills: 2 | Status: SHIPPED | OUTPATIENT
Start: 2024-04-11 | End: 2024-06-03

## 2024-04-11 RX ORDER — ROSUVASTATIN CALCIUM 20 MG/1
TABLET, COATED ORAL
Qty: 120 TABLET | Refills: 2 | Status: SHIPPED | OUTPATIENT
Start: 2024-04-11 | End: 2024-06-03

## 2024-04-11 NOTE — PROGRESS NOTES
ANTICOAGULATION MANAGEMENT     Zach Garcia 74 year old male is on warfarin with therapeutic INR result. (Goal INR 2.0-3.0)    Recent labs: (last 7 days)     04/11/24  0821   INR 2.4*       ASSESSMENT     Source(s): Chart Review and Template     Warfarin doses taken: Warfarin taken as instructed  Diet: No new diet changes identified  Medication/supplement changes: None noted  New illness, injury, or hospitalization: No  Signs or symptoms of bleeding or clotting: No  Previous result: Supratherapeutic  Additional findings: None       PLAN     Recommended plan for no diet, medication or health factor changes affecting INR     Dosing Instructions: Continue your current warfarin dose with next INR in 2-3 weeks       Summary  As of 4/11/2024      Full warfarin instructions:  2.5 mg every day   Next INR check:  5/2/2024               Detailed voice message left for Pat with dosing instructions and follow up date.     Contact [phone] to schedule and with any changes, questions or concerns.     Education provided:   Please call back if any changes to your diet, medications or how you've been taking warfarin    Plan made per [facility] anticoagulation protocol    Irene Black RN  Anticoagulation Clinic  4/11/2024    _______________________________________________________________________     Anticoagulation Episode Summary       Current INR goal:  2.0-3.0   TTR:  52.9% (1 y)   Target end date:  Indefinite   Send INR reminders to:  [facility]    Indications    Chronic atrial fibrillation (H) [I48.20]             Comments:               Anticoagulation Care Providers       Provider Role Specialty Phone number    Aram Martel MD  Cardiovascular Disease [phone]

## 2024-04-12 ENCOUNTER — TELEPHONE (OUTPATIENT)
Dept: CARDIOLOGY | Facility: CLINIC | Age: 74
End: 2024-04-12
Payer: COMMERCIAL

## 2024-04-12 DIAGNOSIS — I42.0 CARDIOMYOPATHY, DILATED, NONISCHEMIC (H): ICD-10-CM

## 2024-04-12 RX ORDER — FUROSEMIDE 20 MG
20 TABLET ORAL 2 TIMES DAILY
Qty: 180 TABLET | Refills: 3 | Status: SHIPPED | OUTPATIENT
Start: 2024-04-12

## 2024-04-12 NOTE — TELEPHONE ENCOUNTER
Received a call from [facility]. He has an Rx for Furosemide and the dose was 20 mg but he was splitting a 40 mg tablet. He notes that this has become cumbersome and it breaks into pieces.     Rx re-sent to [facility] for 20 mg tablets. Left message that this was done. -[facility]

## 2024-05-02 ENCOUNTER — ANTICOAGULATION THERAPY VISIT (OUTPATIENT)
Dept: ANTICOAGULATION | Facility: CLINIC | Age: 74
End: 2024-05-02

## 2024-05-02 ENCOUNTER — LAB (OUTPATIENT)
Dept: CARDIOLOGY | Facility: CLINIC | Age: 74
End: 2024-05-02
Payer: COMMERCIAL

## 2024-05-02 DIAGNOSIS — I48.20 CHRONIC ATRIAL FIBRILLATION (H): ICD-10-CM

## 2024-05-02 DIAGNOSIS — I48.20 CHRONIC ATRIAL FIBRILLATION (H): Primary | ICD-10-CM

## 2024-05-02 LAB — INR POINT OF CARE: 3.2 (ref 0.9–1.1)

## 2024-05-02 PROCEDURE — 36416 COLLJ CAPILLARY BLOOD SPEC: CPT

## 2024-05-02 PROCEDURE — 85610 PROTHROMBIN TIME: CPT | Mod: QW

## 2024-05-02 NOTE — PROGRESS NOTES
ANTICOAGULATION MANAGEMENT     Zach Garcia 74 year old male is on warfarin with supratherapeutic INR result. (Goal INR 2.0-3.0)    Recent labs: (last 7 days)     05/02/24  0823   INR 3.2*       ASSESSMENT     Source(s): Chart Review, Patient/Caregiver Call, and Template     Warfarin doses taken: Warfarin taken as instructed  Diet: No new diet changes identified  Medication/supplement changes: None noted  New illness, injury, or hospitalization: No  Signs or symptoms of bleeding or clotting: No  Previous result: Therapeutic last visit; previously outside of goal range  Additional findings: None and Discussed needing to decrease tablet size as he is currently taking 1/2 tablet daily. He is hesitant and states he has quite a few 5 mg tablets left. Will plan to send in 2.5 mg tabs next refill       PLAN     Recommended plan for no diet, medication or health factor changes affecting INR     Dosing Instructions: Continue your current warfarin dose with next INR in 2 weeks       Summary  As of 5/2/2024      Full warfarin instructions:  2.5 mg every day   Next INR check:  5/23/2024               Telephone call with Pat who verbalizes understanding and agrees to plan    Patient elected to schedule next visit 5/23    Education provided:   Goal range and lab monitoring: goal range and significance of current result, Importance of therapeutic range, and Importance of following up at instructed interval    Plan made per [facility] anticoagulation protocol    Mone Kent RN  Anticoagulation Clinic  5/2/2024    _______________________________________________________________________     Anticoagulation Episode Summary       Current INR goal:  2.0-3.0   TTR:  56.0% (1 y)   Target end date:  Indefinite   Send INR reminders to:  [facility]    Indications    Chronic atrial fibrillation (H) [I48.20]             Comments:               Anticoagulation Care Providers       Provider Role Specialty Phone number     Aram Martel MD  Cardiovascular Disease [phone]

## 2024-05-23 ENCOUNTER — LAB (OUTPATIENT)
Dept: CARDIOLOGY | Facility: CLINIC | Age: 74
End: 2024-05-23
Payer: COMMERCIAL

## 2024-05-23 ENCOUNTER — ANTICOAGULATION THERAPY VISIT (OUTPATIENT)
Dept: ANTICOAGULATION | Facility: CLINIC | Age: 74
End: 2024-05-23

## 2024-05-23 DIAGNOSIS — I48.20 CHRONIC ATRIAL FIBRILLATION (H): Primary | ICD-10-CM

## 2024-05-23 LAB — INR POINT OF CARE: 2 (ref 0.9–1.1)

## 2024-05-23 PROCEDURE — 85610 PROTHROMBIN TIME: CPT | Mod: QW

## 2024-05-23 PROCEDURE — 36416 COLLJ CAPILLARY BLOOD SPEC: CPT

## 2024-05-23 NOTE — PROGRESS NOTES
ANTICOAGULATION MANAGEMENT     Zach Garcia 74 year old male is on warfarin with therapeutic INR result. (Goal INR 2.0-3.0)    Recent labs: (last 7 days)     05/23/24  0812   INR 2.0*       ASSESSMENT     Source(s): Chart Review, Patient/Caregiver Call, and Template     Warfarin doses taken: Warfarin taken as instructed  Diet: No new diet changes identified  Medication/supplement changes: None noted  New illness, injury, or hospitalization: No  Signs or symptoms of bleeding or clotting: No  Previous result: Supratherapeutic-3.2  Additional findings:  His insurance has decided he has to pay out of pocket more for his INR checks. He can't afford that so may not come in as much as we ask.        PLAN     Recommended plan for no diet, medication or health factor changes affecting INR     Dosing Instructions: Continue your current warfarin dose with next INR in 4 weeks       Summary  As of 5/23/2024      Full warfarin instructions:  2.5 mg every day   Next INR check:  6/20/2024               Telephone call with Pat who agrees to plan and repeated back plan correctly    Patient elected to schedule next visit 6/27    Education provided:   Please call back if any changes to your diet, medications or how you've been taking warfarin  Goal range and lab monitoring: goal range and significance of current result and Importance of therapeutic range    Plan made per [facility] anticoagulation protocol    Irene Black RN  Anticoagulation Clinic  5/23/2024    _______________________________________________________________________     Anticoagulation Episode Summary       Current INR goal:  2.0-3.0   TTR:  56.5% (1 y)   Target end date:  Indefinite   Send INR reminders to:  [facility]    Indications    Chronic atrial fibrillation (H) [I48.20]             Comments:               Anticoagulation Care Providers       Provider Role Specialty Phone number    Aram Martel MD  Cardiovascular Disease  [phone]

## 2024-06-03 ENCOUNTER — TELEPHONE (OUTPATIENT)
Dept: CARDIOLOGY | Facility: CLINIC | Age: 74
End: 2024-06-03
Payer: COMMERCIAL

## 2024-06-03 DIAGNOSIS — I25.84 CORONARY ARTERY DISEASE DUE TO CALCIFIED CORONARY LESION: ICD-10-CM

## 2024-06-03 DIAGNOSIS — I50.20 SYSTOLIC HEART FAILURE, UNSPECIFIED HF CHRONICITY (H): ICD-10-CM

## 2024-06-03 DIAGNOSIS — I10 ESSENTIAL HYPERTENSION WITH GOAL BLOOD PRESSURE LESS THAN 130/80: ICD-10-CM

## 2024-06-03 DIAGNOSIS — I47.29 PAROXYSMAL VENTRICULAR TACHYCARDIA (H): ICD-10-CM

## 2024-06-03 DIAGNOSIS — I25.10 CORONARY ARTERY DISEASE DUE TO CALCIFIED CORONARY LESION: ICD-10-CM

## 2024-06-03 DIAGNOSIS — I42.0 CARDIOMYOPATHY, DILATED, NONISCHEMIC (H): ICD-10-CM

## 2024-06-03 DIAGNOSIS — R00.0 TACHYCARDIA: ICD-10-CM

## 2024-06-03 DIAGNOSIS — I48.20 CHRONIC ATRIAL FIBRILLATION (H): ICD-10-CM

## 2024-06-03 DIAGNOSIS — I49.3 FREQUENT UNIFOCAL PREMATURE VENTRICULAR CONTRACTIONS: ICD-10-CM

## 2024-06-03 RX ORDER — LISINOPRIL 20 MG/1
40 TABLET ORAL DAILY
Qty: 240 TABLET | Refills: 3 | Status: SHIPPED | OUTPATIENT
Start: 2024-06-03

## 2024-06-03 RX ORDER — ROSUVASTATIN CALCIUM 20 MG/1
20 TABLET, COATED ORAL DAILY
Qty: 120 TABLET | Refills: 3 | Status: SHIPPED | OUTPATIENT
Start: 2024-06-03

## 2024-06-03 RX ORDER — METOPROLOL SUCCINATE 200 MG/1
200 TABLET, EXTENDED RELEASE ORAL DAILY
Qty: 120 TABLET | Refills: 3 | Status: SHIPPED | OUTPATIENT
Start: 2024-06-03

## 2024-06-03 RX ORDER — SPIRONOLACTONE 25 MG/1
12.5 TABLET ORAL DAILY
Qty: 45 TABLET | Refills: 3 | Status: SHIPPED | OUTPATIENT
Start: 2024-06-03

## 2024-06-03 NOTE — TELEPHONE ENCOUNTER
Received a voicemail from [facility] requesting refills on Lisinopril, Metoprolol, spironolactone and rosuvastatin- all sent to [facility] mail order pharmacy. -nico    LM with patient that this was done. -max

## 2024-06-19 ENCOUNTER — ANCILLARY PROCEDURE (OUTPATIENT)
Dept: CARDIOLOGY | Facility: CLINIC | Age: 74
End: 2024-06-19
Attending: INTERNAL MEDICINE
Payer: COMMERCIAL

## 2024-06-19 DIAGNOSIS — Z95.810 ICD (IMPLANTABLE CARDIOVERTER-DEFIBRILLATOR) IN PLACE: ICD-10-CM

## 2024-06-19 DIAGNOSIS — I48.20 CHRONIC ATRIAL FIBRILLATION (H): ICD-10-CM

## 2024-06-19 DIAGNOSIS — I42.0 CARDIOMYOPATHY, DILATED, NONISCHEMIC (H): ICD-10-CM

## 2024-06-19 DIAGNOSIS — I50.22 CHRONIC SYSTOLIC (CONGESTIVE) HEART FAILURE (H): ICD-10-CM

## 2024-06-19 LAB
MDC_IDC_EPISODE_DTM: NORMAL
MDC_IDC_EPISODE_ID: NORMAL
MDC_IDC_EPISODE_TYPE: NORMAL
MDC_IDC_LEAD_CONNECTION_STATUS: NORMAL
MDC_IDC_LEAD_IMPLANT_DT: NORMAL
MDC_IDC_LEAD_LOCATION: NORMAL
MDC_IDC_LEAD_LOCATION_DETAIL_1: NORMAL
MDC_IDC_LEAD_MFG: NORMAL
MDC_IDC_LEAD_MODEL: NORMAL
MDC_IDC_LEAD_POLARITY_TYPE: NORMAL
MDC_IDC_LEAD_SERIAL: NORMAL
MDC_IDC_LEAD_SPECIAL_FUNCTION: NORMAL
MDC_IDC_MSMT_BATTERY_DTM: NORMAL
MDC_IDC_MSMT_BATTERY_REMAINING_LONGEVITY: 114 MO
MDC_IDC_MSMT_BATTERY_REMAINING_PERCENTAGE: 100 %
MDC_IDC_MSMT_BATTERY_STATUS: NORMAL
MDC_IDC_MSMT_CAP_CHARGE_DTM: NORMAL
MDC_IDC_MSMT_CAP_CHARGE_TIME: 10.5 S
MDC_IDC_MSMT_CAP_CHARGE_TYPE: NORMAL
MDC_IDC_MSMT_LEADCHNL_RV_IMPEDANCE_VALUE: 657 OHM
MDC_IDC_PG_IMPLANT_DTM: NORMAL
MDC_IDC_PG_MFG: NORMAL
MDC_IDC_PG_MODEL: NORMAL
MDC_IDC_PG_SERIAL: NORMAL
MDC_IDC_PG_TYPE: NORMAL
MDC_IDC_SESS_CLINIC_NAME: NORMAL
MDC_IDC_SESS_DTM: NORMAL
MDC_IDC_SESS_TYPE: NORMAL
MDC_IDC_SET_BRADY_LOWRATE: 50 {BEATS}/MIN
MDC_IDC_SET_BRADY_MODE: NORMAL
MDC_IDC_SET_LEADCHNL_RV_PACING_AMPLITUDE: 1.5 V
MDC_IDC_SET_LEADCHNL_RV_PACING_POLARITY: NORMAL
MDC_IDC_SET_LEADCHNL_RV_PACING_PULSEWIDTH: 0.4 MS
MDC_IDC_SET_LEADCHNL_RV_SENSING_ADAPTATION_MODE: NORMAL
MDC_IDC_SET_LEADCHNL_RV_SENSING_POLARITY: NORMAL
MDC_IDC_SET_LEADCHNL_RV_SENSING_SENSITIVITY: 0.6 MV
MDC_IDC_SET_ZONE_DETECTION_INTERVAL: 286 MS
MDC_IDC_SET_ZONE_DETECTION_INTERVAL: 333 MS
MDC_IDC_SET_ZONE_DETECTION_INTERVAL: 400 MS
MDC_IDC_SET_ZONE_STATUS: NORMAL
MDC_IDC_SET_ZONE_TYPE: NORMAL
MDC_IDC_SET_ZONE_VENDOR_TYPE: NORMAL
MDC_IDC_STAT_BRADY_DTM_END: NORMAL
MDC_IDC_STAT_BRADY_DTM_START: NORMAL
MDC_IDC_STAT_BRADY_RV_PERCENT_PACED: 9 %
MDC_IDC_STAT_EPISODE_RECENT_COUNT: 0
MDC_IDC_STAT_EPISODE_RECENT_COUNT: 0
MDC_IDC_STAT_EPISODE_RECENT_COUNT: 12
MDC_IDC_STAT_EPISODE_RECENT_COUNT_DTM_END: NORMAL
MDC_IDC_STAT_EPISODE_RECENT_COUNT_DTM_START: NORMAL
MDC_IDC_STAT_EPISODE_TYPE: NORMAL
MDC_IDC_STAT_EPISODE_VENDOR_TYPE: NORMAL
MDC_IDC_STAT_EPISODE_VENDOR_TYPE: NORMAL
MDC_IDC_STAT_TACHYTHERAPY_ATP_DELIVERED_RECENT: 0
MDC_IDC_STAT_TACHYTHERAPY_ATP_DELIVERED_TOTAL: 1
MDC_IDC_STAT_TACHYTHERAPY_RECENT_DTM_END: NORMAL
MDC_IDC_STAT_TACHYTHERAPY_RECENT_DTM_START: NORMAL
MDC_IDC_STAT_TACHYTHERAPY_SHOCKS_ABORTED_RECENT: 0
MDC_IDC_STAT_TACHYTHERAPY_SHOCKS_ABORTED_TOTAL: 1
MDC_IDC_STAT_TACHYTHERAPY_SHOCKS_DELIVERED_RECENT: 0
MDC_IDC_STAT_TACHYTHERAPY_SHOCKS_DELIVERED_TOTAL: 0
MDC_IDC_STAT_TACHYTHERAPY_TOTAL_DTM_END: NORMAL
MDC_IDC_STAT_TACHYTHERAPY_TOTAL_DTM_START: NORMAL

## 2024-06-19 PROCEDURE — 93296 REM INTERROG EVL PM/IDS: CPT | Performed by: INTERNAL MEDICINE

## 2024-06-19 PROCEDURE — 93295 DEV INTERROG REMOTE 1/2/MLT: CPT | Performed by: INTERNAL MEDICINE

## 2024-06-21 ENCOUNTER — TELEPHONE (OUTPATIENT)
Dept: NEUROSURGERY | Facility: CLINIC | Age: 74
End: 2024-06-21
Payer: COMMERCIAL

## 2024-06-21 NOTE — TELEPHONE ENCOUNTER
General Call      Reason for Call:  anticoagulation     What are your questions or concerns:  Started a new medication 6/18 and would like to talk to a INR nurse regarding this.     Date of last appointment with provider: NA    Okay to leave a detailed message?: Yes at Home number on file [phone] (home)

## 2024-06-21 NOTE — TELEPHONE ENCOUNTER
Spoke with Pat and he has tapered off the Topamax and has now started Primidon Monday or Tuesday of this week. Discussed primidon can interact with his warfarin causing INR to go down. He does have INR scheduled for next week 6/27.

## 2024-06-27 ENCOUNTER — LAB (OUTPATIENT)
Dept: CARDIOLOGY | Facility: CLINIC | Age: 74
End: 2024-06-27
Payer: COMMERCIAL

## 2024-06-27 ENCOUNTER — ANTICOAGULATION THERAPY VISIT (OUTPATIENT)
Dept: ANTICOAGULATION | Facility: CLINIC | Age: 74
End: 2024-06-27

## 2024-06-27 DIAGNOSIS — I48.20 CHRONIC ATRIAL FIBRILLATION (H): Primary | ICD-10-CM

## 2024-06-27 DIAGNOSIS — I48.20 CHRONIC ATRIAL FIBRILLATION (H): ICD-10-CM

## 2024-06-27 LAB — INR POINT OF CARE: 1.3 (ref 0.9–1.1)

## 2024-06-27 PROCEDURE — 85610 PROTHROMBIN TIME: CPT | Mod: QW

## 2024-06-27 PROCEDURE — 36416 COLLJ CAPILLARY BLOOD SPEC: CPT

## 2024-06-27 NOTE — PROGRESS NOTES
ANTICOAGULATION MANAGEMENT     Zach Garcia 74 year old male is on warfarin with subtherapeutic INR result. (Goal INR 2.0-3.0)    Recent labs: (last 7 days)     06/27/24  0808   INR 1.3*       ASSESSMENT     Source(s): Chart Review and Patient/Caregiver Call     Warfarin doses taken: Warfarin taken as instructed  Diet: No new diet changes identified  Medication/supplement changes:  primidone started on ~6/17 which may be decreasing INR today  New illness, injury, or hospitalization: No  Signs or symptoms of bleeding or clotting: No  Previous result: Therapeutic last visit; previously outside of goal range  Additional findings: None       PLAN     Recommended plan for ongoing change(s) affecting INR     Dosing Instructions: Increase your warfarin dose (14.3% change) with next INR in 1 week       Summary  As of 6/27/2024      Full warfarin instructions:  5 mg every Thu; 2.5 mg all other days   Next INR check:  7/4/2024               Telephone call with Pat who verbalizes understanding and agrees to plan    Patient elected to schedule next visit 7/8 due to holiday    Education provided:   Goal range and lab monitoring: goal range and significance of current result and Importance of following up at instructed interval  Interaction IS anticipated between warfarin and primidone    Plan made per [facility] anticoagulation protocol    Mone Kent RN  Anticoagulation Clinic  6/27/2024    _______________________________________________________________________     Anticoagulation Episode Summary       Current INR goal:  2.0-3.0   TTR:  53.2% (1 y)   Target end date:  Indefinite   Send INR reminders to:  [facility]    Indications    Chronic atrial fibrillation (H) [I48.20]             Comments:               Anticoagulation Care Providers       Provider Role Specialty Phone number    Teagan Martel MD  Cardiovascular Disease [phone]

## 2024-07-08 ENCOUNTER — LAB (OUTPATIENT)
Dept: CARDIOLOGY | Facility: CLINIC | Age: 74
End: 2024-07-08
Payer: COMMERCIAL

## 2024-07-08 ENCOUNTER — DOCUMENTATION ONLY (OUTPATIENT)
Dept: ANTICOAGULATION | Facility: CLINIC | Age: 74
End: 2024-07-08

## 2024-07-08 ENCOUNTER — ANTICOAGULATION THERAPY VISIT (OUTPATIENT)
Dept: ANTICOAGULATION | Facility: CLINIC | Age: 74
End: 2024-07-08

## 2024-07-08 DIAGNOSIS — I48.20 CHRONIC ATRIAL FIBRILLATION (H): Primary | ICD-10-CM

## 2024-07-08 DIAGNOSIS — I48.20 CHRONIC ATRIAL FIBRILLATION (H): ICD-10-CM

## 2024-07-08 LAB — INR POINT OF CARE: 1.6 (ref 0.9–1.1)

## 2024-07-08 PROCEDURE — 85610 PROTHROMBIN TIME: CPT

## 2024-07-08 PROCEDURE — 36416 COLLJ CAPILLARY BLOOD SPEC: CPT

## 2024-07-08 NOTE — PROGRESS NOTES
ANTICOAGULATION MANAGEMENT     Zach Garcia 74 year old male is on warfarin with subtherapeutic INR result. (Goal INR 2.0-3.0)    Recent labs: (last 7 days)     07/08/24  0809   INR 1.6*       ASSESSMENT     Source(s): Chart Review and Patient/Caregiver Call     Warfarin doses taken: Warfarin taken as instructed  Diet: No new diet changes identified  Medication/supplement changes: primidone started on ~6/17 which may be decreasing INR today   New illness, injury, or hospitalization: No  Signs or symptoms of bleeding or clotting: No  Previous result: Subtherapeutic  Additional findings: None       PLAN     Recommended plan for no diet, medication or health factor changes affecting INR     Dosing Instructions: Increase your warfarin dose (12.5% change) with next INR in 2 weeks       Summary  As of 7/8/2024      Full warfarin instructions:  5 mg every Mon, Thu; 2.5 mg all other days   Next INR check:  7/15/2024               Telephone call with Pat who verbalizes understanding and agrees to plan    Lab visit scheduled    Education provided: Please call back if any changes to your diet, medications or how you've been taking warfarin  Goal range and lab monitoring: goal range and significance of current result, Importance of therapeutic range, and Importance of following up at instructed interval  Symptom monitoring: monitoring for clotting signs and symptoms and monitoring for stroke signs and symptoms    Plan made per [facility] anticoagulation protocol    Mayra Adair, RN  Anticoagulation Clinic  7/8/2024    _______________________________________________________________________     Anticoagulation Episode Summary       Current INR goal:  2.0-3.0   TTR:  53.2% (1 y)   Target end date:  Indefinite   Send INR reminders to:  [facility]    Indications    Chronic atrial fibrillation (H) [I48.20]             Comments:               Anticoagulation Care Providers       Provider Role Specialty  Phone number    Teagan Martel MD  Cardiology [phone]

## 2024-07-08 NOTE — PROGRESS NOTES
ANTICOAGULATION CLINIC REFERRAL RENEWAL REQUEST       An annual renewal order is required for all patients referred to [facility].?  Please review and sign the pended referral order for Zach Garcia.       ANTICOAGULATION SUMMARY      Warfarin indication(s)   Atrial Fibrillation    Mechanical heart valve present?  NO       Current goal range   INR: 2.0-3.0     Goal appropriate for indication? Goal INR 2-3, standard for indication(s) above     Time in Therapeutic Range (TTR)  (Goal > 60%) 53.2%       Office visit with referring provider's group within last year yes on 8/31/23       Mayra Adair RN  [facility]

## 2024-07-08 NOTE — PROGRESS NOTES
ANTICOAGULATION MANAGEMENT     Zach Garcia 74 year old male is on warfarin with subtherapeutic INR result. (Goal INR 2.0-3.0)    Recent labs: (last 7 days)     07/08/24  0809   INR 1.6*       ASSESSMENT     Source(s): Chart Review  Previous INR was Subtherapeutic  Medication, diet, health changes since last INR - primidone started on ~6/17 which may be decreasing INR today          PLAN     Unable to reach pt today.    VM left to call [facility] back. Will try again later     Follow up required to confirm warfarin dose taken and assess for changes    Mayra Adair, RN  Anticoagulation Clinic  7/8/2024

## 2024-07-22 ENCOUNTER — LAB (OUTPATIENT)
Dept: CARDIOLOGY | Facility: CLINIC | Age: 74
End: 2024-07-22
Payer: COMMERCIAL

## 2024-07-22 ENCOUNTER — ANTICOAGULATION THERAPY VISIT (OUTPATIENT)
Dept: ANTICOAGULATION | Facility: CLINIC | Age: 74
End: 2024-07-22

## 2024-07-22 DIAGNOSIS — I48.20 CHRONIC ATRIAL FIBRILLATION (H): ICD-10-CM

## 2024-07-22 DIAGNOSIS — I48.20 CHRONIC ATRIAL FIBRILLATION (H): Primary | ICD-10-CM

## 2024-07-22 LAB — INR POINT OF CARE: 2 (ref 0.9–1.1)

## 2024-07-22 PROCEDURE — 36416 COLLJ CAPILLARY BLOOD SPEC: CPT

## 2024-07-22 PROCEDURE — 85610 PROTHROMBIN TIME: CPT

## 2024-07-22 NOTE — PROGRESS NOTES
ANTICOAGULATION MANAGEMENT     Zach Garcia 74 year old male is on warfarin with therapeutic INR result. (Goal INR 2.0-3.0)    Recent labs: (last 7 days)     07/22/24  0753   INR 2.0*       ASSESSMENT     Source(s): Chart Review and Patient/Caregiver Call     Warfarin doses taken: Warfarin taken as instructed  Diet: No new diet changes identified  Medication/supplement changes: None noted  New illness, injury, or hospitalization: No  Signs or symptoms of bleeding or clotting: No  Previous result: Subtherapeutic  Additional findings: None       PLAN     Recommended plan for no diet, medication or health factor changes affecting INR     Dosing Instructions: Continue your current warfarin dose with next INR in 3 weeks       Summary  As of 7/22/2024      Full warfarin instructions:  5 mg every Mon, Thu; 2.5 mg all other days   Next INR check:  8/12/2024               Telephone call with Alea who verbalizes understanding and agrees to plan    Lab visit scheduled    Education provided: Contact [phone] with any changes, questions or concerns.     Plan made per [facility] anticoagulation protocol    Kailey Samaniego RN  Anticoagulation Clinic  7/22/2024    _______________________________________________________________________     Anticoagulation Episode Summary       Current INR goal:  2.0-3.0   TTR:  50.5% (1 y)   Target end date:  Indefinite   Send INR reminders to:  [facility]    Indications    Chronic atrial fibrillation (H) [I48.20]             Comments:               Anticoagulation Care Providers       Provider Role Specialty Phone number    Teagan Martel MD  Cardiology [phone]

## 2024-08-08 DIAGNOSIS — Z79.01 LONG TERM (CURRENT) USE OF ANTICOAGULANTS: Primary | ICD-10-CM

## 2024-08-08 DIAGNOSIS — I48.20 CHRONIC ATRIAL FIBRILLATION (H): ICD-10-CM

## 2024-08-08 RX ORDER — WARFARIN SODIUM 5 MG/1
TABLET ORAL
Qty: 80 TABLET | Refills: 2 | Status: SHIPPED | OUTPATIENT
Start: 2024-08-08

## 2024-08-12 ENCOUNTER — DOCUMENTATION ONLY (OUTPATIENT)
Dept: ANTICOAGULATION | Facility: CLINIC | Age: 74
End: 2024-08-12

## 2024-08-12 ENCOUNTER — LAB (OUTPATIENT)
Dept: CARDIOLOGY | Facility: CLINIC | Age: 74
End: 2024-08-12
Payer: COMMERCIAL

## 2024-08-12 ENCOUNTER — ANTICOAGULATION THERAPY VISIT (OUTPATIENT)
Dept: ANTICOAGULATION | Facility: CLINIC | Age: 74
End: 2024-08-12

## 2024-08-12 DIAGNOSIS — Z79.01 LONG TERM (CURRENT) USE OF ANTICOAGULANTS: ICD-10-CM

## 2024-08-12 DIAGNOSIS — I48.20 CHRONIC ATRIAL FIBRILLATION (H): Primary | ICD-10-CM

## 2024-08-12 DIAGNOSIS — Z79.01 LONG TERM (CURRENT) USE OF ANTICOAGULANTS: Primary | ICD-10-CM

## 2024-08-12 LAB — INR POINT OF CARE: 2.5 (ref 0.9–1.1)

## 2024-08-12 PROCEDURE — 85610 PROTHROMBIN TIME: CPT

## 2024-08-12 PROCEDURE — 36416 COLLJ CAPILLARY BLOOD SPEC: CPT

## 2024-08-12 NOTE — PROGRESS NOTES
ANTICOAGULATION CLINIC REFERRAL RENEWAL REQUEST       An annual renewal order is required for all patients referred to [facility]tico[facility].?  Please review and sign the pended referral order for Zach Garcia.       ANTICOAGULATION SUMMARY      Warfarin indication(s)   Atrial Fibrillation    Mechanical heart valve present?  NO       Current goal range   INR: 2.0-3.0     Goal appropriate for indication? Goal INR 2-3, standard for indication(s) above     Time in Therapeutic Range (TTR)  (Goal > 60%) 52.6%       Office visit with referring provider's group within last year yes on 8/31/23       Irene Black RN  [facility] Anticoagulation [facility]

## 2024-08-12 NOTE — PROGRESS NOTES
ANTICOAGULATION MANAGEMENT     Zach Garcia 74 year old male is on warfarin with therapeutic INR result. (Goal INR 2.0-3.0)    Recent labs: (last 7 days)     08/12/24  0821   INR 2.5*       ASSESSMENT     Source(s): Chart Review and Template     Warfarin doses taken: Reviewed in chart  Diet: No new diet changes identified  Medication/supplement changes: None noted  New illness, injury, or hospitalization: No  Signs or symptoms of bleeding or clotting: No  Previous result: Therapeutic last visit; previously outside of goal range  Additional findings: None       PLAN     Recommended plan for no diet, medication or health factor changes affecting INR     Dosing Instructions: Continue your current warfarin dose with next INR in 4 weeks       Summary  As of 8/12/2024      Full warfarin instructions:  5 mg every Mon, Thu; 2.5 mg all other days   Next INR check:  9/9/2024               Detailed voice message left for Pat with dosing instructions and follow up date.     Contact [phone] to schedule and with any changes, questions or concerns.     Education provided: Please call back if any changes to your diet, medications or how you've been taking warfarin  Contact [phone] with any changes, questions or concerns.     Plan made per [facility] anticoagulation protocol    Irene Black RN  Anticoagulation Clinic  8/12/2024    _______________________________________________________________________     Anticoagulation Episode Summary       Current INR goal:  2.0-3.0   TTR:  52.6% (1 y)   Target end date:  Indefinite   Send INR reminders to:  [facility]    Indications    Chronic atrial fibrillation (H) [I48.20]             Comments:               Anticoagulation Care Providers       Provider Role Specialty Phone number    Teagan Martel MD  Cardiology [phone]

## 2024-08-21 ENCOUNTER — ANCILLARY PROCEDURE (OUTPATIENT)
Dept: CARDIOLOGY | Facility: CLINIC | Age: 74
End: 2024-08-21
Attending: INTERNAL MEDICINE
Payer: COMMERCIAL

## 2024-08-21 VITALS
DIASTOLIC BLOOD PRESSURE: 64 MMHG | RESPIRATION RATE: 16 BRPM | BODY MASS INDEX: 29.07 KG/M2 | SYSTOLIC BLOOD PRESSURE: 110 MMHG | WEIGHT: 226.4 LBS | HEART RATE: 60 BPM

## 2024-08-21 DIAGNOSIS — I48.20 CHRONIC ATRIAL FIBRILLATION (H): ICD-10-CM

## 2024-08-21 DIAGNOSIS — I42.0 CARDIOMYOPATHY, DILATED, NONISCHEMIC (H): ICD-10-CM

## 2024-08-21 DIAGNOSIS — G25.0 ESSENTIAL TREMOR: ICD-10-CM

## 2024-08-21 DIAGNOSIS — I50.22 CHRONIC SYSTOLIC (CONGESTIVE) HEART FAILURE (H): ICD-10-CM

## 2024-08-21 DIAGNOSIS — I10 ESSENTIAL HYPERTENSION WITH GOAL BLOOD PRESSURE LESS THAN 130/80: Chronic | ICD-10-CM

## 2024-08-21 DIAGNOSIS — Z95.810 ICD (IMPLANTABLE CARDIOVERTER-DEFIBRILLATOR), SINGLE, IN SITU: ICD-10-CM

## 2024-08-21 DIAGNOSIS — I25.83 CORONARY ARTERIOSCLEROSIS DUE TO LIPID RICH PLAQUE: Primary | ICD-10-CM

## 2024-08-21 DIAGNOSIS — E78.5 DYSLIPIDEMIA: ICD-10-CM

## 2024-08-21 DIAGNOSIS — E66.811 CLASS 1 OBESITY DUE TO EXCESS CALORIES WITHOUT SERIOUS COMORBIDITY WITH BODY MASS INDEX (BMI) OF 31.0 TO 31.9 IN ADULT: ICD-10-CM

## 2024-08-21 DIAGNOSIS — Z95.810 ICD (IMPLANTABLE CARDIOVERTER-DEFIBRILLATOR) IN PLACE: ICD-10-CM

## 2024-08-21 DIAGNOSIS — E66.09 CLASS 1 OBESITY DUE TO EXCESS CALORIES WITHOUT SERIOUS COMORBIDITY WITH BODY MASS INDEX (BMI) OF 31.0 TO 31.9 IN ADULT: ICD-10-CM

## 2024-08-21 PROCEDURE — G2211 COMPLEX E/M VISIT ADD ON: HCPCS | Performed by: INTERNAL MEDICINE

## 2024-08-21 PROCEDURE — 99214 OFFICE O/P EST MOD 30 MIN: CPT | Performed by: INTERNAL MEDICINE

## 2024-08-21 RX ORDER — PRIMIDONE 50 MG/1
1 TABLET ORAL AT BEDTIME
COMMUNITY
Start: 2024-06-07 | End: 2025-06-07

## 2024-08-21 NOTE — PROGRESS NOTES
[facility]  Heart Care Clinic Follow-up Note    Assessment & Plan          (I25.10,  I25.84) Coronary artery disease due to calcified coronary lesion  (primary encounter diagnosis)  Comment:  Angiography November 1, 2016 showed normal left main, mid LAD 30% lesion, normal circumflex and right coronary artery with a proximal 99% lesion which received a drug-coated stent.  Repeat pharmacological stress test September 2020 secondary to ventricular arrhythmias showed no major ischemia.  No symptoms currently and work on prevention.     (I48.20) Chronic atrial fibrillation (H)  Comment: Asymptomatic not valvular and on chronic anticoagulation given advanced CHADS 2 VASC score. Most recent INR 2.5.  If he should have falls or other reasons would not hesitate to consider left atrial appendage occlusion.     (I42.0) Cardiomyopathy, dilated, nonischemic (H)  Comment: Ejection fraction in past 40% by echo and 36% by nuclear stress test.  On recheck echo 30 to 35% 2022, no signs or symptoms of heart failure and takes furosemide twice daily.  Will recheck echo this year.     (I10) Essential hypertension with goal blood pressure less than 130/80  Comment: Under good control on metoprolol, propranolol, furosemide, Aldactone, and lisinopril.  Do not like 2 different types of beta-blockers, propranolol is being adjusted by neurology for his tremor.     (E78.5) Dyslipidemia  Comment: On statin with total cholesterol 158 and LDL 69 which are excellent.  No recent blood test and will need to arrange for this.     (Z95.810) ICD (implantable cardioverter-defibrillator), single, in situ  Comment: [employer] device with [employer] right ventricular lead placed in 2017, approximately 11% ventricular pacing, no significant ventricular arrhythmias.  9-1/2 years of battery life left.     (I47.2) Paroxysmal ventricular tachycardia (H)  Comment: As above no recurrences.  Do see some A-fib with rapid ventricular  response.     (E66.09,  Z68.31) Class 1 obesity due to excess calories without serious comorbidity with body mass index (BMI) of 31.0 to 31.9 in adult  Comment: Work on weight loss.     (G25.0) Essential tremor  Comment: So noted and defer to neurology, was on Topamax, currently on Lyrica as well as propranolol as well as primidone.    Plan  1.  Check echocardiogram as it has been 2 years since he has had 1.  2.  Follow-up me in 1 year or sooner if needed.    The longitudinal plan of care for the diagnosis(es)/condition(s) as documented were addressed during this visit. Due to the added complexity in care, I will continue to support Pat in the subsequent management and with ongoing continuity of care.     Subjective  CC: 74-year-old white gentleman here for yearly follow-up.  Since I seen him he has seen neurology several times, has been placed on primidone which is now being tapered off.  He states his biggest issue is his tremor.  Denies any chest pains, palpitations, shortness of breath, PND, orthopnea, syncope, dizziness or significant peripheral edema.    Medications  Current Outpatient Medications   Medication Sig Dispense Refill    acetaminophen (TYLENOL) 500 MG tablet as needed      ASPIRIN 81 PO daily      baclofen (LIORESAL) 10 MG tablet Take 10 mg by mouth as needed for muscle spasms.      furosemide (LASIX) 20 MG tablet Take 1 tablet (20 mg) by mouth 2 times daily 180 tablet 3    lisinopril (ZESTRIL) 20 MG tablet Take 2 tablets (40 mg) by mouth daily 240 tablet 3    metoprolol succinate ER (TOPROL XL) 200 MG 24 hr tablet Take 1 tablet (200 mg) by mouth daily 120 tablet 3    nortriptyline (PAMELOR) 50 MG capsule Take 50 mg by mouth at bedtime.      pregabalin (LYRICA) 150 MG capsule Take 150 mg by mouth 3 times daily.      primidone (MYSOLINE) 50 MG tablet Take 1 tablet by mouth at bedtime.      rosuvastatin (CRESTOR) 20 MG tablet Take 1 tablet (20 mg) by mouth daily 120 tablet 3    spironolactone  "(ALDACTONE) 25 MG tablet Take 0.5 tablets (12.5 mg) by mouth daily 45 tablet 3    topiramate (TOPAMAX) 25 MG tablet Take 25 mg by mouth daily      warfarin ANTICOAGULANT (COUMADIN) 5 MG tablet Take 1/2 tablet (2.5mg) to 1 tablet (5mg) by mouth daily, or as directed.  Adjust dose based on INR results. 80 tablet 2       Objective  /64 (BP Location: Right arm, Patient Position: Sitting, Cuff Size: Adult Large)   Pulse 60   Resp 16   Wt 102.7 kg (226 lb 6.4 oz)   BMI 29.07 kg/m      General Appearance:    Alert, cooperative, no distress, appears stated age   Head:    Normocephalic, without obvious abnormality, atraumatic   Throat:   Lips, mucosa, and tongue normal; teeth and gums normal   Neck:   Supple, symmetrical, trachea midline, no adenopathy;        thyroid:  No enlargement/tenderness/nodules; no carotid    bruit or JVD   Back:     Symmetric, no curvature, ROM normal, no CVA tenderness   Lungs:     Clear to auscultation bilaterally, respirations unlabored   Chest wall:    No tenderness, left-sided pacemaker   Heart:    Regular rate and rhythm, S1 and S2 normal, no murmur, rub   or gallop   Abdomen:     Soft, non-tender, bowel sounds active all four quadrants,     no masses, no organomegaly   Extremities:   Normal, atraumatic, no cyanosis or edema   Pulses:   2+ and symmetric all extremities   Skin:   Skin color, texture, turgor normal, no rashes or lesions     Results    Lab Results personally reviewed   Lab Results   Component Value Date    CHOL 158 05/22/2019    CHOL 144 02/27/2018     Lab Results   Component Value Date    HDL 46 05/22/2019    HDL 39 (L) 02/27/2018     No components found for: \"LDLCALC\"  Lab Results   Component Value Date    TRIG 215 (H) 05/22/2019    TRIG 189 (H) 02/27/2018     Lab Results   Component Value Date    WBC 6.3 05/22/2019    HGB 15.3 05/22/2019    HCT 45.8 05/22/2019     05/22/2019     Lab Results   Component Value Date    BUN 18 09/09/2020     09/09/2020    " CO2 23 09/09/2020

## 2024-08-21 NOTE — LETTER
8/21/2024    Tyrone Patel MD  [facility] [address]  [facility] [address]    RE: Zach Garcia       Dear Colleague,     I had the pleasure of seeing Zach Garcia in the [facility].      [facility]  Heart Care Clinic Follow-up Note    Assessment & Plan          (I25.10,  I25.84) Coronary artery disease due to calcified coronary lesion  (primary encounter diagnosis)  Comment:  Angiography November 1, 2016 showed normal left main, mid LAD 30% lesion, normal circumflex and right coronary artery with a proximal 99% lesion which received a drug-coated stent.  Repeat pharmacological stress test September 2020 secondary to ventricular arrhythmias showed no major ischemia.  No symptoms currently and work on prevention.     (I48.20) Chronic atrial fibrillation (H)  Comment: Asymptomatic not valvular and on chronic anticoagulation given advanced CHADS 2 VASC score. Most recent INR 2.5.  If he should have falls or other reasons would not hesitate to consider left atrial appendage occlusion.     (I42.0) Cardiomyopathy, dilated, nonischemic (H)  Comment: Ejection fraction in past 40% by echo and 36% by nuclear stress test.  On recheck echo 30 to 35% 2022, no signs or symptoms of heart failure and takes furosemide twice daily.  Will recheck echo this year.     (I10) Essential hypertension with goal blood pressure less than 130/80  Comment: Under good control on metoprolol, propranolol, furosemide, Aldactone, and lisinopril.  Do not like 2 different types of beta-blockers, propranolol is being adjusted by neurology for his tremor.     (E78.5) Dyslipidemia  Comment: On statin with total cholesterol 158 and LDL 69 which are excellent.  No recent blood test and will need to arrange for this.     (Z95.810) ICD (implantable cardioverter-defibrillator), single, in situ  Comment: [employer] device with [employer] right ventricular lead placed in 2017, approximately  11% ventricular pacing, no significant ventricular arrhythmias.  9-1/2 years of battery life left.     (I47.2) Paroxysmal ventricular tachycardia (H)  Comment: As above no recurrences.  Do see some A-fib with rapid ventricular response.     (E66.09,  Z68.31) Class 1 obesity due to excess calories without serious comorbidity with body mass index (BMI) of 31.0 to 31.9 in adult  Comment: Work on weight loss.     (G25.0) Essential tremor  Comment: So noted and defer to neurology, was on Topamax, currently on Lyrica as well as propranolol as well as primidone.    Plan  1.  Check echocardiogram as it has been 2 years since he has had 1.  2.  Follow-up me in 1 year or sooner if needed.    The longitudinal plan of care for the diagnosis(es)/condition(s) as documented were addressed during this visit. Due to the added complexity in care, I will continue to support Pat in the subsequent management and with ongoing continuity of care.     Subjective  CC: 74-year-old white gentleman here for yearly follow-up.  Since I seen him he has seen neurology several times, has been placed on primidone which is now being tapered off.  He states his biggest issue is his tremor.  Denies any chest pains, palpitations, shortness of breath, PND, orthopnea, syncope, dizziness or significant peripheral edema.    Medications  Current Outpatient Medications   Medication Sig Dispense Refill     acetaminophen (TYLENOL) 500 MG tablet as needed       ASPIRIN 81 PO daily       baclofen (LIORESAL) 10 MG tablet Take 10 mg by mouth as needed for muscle spasms.       furosemide (LASIX) 20 MG tablet Take 1 tablet (20 mg) by mouth 2 times daily 180 tablet 3     lisinopril (ZESTRIL) 20 MG tablet Take 2 tablets (40 mg) by mouth daily 240 tablet 3     metoprolol succinate ER (TOPROL XL) 200 MG 24 hr tablet Take 1 tablet (200 mg) by mouth daily 120 tablet 3     nortriptyline (PAMELOR) 50 MG capsule Take 50 mg by mouth at bedtime.       pregabalin (LYRICA) 150 MG  "capsule Take 150 mg by mouth 3 times daily.       primidone (MYSOLINE) 50 MG tablet Take 1 tablet by mouth at bedtime.       rosuvastatin (CRESTOR) 20 MG tablet Take 1 tablet (20 mg) by mouth daily 120 tablet 3     spironolactone (ALDACTONE) 25 MG tablet Take 0.5 tablets (12.5 mg) by mouth daily 45 tablet 3     topiramate (TOPAMAX) 25 MG tablet Take 25 mg by mouth daily       warfarin ANTICOAGULANT (COUMADIN) 5 MG tablet Take 1/2 tablet (2.5mg) to 1 tablet (5mg) by mouth daily, or as directed.  Adjust dose based on INR results. 80 tablet 2       Objective  /64 (BP Location: Right arm, Patient Position: Sitting, Cuff Size: Adult Large)   Pulse 60   Resp 16   Wt 102.7 kg (226 lb 6.4 oz)   BMI 29.07 kg/m      General Appearance:    Alert, cooperative, no distress, appears stated age   Head:    Normocephalic, without obvious abnormality, atraumatic   Throat:   Lips, mucosa, and tongue normal; teeth and gums normal   Neck:   Supple, symmetrical, trachea midline, no adenopathy;        thyroid:  No enlargement/tenderness/nodules; no carotid    bruit or JVD   Back:     Symmetric, no curvature, ROM normal, no CVA tenderness   Lungs:     Clear to auscultation bilaterally, respirations unlabored   Chest wall:    No tenderness, left-sided pacemaker   Heart:    Regular rate and rhythm, S1 and S2 normal, no murmur, rub   or gallop   Abdomen:     Soft, non-tender, bowel sounds active all four quadrants,     no masses, no organomegaly   Extremities:   Normal, atraumatic, no cyanosis or edema   Pulses:   2+ and symmetric all extremities   Skin:   Skin color, texture, turgor normal, no rashes or lesions     Results    Lab Results personally reviewed   Lab Results   Component Value Date    CHOL 158 05/22/2019    CHOL 144 02/27/2018     Lab Results   Component Value Date    HDL 46 05/22/2019    HDL 39 (L) 02/27/2018     No components found for: \"LDLCALC\"  Lab Results   Component Value Date    TRIG 215 (H) 05/22/2019    TRIG " 189 (H) 02/27/2018     Lab Results   Component Value Date    WBC 6.3 05/22/2019    HGB 15.3 05/22/2019    HCT 45.8 05/22/2019     05/22/2019     Lab Results   Component Value Date    BUN 18 09/09/2020     09/09/2020    CO2 23 09/09/2020               Thank you for allowing me to participate in the care of your patient.      Sincerely,     MAYTE PELAYO MD     [facility]  cc:   Gretchen Angelo MD  [address] [place] [id number]

## 2024-08-22 LAB
MDC_IDC_LEAD_CONNECTION_STATUS: NORMAL
MDC_IDC_LEAD_IMPLANT_DT: NORMAL
MDC_IDC_LEAD_LOCATION: NORMAL
MDC_IDC_LEAD_LOCATION_DETAIL_1: NORMAL
MDC_IDC_LEAD_MFG: NORMAL
MDC_IDC_LEAD_MODEL: NORMAL
MDC_IDC_LEAD_POLARITY_TYPE: NORMAL
MDC_IDC_LEAD_SERIAL: NORMAL
MDC_IDC_LEAD_SPECIAL_FUNCTION: NORMAL
MDC_IDC_MSMT_BATTERY_DTM: NORMAL
MDC_IDC_MSMT_BATTERY_REMAINING_LONGEVITY: 114 MO
MDC_IDC_MSMT_BATTERY_REMAINING_PERCENTAGE: 100 %
MDC_IDC_MSMT_BATTERY_STATUS: NORMAL
MDC_IDC_MSMT_CAP_CHARGE_DTM: NORMAL
MDC_IDC_MSMT_CAP_CHARGE_TIME: 10.6 S
MDC_IDC_MSMT_CAP_CHARGE_TYPE: NORMAL
MDC_IDC_MSMT_LEADCHNL_RV_IMPEDANCE_VALUE: 539 OHM
MDC_IDC_MSMT_LEADCHNL_RV_PACING_THRESHOLD_AMPLITUDE: 0.8 V
MDC_IDC_MSMT_LEADCHNL_RV_PACING_THRESHOLD_PULSEWIDTH: 0.4 MS
MDC_IDC_MSMT_LEADCHNL_RV_SENSING_INTR_AMPL: 6 MV
MDC_IDC_PG_IMPLANT_DTM: NORMAL
MDC_IDC_PG_MFG: NORMAL
MDC_IDC_PG_MODEL: NORMAL
MDC_IDC_PG_SERIAL: NORMAL
MDC_IDC_PG_TYPE: NORMAL
MDC_IDC_SESS_CLINIC_NAME: NORMAL
MDC_IDC_SESS_DTM: NORMAL
MDC_IDC_SESS_TYPE: NORMAL
MDC_IDC_SET_BRADY_LOWRATE: 50 {BEATS}/MIN
MDC_IDC_SET_BRADY_MODE: NORMAL
MDC_IDC_SET_LEADCHNL_RV_PACING_AMPLITUDE: 1.5 V
MDC_IDC_SET_LEADCHNL_RV_PACING_POLARITY: NORMAL
MDC_IDC_SET_LEADCHNL_RV_PACING_PULSEWIDTH: 0.4 MS
MDC_IDC_SET_LEADCHNL_RV_SENSING_ADAPTATION_MODE: NORMAL
MDC_IDC_SET_LEADCHNL_RV_SENSING_POLARITY: NORMAL
MDC_IDC_SET_LEADCHNL_RV_SENSING_SENSITIVITY: 0.6 MV
MDC_IDC_SET_ZONE_DETECTION_INTERVAL: 286 MS
MDC_IDC_SET_ZONE_DETECTION_INTERVAL: 333 MS
MDC_IDC_SET_ZONE_DETECTION_INTERVAL: 400 MS
MDC_IDC_SET_ZONE_STATUS: NORMAL
MDC_IDC_SET_ZONE_TYPE: NORMAL
MDC_IDC_SET_ZONE_VENDOR_TYPE: NORMAL
MDC_IDC_STAT_BRADY_DTM_END: NORMAL
MDC_IDC_STAT_BRADY_DTM_START: NORMAL
MDC_IDC_STAT_BRADY_RV_PERCENT_PACED: 11 %
MDC_IDC_STAT_EPISODE_RECENT_COUNT: 0
MDC_IDC_STAT_EPISODE_RECENT_COUNT: 0
MDC_IDC_STAT_EPISODE_RECENT_COUNT: 13
MDC_IDC_STAT_EPISODE_RECENT_COUNT: 165
MDC_IDC_STAT_EPISODE_RECENT_COUNT_DTM_END: NORMAL
MDC_IDC_STAT_EPISODE_RECENT_COUNT_DTM_START: NORMAL
MDC_IDC_STAT_EPISODE_TYPE: NORMAL
MDC_IDC_STAT_EPISODE_VENDOR_TYPE: NORMAL
MDC_IDC_STAT_TACHYTHERAPY_ATP_DELIVERED_RECENT: 0
MDC_IDC_STAT_TACHYTHERAPY_ATP_DELIVERED_TOTAL: 1
MDC_IDC_STAT_TACHYTHERAPY_RECENT_DTM_END: NORMAL
MDC_IDC_STAT_TACHYTHERAPY_RECENT_DTM_START: NORMAL
MDC_IDC_STAT_TACHYTHERAPY_SHOCKS_ABORTED_RECENT: 0
MDC_IDC_STAT_TACHYTHERAPY_SHOCKS_ABORTED_TOTAL: 1
MDC_IDC_STAT_TACHYTHERAPY_SHOCKS_DELIVERED_RECENT: 0
MDC_IDC_STAT_TACHYTHERAPY_SHOCKS_DELIVERED_TOTAL: 0
MDC_IDC_STAT_TACHYTHERAPY_TOTAL_DTM_END: NORMAL
MDC_IDC_STAT_TACHYTHERAPY_TOTAL_DTM_START: NORMAL

## 2024-08-22 PROCEDURE — 93282 PRGRMG EVAL IMPLANTABLE DFB: CPT | Performed by: INTERNAL MEDICINE

## 2024-08-26 ENCOUNTER — TRANSFERRED RECORDS (OUTPATIENT)
Dept: HEALTH INFORMATION MANAGEMENT | Facility: CLINIC | Age: 74
End: 2024-08-26

## 2024-08-26 LAB — INR (EXTERNAL): 4.1 (ref 0.9–1.1)

## 2024-08-28 LAB — INR (EXTERNAL): 1.9 (ref 0.9–1.1)

## 2024-08-30 LAB — INR (EXTERNAL): 1.4 (ref 0.9–1.1)

## 2024-09-05 ENCOUNTER — LAB REQUISITION (OUTPATIENT)
Dept: LAB | Facility: CLINIC | Age: 74
End: 2024-09-05
Payer: COMMERCIAL

## 2024-09-05 ENCOUNTER — TRANSFERRED RECORDS (OUTPATIENT)
Dept: HEALTH INFORMATION MANAGEMENT | Facility: CLINIC | Age: 74
End: 2024-09-05

## 2024-09-05 DIAGNOSIS — N17.9 ACUTE KIDNEY FAILURE, UNSPECIFIED (H): ICD-10-CM

## 2024-09-05 LAB
ANION GAP SERPL CALCULATED.3IONS-SCNC: 15 MMOL/L (ref 7–15)
BUN SERPL-MCNC: 40 MG/DL (ref 8–23)
CALCIUM SERPL-MCNC: 8.8 MG/DL (ref 8.8–10.4)
CHLORIDE SERPL-SCNC: 100 MMOL/L (ref 98–107)
CREAT SERPL-MCNC: 3 MG/DL (ref 0.67–1.17)
EGFRCR SERPLBLD CKD-EPI 2021: 21 ML/MIN/1.73M2
GLUCOSE SERPL-MCNC: 104 MG/DL (ref 70–99)
HCO3 SERPL-SCNC: 23 MMOL/L (ref 22–29)
POTASSIUM SERPL-SCNC: 4.3 MMOL/L (ref 3.4–5.3)
SODIUM SERPL-SCNC: 138 MMOL/L (ref 135–145)

## 2024-09-05 PROCEDURE — 80048 BASIC METABOLIC PNL TOTAL CA: CPT | Mod: ORL | Performed by: NURSE PRACTITIONER

## 2024-09-12 ENCOUNTER — LAB (OUTPATIENT)
Dept: CARDIOLOGY | Facility: CLINIC | Age: 74
End: 2024-09-12
Payer: COMMERCIAL

## 2024-09-12 ENCOUNTER — ANTICOAGULATION THERAPY VISIT (OUTPATIENT)
Dept: ANTICOAGULATION | Facility: CLINIC | Age: 74
End: 2024-09-12

## 2024-09-12 DIAGNOSIS — I48.20 CHRONIC ATRIAL FIBRILLATION (H): ICD-10-CM

## 2024-09-12 DIAGNOSIS — Z79.01 LONG TERM (CURRENT) USE OF ANTICOAGULANTS: ICD-10-CM

## 2024-09-12 DIAGNOSIS — I48.20 CHRONIC ATRIAL FIBRILLATION (H): Primary | ICD-10-CM

## 2024-09-12 LAB — INR POINT OF CARE: 1.3 (ref 0.9–1.1)

## 2024-09-12 PROCEDURE — 36416 COLLJ CAPILLARY BLOOD SPEC: CPT

## 2024-09-12 PROCEDURE — 85610 PROTHROMBIN TIME: CPT

## 2024-09-12 NOTE — PROGRESS NOTES
ANTICOAGULATION MANAGEMENT     Zach Garcia 74 year old male is on warfarin with subtherapeutic INR result. (Goal INR 2.0-3.0)    Recent labs: (last 7 days)     09/12/24  0809   INR 1.3*       ASSESSMENT     Source(s): Chart Review and Patient/Caregiver Call     Warfarin doses taken: Warfarin taken as instructed   Verified taking correct warfarin dose and has not missed any doses.   As of 8/30: take 5mg on Monday and Thursday; 2.5 all other days of the week   Diet: No new diet changes identified   Does not eat any Vitamin-K foods.  Medication/supplement changes:  Yes.   Several med changes during his recent hospitalization.  New illness, injury, or hospitalization: Yes.   Reported recent hospitalization possibly affected INR being low.   Last INR on 9/4/24 - 1.4   Recent hospitalization from 8/26-30/24 for altered mental status, AR, metabolic and respiratory acidosis.   Hx of chronic atrial fibrillation.  Signs or symptoms of bleeding or clotting: No  Previous result: Therapeutic last 2 INR visits  Additional findings:  [facility] patient.       PLAN     Recommended plan for temporary change(s) affecting INR     Dosing Instructions: booster dose then continue your current warfarin dose with next INR in 1 week       Summary  As of 9/12/2024      Full warfarin instructions:  9/12: 7.5 mg; Otherwise 5 mg every Mon, Thu; 2.5 mg all other days   Next INR check:  9/19/2024               Telephone call with Alea who verbalizes understanding and agrees to plan.   - he was reluctant to check INR in one week d/t to upcoming nephrology appt on 9/23, and there could be medication changes.  We will adjust warfarin dose by 11%, based on next INR appt, to ensure INR stability.   - did provided education - INR was subtherapeutic and not getting any protection for potential stroke, d/t atrial fibrillation.    Lab visit scheduled - [facility]18[facility].    Education provided: Taking warfarin: Importance of taking  warfarin as instructed  Goal range and lab monitoring: goal range and significance of current result  Symptom monitoring: monitoring for clotting signs and symptoms and monitoring for stroke signs and symptoms    Plan made with [facility] Pharmacist Vania Alvarado, RN  9/12/2024  Anticoagulation Clinic  [facility] for routing messages: p [facility] patient phone line: [phone]        _______________________________________________________________________     Anticoagulation Episode Summary       Current INR goal:  2.0-3.0   TTR:  45.6% (1 y)   Target end date:  Indefinite   Send INR reminders to:  [facility]    Indications    Chronic atrial fibrillation (H) [I48.20]  Long term (current) use of anticoagulants [Z79.01]             Comments:               Anticoagulation Care Providers       Provider Role Specialty Phone number    Teagan Martel MD  Cardiology [phone]

## 2024-09-18 ENCOUNTER — TELEPHONE (OUTPATIENT)
Dept: CARDIOLOGY | Facility: CLINIC | Age: 74
End: 2024-09-18

## 2024-09-18 ENCOUNTER — ANTICOAGULATION THERAPY VISIT (OUTPATIENT)
Dept: ANTICOAGULATION | Facility: CLINIC | Age: 74
End: 2024-09-18

## 2024-09-18 ENCOUNTER — LAB (OUTPATIENT)
Dept: CARDIOLOGY | Facility: CLINIC | Age: 74
End: 2024-09-18
Payer: COMMERCIAL

## 2024-09-18 DIAGNOSIS — Z79.01 LONG TERM (CURRENT) USE OF ANTICOAGULANTS: ICD-10-CM

## 2024-09-18 DIAGNOSIS — I48.20 CHRONIC ATRIAL FIBRILLATION (H): ICD-10-CM

## 2024-09-18 DIAGNOSIS — I48.20 CHRONIC ATRIAL FIBRILLATION (H): Primary | ICD-10-CM

## 2024-09-18 LAB — INR POINT OF CARE: 1.2 (ref 0.9–1.1)

## 2024-09-18 PROCEDURE — 36416 COLLJ CAPILLARY BLOOD SPEC: CPT

## 2024-09-18 PROCEDURE — 85610 PROTHROMBIN TIME: CPT

## 2024-09-18 NOTE — PROGRESS NOTES
ANTICOAGULATION MANAGEMENT     Zach Garcia 74 year old male is on warfarin with subtherapeutic INR result. (Goal INR 2.0-3.0)    Recent labs: (last 7 days)     09/18/24  0753   INR 1.2*       ASSESSMENT     Source(s): Chart Review and Patient/Caregiver Call     Warfarin doses taken: Warfarin taken as instructed - confirms he took booster last week and did not miss any doses.   Diet: No new diet changes identified  Medication/supplement changes:  seeing neurology 9/23 and thinks primidone dose will be increased.   New illness, injury, or hospitalization: No  Signs or symptoms of bleeding or clotting: No  Previous result: Subtherapeutic  Additional findings: None     PLAN     Recommended plan for no diet, medication or health factor changes affecting INR     Dosing Instructions: booster dose then Increase your warfarin dose (20% change from last 7 day total) with next INR in 1 week       Summary  As of 9/18/2024      Full warfarin instructions:  9/18: 7.5 mg; Otherwise 2.5 mg every Tue, Fri; 5 mg all other days   Next INR check:  9/26/2024               Telephone call with Pat who agrees to plan and repeated back plan correctly    Lab visit scheduled    Education provided: Goal range and lab monitoring: goal range and significance of current result, Importance of therapeutic range, and Importance of following up at instructed interval    Plan made with [facility] Pharmacist Vania Kent RN  9/18/2024  Anticoagulation Clinic  [employer] for routing messages: p [facility] patient phone line: [phone]        _______________________________________________________________________     Anticoagulation Episode Summary       Current INR goal:  2.0-3.0   TTR:  43.9% (1 y)   Target end date:  Indefinite   Send INR reminders to:  [facility]    Indications    Chronic atrial fibrillation (H) [I48.20]  Long term (current) use of anticoagulants [Z79.01]              Comments:               Anticoagulation Care Providers       Provider Role Specialty Phone number    Teagan Martel MD  Cardiology [phone]

## 2024-09-18 NOTE — TELEPHONE ENCOUNTER
Received a fax from [employer] requesting signature from [facility] for his home health certification and plan of care. Cardiology is not managing his home care and he did not order this service.     Called [employer] and was transferred to nurse leader voicemail. Writer left a voicemail requesting that form be faxed to ordering provider. Direct line left for any questions. -nico

## 2024-09-19 ENCOUNTER — TRANSFERRED RECORDS (OUTPATIENT)
Dept: HEALTH INFORMATION MANAGEMENT | Facility: CLINIC | Age: 74
End: 2024-09-19

## 2024-09-26 ENCOUNTER — ANTICOAGULATION THERAPY VISIT (OUTPATIENT)
Dept: ANTICOAGULATION | Facility: CLINIC | Age: 74
End: 2024-09-26

## 2024-09-26 ENCOUNTER — LAB (OUTPATIENT)
Dept: CARDIOLOGY | Facility: CLINIC | Age: 74
End: 2024-09-26
Payer: COMMERCIAL

## 2024-09-26 DIAGNOSIS — Z79.01 LONG TERM (CURRENT) USE OF ANTICOAGULANTS: ICD-10-CM

## 2024-09-26 DIAGNOSIS — I48.20 CHRONIC ATRIAL FIBRILLATION (H): Primary | ICD-10-CM

## 2024-09-26 DIAGNOSIS — I48.20 CHRONIC ATRIAL FIBRILLATION (H): ICD-10-CM

## 2024-09-26 LAB — INR POINT OF CARE: 1.8 (ref 0.9–1.1)

## 2024-09-26 PROCEDURE — 36416 COLLJ CAPILLARY BLOOD SPEC: CPT

## 2024-09-26 PROCEDURE — 85610 PROTHROMBIN TIME: CPT

## 2024-09-26 NOTE — PROGRESS NOTES
ANTICOAGULATION MANAGEMENT     Zach Garcia 74 year old male is on warfarin with subtherapeutic INR result. (Goal INR 2.0-3.0)    Recent labs: (last 7 days)     09/26/24  0755   INR 1.8*       ASSESSMENT     Source(s): Chart Review and Patient/Caregiver Call     Warfarin doses taken: Warfarin taken as instructed - booster dose and 20% increase on 9/18  Diet: No new diet changes identified  Medication/supplement changes:  primidone dose increased on 9/23 subsequent INRs may be decreased. Closer INR monitoring recommended.  New illness, injury, or hospitalization: No  Signs or symptoms of bleeding or clotting: No  Previous result: Subtherapeutic  Additional findings: None       PLAN     Recommended plan for ongoing change(s) affecting INR     Dosing Instructions: Increase your warfarin dose (8.3% change) with next INR in 1-2 weeks       Summary  As of 9/26/2024      Full warfarin instructions:  2.5 mg every Wed; 5 mg all other days   Next INR check:  10/10/2024               Telephone call with Pat who agrees to plan and repeated back plan correctly    Lab visit scheduled    Education provided: Goal range and lab monitoring: goal range and significance of current result and Importance of following up at instructed interval  Interaction IS anticipated between warfarin and primidone    Plan made with [facility] Pharmacist Vania Kent RN  9/26/2024  Anticoagulation Clinic  [employer] for routing messages: maci [facility]ART [facility] patient phone line: [phone]        _______________________________________________________________________     Anticoagulation Episode Summary       Current INR goal:  2.0-3.0   TTR:  42.1% (1 y)   Target end date:  Indefinite   Send INR reminders to:  [facility]    Indications    Chronic atrial fibrillation (H) [I48.20]  Long term (current) use of anticoagulants [Z79.01]             Comments:               Anticoagulation Care  Providers       Provider Role Specialty Phone number    Teagan Martel MD  Cardiology [phone]

## 2024-09-29 NOTE — PATIENT INSTRUCTIONS
Zach STALLWORTH Radha,  I enjoyed visiting with you again today.  I am glad to hear you are doing well.  Per our conversation let us recheck the echo to see how the heart is doing.  I will plan on seeing you 1 year.  Aram Martel  
English

## 2024-10-10 ENCOUNTER — LAB (OUTPATIENT)
Dept: CARDIOLOGY | Facility: CLINIC | Age: 74
End: 2024-10-10
Payer: COMMERCIAL

## 2024-10-10 ENCOUNTER — ANTICOAGULATION THERAPY VISIT (OUTPATIENT)
Dept: ANTICOAGULATION | Facility: CLINIC | Age: 74
End: 2024-10-10

## 2024-10-10 DIAGNOSIS — I48.20 CHRONIC ATRIAL FIBRILLATION (H): ICD-10-CM

## 2024-10-10 DIAGNOSIS — Z79.01 LONG TERM (CURRENT) USE OF ANTICOAGULANTS: ICD-10-CM

## 2024-10-10 DIAGNOSIS — I48.20 CHRONIC ATRIAL FIBRILLATION (H): Primary | ICD-10-CM

## 2024-10-10 LAB — INR POINT OF CARE: 1.5 (ref 0.9–1.1)

## 2024-10-10 PROCEDURE — 36416 COLLJ CAPILLARY BLOOD SPEC: CPT

## 2024-10-10 PROCEDURE — 85610 PROTHROMBIN TIME: CPT

## 2024-10-10 NOTE — PROGRESS NOTES
ANTICOAGULATION MANAGEMENT     Zach Garcia 74 year old male is on warfarin with subtherapeutic INR result. (Goal INR 2.0-3.0)    Recent labs: (last 7 days)     10/10/24  0811   INR 1.5*       ASSESSMENT     Source(s): Chart Review, Patient/Caregiver Call, and Template     Warfarin doses taken: Warfarin taken as instructed  Diet: No new diet changes identified  Medication/supplement changes:  primidone dose increased on 9/23 which may be decreasing INR today. Closer INR monitoring recommended.  New illness, injury, or hospitalization: No  Signs or symptoms of bleeding or clotting: No  Previous result: Subtherapeutic  Additional findings: None       PLAN     Recommended plan for ongoing change(s) affecting INR     Dosing Instructions: Increase your warfarin dose (15.4% change) with next INR in 1-2 weeks       Summary  As of 10/10/2024      Full warfarin instructions:  7.5 mg every Thu; 5 mg all other days   Next INR check:  10/24/2024               Telephone call with Pat who agrees to plan and repeated back plan correctly    Lab visit scheduled    Education provided: Goal range and lab monitoring: goal range and significance of current result and Importance of following up at instructed interval  Interaction IS anticipated between warfarin and primidone    Plan made per [facility] anticoagulation protocol    Mone Kent RN  10/10/2024  Anticoagulation Clinic  [employer] for routing messages: maci [facility] HEART [facility] patient phone line: [phone]        _______________________________________________________________________     Anticoagulation Episode Summary       Current INR goal:  2.0-3.0   TTR:  42.1% (1 y)   Target end date:  Indefinite   Send INR reminders to:  [facility] HEART [facility]    Indications    Chronic atrial fibrillation (H) [I48.20]  Long term (current) use of anticoagulants [Z79.01]             Comments:               Anticoagulation Care Providers       Provider  Role Specialty Phone number    Teagan Martel MD  Cardiology [phone]

## 2024-10-18 ENCOUNTER — TELEPHONE (OUTPATIENT)
Dept: CARDIOLOGY | Facility: CLINIC | Age: 74
End: 2024-10-18
Payer: COMMERCIAL

## 2024-10-18 NOTE — TELEPHONE ENCOUNTER
[facility] Call Center    Phone Message    May a detailed message be left on voicemail: yes     Reason for Call: Other: Pt would like a call back to discuss his medication as he was told to stop taking his heart medication when he was in the hospital for kidney issues , he is wondering if he should start them up again       Action Taken: Other: Cardio    Travel Screening: Not Applicable     Date of Service:

## 2024-10-21 NOTE — TELEPHONE ENCOUNTER
Dr. Martel -- please see update below. Pt scheduled to see you on 11/5/24. Any recommendations/changes prior to that? Thx.    -------------------------------------------------------    Spoke with pt who reports that his Lisinopril, lasix, and aldactone were all discontinued during his admission to [facility] at the end of August for an AR. Pt inquiring whether he should restart any of those meds. Pt reports that recent BP's have been around 130/80 & 127/70. Pt does not take BP's or weights regularly. He also lost about 20 lbs while in the hospital. Denies SOB, edema, CP. Plan made to set-up follow-up appt with Dr. Martel to discuss plan moving forward and will connect prior to that if Dr. Martel has any changes/recommendations. Pt agreeable and has direct phone number if he needs anything. aj

## 2024-10-21 NOTE — TELEPHONE ENCOUNTER
Called and spoke with pt regarding Dr. Martel's comments/recommendations. Discussed weighing himself, keeping a log, when to report, etc. Also discussed obtaining BP's and when to report those. Pt verbalized understanding and is agreeable to plan. He plans on bringing his BP cuff from home with him on the day of his appt to check accuracy with our BP reading. aj    -------------------------------------------------------  Msg received:  From: Teagan Martel MD  Sent: 10/21/2024   3:11 PM CDT    I would continue holding those for the time being, asked him to weigh himself or look somehow for fluid retention in which case we need to restart furosemide.  Check blood pressures maybe 3 times a week in which case we may need to start lisinopril.  LF

## 2024-10-21 NOTE — TELEPHONE ENCOUNTER
Attempted to call pt but was unable to reach. LVM with direct callback number.    Noted that pt was admitted to [facility] on 8/26 with AR. Upon discharge, his Lasix, Lisinopril, and Aldactone were all discontinued. Most recent Creat is 1.61.  Last saw Dr. Martel on 8/21/24. Dr. aMrtel does have some upcoming return pt openings available. aj

## 2024-10-24 ENCOUNTER — LAB (OUTPATIENT)
Dept: CARDIOLOGY | Facility: CLINIC | Age: 74
End: 2024-10-24
Payer: COMMERCIAL

## 2024-10-24 ENCOUNTER — ANTICOAGULATION THERAPY VISIT (OUTPATIENT)
Dept: ANTICOAGULATION | Facility: CLINIC | Age: 74
End: 2024-10-24

## 2024-10-24 DIAGNOSIS — Z79.01 LONG TERM (CURRENT) USE OF ANTICOAGULANTS: ICD-10-CM

## 2024-10-24 DIAGNOSIS — I48.20 CHRONIC ATRIAL FIBRILLATION (H): Primary | ICD-10-CM

## 2024-10-24 DIAGNOSIS — I48.20 CHRONIC ATRIAL FIBRILLATION (H): ICD-10-CM

## 2024-10-24 LAB — INR POINT OF CARE: 2.2 (ref 0.9–1.1)

## 2024-10-24 PROCEDURE — 36416 COLLJ CAPILLARY BLOOD SPEC: CPT

## 2024-10-24 PROCEDURE — 85610 PROTHROMBIN TIME: CPT

## 2024-10-24 NOTE — PROGRESS NOTES
ANTICOAGULATION MANAGEMENT     Zach Garcia 74 year old male is on warfarin with therapeutic INR result. (Goal INR 2.0-3.0)    Recent labs: (last 7 days)     10/24/24  0814   INR 2.2*       ASSESSMENT     Source(s): Chart Review  Previous INR was Subtherapeutic  Medication, diet, health changes since last INR chart reviewed; none identified         PLAN     Recommended plan for no diet, medication or health factor changes affecting INR     Dosing Instructions: Continue your current warfarin dose with next INR in 2 weeks       Summary  As of 10/24/2024      Full warfarin instructions:  7.5 mg every Thu; 5 mg all other days   Next INR check:  11/7/2024               Detailed voice message left for Pat with dosing instructions and follow up date.     Check at provider office visit    Education provided: Please call back if any changes to your diet, medications or how you've been taking warfarin  Goal range and lab monitoring: goal range and significance of current result    Plan made per [facility] anticoagulation protocol    Mone Kent RN  10/24/2024  Anticoagulation Clinic  [employer] for routing messages: p [facility] patient phone line: [phone]        _______________________________________________________________________     Anticoagulation Episode Summary       Current INR goal:  2.0-3.0   TTR:  43.2% (1 y)   Target end date:  Indefinite   Send INR reminders to:  [facility]    Indications    Chronic atrial fibrillation (H) [I48.20]  Long term (current) use of anticoagulants [Z79.01]             Comments:  --             Anticoagulation Care Providers       Provider Role Specialty Phone number    Teagan Martel MD  Cardiology [phone]

## 2024-11-05 ENCOUNTER — ANTICOAGULATION THERAPY VISIT (OUTPATIENT)
Dept: ANTICOAGULATION | Facility: CLINIC | Age: 74
End: 2024-11-05

## 2024-11-05 ENCOUNTER — OFFICE VISIT (OUTPATIENT)
Dept: CARDIOLOGY | Facility: CLINIC | Age: 74
End: 2024-11-05
Payer: COMMERCIAL

## 2024-11-05 ENCOUNTER — LAB (OUTPATIENT)
Dept: CARDIOLOGY | Facility: CLINIC | Age: 74
End: 2024-11-05
Payer: COMMERCIAL

## 2024-11-05 VITALS
DIASTOLIC BLOOD PRESSURE: 90 MMHG | BODY MASS INDEX: 27.46 KG/M2 | SYSTOLIC BLOOD PRESSURE: 144 MMHG | HEART RATE: 71 BPM | WEIGHT: 214 LBS | RESPIRATION RATE: 17 BRPM | HEIGHT: 74 IN

## 2024-11-05 DIAGNOSIS — Z95.810 ICD (IMPLANTABLE CARDIOVERTER-DEFIBRILLATOR), SINGLE, IN SITU: ICD-10-CM

## 2024-11-05 DIAGNOSIS — E66.09 CLASS 1 OBESITY DUE TO EXCESS CALORIES WITHOUT SERIOUS COMORBIDITY WITH BODY MASS INDEX (BMI) OF 31.0 TO 31.9 IN ADULT: ICD-10-CM

## 2024-11-05 DIAGNOSIS — N17.9 AKI (ACUTE KIDNEY INJURY) (H): ICD-10-CM

## 2024-11-05 DIAGNOSIS — I42.0 CARDIOMYOPATHY, DILATED, NONISCHEMIC (H): ICD-10-CM

## 2024-11-05 DIAGNOSIS — I48.20 CHRONIC ATRIAL FIBRILLATION (H): Primary | ICD-10-CM

## 2024-11-05 DIAGNOSIS — I25.83 CORONARY ARTERIOSCLEROSIS DUE TO LIPID RICH PLAQUE: Primary | ICD-10-CM

## 2024-11-05 DIAGNOSIS — I10 ESSENTIAL HYPERTENSION WITH GOAL BLOOD PRESSURE LESS THAN 130/80: Chronic | ICD-10-CM

## 2024-11-05 DIAGNOSIS — E78.5 DYSLIPIDEMIA: ICD-10-CM

## 2024-11-05 DIAGNOSIS — I48.20 CHRONIC ATRIAL FIBRILLATION (H): ICD-10-CM

## 2024-11-05 DIAGNOSIS — Z79.01 LONG TERM (CURRENT) USE OF ANTICOAGULANTS: ICD-10-CM

## 2024-11-05 DIAGNOSIS — E66.811 CLASS 1 OBESITY DUE TO EXCESS CALORIES WITHOUT SERIOUS COMORBIDITY WITH BODY MASS INDEX (BMI) OF 31.0 TO 31.9 IN ADULT: ICD-10-CM

## 2024-11-05 LAB — INR POINT OF CARE: 2.9 (ref 0.9–1.1)

## 2024-11-05 PROCEDURE — G2211 COMPLEX E/M VISIT ADD ON: HCPCS | Performed by: INTERNAL MEDICINE

## 2024-11-05 PROCEDURE — 36416 COLLJ CAPILLARY BLOOD SPEC: CPT | Performed by: INTERNAL MEDICINE

## 2024-11-05 PROCEDURE — 99214 OFFICE O/P EST MOD 30 MIN: CPT | Performed by: INTERNAL MEDICINE

## 2024-11-05 PROCEDURE — 85610 PROTHROMBIN TIME: CPT | Performed by: INTERNAL MEDICINE

## 2024-11-05 RX ORDER — TRAZODONE HYDROCHLORIDE 50 MG/1
50-100 TABLET, FILM COATED ORAL
COMMUNITY
Start: 2024-09-05

## 2024-11-05 RX ORDER — HYDRALAZINE HYDROCHLORIDE 10 MG/1
10 TABLET, FILM COATED ORAL 3 TIMES DAILY
Qty: 60 TABLET | Refills: 1 | Status: SHIPPED | OUTPATIENT
Start: 2024-11-05

## 2024-11-05 NOTE — PATIENT INSTRUCTIONS
Mr Zach STALLWORTH Radha,  I enjoyed visiting with you again today.  I am glad to hear you are doing well following your renal failure.  Per our conversation let us try the hydralazine, 10 mg tablets 3 times daily.  It might make your heart a little faster and also a little lightheaded initially.  If you do okay with that, give us a call at [phone] for us to get you a 90-day supply or possibly go up on the dose of it.  I will plan on seeing you 3 months or so.  Aram Martel

## 2024-11-05 NOTE — LETTER
11/5/2024    Brittanie Cardona, NP  [address] [place] [address]    RE: Zach Garcia       Dear Colleague,     I had the pleasure of seeing Zach Garcia in the [facility].      [facility]  Heart Care Clinic Follow-up Note    Assessment & Plan      (I25.10,  I25.84) Coronary artery disease due to calcified coronary lesion  (primary encounter diagnosis)  Comment:  Angiography November 1, 2016 showed normal left main, mid LAD 30% lesion, normal circumflex and right coronary artery with a proximal 99% lesion which received a drug-coated stent.  Repeat pharmacological stress test September 2020 secondary to ventricular arrhythmias showed no major ischemia.  No symptoms currently and work on prevention.     (I48.20) Chronic atrial fibrillation (H)  Comment: Asymptomatic not valvular and on chronic anticoagulation given advanced CHADS 2 VASC score. Most recent INR 2.9.  If he should have falls or other reasons would not hesitate to consider left atrial appendage occlusion.     (I42.0) Cardiomyopathy, dilated, nonischemic (H)  Comment: Ejection fraction 40 to 45%, unable to tolerate ACE inhibitor secondary to acute kidney injury.  Will use hydralazine and beta-blocker.    (I10) Essential hypertension with goal blood pressure less than 130/80  Comment: Not under good control currently on metoprolol.  Lisinopril was discontinued, will add hydralazine 10 mg 3 times daily and titrate upwards if tolerated.    (E78.5) Dyslipidemia  Comment: On statin with total cholesterol 158 and LDL 69 which are excellent.  No recent blood test and will need to arrange for this.     (Z95.810) ICD (implantable cardioverter-defibrillator), single, in situ  Comment: [employer] device with [employer] right ventricular lead placed in 2017, approximately 11% ventricular pacing, no significant ventricular arrhythmias.  9-1/2 years of battery life left.     (I47.2) Paroxysmal ventricular tachycardia  (H)  Comment: As above no recurrences.      (E66.09,  Z68.31) Class 1 obesity due to excess calories without serious comorbidity with body mass index (BMI) of 31.0 to 31.9 in adult  Comment: Work on weight loss.     (G25.0) Essential tremor  Comment: So noted and defer to neurology, was on Topamax, currently on Lyrica as well as primidone.      (N17.9) AR (acute kidney injury) (H)  Comment: Significantly increased creatinine with uremia.  Following hospitalization now 1.36 creatinine with GFR 55, refrain from lisinopril as above.    Plan  1.  Try hydralazine similar grams p.o. 3 times daily and uptitrate if need be.  20-day supply given, if tolerates that he will call us and we will call in prescription for mail order.  2.  Follow-up with me 3 to 4 months given all these issues or sooner if needed.    The longitudinal plan of care for the diagnosis(es)/condition(s) as documented were addressed during this visit. Due to the added complexity in care, I will continue to support Pat in the subsequent management and with ongoing continuity of care.     Subjective  CC: 74-year-old white gentleman being seen in hospital discharge follow-up.  Since I saw him he was hospitalized with uremia and confusion.  He had acute kidney injury.  His lisinopril and furosemide were discontinued.  He is back home living independently with his wife, went hunting with [employer] this weekend in [place], is mowing the lawn.  For the most part does complain of fatigue after heavy activity but no chest pains, palpitations, shortness of breath, PND, orthopnea, syncope, dizziness or significant peripheral edema.    Medications  Current Outpatient Medications   Medication Sig Dispense Refill     acetaminophen (TYLENOL) 500 MG tablet as needed       ASPIRIN 81 PO daily       baclofen (LIORESAL) 10 MG tablet Take 10 mg by mouth as needed for muscle spasms.       hydrALAZINE (APRESOLINE) 10 MG tablet Take 1 tablet (10 mg) by mouth 3 times daily. 60  "tablet 1     metoprolol succinate ER (TOPROL XL) 200 MG 24 hr tablet Take 1 tablet (200 mg) by mouth daily 120 tablet 3     nortriptyline (PAMELOR) 50 MG capsule Take 50 mg by mouth at bedtime.       pregabalin (LYRICA) 150 MG capsule Take 150 mg by mouth 3 times daily.       primidone (MYSOLINE) 50 MG tablet Take 1 tablet by mouth at bedtime.       rosuvastatin (CRESTOR) 20 MG tablet Take 1 tablet (20 mg) by mouth daily 120 tablet 3     topiramate (TOPAMAX) 25 MG tablet Take 25 mg by mouth daily       traZODone (DESYREL) 50 MG tablet Take  mg by mouth nightly as needed for sleep.       warfarin ANTICOAGULANT (COUMADIN) 5 MG tablet Take 1/2 tablet (2.5mg) to 1 tablet (5mg) by mouth daily, or as directed.  Adjust dose based on INR results. 80 tablet 2       Objective  BP (!) 144/90 (BP Location: Right arm, Patient Position: Sitting, Cuff Size: Adult Large)   Pulse 71   Resp 17   Ht 1.88 m (6' 2\")   Wt 97.1 kg (214 lb)   BMI 27.48 kg/m      General Appearance:    Alert, cooperative, no distress, appears stated age   Head:    Normocephalic, without obvious abnormality, atraumatic   Throat:   Lips, mucosa, and tongue normal; teeth and gums normal   Neck:   Supple, symmetrical, trachea midline, no adenopathy;        thyroid:  No enlargement/tenderness/nodules; no carotid    bruit or JVD   Back:     Symmetric, no curvature, ROM normal, no CVA tenderness   Lungs:     Clear to auscultation bilaterally, respirations unlabored   Chest wall:    No tenderness, left-sided pacemaker   Heart:    Regular rate and rhythm, S1 and S2 normal, no murmur, rub   or gallop   Abdomen:     Soft, non-tender, bowel sounds active all four quadrants,     no masses, no organomegaly   Extremities:   Normal, atraumatic, no cyanosis or edema   Pulses:   2+ and symmetric all extremities   Skin:   Skin color, texture, turgor normal, no rashes or lesions     Results    Lab Results personally reviewed   Lab Results   Component Value Date    " "CHOL 158 05/22/2019    CHOL 144 02/27/2018     Lab Results   Component Value Date    HDL 46 05/22/2019    HDL 39 (L) 02/27/2018     No components found for: \"LDLCALC\"  Lab Results   Component Value Date    TRIG 215 (H) 05/22/2019    TRIG 189 (H) 02/27/2018     Lab Results   Component Value Date    WBC 6.3 05/22/2019    HGB 15.3 05/22/2019    HCT 45.8 05/22/2019     05/22/2019     Lab Results   Component Value Date    BUN 40.0 (H) 09/05/2024     09/05/2024    CO2 23 09/05/2024               Thank you for allowing me to participate in the care of your patient.      Sincerely,     MAYTE MARTEL MD     [facility]  cc:   Teagan Martel MD  [address], [address]  [place] [address]      "

## 2024-11-05 NOTE — PROGRESS NOTES
[facility]  Heart Care Clinic Follow-up Note    Assessment & Plan      (I25.10,  I25.84) Coronary artery disease due to calcified coronary lesion  (primary encounter diagnosis)  Comment:  Angiography November 1, 2016 showed normal left main, mid LAD 30% lesion, normal circumflex and right coronary artery with a proximal 99% lesion which received a drug-coated stent.  Repeat pharmacological stress test September 2020 secondary to ventricular arrhythmias showed no major ischemia.  No symptoms currently and work on prevention.     (I48.20) Chronic atrial fibrillation (H)  Comment: Asymptomatic not valvular and on chronic anticoagulation given advanced CHADS 2 VASC score. Most recent INR 2.9.  If he should have falls or other reasons would not hesitate to consider left atrial appendage occlusion.     (I42.0) Cardiomyopathy, dilated, nonischemic (H)  Comment: Ejection fraction 40 to 45%, unable to tolerate ACE inhibitor secondary to acute kidney injury.  Will use hydralazine and beta-blocker.    (I10) Essential hypertension with goal blood pressure less than 130/80  Comment: Not under good control currently on metoprolol.  Lisinopril was discontinued, will add hydralazine 10 mg 3 times daily and titrate upwards if tolerated.    (E78.5) Dyslipidemia  Comment: On statin with total cholesterol 158 and LDL 69 which are excellent.  No recent blood test and will need to arrange for this.     (Z95.810) ICD (implantable cardioverter-defibrillator), single, in situ  Comment: [employer] device with [employer] right ventricular lead placed in 2017, approximately 11% ventricular pacing, no significant ventricular arrhythmias.  9-1/2 years of battery life left.     (I47.2) Paroxysmal ventricular tachycardia (H)  Comment: As above no recurrences.      (E66.09,  Z68.31) Class 1 obesity due to excess calories without serious comorbidity with body mass index (BMI) of 31.0 to 31.9 in adult  Comment: Work on weight  loss.     (G25.0) Essential tremor  Comment: So noted and defer to neurology, was on Topamax, currently on Lyrica as well as primidone.      (N17.9) AR (acute kidney injury) (H)  Comment: Significantly increased creatinine with uremia.  Following hospitalization now 1.36 creatinine with GFR 55, refrain from lisinopril as above.    Plan  1.  Try hydralazine similar grams p.o. 3 times daily and uptitrate if need be.  20-day supply given, if tolerates that he will call us and we will call in prescription for mail order.  2.  Follow-up with me 3 to 4 months given all these issues or sooner if needed.    The longitudinal plan of care for the diagnosis(es)/condition(s) as documented were addressed during this visit. Due to the added complexity in care, I will continue to support Pat in the subsequent management and with ongoing continuity of care.     Subjective  CC: 74-year-old white gentleman being seen in hospital discharge follow-up.  Since I saw him he was hospitalized with uremia and confusion.  He had acute kidney injury.  His lisinopril and furosemide were discontinued.  He is back home living independently with his wife, went hunting with [employer] this weekend in [place], is mowing the lawn.  For the most part does complain of fatigue after heavy activity but no chest pains, palpitations, shortness of breath, PND, orthopnea, syncope, dizziness or significant peripheral edema.    Medications  Current Outpatient Medications   Medication Sig Dispense Refill    acetaminophen (TYLENOL) 500 MG tablet as needed      ASPIRIN 81 PO daily      baclofen (LIORESAL) 10 MG tablet Take 10 mg by mouth as needed for muscle spasms.      hydrALAZINE (APRESOLINE) 10 MG tablet Take 1 tablet (10 mg) by mouth 3 times daily. 60 tablet 1    metoprolol succinate ER (TOPROL XL) 200 MG 24 hr tablet Take 1 tablet (200 mg) by mouth daily 120 tablet 3    nortriptyline (PAMELOR) 50 MG capsule Take 50 mg by mouth at bedtime.      pregabalin  "(LYRICA) 150 MG capsule Take 150 mg by mouth 3 times daily.      primidone (MYSOLINE) 50 MG tablet Take 1 tablet by mouth at bedtime.      rosuvastatin (CRESTOR) 20 MG tablet Take 1 tablet (20 mg) by mouth daily 120 tablet 3    topiramate (TOPAMAX) 25 MG tablet Take 25 mg by mouth daily      traZODone (DESYREL) 50 MG tablet Take  mg by mouth nightly as needed for sleep.      warfarin ANTICOAGULANT (COUMADIN) 5 MG tablet Take 1/2 tablet (2.5mg) to 1 tablet (5mg) by mouth daily, or as directed.  Adjust dose based on INR results. 80 tablet 2       Objective  BP (!) 144/90 (BP Location: Right arm, Patient Position: Sitting, Cuff Size: Adult Large)   Pulse 71   Resp 17   Ht 1.88 m (6' 2\")   Wt 97.1 kg (214 lb)   BMI 27.48 kg/m      General Appearance:    Alert, cooperative, no distress, appears stated age   Head:    Normocephalic, without obvious abnormality, atraumatic   Throat:   Lips, mucosa, and tongue normal; teeth and gums normal   Neck:   Supple, symmetrical, trachea midline, no adenopathy;        thyroid:  No enlargement/tenderness/nodules; no carotid    bruit or JVD   Back:     Symmetric, no curvature, ROM normal, no CVA tenderness   Lungs:     Clear to auscultation bilaterally, respirations unlabored   Chest wall:    No tenderness, left-sided pacemaker   Heart:    Regular rate and rhythm, S1 and S2 normal, no murmur, rub   or gallop   Abdomen:     Soft, non-tender, bowel sounds active all four quadrants,     no masses, no organomegaly   Extremities:   Normal, atraumatic, no cyanosis or edema   Pulses:   2+ and symmetric all extremities   Skin:   Skin color, texture, turgor normal, no rashes or lesions     Results    Lab Results personally reviewed   Lab Results   Component Value Date    CHOL 158 05/22/2019    CHOL 144 02/27/2018     Lab Results   Component Value Date    HDL 46 05/22/2019    HDL 39 (L) 02/27/2018     No components found for: \"LDLCALC\"  Lab Results   Component Value Date    TRIG 215 " (H) 05/22/2019    TRIG 189 (H) 02/27/2018     Lab Results   Component Value Date    WBC 6.3 05/22/2019    HGB 15.3 05/22/2019    HCT 45.8 05/22/2019     05/22/2019     Lab Results   Component Value Date    BUN 40.0 (H) 09/05/2024     09/05/2024    CO2 23 09/05/2024

## 2024-11-05 NOTE — PROGRESS NOTES
ANTICOAGULATION MANAGEMENT     Zach Garcia 74 year old male is on warfarin with therapeutic INR result. (Goal INR 2.0-3.0)    Recent labs: (last 7 days)     11/05/24  0820   INR 2.9*       ASSESSMENT     Source(s): Chart Review and Template     Warfarin doses taken: Warfarin taken as instructed  Diet: No new diet changes identified  Medication/supplement changes: None noted  New illness, injury, or hospitalization: No  Signs or symptoms of bleeding or clotting: No  Previous result: Therapeutic last visit; previously outside of goal range  Additional findings: None       PLAN     Recommended plan for no diet, medication or health factor changes affecting INR     Dosing Instructions: Continue your current warfarin dose with next INR in 3 weeks       Summary  As of 11/5/2024      Full warfarin instructions:  7.5 mg every Thu; 5 mg all other days   Next INR check:  11/26/2024               Detailed voice message left for Pat with dosing instructions and follow up date.     Contact [phone] to schedule and with any changes, questions or concerns.     Education provided: Please call back if any changes to your diet, medications or how you've been taking warfarin    Plan made per [facility] anticoagulation protocol    Mone Kent RN  11/5/2024  Anticoagulation Clinic  [employer] for routing messages: p [facility] patient phone line: [phone]        _______________________________________________________________________     Anticoagulation Episode Summary       Current INR goal:  2.0-3.0   TTR:  46.5% (1 y)   Target end date:  Indefinite   Send INR reminders to:  [facility]    Indications    Chronic atrial fibrillation (H) [I48.20]  Long term (current) use of anticoagulants [Z79.01]             Comments:  --             Anticoagulation Care Providers       Provider Role Specialty Phone number    Teagan Martel MD  Cardiology [phone]

## 2024-11-25 ENCOUNTER — ANCILLARY PROCEDURE (OUTPATIENT)
Dept: CARDIOLOGY | Facility: CLINIC | Age: 74
End: 2024-11-25
Attending: INTERNAL MEDICINE
Payer: COMMERCIAL

## 2024-11-25 DIAGNOSIS — I48.20 CHRONIC ATRIAL FIBRILLATION (H): ICD-10-CM

## 2024-11-25 DIAGNOSIS — Z95.810 ICD (IMPLANTABLE CARDIOVERTER-DEFIBRILLATOR) IN PLACE: ICD-10-CM

## 2024-11-25 DIAGNOSIS — I42.0 CARDIOMYOPATHY, DILATED, NONISCHEMIC (H): ICD-10-CM

## 2024-11-25 DIAGNOSIS — I50.22 CHRONIC SYSTOLIC (CONGESTIVE) HEART FAILURE (H): ICD-10-CM

## 2024-11-25 LAB
MDC_IDC_EPISODE_DTM: NORMAL
MDC_IDC_EPISODE_ID: NORMAL
MDC_IDC_EPISODE_TYPE: NORMAL
MDC_IDC_LEAD_CONNECTION_STATUS: NORMAL
MDC_IDC_LEAD_IMPLANT_DT: NORMAL
MDC_IDC_LEAD_LOCATION: NORMAL
MDC_IDC_LEAD_LOCATION_DETAIL_1: NORMAL
MDC_IDC_LEAD_MFG: NORMAL
MDC_IDC_LEAD_MODEL: NORMAL
MDC_IDC_LEAD_POLARITY_TYPE: NORMAL
MDC_IDC_LEAD_SERIAL: NORMAL
MDC_IDC_LEAD_SPECIAL_FUNCTION: NORMAL
MDC_IDC_MSMT_BATTERY_DTM: NORMAL
MDC_IDC_MSMT_BATTERY_REMAINING_LONGEVITY: 114 MO
MDC_IDC_MSMT_BATTERY_REMAINING_PERCENTAGE: 100 %
MDC_IDC_MSMT_BATTERY_STATUS: NORMAL
MDC_IDC_MSMT_CAP_CHARGE_DTM: NORMAL
MDC_IDC_MSMT_CAP_CHARGE_TIME: 10.6 S
MDC_IDC_MSMT_CAP_CHARGE_TYPE: NORMAL
MDC_IDC_MSMT_LEADCHNL_RV_IMPEDANCE_VALUE: 494 OHM
MDC_IDC_PG_IMPLANT_DTM: NORMAL
MDC_IDC_PG_MFG: NORMAL
MDC_IDC_PG_MODEL: NORMAL
MDC_IDC_PG_SERIAL: NORMAL
MDC_IDC_PG_TYPE: NORMAL
MDC_IDC_SESS_CLINIC_NAME: NORMAL
MDC_IDC_SESS_DTM: NORMAL
MDC_IDC_SESS_TYPE: NORMAL
MDC_IDC_SET_BRADY_LOWRATE: 50 {BEATS}/MIN
MDC_IDC_SET_BRADY_MODE: NORMAL
MDC_IDC_SET_LEADCHNL_RV_PACING_AMPLITUDE: 1.5 V
MDC_IDC_SET_LEADCHNL_RV_PACING_POLARITY: NORMAL
MDC_IDC_SET_LEADCHNL_RV_PACING_PULSEWIDTH: 0.4 MS
MDC_IDC_SET_LEADCHNL_RV_SENSING_ADAPTATION_MODE: NORMAL
MDC_IDC_SET_LEADCHNL_RV_SENSING_POLARITY: NORMAL
MDC_IDC_SET_LEADCHNL_RV_SENSING_SENSITIVITY: 0.6 MV
MDC_IDC_SET_ZONE_DETECTION_INTERVAL: 286 MS
MDC_IDC_SET_ZONE_DETECTION_INTERVAL: 333 MS
MDC_IDC_SET_ZONE_DETECTION_INTERVAL: 400 MS
MDC_IDC_SET_ZONE_STATUS: NORMAL
MDC_IDC_SET_ZONE_TYPE: NORMAL
MDC_IDC_SET_ZONE_VENDOR_TYPE: NORMAL
MDC_IDC_STAT_BRADY_DTM_END: NORMAL
MDC_IDC_STAT_BRADY_DTM_START: NORMAL
MDC_IDC_STAT_BRADY_RV_PERCENT_PACED: 20 %
MDC_IDC_STAT_EPISODE_RECENT_COUNT: 0
MDC_IDC_STAT_EPISODE_RECENT_COUNT_DTM_END: NORMAL
MDC_IDC_STAT_EPISODE_RECENT_COUNT_DTM_START: NORMAL
MDC_IDC_STAT_EPISODE_TYPE: NORMAL
MDC_IDC_STAT_EPISODE_VENDOR_TYPE: NORMAL
MDC_IDC_STAT_TACHYTHERAPY_ATP_DELIVERED_RECENT: 0
MDC_IDC_STAT_TACHYTHERAPY_ATP_DELIVERED_TOTAL: 1
MDC_IDC_STAT_TACHYTHERAPY_RECENT_DTM_END: NORMAL
MDC_IDC_STAT_TACHYTHERAPY_RECENT_DTM_START: NORMAL
MDC_IDC_STAT_TACHYTHERAPY_SHOCKS_ABORTED_RECENT: 0
MDC_IDC_STAT_TACHYTHERAPY_SHOCKS_ABORTED_TOTAL: 1
MDC_IDC_STAT_TACHYTHERAPY_SHOCKS_DELIVERED_RECENT: 0
MDC_IDC_STAT_TACHYTHERAPY_SHOCKS_DELIVERED_TOTAL: 0
MDC_IDC_STAT_TACHYTHERAPY_TOTAL_DTM_END: NORMAL
MDC_IDC_STAT_TACHYTHERAPY_TOTAL_DTM_START: NORMAL

## 2024-11-25 PROCEDURE — 93295 DEV INTERROG REMOTE 1/2/MLT: CPT | Performed by: INTERNAL MEDICINE

## 2024-11-25 PROCEDURE — 93296 REM INTERROG EVL PM/IDS: CPT | Performed by: INTERNAL MEDICINE

## 2024-12-03 ENCOUNTER — ANTICOAGULATION THERAPY VISIT (OUTPATIENT)
Dept: ANTICOAGULATION | Facility: CLINIC | Age: 74
End: 2024-12-03

## 2024-12-03 ENCOUNTER — LAB (OUTPATIENT)
Dept: CARDIOLOGY | Facility: CLINIC | Age: 74
End: 2024-12-03
Payer: COMMERCIAL

## 2024-12-03 DIAGNOSIS — I48.20 CHRONIC ATRIAL FIBRILLATION (H): ICD-10-CM

## 2024-12-03 DIAGNOSIS — Z79.01 LONG TERM (CURRENT) USE OF ANTICOAGULANTS: ICD-10-CM

## 2024-12-03 DIAGNOSIS — I48.20 CHRONIC ATRIAL FIBRILLATION (H): Primary | ICD-10-CM

## 2024-12-03 LAB — INR POINT OF CARE: 3.4 (ref 0.9–1.1)

## 2024-12-03 PROCEDURE — 85610 PROTHROMBIN TIME: CPT

## 2024-12-03 PROCEDURE — 36416 COLLJ CAPILLARY BLOOD SPEC: CPT

## 2024-12-03 NOTE — PROGRESS NOTES
ANTICOAGULATION MANAGEMENT     Zach Garcia 74 year old male is on warfarin with supratherapeutic INR result. (Goal INR 2.0-3.0)    Recent labs: (last 7 days)     12/03/24  0806   INR 3.4*       ASSESSMENT     Source(s): Chart Review and Patient/Caregiver Call     Warfarin doses taken: Warfarin taken as instructed  Diet: No new diet changes identified  Medication/supplement changes: None noted - however, patient does plan to ask for another increase in primidone dose next week which may decrease ongoing INRs.   New illness, injury, or hospitalization: No  Signs or symptoms of bleeding or clotting: No  Previous result: Therapeutic last 2(+) visits  Additional findings:  Decreasing dose today, however, patient aware that if primidone increased, Warfarin dose will likely increase again in 2 weeks.        PLAN     Recommended plan for ongoing change(s) affecting INR     Dosing Instructions: decrease your warfarin dose (6.7% change) with next INR in 2 weeks       Summary  As of 12/3/2024      Full warfarin instructions:  5 mg every day   Next INR check:  12/19/2024               Telephone call with Pat who agrees to plan and repeated back plan correctly    Lab visit scheduled    Education provided: Goal range and lab monitoring: goal range and significance of current result  Interaction IS anticipated between warfarin and primidone    Plan made per [facility] anticoagulation protocol    Mone Kent RN  12/3/2024  Anticoagulation Clinic  [employer] for routing messages: [employer] [facility] patient phone line: [phone]        _______________________________________________________________________     Anticoagulation Episode Summary       Current INR goal:  2.0-3.0   TTR:  44.8% (1 y)   Target end date:  Indefinite   Send INR reminders to:  [facility] HEART [facility]    Indications    Chronic atrial fibrillation (H) [I48.20]  Long term (current) use of anticoagulants [Z79.01]              Comments:  --             Anticoagulation Care Providers       Provider Role Specialty Phone number    Teagan Martel MD  Cardiology [phone]

## 2024-12-19 ENCOUNTER — ANTICOAGULATION THERAPY VISIT (OUTPATIENT)
Dept: ANTICOAGULATION | Facility: CLINIC | Age: 74
End: 2024-12-19

## 2024-12-19 ENCOUNTER — LAB (OUTPATIENT)
Dept: CARDIOLOGY | Facility: CLINIC | Age: 74
End: 2024-12-19
Payer: COMMERCIAL

## 2024-12-19 DIAGNOSIS — I48.20 CHRONIC ATRIAL FIBRILLATION (H): ICD-10-CM

## 2024-12-19 DIAGNOSIS — Z79.01 LONG TERM (CURRENT) USE OF ANTICOAGULANTS: ICD-10-CM

## 2024-12-19 DIAGNOSIS — I48.20 CHRONIC ATRIAL FIBRILLATION (H): Primary | ICD-10-CM

## 2024-12-19 LAB — INR POINT OF CARE: 2.3 (ref 0.9–1.1)

## 2024-12-19 PROCEDURE — 36416 COLLJ CAPILLARY BLOOD SPEC: CPT

## 2024-12-19 PROCEDURE — 85610 PROTHROMBIN TIME: CPT

## 2024-12-19 NOTE — PROGRESS NOTES
ANTICOAGULATION MANAGEMENT     Zach Garcia 74 year old male is on warfarin with therapeutic INR result. (Goal INR 2.0-3.0)    Recent labs: (last 7 days)     12/19/24  0753   INR 2.3*       ASSESSMENT     Source(s): Chart Review and Patient/Caregiver Call     Warfarin doses taken: Warfarin taken as instructed  Diet: No new diet changes identified  Medication/supplement changes:  Primidone dose was increased from 50mg BID to 50mg in AM and 100mg PM on 12/3/24. Potential for reduced warfarin exposure, reduced INR and reduced efficacy of warfarin   New illness, injury, or hospitalization: No  Signs or symptoms of bleeding or clotting: No  Previous result: Supratherapeutic  Additional findings: None       PLAN     Recommended plan for ongoing change(s) affecting INR     Dosing Instructions: Continue your current warfarin dose with next INR in 2-3 weeks       Summary  As of 12/19/2024      Full warfarin instructions:  5 mg every day   Next INR check:  1/9/2025               Telephone call with Pat who verbalizes understanding and agrees to plan    Lab visit scheduled    Education provided: Goal range and lab monitoring: goal range and significance of current result and Importance of therapeutic range  Interaction IS anticipated between warfarin and primidone    Plan made per [facility] anticoagulation protocol    Jaime Mckenna RN  12/19/2024  Anticoagulation Clinic  [employer] for routing messages: p [facility] HEART [facility] patient phone line: [phone]        _______________________________________________________________________     Anticoagulation Episode Summary       Current INR goal:  2.0-3.0   TTR:  43.3% (1 y)   Target end date:  Indefinite   Send INR reminders to:  [facility] HEART [facility]    Indications    Chronic atrial fibrillation (H) [I48.20]  Long term (current) use of anticoagulants [Z79.01]             Comments:  --             Anticoagulation Care Providers       Provider Role  Specialty Phone number    Teagan Martel MD  Cardiology [phone]

## 2025-01-09 ENCOUNTER — ANTICOAGULATION THERAPY VISIT (OUTPATIENT)
Dept: ANTICOAGULATION | Facility: CLINIC | Age: 75
End: 2025-01-09

## 2025-01-09 ENCOUNTER — LAB (OUTPATIENT)
Dept: CARDIOLOGY | Facility: CLINIC | Age: 75
End: 2025-01-09
Payer: COMMERCIAL

## 2025-01-09 DIAGNOSIS — I48.20 CHRONIC ATRIAL FIBRILLATION (H): ICD-10-CM

## 2025-01-09 DIAGNOSIS — I48.20 CHRONIC ATRIAL FIBRILLATION (H): Primary | ICD-10-CM

## 2025-01-09 DIAGNOSIS — Z79.01 LONG TERM (CURRENT) USE OF ANTICOAGULANTS: ICD-10-CM

## 2025-01-09 LAB — INR POINT OF CARE: 2.8 (ref 0.9–1.1)

## 2025-01-09 NOTE — PROGRESS NOTES
ANTICOAGULATION MANAGEMENT     Zach Garcia 74 year old male is on warfarin with therapeutic INR result. (Goal INR 2.0-3.0)    Recent labs: (last 7 days)     01/09/25  0818   INR 2.8*     ASSESSMENT     Source(s): Chart Review  Previous INR was Therapeutic last visit; previously outside of goal range  Medication, diet, health changes since last INR   Primidone increased on 12/3/24         PLAN     Recommended plan for ongoing change(s) affecting INR     Dosing Instructions: Continue your current warfarin dose with next INR in 4 weeks       Summary  As of 1/9/2025      Full warfarin instructions:  5 mg every day   Next INR check:  2/6/2025               Detailed voice message left for Pat with dosing instructions and follow up date.     Contact [phone] to schedule and with any changes, questions or concerns.     Education provided: Please call back if any changes to your diet, medications or how you've been taking warfarin  Taking warfarin: Importance of taking warfarin as instructed    Plan made per [facility] anticoagulation protocol    Marge Herbert RN  1/9/2025  Anticoagulation Clinic  [facility] for routing messages: p [facility] patient phone line: [phone]        _______________________________________________________________________     Anticoagulation Episode Summary       Current INR goal:  2.0-3.0   TTR:  43.3% (1 y)   Target end date:  Indefinite   Send INR reminders to:  [facility]    Indications    Chronic atrial fibrillation (H) [I48.20]  Long term (current) use of anticoagulants [Z79.01]             Comments:  --             Anticoagulation Care Providers       Provider Role Specialty Phone number    Teagan Martel MD  Cardiology [phone]

## 2025-01-14 ENCOUNTER — TELEPHONE (OUTPATIENT)
Dept: CARDIOLOGY | Facility: CLINIC | Age: 75
End: 2025-01-14
Payer: COMMERCIAL

## 2025-01-14 DIAGNOSIS — I10 ESSENTIAL HYPERTENSION WITH GOAL BLOOD PRESSURE LESS THAN 130/80: Primary | ICD-10-CM

## 2025-01-15 NOTE — TELEPHONE ENCOUNTER
Received a call directly from Alea regarding his blood pressure control. He states that he has been taking the hydralazine 20 mg TID- see telephone encounter dated 11/21/24.       Alea states that he is still having higher BP values. He checks approximately 4x weekly and usually sitting on the edge of his bed, resting. He still gets readings on average between 160-180 systolic. He has not brought his cuff in to be checked vs a manual check. Will have him do this at upcoming March visit. He denies any symptoms. Will route.  -[facility] [place]        ----------------------------------------------------------------------------------------------------------------------------------------------------------    Dr. Martel,  Alea has been on hydralazine 20 mg TID and still getting average BP readings in the 160's to as high as 180's systolic. He checks 4x weekly approximately. He will follow up with you in March- any recommendations? See also telephone encounter 11/21/24.  Thanks,  Jorge L

## 2025-01-16 RX ORDER — AMLODIPINE BESYLATE 5 MG/1
5 TABLET ORAL DAILY
Qty: 30 TABLET | Refills: 1 | Status: SHIPPED | OUTPATIENT
Start: 2025-01-16

## 2025-01-16 NOTE — TELEPHONE ENCOUNTER
----- Message -----  From: Teagan Martel MD  Sent: 1/15/2025   1:40 PM CST  To: Jenna Givens RN    Given coronary artery disease do not like to use hydralazine, will instead try amlodipine, continue the metoprolol and hydralazine, let us add amlodipine 5 mg daily, warn him of peripheral edema.  LF            ==LM with Pat outlining above. Rx for Amlodipine sent to preferred pharmacy. Direct line provided for call back with any questions. Asked that patient call back in a week or two with BP update. -[facility] [place]

## 2025-01-26 DIAGNOSIS — I10 ESSENTIAL HYPERTENSION WITH GOAL BLOOD PRESSURE LESS THAN 130/80: Chronic | ICD-10-CM

## 2025-01-26 DIAGNOSIS — I42.0 CARDIOMYOPATHY, DILATED, NONISCHEMIC (H): ICD-10-CM

## 2025-01-28 RX ORDER — HYDRALAZINE HYDROCHLORIDE 10 MG/1
TABLET, FILM COATED ORAL
Qty: 180 TABLET | Refills: 1 | Status: SHIPPED | OUTPATIENT
Start: 2025-01-28

## 2025-02-06 ENCOUNTER — LAB (OUTPATIENT)
Dept: CARDIOLOGY | Facility: CLINIC | Age: 75
End: 2025-02-06
Payer: COMMERCIAL

## 2025-02-06 ENCOUNTER — ANTICOAGULATION THERAPY VISIT (OUTPATIENT)
Dept: ANTICOAGULATION | Facility: CLINIC | Age: 75
End: 2025-02-06

## 2025-02-06 DIAGNOSIS — I48.20 CHRONIC ATRIAL FIBRILLATION (H): ICD-10-CM

## 2025-02-06 DIAGNOSIS — Z79.01 LONG TERM (CURRENT) USE OF ANTICOAGULANTS: ICD-10-CM

## 2025-02-06 DIAGNOSIS — I48.20 CHRONIC ATRIAL FIBRILLATION (H): Primary | ICD-10-CM

## 2025-02-06 LAB — INR POINT OF CARE: 2.9 (ref 0.9–1.1)

## 2025-02-06 PROCEDURE — 36416 COLLJ CAPILLARY BLOOD SPEC: CPT

## 2025-02-06 PROCEDURE — 85610 PROTHROMBIN TIME: CPT

## 2025-02-06 NOTE — PROGRESS NOTES
ANTICOAGULATION MANAGEMENT     Zach Garcia 74 year old male is on warfarin with therapeutic INR result. (Goal INR 2.0-3.0)    Recent labs: (last 7 days)     02/06/25  0749   INR 2.9*       ASSESSMENT     Source(s): Chart Review and Patient/Caregiver Call     Warfarin doses taken: Warfarin taken as instructed  Diet: No new diet changes identified  Medication/supplement changes:  Yes   1/28/25 increased Hydralazine 10mg take 2 tabs 3x/day.   1/14/25 added to his BP regimen - Amlodipine 5mg daily (for better control of BP)  New illness, injury, or hospitalization: No   HTN  Signs or symptoms of bleeding or clotting: No  Previous result: Therapeutic last 2 INR visits  Additional findings: None       PLAN     Recommended plan for ongoing change(s) affecting INR     Dosing Instructions: Continue your current warfarin dose with next INR in 4 weeks       Summary  As of 2/6/2025      Full warfarin instructions:  5 mg every day   Next INR check:  3/6/2025               Telephone call with Pat who verbalizes understanding and agrees to plan    Check at provider office visit - INR on 3/5/25 at follow-up appt with Dr. Martel @ [facility].    Education provided: Taking warfarin: Importance of taking warfarin as instructed  Goal range and lab monitoring: goal range and significance of current result  Interaction IS NOT anticipated between warfarin and Hydralazine and Amlodipine    Plan made per [facility] anticoagulation protocol    Layla Alvarado, RN  2/6/2025  Anticoagulation Clinic  [facility] [employer] for routing messages: p [facility] patient phone line: [phone]        _______________________________________________________________________     Anticoagulation Episode Summary       Current INR goal:  2.0-3.0   TTR:  43.2% (1 y)   Target end date:  Indefinite   Send INR reminders to:  [facility] HEART [facility]    Indications    Chronic atrial fibrillation (H) [I48.20]  Long term  (current) use of anticoagulants [Z79.01]             Comments:  --             Anticoagulation Care Providers       Provider Role Specialty Phone number    Teagan Martel MD  Cardiology [phone]

## 2025-02-10 DIAGNOSIS — I10 ESSENTIAL HYPERTENSION WITH GOAL BLOOD PRESSURE LESS THAN 130/80: ICD-10-CM

## 2025-02-11 RX ORDER — AMLODIPINE BESYLATE 5 MG/1
5 TABLET ORAL DAILY
Qty: 30 TABLET | Refills: 1 | Status: SHIPPED | OUTPATIENT
Start: 2025-02-11

## 2025-03-05 ENCOUNTER — ANTICOAGULATION THERAPY VISIT (OUTPATIENT)
Dept: ANTICOAGULATION | Facility: CLINIC | Age: 75
End: 2025-03-05

## 2025-03-05 ENCOUNTER — LAB (OUTPATIENT)
Dept: CARDIOLOGY | Facility: CLINIC | Age: 75
End: 2025-03-05
Payer: COMMERCIAL

## 2025-03-05 ENCOUNTER — OFFICE VISIT (OUTPATIENT)
Dept: CARDIOLOGY | Facility: CLINIC | Age: 75
End: 2025-03-05
Payer: COMMERCIAL

## 2025-03-05 VITALS
OXYGEN SATURATION: 96 % | DIASTOLIC BLOOD PRESSURE: 78 MMHG | WEIGHT: 225 LBS | RESPIRATION RATE: 16 BRPM | SYSTOLIC BLOOD PRESSURE: 130 MMHG | HEART RATE: 75 BPM | BODY MASS INDEX: 28.89 KG/M2

## 2025-03-05 DIAGNOSIS — Z95.810 ICD (IMPLANTABLE CARDIOVERTER-DEFIBRILLATOR), SINGLE, IN SITU: ICD-10-CM

## 2025-03-05 DIAGNOSIS — E66.09 CLASS 1 OBESITY DUE TO EXCESS CALORIES WITHOUT SERIOUS COMORBIDITY WITH BODY MASS INDEX (BMI) OF 31.0 TO 31.9 IN ADULT: ICD-10-CM

## 2025-03-05 DIAGNOSIS — G25.0 ESSENTIAL TREMOR: ICD-10-CM

## 2025-03-05 DIAGNOSIS — I10 ESSENTIAL HYPERTENSION WITH GOAL BLOOD PRESSURE LESS THAN 130/80: Chronic | ICD-10-CM

## 2025-03-05 DIAGNOSIS — Z79.01 LONG TERM (CURRENT) USE OF ANTICOAGULANTS: ICD-10-CM

## 2025-03-05 DIAGNOSIS — I42.0 CARDIOMYOPATHY, DILATED, NONISCHEMIC (H): ICD-10-CM

## 2025-03-05 DIAGNOSIS — I48.20 CHRONIC ATRIAL FIBRILLATION (H): Primary | ICD-10-CM

## 2025-03-05 DIAGNOSIS — E66.811 CLASS 1 OBESITY DUE TO EXCESS CALORIES WITHOUT SERIOUS COMORBIDITY WITH BODY MASS INDEX (BMI) OF 31.0 TO 31.9 IN ADULT: ICD-10-CM

## 2025-03-05 DIAGNOSIS — I48.20 CHRONIC ATRIAL FIBRILLATION (H): ICD-10-CM

## 2025-03-05 DIAGNOSIS — I25.83 CORONARY ARTERIOSCLEROSIS DUE TO LIPID RICH PLAQUE: Primary | ICD-10-CM

## 2025-03-05 DIAGNOSIS — E78.5 DYSLIPIDEMIA: ICD-10-CM

## 2025-03-05 LAB — INR POINT OF CARE: 1.8 (ref 0.9–1.1)

## 2025-03-05 PROCEDURE — 36416 COLLJ CAPILLARY BLOOD SPEC: CPT

## 2025-03-05 PROCEDURE — 85610 PROTHROMBIN TIME: CPT

## 2025-03-05 PROCEDURE — 3078F DIAST BP <80 MM HG: CPT | Performed by: INTERNAL MEDICINE

## 2025-03-05 PROCEDURE — 3075F SYST BP GE 130 - 139MM HG: CPT | Performed by: INTERNAL MEDICINE

## 2025-03-05 PROCEDURE — G2211 COMPLEX E/M VISIT ADD ON: HCPCS | Performed by: INTERNAL MEDICINE

## 2025-03-05 PROCEDURE — 99214 OFFICE O/P EST MOD 30 MIN: CPT | Performed by: INTERNAL MEDICINE

## 2025-03-05 NOTE — LETTER
3/5/2025    Brittanie Cardona, NP  [address] [place] [address]    RE: Zach Garcia       Dear Colleague,     I had the pleasure of seeing Zach Garcia in the [facility].      [facility]  Heart Care Clinic Follow-up Note    Assessment & Plan        (I25.83) Coronary arteriosclerosis due to lipid rich plaque  (primary   encounter diagnosis)  Comment:  Angiography November 1, 2016 showed normal left main, mid LAD 30% lesion, normal circumflex and right coronary artery with a proximal 99% lesion which received a drug-coated stent.  Pharmacological stress test September 2020 secondary to ventricular arrhythmias showed no major ischemia.  No symptoms currently and work on prevention.     (I48.20) Chronic atrial fibrillation (H)  Comment: Asymptomatic not valvular and on chronic anticoagulation given advanced CHADS 2 VASC score. Most recent INR 2.9.  If he should have falls or other reasons would not hesitate to consider left atrial appendage occlusion.     (I42.0) Cardiomyopathy, dilated, nonischemic (H)  Comment: Ejection fraction 40 to 45%, unable to tolerate ACE inhibitor secondary to acute kidney injury.  Will use hydralazine and beta-blocker.     (I10) Essential hypertension with goal blood pressure less than 130/80  Comment: Not under good control currently on metoprolol.  Lisinopril was discontinued, somewhat better at hydralazine 23 times a day as well as amlodipine 5 a day, will increase amlodipine to 10 mg a day, if need be increase hydralazine and then add Isordil.  Will check for renal artery stenosis.    (E78.5) Dyslipidemia  Comment: On statin with total cholesterol 158 and LDL 69 which are excellent.  No recent blood test and will need to arrange for this.     (Z95.810) ICD (implantable cardioverter-defibrillator), single, in situ  Comment: [employer] device with [employer] right ventricular lead placed in 2017, approximately 11% ventricular pacing, no  significant ventricular arrhythmias.  9-1/2 years of battery life left.     (I47.2) Paroxysmal ventricular tachycardia (H)  Comment: No recurrences.      (E66.09,  Z68.31) Class 1 obesity due to excess calories without serious comorbidity with body mass index (BMI) of 31.0 to 31.9 in adult  Comment: Work on weight loss.     (G25.0) Essential tremor  Comment: So noted and defer to neurology, was on Topamax, currently on Lyrica as well as primidone with primidone increased to 50 in the morning and 100 mg in the evening    Plan  1.  For blood pressure management try amlodipine 10 mg in the morning, if tolerated we will get him to milligram tablets.  If not we will go back down to 5 mg and try hydralazine 25 3 times daily as well as Isordil 10 mg 3 times daily.  Do not like using hydralazine which could cause reflex tachycardia but given his cardiomyopathy do not really like using amlodipine in any event with his inability use ACE inhibitor's or ARB's.  2.  Check renal artery ultrasound looking for renal artery stenosis.  3.  Follow-up me in 6 months or sooner if needed.    Diagnosis(es)/condition(s) as documented were addressed during this visit. Due to the added complexity in care, I will continue to support Pat in the subsequent management and with ongoing continuity of care.     Subjective  CC: 74-year-old white gentleman here for follow-up visit.  He tells me that he checks his blood pressures and usually in the afternoons it is increasing.  He however is entirely asymptomatic, denies any fatigue, chest pains, palpitations, shortness of breath, PND, orthopnea or peripheral edema.  He does admit to his essential tremor.  He was out snow shoveling this morning as well as walking his dog.    Medications  Current Outpatient Medications   Medication Sig Dispense Refill     acetaminophen (TYLENOL) 500 MG tablet as needed       amLODIPine (NORVASC) 5 MG tablet TAKE 1 TABLET(5 MG) BY MOUTH DAILY 30 tablet 1     ASPIRIN 81  PO daily       baclofen (LIORESAL) 10 MG tablet Take 10 mg by mouth as needed for muscle spasms.       hydrALAZINE (APRESOLINE) 10 MG tablet TAKE 2 TABLETS(20 MG) BY MOUTH THREE TIMES DAILY 180 tablet 1     metoprolol succinate ER (TOPROL XL) 200 MG 24 hr tablet Take 1 tablet (200 mg) by mouth daily 120 tablet 3     nortriptyline (PAMELOR) 50 MG capsule Take 50 mg by mouth at bedtime.       pregabalin (LYRICA) 150 MG capsule Take 150 mg by mouth 3 times daily.       primidone (MYSOLINE) 50 MG tablet Take 1 tablet by mouth daily. 100 mg at bedtime       rosuvastatin (CRESTOR) 20 MG tablet Take 1 tablet (20 mg) by mouth daily 120 tablet 3     topiramate (TOPAMAX) 25 MG tablet Take 25 mg by mouth daily       traZODone (DESYREL) 50 MG tablet Take  mg by mouth nightly as needed for sleep.       warfarin ANTICOAGULANT (COUMADIN) 5 MG tablet Take 1/2 tablet (2.5mg) to 1 tablet (5mg) by mouth daily, or as directed.  Adjust dose based on INR results. 80 tablet 2       Objective  /78 (BP Location: Left arm, Patient Position: Sitting, Cuff Size: Adult Large)   Pulse 75   Resp 16   Wt 102.1 kg (225 lb)   SpO2 96%   BMI 28.89 kg/m      General Appearance:    Alert, cooperative, no distress, appears stated age   Head:    Normocephalic, without obvious abnormality, atraumatic   Throat:   Lips, mucosa, and tongue normal; teeth and gums normal   Neck:   Supple, symmetrical, trachea midline, no adenopathy;        thyroid:  No enlargement/tenderness/nodules; no carotid    bruit or JVD   Back:     Symmetric, no curvature, ROM normal, no CVA tenderness   Lungs:     Clear to auscultation bilaterally, respirations unlabored   Chest wall:    No tenderness or deformity   Heart:    Irregularly irregular, S1 and S2 normal, no murmur, rub   or gallop   Abdomen:     Soft, non-tender, bowel sounds active all four quadrants,     no masses, no organomegaly   Extremities:   Normal, atraumatic, no cyanosis or edema   Pulses:   2+  "and symmetric all extremities   Skin:   Skin color, texture, turgor normal, no rashes or lesions     Results    Lab Results personally reviewed   Lab Results   Component Value Date    CHOL 158 05/22/2019    CHOL 144 02/27/2018     Lab Results   Component Value Date    HDL 46 05/22/2019    HDL 39 (L) 02/27/2018     No components found for: \"LDLCALC\"  Lab Results   Component Value Date    TRIG 215 (H) 05/22/2019    TRIG 189 (H) 02/27/2018     Lab Results   Component Value Date    WBC 6.3 05/22/2019    HGB 15.3 05/22/2019    HCT 45.8 05/22/2019     05/22/2019     Lab Results   Component Value Date    BUN 40.0 (H) 09/05/2024     09/05/2024    CO2 23 09/05/2024             Thank you for allowing me to participate in the care of your patient.      Sincerely,     MAYTE MARTEL MD     [facility]  cc:   Teagan Martel MD  [address], [address]  [place] [address]      "

## 2025-03-05 NOTE — PATIENT INSTRUCTIONS
Mr Zach STALLWORTH Radha,  I enjoyed visiting with you again today.  I am glad to hear you are doing well.  Per our conversation take 2 of the AMLODIPINE in the AM for 10 mg and let us know pressures after about 3-5 days and if better and tolerated will get you 10 mg tablets.  If not tolerated or working will then need to increase dose of HYDRALZINE but leave at 20 mg three a day.   Call [phone] with numbers.  We will get the ultrasound of the kidney.  I will plan on seeing you 6 months.  Aram Martel

## 2025-03-05 NOTE — PROGRESS NOTES
[facility]  Heart Care Clinic Follow-up Note    Assessment & Plan        (I25.83) Coronary arteriosclerosis due to lipid rich plaque  (primary   encounter diagnosis)  Comment:  Angiography November 1, 2016 showed normal left main, mid LAD 30% lesion, normal circumflex and right coronary artery with a proximal 99% lesion which received a drug-coated stent.  Pharmacological stress test September 2020 secondary to ventricular arrhythmias showed no major ischemia.  No symptoms currently and work on prevention.     (I48.20) Chronic atrial fibrillation (H)  Comment: Asymptomatic not valvular and on chronic anticoagulation given advanced CHADS 2 VASC score. Most recent INR 2.9.  If he should have falls or other reasons would not hesitate to consider left atrial appendage occlusion.     (I42.0) Cardiomyopathy, dilated, nonischemic (H)  Comment: Ejection fraction 40 to 45%, unable to tolerate ACE inhibitor secondary to acute kidney injury.  Will use hydralazine and beta-blocker.     (I10) Essential hypertension with goal blood pressure less than 130/80  Comment: Not under good control currently on metoprolol.  Lisinopril was discontinued, somewhat better at hydralazine 23 times a day as well as amlodipine 5 a day, will increase amlodipine to 10 mg a day, if need be increase hydralazine and then add Isordil.  Will check for renal artery stenosis.    (E78.5) Dyslipidemia  Comment: On statin with total cholesterol 158 and LDL 69 which are excellent.  No recent blood test and will need to arrange for this.     (Z95.810) ICD (implantable cardioverter-defibrillator), single, in situ  Comment: [employer] device with [employer] right ventricular lead placed in 2017, approximately 11% ventricular pacing, no significant ventricular arrhythmias.  9-1/2 years of battery life left.     (I47.2) Paroxysmal ventricular tachycardia (H)  Comment: No recurrences.      (E66.09,  Z68.31) Class 1 obesity due to excess  calories without serious comorbidity with body mass index (BMI) of 31.0 to 31.9 in adult  Comment: Work on weight loss.     (G25.0) Essential tremor  Comment: So noted and defer to neurology, was on Topamax, currently on Lyrica as well as primidone with primidone increased to 50 in the morning and 100 mg in the evening    Plan  1.  For blood pressure management try amlodipine 10 mg in the morning, if tolerated we will get him to milligram tablets.  If not we will go back down to 5 mg and try hydralazine 25 3 times daily as well as Isordil 10 mg 3 times daily.  Do not like using hydralazine which could cause reflex tachycardia but given his cardiomyopathy do not really like using amlodipine in any event with his inability use ACE inhibitor's or ARB's.  2.  Check renal artery ultrasound looking for renal artery stenosis.  3.  Follow-up me in 6 months or sooner if needed.    Diagnosis(es)/condition(s) as documented were addressed during this visit. Due to the added complexity in care, I will continue to support Pat in the subsequent management and with ongoing continuity of care.     Subjective  CC: 74-year-old white gentleman here for follow-up visit.  He tells me that he checks his blood pressures and usually in the afternoons it is increasing.  He however is entirely asymptomatic, denies any fatigue, chest pains, palpitations, shortness of breath, PND, orthopnea or peripheral edema.  He does admit to his essential tremor.  He was out snow shoveling this morning as well as walking his dog.    Medications  Current Outpatient Medications   Medication Sig Dispense Refill    acetaminophen (TYLENOL) 500 MG tablet as needed      amLODIPine (NORVASC) 5 MG tablet TAKE 1 TABLET(5 MG) BY MOUTH DAILY 30 tablet 1    ASPIRIN 81 PO daily      baclofen (LIORESAL) 10 MG tablet Take 10 mg by mouth as needed for muscle spasms.      hydrALAZINE (APRESOLINE) 10 MG tablet TAKE 2 TABLETS(20 MG) BY MOUTH THREE TIMES DAILY 180 tablet 1     metoprolol succinate ER (TOPROL XL) 200 MG 24 hr tablet Take 1 tablet (200 mg) by mouth daily 120 tablet 3    nortriptyline (PAMELOR) 50 MG capsule Take 50 mg by mouth at bedtime.      pregabalin (LYRICA) 150 MG capsule Take 150 mg by mouth 3 times daily.      primidone (MYSOLINE) 50 MG tablet Take 1 tablet by mouth daily. 100 mg at bedtime      rosuvastatin (CRESTOR) 20 MG tablet Take 1 tablet (20 mg) by mouth daily 120 tablet 3    topiramate (TOPAMAX) 25 MG tablet Take 25 mg by mouth daily      traZODone (DESYREL) 50 MG tablet Take  mg by mouth nightly as needed for sleep.      warfarin ANTICOAGULANT (COUMADIN) 5 MG tablet Take 1/2 tablet (2.5mg) to 1 tablet (5mg) by mouth daily, or as directed.  Adjust dose based on INR results. 80 tablet 2       Objective  /78 (BP Location: Left arm, Patient Position: Sitting, Cuff Size: Adult Large)   Pulse 75   Resp 16   Wt 102.1 kg (225 lb)   SpO2 96%   BMI 28.89 kg/m      General Appearance:    Alert, cooperative, no distress, appears stated age   Head:    Normocephalic, without obvious abnormality, atraumatic   Throat:   Lips, mucosa, and tongue normal; teeth and gums normal   Neck:   Supple, symmetrical, trachea midline, no adenopathy;        thyroid:  No enlargement/tenderness/nodules; no carotid    bruit or JVD   Back:     Symmetric, no curvature, ROM normal, no CVA tenderness   Lungs:     Clear to auscultation bilaterally, respirations unlabored   Chest wall:    No tenderness or deformity   Heart:    Irregularly irregular, S1 and S2 normal, no murmur, rub   or gallop   Abdomen:     Soft, non-tender, bowel sounds active all four quadrants,     no masses, no organomegaly   Extremities:   Normal, atraumatic, no cyanosis or edema   Pulses:   2+ and symmetric all extremities   Skin:   Skin color, texture, turgor normal, no rashes or lesions     Results    Lab Results personally reviewed   Lab Results   Component Value Date    CHOL 158 05/22/2019    CHOL 144  "02/27/2018     Lab Results   Component Value Date    HDL 46 05/22/2019    HDL 39 (L) 02/27/2018     No components found for: \"LDLCALC\"  Lab Results   Component Value Date    TRIG 215 (H) 05/22/2019    TRIG 189 (H) 02/27/2018     Lab Results   Component Value Date    WBC 6.3 05/22/2019    HGB 15.3 05/22/2019    HCT 45.8 05/22/2019     05/22/2019     Lab Results   Component Value Date    BUN 40.0 (H) 09/05/2024     09/05/2024    CO2 23 09/05/2024         "

## 2025-03-10 ENCOUNTER — ANCILLARY PROCEDURE (OUTPATIENT)
Dept: CARDIOLOGY | Facility: CLINIC | Age: 75
End: 2025-03-10
Attending: INTERNAL MEDICINE
Payer: COMMERCIAL

## 2025-03-10 DIAGNOSIS — I48.20 CHRONIC ATRIAL FIBRILLATION (H): ICD-10-CM

## 2025-03-10 DIAGNOSIS — I50.22 CHRONIC SYSTOLIC (CONGESTIVE) HEART FAILURE (H): ICD-10-CM

## 2025-03-10 DIAGNOSIS — I42.0 CARDIOMYOPATHY, DILATED, NONISCHEMIC (H): ICD-10-CM

## 2025-03-10 DIAGNOSIS — Z95.810 ICD (IMPLANTABLE CARDIOVERTER-DEFIBRILLATOR) IN PLACE: ICD-10-CM

## 2025-03-10 LAB
MDC_IDC_EPISODE_DTM: NORMAL
MDC_IDC_EPISODE_DTM: NORMAL
MDC_IDC_EPISODE_ID: NORMAL
MDC_IDC_EPISODE_ID: NORMAL
MDC_IDC_EPISODE_TYPE: NORMAL
MDC_IDC_EPISODE_TYPE: NORMAL
MDC_IDC_LEAD_CONNECTION_STATUS: NORMAL
MDC_IDC_LEAD_IMPLANT_DT: NORMAL
MDC_IDC_LEAD_LOCATION: NORMAL
MDC_IDC_LEAD_LOCATION_DETAIL_1: NORMAL
MDC_IDC_LEAD_MFG: NORMAL
MDC_IDC_LEAD_MODEL: NORMAL
MDC_IDC_LEAD_POLARITY_TYPE: NORMAL
MDC_IDC_LEAD_SERIAL: NORMAL
MDC_IDC_LEAD_SPECIAL_FUNCTION: NORMAL
MDC_IDC_MSMT_BATTERY_DTM: NORMAL
MDC_IDC_MSMT_BATTERY_REMAINING_LONGEVITY: 108 MO
MDC_IDC_MSMT_BATTERY_REMAINING_PERCENTAGE: 96 %
MDC_IDC_MSMT_BATTERY_STATUS: NORMAL
MDC_IDC_MSMT_CAP_CHARGE_DTM: NORMAL
MDC_IDC_MSMT_CAP_CHARGE_TIME: 10.7 S
MDC_IDC_MSMT_CAP_CHARGE_TYPE: NORMAL
MDC_IDC_MSMT_LEADCHNL_RV_IMPEDANCE_VALUE: 484 OHM
MDC_IDC_PG_IMPLANT_DTM: NORMAL
MDC_IDC_PG_MFG: NORMAL
MDC_IDC_PG_MODEL: NORMAL
MDC_IDC_PG_SERIAL: NORMAL
MDC_IDC_PG_TYPE: NORMAL
MDC_IDC_SESS_CLINIC_NAME: NORMAL
MDC_IDC_SESS_DTM: NORMAL
MDC_IDC_SESS_TYPE: NORMAL
MDC_IDC_SET_BRADY_LOWRATE: 50 {BEATS}/MIN
MDC_IDC_SET_BRADY_MODE: NORMAL
MDC_IDC_SET_LEADCHNL_RV_PACING_AMPLITUDE: 1.5 V
MDC_IDC_SET_LEADCHNL_RV_PACING_POLARITY: NORMAL
MDC_IDC_SET_LEADCHNL_RV_PACING_PULSEWIDTH: 0.4 MS
MDC_IDC_SET_LEADCHNL_RV_SENSING_ADAPTATION_MODE: NORMAL
MDC_IDC_SET_LEADCHNL_RV_SENSING_POLARITY: NORMAL
MDC_IDC_SET_LEADCHNL_RV_SENSING_SENSITIVITY: 0.6 MV
MDC_IDC_SET_ZONE_DETECTION_INTERVAL: 286 MS
MDC_IDC_SET_ZONE_DETECTION_INTERVAL: 333 MS
MDC_IDC_SET_ZONE_DETECTION_INTERVAL: 400 MS
MDC_IDC_SET_ZONE_STATUS: NORMAL
MDC_IDC_SET_ZONE_TYPE: NORMAL
MDC_IDC_SET_ZONE_VENDOR_TYPE: NORMAL
MDC_IDC_STAT_BRADY_DTM_END: NORMAL
MDC_IDC_STAT_BRADY_DTM_START: NORMAL
MDC_IDC_STAT_BRADY_RV_PERCENT_PACED: 15 %
MDC_IDC_STAT_EPISODE_RECENT_COUNT: 0
MDC_IDC_STAT_EPISODE_RECENT_COUNT_DTM_END: NORMAL
MDC_IDC_STAT_EPISODE_RECENT_COUNT_DTM_START: NORMAL
MDC_IDC_STAT_EPISODE_TYPE: NORMAL
MDC_IDC_STAT_EPISODE_VENDOR_TYPE: NORMAL
MDC_IDC_STAT_TACHYTHERAPY_ATP_DELIVERED_RECENT: 0
MDC_IDC_STAT_TACHYTHERAPY_ATP_DELIVERED_TOTAL: 1
MDC_IDC_STAT_TACHYTHERAPY_RECENT_DTM_END: NORMAL
MDC_IDC_STAT_TACHYTHERAPY_RECENT_DTM_START: NORMAL
MDC_IDC_STAT_TACHYTHERAPY_SHOCKS_ABORTED_RECENT: 0
MDC_IDC_STAT_TACHYTHERAPY_SHOCKS_ABORTED_TOTAL: 1
MDC_IDC_STAT_TACHYTHERAPY_SHOCKS_DELIVERED_RECENT: 0
MDC_IDC_STAT_TACHYTHERAPY_SHOCKS_DELIVERED_TOTAL: 0
MDC_IDC_STAT_TACHYTHERAPY_TOTAL_DTM_END: NORMAL
MDC_IDC_STAT_TACHYTHERAPY_TOTAL_DTM_START: NORMAL

## 2025-03-11 ENCOUNTER — TELEPHONE (OUTPATIENT)
Dept: CARDIOLOGY | Facility: CLINIC | Age: 75
End: 2025-03-11
Payer: COMMERCIAL

## 2025-03-11 DIAGNOSIS — I10 ESSENTIAL HYPERTENSION WITH GOAL BLOOD PRESSURE LESS THAN 130/80: ICD-10-CM

## 2025-03-11 RX ORDER — AMLODIPINE BESYLATE 10 MG/1
10 TABLET ORAL DAILY
Qty: 90 TABLET | Refills: 1 | Status: SHIPPED | OUTPATIENT
Start: 2025-03-11

## 2025-03-11 NOTE — TELEPHONE ENCOUNTER
Received a direct call from Alea requesting refill of his Amlodipine. Noted today is Alea's birthday- well wishes given!    Per 3/5/25 OV note with Dr. Martel:    Plan  1.  For blood pressure management try amlodipine 10 mg in the morning, if tolerated we will get him to milligram tablets.  If not we will go back down to 5 mg and try hydralazine 25 3 times daily as well as Isordil 10 mg 3 times daily.  Do not like using hydralazine which could cause reflex tachycardia but given his cardiomyopathy do not really like using amlodipine in any event with his inability use ACE inhibitor's or ARB's.  2.  Check renal artery ultrasound looking for renal artery stenosis.  3.  Follow-up me in 6 months or sooner if needed.           Refilled Amlodipine to 10 mg tablets as above. Will send update to Dr. Martel. Pt questions if BP cuff is entirely accurate. He was advised to bring to his INR appt on 3/20 and we can check vs a manual.     BP values: (did not record pulse)  3/11: 133/86  3/10: 132/91  3/9: 140/83  3/8: 157/93  3/7:154/83  3/6: 127/83          Dr. Martel,  I refilled Alea's Amlodipine at the 10 mg dose. He feels well and it's his birthday today:) Fyi for when you cosign the amlodipine and BP update above.  Thanks!  Mal

## 2025-03-17 ENCOUNTER — HOSPITAL ENCOUNTER (OUTPATIENT)
Dept: ULTRASOUND IMAGING | Facility: HOSPITAL | Age: 75
Discharge: HOME | End: 2025-03-17
Attending: INTERNAL MEDICINE | Admitting: INTERNAL MEDICINE
Payer: COMMERCIAL

## 2025-03-17 DIAGNOSIS — I10 ESSENTIAL HYPERTENSION WITH GOAL BLOOD PRESSURE LESS THAN 130/80: Chronic | ICD-10-CM

## 2025-03-17 PROCEDURE — 76770 US EXAM ABDO BACK WALL COMP: CPT

## 2025-03-17 PROCEDURE — 93975 VASCULAR STUDY: CPT

## 2025-03-20 ENCOUNTER — LAB (OUTPATIENT)
Dept: CARDIOLOGY | Facility: CLINIC | Age: 75
End: 2025-03-20
Payer: COMMERCIAL

## 2025-03-20 ENCOUNTER — ANTICOAGULATION THERAPY VISIT (OUTPATIENT)
Dept: ANTICOAGULATION | Facility: CLINIC | Age: 75
End: 2025-03-20

## 2025-03-20 DIAGNOSIS — I48.20 CHRONIC ATRIAL FIBRILLATION (H): Primary | ICD-10-CM

## 2025-03-20 DIAGNOSIS — Z79.01 LONG TERM (CURRENT) USE OF ANTICOAGULANTS: ICD-10-CM

## 2025-03-20 DIAGNOSIS — I48.20 CHRONIC ATRIAL FIBRILLATION (H): ICD-10-CM

## 2025-03-20 LAB — INR POINT OF CARE: 2.3 (ref 0.9–1.1)

## 2025-03-20 PROCEDURE — 85610 PROTHROMBIN TIME: CPT

## 2025-03-20 PROCEDURE — 36416 COLLJ CAPILLARY BLOOD SPEC: CPT

## 2025-03-20 NOTE — PROGRESS NOTES
ANTICOAGULATION MANAGEMENT     Zach Garcia 75 year old male is on warfarin with therapeutic INR result. (Goal INR 2.0-3.0)    Recent labs: (last 7 days)     03/20/25  0759   INR 2.3*       ASSESSMENT     Source(s): Chart Review and Template     Warfarin doses taken: Reviewed in chart  Diet: No new diet changes identified  Medication/supplement changes: None noted  New illness, injury, or hospitalization: No  Signs or symptoms of bleeding or clotting: No  Previous result: Subtherapeutic  Additional findings: Upcoming surgery/procedure colonoscopy 4/11/25 - TE sent for procedure plan 2/26/25        PLAN     Recommended plan for no diet, medication or health factor changes affecting INR     Dosing Instructions: Continue your current warfarin dose with next INR in 3 weeks, or as needed for procedure       Summary  As of 3/20/2025      Full warfarin instructions:  5 mg every day   Next INR check:  4/10/2025               Detailed voice message left for Pat with dosing instructions and follow up date.     Contact [phone] to schedule and with any changes, questions or concerns.     Education provided: Please call back if any changes to your diet, medications or how you've been taking warfarin    Plan made per [facility] anticoagulation protocol    Mone Kent, RN  3/20/2025  Anticoagulation Clinic  [employer] for routing messages: [facility] [facility] HEART [facility] patient phone line: [phone]        _______________________________________________________________________     Anticoagulation Episode Summary       Current INR goal:  2.0-3.0   TTR:  48.0% (1 y)   Target end date:  Indefinite   Send INR reminders to:  [facility] HEART [facility]    Indications    Chronic atrial fibrillation (H) [I48.20]  Long term (current) use of anticoagulants [Z79.01]             Comments:  --             Anticoagulation Care Providers       Provider Role Specialty Phone number    Teagan Martel MD  Cardiology  [phone]

## 2025-03-21 PROCEDURE — 93295 DEV INTERROG REMOTE 1/2/MLT: CPT | Performed by: INTERNAL MEDICINE

## 2025-03-21 PROCEDURE — 93296 REM INTERROG EVL PM/IDS: CPT | Performed by: INTERNAL MEDICINE

## 2025-03-24 ENCOUNTER — TELEPHONE (OUTPATIENT)
Dept: CARDIOLOGY | Facility: CLINIC | Age: 75
End: 2025-03-24
Payer: COMMERCIAL

## 2025-03-24 NOTE — TELEPHONE ENCOUNTER
----- Message from Tosha Reyes sent at 3/20/2025  8:08 AM CDT -----  Regarding: FW: Blood pressure readings    ----- Message -----  From: Cindy Cody MA  Sent: 3/20/2025   8:02 AM CDT  To: [facility] R[facility]  Subject: Blood pressure readings                          Good morning,    Alea was in clinic today for an INR and asked me to pass along his BP readings from his machien.    3/18 - 120/89;  137/81  143/82    3/19 - 132/78;  133/90    3/20 - 134/78;  135/78;  120/73 (clinic BP)

## 2025-03-28 ENCOUNTER — LAB REQUISITION (OUTPATIENT)
Dept: LAB | Facility: CLINIC | Age: 75
End: 2025-03-28
Payer: COMMERCIAL

## 2025-03-28 DIAGNOSIS — E78.5 HYPERLIPIDEMIA, UNSPECIFIED: ICD-10-CM

## 2025-03-28 DIAGNOSIS — Z01.818 ENCOUNTER FOR OTHER PREPROCEDURAL EXAMINATION: ICD-10-CM

## 2025-03-28 DIAGNOSIS — I48.91 UNSPECIFIED ATRIAL FIBRILLATION (H): ICD-10-CM

## 2025-03-28 LAB
ALBUMIN SERPL BCG-MCNC: 4.3 G/DL (ref 3.5–5.2)
ALP SERPL-CCNC: 93 U/L (ref 40–150)
ALT SERPL W P-5'-P-CCNC: 17 U/L (ref 0–70)
ANION GAP SERPL CALCULATED.3IONS-SCNC: 15 MMOL/L (ref 7–15)
AST SERPL W P-5'-P-CCNC: 28 U/L (ref 0–45)
BASOPHILS # BLD AUTO: 0 10E3/UL (ref 0–0.2)
BASOPHILS NFR BLD AUTO: 1 %
BILIRUB SERPL-MCNC: 0.3 MG/DL
BUN SERPL-MCNC: 25.3 MG/DL (ref 8–23)
CALCIUM SERPL-MCNC: 8.7 MG/DL (ref 8.8–10.4)
CHLORIDE SERPL-SCNC: 103 MMOL/L (ref 98–107)
CREAT SERPL-MCNC: 1.67 MG/DL (ref 0.67–1.17)
EGFRCR SERPLBLD CKD-EPI 2021: 42 ML/MIN/1.73M2
EOSINOPHIL # BLD AUTO: 0.2 10E3/UL (ref 0–0.7)
EOSINOPHIL NFR BLD AUTO: 4 %
ERYTHROCYTE [DISTWIDTH] IN BLOOD BY AUTOMATED COUNT: 14.3 % (ref 10–15)
GLUCOSE SERPL-MCNC: 121 MG/DL (ref 70–99)
HCO3 SERPL-SCNC: 23 MMOL/L (ref 22–29)
HCT VFR BLD AUTO: 42 % (ref 40–53)
HGB BLD-MCNC: 13.1 G/DL (ref 13.3–17.7)
IMM GRANULOCYTES # BLD: 0 10E3/UL
IMM GRANULOCYTES NFR BLD: 0 %
LYMPHOCYTES # BLD AUTO: 1.3 10E3/UL (ref 0.8–5.3)
LYMPHOCYTES NFR BLD AUTO: 22 %
MAGNESIUM SERPL-MCNC: 1.9 MG/DL (ref 1.7–2.3)
MCH RBC QN AUTO: 28.4 PG (ref 26.5–33)
MCHC RBC AUTO-ENTMCNC: 31.2 G/DL (ref 31.5–36.5)
MCV RBC AUTO: 91 FL (ref 78–100)
MONOCYTES # BLD AUTO: 0.4 10E3/UL (ref 0–1.3)
MONOCYTES NFR BLD AUTO: 8 %
NEUTROPHILS # BLD AUTO: 3.7 10E3/UL (ref 1.6–8.3)
NEUTROPHILS NFR BLD AUTO: 66 %
NRBC # BLD AUTO: 0 10E3/UL
NRBC BLD AUTO-RTO: 0 /100
PLATELET # BLD AUTO: 202 10E3/UL (ref 150–450)
POTASSIUM SERPL-SCNC: 4.2 MMOL/L (ref 3.4–5.3)
PROT SERPL-MCNC: 6.9 G/DL (ref 6.4–8.3)
RBC # BLD AUTO: 4.61 10E6/UL (ref 4.4–5.9)
SODIUM SERPL-SCNC: 141 MMOL/L (ref 135–145)
WBC # BLD AUTO: 5.7 10E3/UL (ref 4–11)

## 2025-03-28 PROCEDURE — 85025 COMPLETE CBC W/AUTO DIFF WBC: CPT | Mod: ORL | Performed by: NURSE PRACTITIONER

## 2025-03-28 PROCEDURE — 83735 ASSAY OF MAGNESIUM: CPT | Mod: ORL | Performed by: NURSE PRACTITIONER

## 2025-03-28 PROCEDURE — 80053 COMPREHEN METABOLIC PANEL: CPT | Mod: ORL | Performed by: NURSE PRACTITIONER

## 2025-03-30 DIAGNOSIS — I42.0 CARDIOMYOPATHY, DILATED, NONISCHEMIC (H): ICD-10-CM

## 2025-03-30 DIAGNOSIS — I10 ESSENTIAL HYPERTENSION WITH GOAL BLOOD PRESSURE LESS THAN 130/80: Chronic | ICD-10-CM

## 2025-03-31 RX ORDER — HYDRALAZINE HYDROCHLORIDE 10 MG/1
TABLET, FILM COATED ORAL
Qty: 180 TABLET | Refills: 1 | Status: SHIPPED | OUTPATIENT
Start: 2025-03-31

## 2025-04-11 ENCOUNTER — HOSPITAL ENCOUNTER (OUTPATIENT)
Facility: HOSPITAL | Age: 75
Discharge: HOME | End: 2025-04-11
Attending: INTERNAL MEDICINE | Admitting: INTERNAL MEDICINE
Payer: COMMERCIAL

## 2025-04-11 VITALS
BODY MASS INDEX: 27.26 KG/M2 | SYSTOLIC BLOOD PRESSURE: 114 MMHG | TEMPERATURE: 97 F | HEART RATE: 69 BPM | DIASTOLIC BLOOD PRESSURE: 63 MMHG | OXYGEN SATURATION: 97 % | WEIGHT: 212.3 LBS | RESPIRATION RATE: 17 BRPM

## 2025-04-11 LAB — COLONOSCOPY: NORMAL

## 2025-04-11 PROCEDURE — 360N000075 HC SURGERY LEVEL 2, PER MIN: Performed by: INTERNAL MEDICINE

## 2025-04-11 PROCEDURE — 370N000017 HC ANESTHESIA TECHNICAL FEE, PER MIN: Performed by: INTERNAL MEDICINE

## 2025-04-11 PROCEDURE — 710N000012 HC RECOVERY PHASE 2, PER MINUTE: Performed by: INTERNAL MEDICINE

## 2025-04-11 PROCEDURE — 999N000141 HC STATISTIC PRE-PROCEDURE NURSING ASSESSMENT: Performed by: INTERNAL MEDICINE

## 2025-04-11 PROCEDURE — 88305 TISSUE EXAM BY PATHOLOGIST: CPT | Mod: TC | Performed by: INTERNAL MEDICINE

## 2025-04-11 PROCEDURE — 258N000003 HC RX IP 258 OP 636: Performed by: ANESTHESIOLOGY

## 2025-04-11 PROCEDURE — 272N000001 HC OR GENERAL SUPPLY STERILE: Performed by: INTERNAL MEDICINE

## 2025-04-11 RX ORDER — EPINEPHRINE 1 MG/ML
0.1 INJECTION, SOLUTION INTRAMUSCULAR; SUBCUTANEOUS
Status: DISCONTINUED | OUTPATIENT
Start: 2025-04-11 | End: 2025-04-11 | Stop reason: HOSPADM

## 2025-04-11 RX ORDER — NALOXONE HYDROCHLORIDE 0.4 MG/ML
0.2 INJECTION, SOLUTION INTRAMUSCULAR; INTRAVENOUS; SUBCUTANEOUS
Status: DISCONTINUED | OUTPATIENT
Start: 2025-04-11 | End: 2025-04-11

## 2025-04-11 RX ORDER — ONDANSETRON 4 MG/1
4 TABLET, ORALLY DISINTEGRATING ORAL EVERY 6 HOURS PRN
Status: DISCONTINUED | OUTPATIENT
Start: 2025-04-11 | End: 2025-04-11 | Stop reason: HOSPADM

## 2025-04-11 RX ORDER — PROCHLORPERAZINE MALEATE 5 MG/1
5 TABLET ORAL EVERY 6 HOURS PRN
Status: DISCONTINUED | OUTPATIENT
Start: 2025-04-11 | End: 2025-04-11

## 2025-04-11 RX ORDER — NALOXONE HYDROCHLORIDE 0.4 MG/ML
0.4 INJECTION, SOLUTION INTRAMUSCULAR; INTRAVENOUS; SUBCUTANEOUS
Status: DISCONTINUED | OUTPATIENT
Start: 2025-04-11 | End: 2025-04-11

## 2025-04-11 RX ORDER — FLUMAZENIL 0.1 MG/ML
0.2 INJECTION, SOLUTION INTRAVENOUS
Status: DISCONTINUED | OUTPATIENT
Start: 2025-04-11 | End: 2025-04-11

## 2025-04-11 RX ORDER — DIPHENHYDRAMINE HYDROCHLORIDE 50 MG/ML
25-50 INJECTION, SOLUTION INTRAMUSCULAR; INTRAVENOUS
Status: DISCONTINUED | OUTPATIENT
Start: 2025-04-11 | End: 2025-04-11

## 2025-04-11 RX ORDER — FENTANYL CITRATE 50 UG/ML
25-100 INJECTION, SOLUTION INTRAMUSCULAR; INTRAVENOUS EVERY 5 MIN PRN
Status: DISCONTINUED | OUTPATIENT
Start: 2025-04-11 | End: 2025-04-11 | Stop reason: HOSPADM

## 2025-04-11 RX ORDER — NALOXONE HYDROCHLORIDE 0.4 MG/ML
0.2 INJECTION, SOLUTION INTRAMUSCULAR; INTRAVENOUS; SUBCUTANEOUS
Status: DISCONTINUED | OUTPATIENT
Start: 2025-04-11 | End: 2025-04-11 | Stop reason: HOSPADM

## 2025-04-11 RX ORDER — ONDANSETRON 2 MG/ML
4 INJECTION INTRAMUSCULAR; INTRAVENOUS EVERY 6 HOURS PRN
Status: DISCONTINUED | OUTPATIENT
Start: 2025-04-11 | End: 2025-04-11 | Stop reason: HOSPADM

## 2025-04-11 RX ORDER — SIMETHICONE 40MG/0.6ML
133 SUSPENSION, DROPS(FINAL DOSAGE FORM)(ML) ORAL
Status: DISCONTINUED | OUTPATIENT
Start: 2025-04-11 | End: 2025-04-11 | Stop reason: HOSPADM

## 2025-04-11 RX ORDER — LIDOCAINE 40 MG/G
CREAM TOPICAL
Status: DISCONTINUED | OUTPATIENT
Start: 2025-04-11 | End: 2025-04-11 | Stop reason: HOSPADM

## 2025-04-11 RX ORDER — NALOXONE HYDROCHLORIDE 0.4 MG/ML
0.1 INJECTION, SOLUTION INTRAMUSCULAR; INTRAVENOUS; SUBCUTANEOUS
Status: DISCONTINUED | OUTPATIENT
Start: 2025-04-11 | End: 2025-04-11

## 2025-04-11 RX ORDER — FENTANYL CITRATE 50 UG/ML
25-100 INJECTION, SOLUTION INTRAMUSCULAR; INTRAVENOUS EVERY 5 MIN PRN
Status: DISCONTINUED | OUTPATIENT
Start: 2025-04-11 | End: 2025-04-11

## 2025-04-11 RX ORDER — FLUMAZENIL 0.1 MG/ML
0.2 INJECTION, SOLUTION INTRAVENOUS
Status: DISCONTINUED | OUTPATIENT
Start: 2025-04-11 | End: 2025-04-11 | Stop reason: HOSPADM

## 2025-04-11 RX ORDER — NALOXONE HYDROCHLORIDE 0.4 MG/ML
0.4 INJECTION, SOLUTION INTRAMUSCULAR; INTRAVENOUS; SUBCUTANEOUS
Status: DISCONTINUED | OUTPATIENT
Start: 2025-04-11 | End: 2025-04-11 | Stop reason: HOSPADM

## 2025-04-11 RX ORDER — SIMETHICONE 40MG/0.6ML
133 SUSPENSION, DROPS(FINAL DOSAGE FORM)(ML) ORAL
Status: DISCONTINUED | OUTPATIENT
Start: 2025-04-11 | End: 2025-04-11

## 2025-04-11 RX ORDER — ATROPINE SULFATE 0.1 MG/ML
1 INJECTION INTRAVENOUS
Status: DISCONTINUED | OUTPATIENT
Start: 2025-04-11 | End: 2025-04-11 | Stop reason: HOSPADM

## 2025-04-11 RX ORDER — ONDANSETRON 2 MG/ML
4 INJECTION INTRAMUSCULAR; INTRAVENOUS EVERY 30 MIN PRN
Status: DISCONTINUED | OUTPATIENT
Start: 2025-04-11 | End: 2025-04-11 | Stop reason: HOSPADM

## 2025-04-11 RX ORDER — DIPHENHYDRAMINE HYDROCHLORIDE 50 MG/ML
25-50 INJECTION, SOLUTION INTRAMUSCULAR; INTRAVENOUS
Status: DISCONTINUED | OUTPATIENT
Start: 2025-04-11 | End: 2025-04-11 | Stop reason: HOSPADM

## 2025-04-11 RX ORDER — DEXAMETHASONE SODIUM PHOSPHATE 10 MG/ML
4 INJECTION, SOLUTION INTRAMUSCULAR; INTRAVENOUS
Status: DISCONTINUED | OUTPATIENT
Start: 2025-04-11 | End: 2025-04-11 | Stop reason: HOSPADM

## 2025-04-11 RX ORDER — OXYCODONE HYDROCHLORIDE 5 MG/1
5 TABLET ORAL
Status: DISCONTINUED | OUTPATIENT
Start: 2025-04-11 | End: 2025-04-11 | Stop reason: HOSPADM

## 2025-04-11 RX ORDER — SODIUM CHLORIDE, SODIUM LACTATE, POTASSIUM CHLORIDE, CALCIUM CHLORIDE 600; 310; 30; 20 MG/100ML; MG/100ML; MG/100ML; MG/100ML
INJECTION, SOLUTION INTRAVENOUS CONTINUOUS
Status: DISCONTINUED | OUTPATIENT
Start: 2025-04-11 | End: 2025-04-11 | Stop reason: HOSPADM

## 2025-04-11 RX ORDER — OXYCODONE HYDROCHLORIDE 5 MG/1
10 TABLET ORAL
Status: DISCONTINUED | OUTPATIENT
Start: 2025-04-11 | End: 2025-04-11 | Stop reason: HOSPADM

## 2025-04-11 RX ORDER — LIDOCAINE 40 MG/G
CREAM TOPICAL
Status: DISCONTINUED | OUTPATIENT
Start: 2025-04-11 | End: 2025-04-11

## 2025-04-11 RX ORDER — ONDANSETRON 4 MG/1
4 TABLET, ORALLY DISINTEGRATING ORAL EVERY 30 MIN PRN
Status: DISCONTINUED | OUTPATIENT
Start: 2025-04-11 | End: 2025-04-11 | Stop reason: HOSPADM

## 2025-04-11 RX ORDER — ONDANSETRON 2 MG/ML
4 INJECTION INTRAMUSCULAR; INTRAVENOUS
Status: DISCONTINUED | OUTPATIENT
Start: 2025-04-11 | End: 2025-04-11 | Stop reason: HOSPADM

## 2025-04-11 RX ADMIN — SODIUM CHLORIDE, SODIUM LACTATE, POTASSIUM CHLORIDE, AND CALCIUM CHLORIDE: .6; .31; .03; .02 INJECTION, SOLUTION INTRAVENOUS at 08:58

## 2025-04-11 ASSESSMENT — ACTIVITIES OF DAILY LIVING (ADL)
ADLS_ACUITY_SCORE: 43
ADLS_ACUITY_SCORE: 41
ADLS_ACUITY_SCORE: 43

## 2025-04-11 NOTE — PRE-PROCEDURE
GENERAL PRE-PROCEDURE:   Procedure:  Colonoscopy  Date/Time:  4/11/2025 9:15 AM    Verbal consent obtained?: Yes    Written consent obtained?: Yes    Risks and benefits: Risks, benefits and alternatives were discussed    Consent given by:  Patient  Patient states understanding of procedure being performed: Yes    Patient's understanding of procedure matches consent: Yes    Procedure consent matches procedure scheduled: Yes    Expected level of sedation:  Moderate  Appropriately NPO:  Yes  ASA Class:  3  Mallampati  :  Grade 3- soft palate visible, posterior pharyngeal wall not visible  Lungs:  Lungs clear with good breath sounds bilaterally  Heart:  Normal heart sounds and rate and a-fib  History & Physical reviewed:  History and physical reviewed and no updates needed  Statement of review:  I have reviewed the lab findings, diagnostic data, medications, and the plan for sedation

## 2025-04-14 LAB
PATH REPORT.COMMENTS IMP SPEC: NORMAL
PATH REPORT.COMMENTS IMP SPEC: NORMAL
PATH REPORT.FINAL DX SPEC: NORMAL
PATH REPORT.GROSS SPEC: NORMAL
PATH REPORT.MICROSCOPIC SPEC OTHER STN: NORMAL
PATH REPORT.RELEVANT HX SPEC: NORMAL
PHOTO IMAGE: NORMAL

## 2025-04-18 ENCOUNTER — LAB (OUTPATIENT)
Dept: CARDIOLOGY | Facility: CLINIC | Age: 75
End: 2025-04-18
Payer: COMMERCIAL

## 2025-04-18 DIAGNOSIS — I48.20 CHRONIC ATRIAL FIBRILLATION (H): ICD-10-CM

## 2025-04-18 LAB — INR POINT OF CARE: 1.7 (ref 0.9–1.1)

## 2025-04-18 PROCEDURE — 85610 PROTHROMBIN TIME: CPT

## 2025-04-18 PROCEDURE — 36416 COLLJ CAPILLARY BLOOD SPEC: CPT

## 2025-04-28 ENCOUNTER — TELEPHONE (OUTPATIENT)
Dept: CARDIOLOGY | Facility: CLINIC | Age: 75
End: 2025-04-28
Payer: COMMERCIAL

## 2025-04-28 DIAGNOSIS — I42.0 CARDIOMYOPATHY, DILATED, NONISCHEMIC (H): ICD-10-CM

## 2025-04-28 DIAGNOSIS — I10 ESSENTIAL HYPERTENSION WITH GOAL BLOOD PRESSURE LESS THAN 130/80: ICD-10-CM

## 2025-04-28 RX ORDER — HYDRALAZINE HYDROCHLORIDE 10 MG/1
20 TABLET, FILM COATED ORAL 3 TIMES DAILY
Qty: 360 TABLET | Refills: 1 | Status: SHIPPED | OUTPATIENT
Start: 2025-04-28

## 2025-04-28 RX ORDER — AMLODIPINE BESYLATE 10 MG/1
10 TABLET ORAL DAILY
Qty: 90 TABLET | Refills: 1 | Status: SHIPPED | OUTPATIENT
Start: 2025-04-28

## 2025-04-28 NOTE — TELEPHONE ENCOUNTER
[facility] Call Center    Phone Message    May a detailed message be left on voicemail: yes     Reason for Call: Medication Refill Request    Has the patient contacted the pharmacy for the refill? Yes   Name of medication being requested: amLODIPine (NORVASC) 10 MG tablet , hydrALAZINE (APRESOLINE) 10 MG tablet , warfarin ANTICOAGULANT (COUMADIN) 5 MG tablet   Provider who prescribed the medication: Dr. Martel  Pharmacy:      [address]    Date medication is needed: 04/30/25  New rx's for Amlodipine and Hydralazine to mail order and than refill on Warfarin . 90 day supply with refill .        Action Taken: Other: cardiology    Travel Screening: Not Applicable   Thank you!  [facility]      Date of Service:

## 2025-04-28 NOTE — TELEPHONE ENCOUNTER
Warfarin refill was sent to [facility] on 4/24/25. Amlodipine and hydralazine sent to [facility] as requested. DALILARn

## 2025-04-30 NOTE — PROGRESS NOTES
Pt on cardiac monitor in MRI for 45 minutes for and he maintained a narrow complex rhythm in the 70's and 80's the entire time.  No complaints post procedure.     Patient seen and evaluated  Pt sent from wound care   Will await results of vascular angio  Left foot dressed with DSD  Advised pt to remain NWB as much as possible, dressings noted to be falling off foot   Rec cont iv abx  Pt understands he is at risk to lost part of the foot, all of the foot, bka  Podiatry will follow while in house.

## 2025-05-02 ENCOUNTER — LAB (OUTPATIENT)
Dept: CARDIOLOGY | Facility: CLINIC | Age: 75
End: 2025-05-02
Payer: COMMERCIAL

## 2025-05-02 DIAGNOSIS — I48.20 CHRONIC ATRIAL FIBRILLATION (H): ICD-10-CM

## 2025-05-02 LAB — INR POINT OF CARE: 2.1 (ref 0.9–1.1)

## 2025-05-02 PROCEDURE — 85610 PROTHROMBIN TIME: CPT

## 2025-05-02 PROCEDURE — 36416 COLLJ CAPILLARY BLOOD SPEC: CPT

## 2025-06-02 ENCOUNTER — LAB (OUTPATIENT)
Dept: CARDIOLOGY | Facility: CLINIC | Age: 75
End: 2025-06-02
Payer: COMMERCIAL

## 2025-06-02 ENCOUNTER — ANTICOAGULATION THERAPY VISIT (OUTPATIENT)
Dept: ANTICOAGULATION | Facility: CLINIC | Age: 75
End: 2025-06-02

## 2025-06-02 DIAGNOSIS — I48.20 CHRONIC ATRIAL FIBRILLATION (H): ICD-10-CM

## 2025-06-02 DIAGNOSIS — I48.20 CHRONIC ATRIAL FIBRILLATION (H): Primary | ICD-10-CM

## 2025-06-02 DIAGNOSIS — Z79.01 LONG TERM (CURRENT) USE OF ANTICOAGULANTS: ICD-10-CM

## 2025-06-02 LAB — INR POINT OF CARE: 1.9 (ref 0.9–1.1)

## 2025-06-02 PROCEDURE — 36416 COLLJ CAPILLARY BLOOD SPEC: CPT

## 2025-06-02 PROCEDURE — 85610 PROTHROMBIN TIME: CPT | Mod: QW

## 2025-06-02 NOTE — PROGRESS NOTES
ANTICOAGULATION MANAGEMENT     Zach Garcia 75 year old male is on warfarin with supratherapeutic INR result. (Goal INR 2.0-3.0)    Recent labs: (last 7 days)     06/02/25  0814   INR 1.9*       ASSESSMENT     Source(s): Chart Review  Previous INR was Therapeutic last visit; previously outside of goal range  Medication, diet, health changes since last INR chart reviewed; none identified  Talked with Pat who wants to discuss increasing his primidone with his pcp.  If he does increase, this may cause his inr to go down.  We scheduled his next inr in 10 days but can check sooner if need be, per pcp advice       PLAN     Recommended plan for no diet, medication or health factor changes affecting INR     Dosing Instructions: Continue your current warfarin dose with next INR in 10 days       Summary  As of 6/2/2025      Full warfarin instructions:  5 mg every day   Next INR check:  6/16/2025               Telephone call with Pat who verbalizes understanding and agrees to plan    Lab visit scheduled    Education provided: Please call back if any changes to your diet, medications or how you've been taking warfarin    Plan made per [facility] anticoagulation protocol    James Lawrence RN  6/2/2025  Anticoagulation Clinic  Siri for routing messages: p [facility] patient phone line: [phone]        _______________________________________________________________________     Anticoagulation Episode Summary       Current INR goal:  2.0-3.0   TTR:  47.0% (1 y)   Target end date:  Indefinite   Send INR reminders to:  [facility]    Indications    Chronic atrial fibrillation (H) [I48.20]  Long term (current) use of anticoagulants [Z79.01]             Comments:  --             Anticoagulation Care Providers       Provider Role Specialty Phone number    Teagan Martel MD  Cardiology [phone]

## 2025-06-13 ENCOUNTER — LAB (OUTPATIENT)
Dept: CARDIOLOGY | Facility: CLINIC | Age: 75
End: 2025-06-13
Payer: COMMERCIAL

## 2025-06-13 DIAGNOSIS — I48.20 CHRONIC ATRIAL FIBRILLATION (H): ICD-10-CM

## 2025-06-13 LAB — INR POINT OF CARE: 2 (ref 0.9–1.1)

## 2025-06-13 PROCEDURE — 85610 PROTHROMBIN TIME: CPT

## 2025-06-13 PROCEDURE — 36416 COLLJ CAPILLARY BLOOD SPEC: CPT

## 2025-06-18 ENCOUNTER — ANCILLARY PROCEDURE (OUTPATIENT)
Dept: CARDIOLOGY | Facility: CLINIC | Age: 75
End: 2025-06-18
Attending: INTERNAL MEDICINE
Payer: COMMERCIAL

## 2025-06-18 DIAGNOSIS — I50.22 CHRONIC SYSTOLIC (CONGESTIVE) HEART FAILURE (H): ICD-10-CM

## 2025-06-18 DIAGNOSIS — I42.0 CARDIOMYOPATHY, DILATED, NONISCHEMIC (H): ICD-10-CM

## 2025-06-18 DIAGNOSIS — I48.20 CHRONIC ATRIAL FIBRILLATION (H): ICD-10-CM

## 2025-06-18 DIAGNOSIS — Z95.810 ICD (IMPLANTABLE CARDIOVERTER-DEFIBRILLATOR) IN PLACE: ICD-10-CM

## 2025-06-18 LAB
MDC_IDC_EPISODE_DTM: NORMAL
MDC_IDC_EPISODE_DURATION: 12 S
MDC_IDC_EPISODE_DURATION: 17 S
MDC_IDC_EPISODE_DURATION: 17 S
MDC_IDC_EPISODE_ID: NORMAL
MDC_IDC_EPISODE_TYPE: NORMAL
MDC_IDC_EPISODE_TYPE_INDUCED: NO
MDC_IDC_EPISODE_VENDOR_TYPE: NORMAL
MDC_IDC_LEAD_CONNECTION_STATUS: NORMAL
MDC_IDC_LEAD_IMPLANT_DT: NORMAL
MDC_IDC_LEAD_LOCATION: NORMAL
MDC_IDC_LEAD_LOCATION_DETAIL_1: NORMAL
MDC_IDC_LEAD_MFG: NORMAL
MDC_IDC_LEAD_MODEL: NORMAL
MDC_IDC_LEAD_POLARITY_TYPE: NORMAL
MDC_IDC_LEAD_SERIAL: NORMAL
MDC_IDC_LEAD_SPECIAL_FUNCTION: NORMAL
MDC_IDC_MSMT_BATTERY_DTM: NORMAL
MDC_IDC_MSMT_BATTERY_REMAINING_LONGEVITY: 108 MO
MDC_IDC_MSMT_BATTERY_REMAINING_PERCENTAGE: 94 %
MDC_IDC_MSMT_BATTERY_STATUS: NORMAL
MDC_IDC_MSMT_CAP_CHARGE_DTM: NORMAL
MDC_IDC_MSMT_CAP_CHARGE_TIME: 10.7 S
MDC_IDC_MSMT_CAP_CHARGE_TYPE: NORMAL
MDC_IDC_MSMT_LEADCHNL_RV_IMPEDANCE_VALUE: 458 OHM
MDC_IDC_PG_IMPLANT_DTM: NORMAL
MDC_IDC_PG_MFG: NORMAL
MDC_IDC_PG_MODEL: NORMAL
MDC_IDC_PG_SERIAL: NORMAL
MDC_IDC_PG_TYPE: NORMAL
MDC_IDC_SESS_CLINIC_NAME: NORMAL
MDC_IDC_SESS_DTM: NORMAL
MDC_IDC_SESS_TYPE: NORMAL
MDC_IDC_SET_BRADY_LOWRATE: 50 {BEATS}/MIN
MDC_IDC_SET_BRADY_MODE: NORMAL
MDC_IDC_SET_LEADCHNL_RV_PACING_AMPLITUDE: 1.5 V
MDC_IDC_SET_LEADCHNL_RV_PACING_POLARITY: NORMAL
MDC_IDC_SET_LEADCHNL_RV_PACING_PULSEWIDTH: 0.4 MS
MDC_IDC_SET_LEADCHNL_RV_SENSING_ADAPTATION_MODE: NORMAL
MDC_IDC_SET_LEADCHNL_RV_SENSING_POLARITY: NORMAL
MDC_IDC_SET_LEADCHNL_RV_SENSING_SENSITIVITY: 0.6 MV
MDC_IDC_SET_ZONE_DETECTION_INTERVAL: 286 MS
MDC_IDC_SET_ZONE_DETECTION_INTERVAL: 333 MS
MDC_IDC_SET_ZONE_DETECTION_INTERVAL: 400 MS
MDC_IDC_SET_ZONE_STATUS: NORMAL
MDC_IDC_SET_ZONE_TYPE: NORMAL
MDC_IDC_SET_ZONE_VENDOR_TYPE: NORMAL
MDC_IDC_STAT_BRADY_DTM_END: NORMAL
MDC_IDC_STAT_BRADY_DTM_START: NORMAL
MDC_IDC_STAT_BRADY_RV_PERCENT_PACED: 17 %
MDC_IDC_STAT_EPISODE_RECENT_COUNT: 0
MDC_IDC_STAT_EPISODE_RECENT_COUNT: 0
MDC_IDC_STAT_EPISODE_RECENT_COUNT: 1
MDC_IDC_STAT_EPISODE_RECENT_COUNT: 3
MDC_IDC_STAT_EPISODE_RECENT_COUNT_DTM_END: NORMAL
MDC_IDC_STAT_EPISODE_RECENT_COUNT_DTM_START: NORMAL
MDC_IDC_STAT_EPISODE_TYPE: NORMAL
MDC_IDC_STAT_EPISODE_VENDOR_TYPE: NORMAL
MDC_IDC_STAT_TACHYTHERAPY_ATP_DELIVERED_RECENT: 0
MDC_IDC_STAT_TACHYTHERAPY_ATP_DELIVERED_TOTAL: 1
MDC_IDC_STAT_TACHYTHERAPY_RECENT_DTM_END: NORMAL
MDC_IDC_STAT_TACHYTHERAPY_RECENT_DTM_START: NORMAL
MDC_IDC_STAT_TACHYTHERAPY_SHOCKS_ABORTED_RECENT: 0
MDC_IDC_STAT_TACHYTHERAPY_SHOCKS_ABORTED_TOTAL: 1
MDC_IDC_STAT_TACHYTHERAPY_SHOCKS_DELIVERED_RECENT: 0
MDC_IDC_STAT_TACHYTHERAPY_SHOCKS_DELIVERED_TOTAL: 0
MDC_IDC_STAT_TACHYTHERAPY_TOTAL_DTM_END: NORMAL
MDC_IDC_STAT_TACHYTHERAPY_TOTAL_DTM_START: NORMAL

## 2025-06-21 PROCEDURE — 93295 DEV INTERROG REMOTE 1/2/MLT: CPT | Performed by: INTERNAL MEDICINE

## 2025-06-21 PROCEDURE — 93296 REM INTERROG EVL PM/IDS: CPT | Performed by: INTERNAL MEDICINE

## 2025-07-03 ENCOUNTER — ANTICOAGULATION THERAPY VISIT (OUTPATIENT)
Dept: ANTICOAGULATION | Facility: CLINIC | Age: 75
End: 2025-07-03

## 2025-07-03 ENCOUNTER — DOCUMENTATION ONLY (OUTPATIENT)
Dept: ANTICOAGULATION | Facility: CLINIC | Age: 75
End: 2025-07-03

## 2025-07-03 ENCOUNTER — LAB (OUTPATIENT)
Dept: CARDIOLOGY | Facility: CLINIC | Age: 75
End: 2025-07-03
Payer: COMMERCIAL

## 2025-07-03 ENCOUNTER — RESULTS FOLLOW-UP (OUTPATIENT)
Dept: ANTICOAGULATION | Facility: CLINIC | Age: 75
End: 2025-07-03

## 2025-07-03 DIAGNOSIS — I48.20 CHRONIC ATRIAL FIBRILLATION (H): Primary | ICD-10-CM

## 2025-07-03 DIAGNOSIS — Z79.01 LONG TERM (CURRENT) USE OF ANTICOAGULANTS: ICD-10-CM

## 2025-07-03 DIAGNOSIS — I48.20 CHRONIC ATRIAL FIBRILLATION (H): ICD-10-CM

## 2025-07-03 LAB — INR POINT OF CARE: 2 (ref 0.9–1.1)

## 2025-07-03 PROCEDURE — 85610 PROTHROMBIN TIME: CPT | Mod: QW

## 2025-07-03 PROCEDURE — 36416 COLLJ CAPILLARY BLOOD SPEC: CPT

## 2025-07-03 NOTE — PROGRESS NOTES
ANTICOAGULATION MANAGEMENT     Zach Garcia 75 year old male is on warfarin with therapeutic INR result. (Goal INR 2.0-3.0)    Recent labs: (last 7 days)     07/03/25  0804   INR 2.0*       ASSESSMENT     Source(s): Chart Review and Patient/Caregiver Call     Warfarin doses taken: Warfarin taken as instructed  Diet: No new diet changes identified  Medication/supplement changes: primidone dose increased on ~ 2 weeks ago which may decrease INR. - however, INR has remained stable since increase.   New illness, injury, or hospitalization: No  Signs or symptoms of bleeding or clotting: No  Previous result: Therapeutic last visit; previously outside of goal range  Additional findings: None       PLAN     Recommended plan for ongoing change(s) affecting INR     Dosing Instructions: Continue your current warfarin dose with next INR in 4 weeks       Summary  As of 7/3/2025      Full warfarin instructions:  5 mg every day   Next INR check:  8/5/2025               Telephone call with Pat who verbalizes understanding and agrees to plan    Check at provider office visit    Education provided: Goal range and lab monitoring: goal range and significance of current result and Importance of following up at instructed interval  Importance of notifying anticoagulation clinic for: changes in medications; a sooner lab recheck maybe needed    Plan made per [facility] anticoagulation protocol    Mone Kent RN  7/3/2025  Anticoagulation Clinic  Blockchain for routing messages: p [facility] patient phone line: [phone]        _______________________________________________________________________     Anticoagulation Episode Summary       Current INR goal:  2.0-3.0   TTR:  52.4% (1 y)   Target end date:  Indefinite   Send INR reminders to:  [facility]    Indications    Chronic atrial fibrillation (H) [I48.20]  Long term (current) use of anticoagulants [Z79.01]             Comments:   --             Anticoagulation Care Providers       Provider Role Specialty Phone number    Teagan Martel MD  Cardiology [phone]

## 2025-07-22 DIAGNOSIS — I47.20 PAROXYSMAL VENTRICULAR TACHYCARDIA (H): ICD-10-CM

## 2025-07-22 DIAGNOSIS — I25.84 CORONARY ARTERY DISEASE DUE TO CALCIFIED CORONARY LESION: ICD-10-CM

## 2025-07-22 DIAGNOSIS — I50.20 SYSTOLIC HEART FAILURE, UNSPECIFIED HF CHRONICITY (H): ICD-10-CM

## 2025-07-22 DIAGNOSIS — I49.3 FREQUENT UNIFOCAL PREMATURE VENTRICULAR CONTRACTIONS: ICD-10-CM

## 2025-07-22 DIAGNOSIS — I25.10 CORONARY ARTERY DISEASE DUE TO CALCIFIED CORONARY LESION: ICD-10-CM

## 2025-07-22 DIAGNOSIS — Z79.01 LONG TERM (CURRENT) USE OF ANTICOAGULANTS: ICD-10-CM

## 2025-07-22 DIAGNOSIS — I10 ESSENTIAL HYPERTENSION WITH GOAL BLOOD PRESSURE LESS THAN 130/80: ICD-10-CM

## 2025-07-22 DIAGNOSIS — I48.20 CHRONIC ATRIAL FIBRILLATION (H): ICD-10-CM

## 2025-07-22 DIAGNOSIS — I42.0 CARDIOMYOPATHY, DILATED, NONISCHEMIC (H): ICD-10-CM

## 2025-07-22 DIAGNOSIS — R00.0 TACHYCARDIA: ICD-10-CM

## 2025-07-23 RX ORDER — METOPROLOL SUCCINATE 200 MG/1
200 TABLET, EXTENDED RELEASE ORAL DAILY
Qty: 120 TABLET | Refills: 2 | Status: SHIPPED | OUTPATIENT
Start: 2025-07-23

## 2025-07-23 RX ORDER — WARFARIN SODIUM 5 MG/1
5 TABLET ORAL EVERY EVENING
Qty: 90 TABLET | Refills: 1 | Status: SHIPPED | OUTPATIENT
Start: 2025-07-23

## 2025-07-23 NOTE — TELEPHONE ENCOUNTER
ANTICOAGULATION MANAGEMENT:  Medication Refill    Anticoagulation Summary  As of 7/3/2025      Warfarin maintenance plan:  5 mg (5 mg x 1) every day   Next INR check:  8/5/2025   Target end date:  Indefinite    Indications    Chronic atrial fibrillation (H) [I48.20]  Long term (current) use of anticoagulants [Z79.01]                 Anticoagulation Care Providers       Provider Role Specialty Phone number    Teagan Martel MD  Cardiology [phone]            Refill Criteria    Visit with referring provider/group: Meets criteria: visit within referring provider group in the last 15 months on 3/5/25    [facility] referral last signed: 07/03/2025; within last year:  Yes    Lab monitoring is up to date (not exceeding 2 weeks overdue): Yes    Zach meets all criteria for refill. Rx instructions and quantity supplied updated to match patient's current dosing plan. Warfarin 90 day supply with 1 refill granted per [facility] protocol     Mone Kent RN  Anticoagulation Clinic

## 2025-07-26 DIAGNOSIS — I10 ESSENTIAL HYPERTENSION WITH GOAL BLOOD PRESSURE LESS THAN 130/80: ICD-10-CM

## 2025-07-26 DIAGNOSIS — I42.0 CARDIOMYOPATHY, DILATED, NONISCHEMIC (H): ICD-10-CM

## 2025-07-29 RX ORDER — HYDRALAZINE HYDROCHLORIDE 10 MG/1
20 TABLET, FILM COATED ORAL 3 TIMES DAILY
Qty: 540 TABLET | Refills: 0 | Status: SHIPPED | OUTPATIENT
Start: 2025-07-29

## 2025-08-05 ENCOUNTER — OFFICE VISIT (OUTPATIENT)
Dept: CARDIOLOGY | Facility: CLINIC | Age: 75
End: 2025-08-05
Attending: INTERNAL MEDICINE
Payer: COMMERCIAL

## 2025-08-05 ENCOUNTER — ANTICOAGULATION THERAPY VISIT (OUTPATIENT)
Dept: ANTICOAGULATION | Facility: CLINIC | Age: 75
End: 2025-08-05

## 2025-08-05 ENCOUNTER — LAB (OUTPATIENT)
Dept: CARDIOLOGY | Facility: CLINIC | Age: 75
End: 2025-08-05
Payer: COMMERCIAL

## 2025-08-05 VITALS
DIASTOLIC BLOOD PRESSURE: 58 MMHG | BODY MASS INDEX: 28.83 KG/M2 | HEIGHT: 74 IN | HEART RATE: 51 BPM | SYSTOLIC BLOOD PRESSURE: 108 MMHG | WEIGHT: 224.6 LBS | RESPIRATION RATE: 16 BRPM

## 2025-08-05 DIAGNOSIS — I48.20 CHRONIC ATRIAL FIBRILLATION (H): ICD-10-CM

## 2025-08-05 DIAGNOSIS — I25.83 CORONARY ARTERIOSCLEROSIS DUE TO LIPID RICH PLAQUE: Primary | ICD-10-CM

## 2025-08-05 DIAGNOSIS — I50.22 CHRONIC SYSTOLIC (CONGESTIVE) HEART FAILURE (H): ICD-10-CM

## 2025-08-05 DIAGNOSIS — I42.0 CARDIOMYOPATHY, DILATED, NONISCHEMIC (H): ICD-10-CM

## 2025-08-05 DIAGNOSIS — G95.9 CERVICAL MYELOPATHY (H): ICD-10-CM

## 2025-08-05 DIAGNOSIS — I10 ESSENTIAL HYPERTENSION WITH GOAL BLOOD PRESSURE LESS THAN 130/80: Chronic | ICD-10-CM

## 2025-08-05 DIAGNOSIS — Z95.810 ICD (IMPLANTABLE CARDIOVERTER-DEFIBRILLATOR) IN PLACE: Primary | ICD-10-CM

## 2025-08-05 DIAGNOSIS — Z95.810 ICD (IMPLANTABLE CARDIOVERTER-DEFIBRILLATOR), SINGLE, IN SITU: ICD-10-CM

## 2025-08-05 DIAGNOSIS — Z79.01 LONG TERM (CURRENT) USE OF ANTICOAGULANTS: ICD-10-CM

## 2025-08-05 DIAGNOSIS — I48.20 CHRONIC ATRIAL FIBRILLATION (H): Primary | ICD-10-CM

## 2025-08-05 LAB
INR POINT OF CARE: 1.9 (ref 0.9–1.1)
MDC_IDC_LEAD_CONNECTION_STATUS: NORMAL
MDC_IDC_LEAD_IMPLANT_DT: NORMAL
MDC_IDC_LEAD_LOCATION: NORMAL
MDC_IDC_LEAD_LOCATION_DETAIL_1: NORMAL
MDC_IDC_LEAD_MFG: NORMAL
MDC_IDC_LEAD_MODEL: NORMAL
MDC_IDC_LEAD_POLARITY_TYPE: NORMAL
MDC_IDC_LEAD_SERIAL: NORMAL
MDC_IDC_LEAD_SPECIAL_FUNCTION: NORMAL
MDC_IDC_MSMT_BATTERY_DTM: NORMAL
MDC_IDC_MSMT_BATTERY_REMAINING_LONGEVITY: 102 MO
MDC_IDC_MSMT_BATTERY_REMAINING_PERCENTAGE: 93 %
MDC_IDC_MSMT_BATTERY_STATUS: NORMAL
MDC_IDC_MSMT_CAP_CHARGE_DTM: NORMAL
MDC_IDC_MSMT_CAP_CHARGE_TIME: 10.7 S
MDC_IDC_MSMT_CAP_CHARGE_TYPE: NORMAL
MDC_IDC_MSMT_LEADCHNL_RV_IMPEDANCE_VALUE: 456 OHM
MDC_IDC_MSMT_LEADCHNL_RV_IMPEDANCE_VALUE: 456 OHM
MDC_IDC_MSMT_LEADCHNL_RV_LEAD_CHANNEL_STATUS: NORMAL
MDC_IDC_MSMT_LEADCHNL_RV_PACING_THRESHOLD_AMPLITUDE: 0.8 V
MDC_IDC_MSMT_LEADCHNL_RV_PACING_THRESHOLD_AMPLITUDE: 0.8 V
MDC_IDC_MSMT_LEADCHNL_RV_PACING_THRESHOLD_PULSEWIDTH: 0.4 MS
MDC_IDC_MSMT_LEADCHNL_RV_PACING_THRESHOLD_PULSEWIDTH: 0.4 MS
MDC_IDC_MSMT_LEADCHNL_RV_SENSING_INTR_AMPL: 7.6 MV
MDC_IDC_PG_IMPLANT_DTM: NORMAL
MDC_IDC_PG_MFG: NORMAL
MDC_IDC_PG_MODEL: NORMAL
MDC_IDC_PG_SERIAL: NORMAL
MDC_IDC_PG_TYPE: NORMAL
MDC_IDC_SESS_CLINIC_NAME: NORMAL
MDC_IDC_SESS_DTM: NORMAL
MDC_IDC_SESS_TYPE: NORMAL
MDC_IDC_SET_BRADY_LOWRATE: 50 {BEATS}/MIN
MDC_IDC_SET_BRADY_MODE: NORMAL
MDC_IDC_SET_LEADCHNL_RV_PACING_AMPLITUDE: 1.5 V
MDC_IDC_SET_LEADCHNL_RV_PACING_POLARITY: NORMAL
MDC_IDC_SET_LEADCHNL_RV_PACING_PULSEWIDTH: 0.4 MS
MDC_IDC_SET_LEADCHNL_RV_SENSING_ADAPTATION_MODE: NORMAL
MDC_IDC_SET_LEADCHNL_RV_SENSING_POLARITY: NORMAL
MDC_IDC_SET_LEADCHNL_RV_SENSING_SENSITIVITY: 0.6 MV
MDC_IDC_SET_ZONE_DETECTION_INTERVAL: 286 MS
MDC_IDC_SET_ZONE_DETECTION_INTERVAL: 333 MS
MDC_IDC_SET_ZONE_DETECTION_INTERVAL: 400 MS
MDC_IDC_SET_ZONE_STATUS: NORMAL
MDC_IDC_SET_ZONE_TYPE: NORMAL
MDC_IDC_SET_ZONE_VENDOR_TYPE: NORMAL
MDC_IDC_STAT_BRADY_DTM_END: NORMAL
MDC_IDC_STAT_BRADY_DTM_START: NORMAL
MDC_IDC_STAT_BRADY_RV_PERCENT_PACED: 20 %
MDC_IDC_STAT_EPISODE_RECENT_COUNT: 0
MDC_IDC_STAT_EPISODE_RECENT_COUNT: 0
MDC_IDC_STAT_EPISODE_RECENT_COUNT: 1
MDC_IDC_STAT_EPISODE_RECENT_COUNT: 2
MDC_IDC_STAT_EPISODE_RECENT_COUNT_DTM_END: NORMAL
MDC_IDC_STAT_EPISODE_RECENT_COUNT_DTM_START: NORMAL
MDC_IDC_STAT_EPISODE_TYPE: NORMAL
MDC_IDC_STAT_EPISODE_VENDOR_TYPE: NORMAL
MDC_IDC_STAT_TACHYTHERAPY_ATP_DELIVERED_RECENT: 0
MDC_IDC_STAT_TACHYTHERAPY_ATP_DELIVERED_TOTAL: 1
MDC_IDC_STAT_TACHYTHERAPY_RECENT_DTM_END: NORMAL
MDC_IDC_STAT_TACHYTHERAPY_RECENT_DTM_START: NORMAL
MDC_IDC_STAT_TACHYTHERAPY_SHOCKS_ABORTED_RECENT: 0
MDC_IDC_STAT_TACHYTHERAPY_SHOCKS_ABORTED_TOTAL: 1
MDC_IDC_STAT_TACHYTHERAPY_SHOCKS_DELIVERED_RECENT: 0
MDC_IDC_STAT_TACHYTHERAPY_SHOCKS_DELIVERED_TOTAL: 0
MDC_IDC_STAT_TACHYTHERAPY_TOTAL_DTM_END: NORMAL
MDC_IDC_STAT_TACHYTHERAPY_TOTAL_DTM_START: NORMAL

## 2025-08-05 PROCEDURE — 3078F DIAST BP <80 MM HG: CPT | Performed by: INTERNAL MEDICINE

## 2025-08-05 PROCEDURE — G2211 COMPLEX E/M VISIT ADD ON: HCPCS | Performed by: INTERNAL MEDICINE

## 2025-08-05 PROCEDURE — 3074F SYST BP LT 130 MM HG: CPT | Performed by: INTERNAL MEDICINE

## 2025-08-05 PROCEDURE — 36416 COLLJ CAPILLARY BLOOD SPEC: CPT | Performed by: INTERNAL MEDICINE

## 2025-08-05 PROCEDURE — 85610 PROTHROMBIN TIME: CPT | Performed by: INTERNAL MEDICINE

## 2025-08-05 PROCEDURE — 99214 OFFICE O/P EST MOD 30 MIN: CPT | Performed by: INTERNAL MEDICINE

## 2025-08-05 PROCEDURE — 93282 PRGRMG EVAL IMPLANTABLE DFB: CPT | Performed by: INTERNAL MEDICINE

## 2025-08-05 RX ORDER — ONDANSETRON 4 MG/1
4 TABLET, ORALLY DISINTEGRATING ORAL PRN
COMMUNITY
Start: 2024-09-05

## 2025-08-05 RX ORDER — ROSUVASTATIN CALCIUM 20 MG/1
20 TABLET, COATED ORAL DAILY
Qty: 90 TABLET | Refills: 4 | Status: SHIPPED | OUTPATIENT
Start: 2025-08-05

## 2025-08-17 DIAGNOSIS — I10 ESSENTIAL HYPERTENSION WITH GOAL BLOOD PRESSURE LESS THAN 130/80: ICD-10-CM

## 2025-08-18 RX ORDER — AMLODIPINE BESYLATE 10 MG/1
10 TABLET ORAL DAILY
Qty: 120 TABLET | Refills: 2 | Status: SHIPPED | OUTPATIENT
Start: 2025-08-18

## 2025-08-22 ENCOUNTER — LAB (OUTPATIENT)
Dept: CARDIOLOGY | Facility: CLINIC | Age: 75
End: 2025-08-22
Payer: COMMERCIAL

## 2025-08-22 DIAGNOSIS — I48.20 CHRONIC ATRIAL FIBRILLATION (H): ICD-10-CM

## 2025-08-22 LAB — INR POINT OF CARE: 2.3 (ref 0.9–1.1)

## 2025-08-22 PROCEDURE — 85610 PROTHROMBIN TIME: CPT

## 2025-08-22 PROCEDURE — 36416 COLLJ CAPILLARY BLOOD SPEC: CPT

## 2025-09-02 ENCOUNTER — LAB REQUISITION (OUTPATIENT)
Dept: LAB | Facility: CLINIC | Age: 75
End: 2025-09-02
Payer: COMMERCIAL

## 2025-09-02 DIAGNOSIS — E78.5 HYPERLIPIDEMIA, UNSPECIFIED: ICD-10-CM

## 2025-09-03 LAB
ALBUMIN SERPL BCG-MCNC: 4.5 G/DL (ref 3.5–5.2)
ALP SERPL-CCNC: 92 U/L (ref 40–150)
ALT SERPL W P-5'-P-CCNC: 18 U/L (ref 0–70)
ANION GAP SERPL CALCULATED.3IONS-SCNC: 14 MMOL/L (ref 7–15)
AST SERPL W P-5'-P-CCNC: 26 U/L (ref 0–45)
BILIRUB SERPL-MCNC: 0.3 MG/DL
BUN SERPL-MCNC: 26.2 MG/DL (ref 8–23)
CALCIUM SERPL-MCNC: 9 MG/DL (ref 8.8–10.4)
CHLORIDE SERPL-SCNC: 102 MMOL/L (ref 98–107)
CHOLEST SERPL-MCNC: 157 MG/DL
CREAT SERPL-MCNC: 1.53 MG/DL (ref 0.67–1.17)
EGFRCR SERPLBLD CKD-EPI 2021: 47 ML/MIN/1.73M2
FASTING STATUS PATIENT QL REPORTED: NO
FASTING STATUS PATIENT QL REPORTED: NO
GLUCOSE SERPL-MCNC: 171 MG/DL (ref 70–99)
HCO3 SERPL-SCNC: 23 MMOL/L (ref 22–29)
HDLC SERPL-MCNC: 47 MG/DL
LDLC SERPL CALC-MCNC: 86 MG/DL
NONHDLC SERPL-MCNC: 110 MG/DL
POTASSIUM SERPL-SCNC: 4.6 MMOL/L (ref 3.4–5.3)
PROT SERPL-MCNC: 6.8 G/DL (ref 6.4–8.3)
SODIUM SERPL-SCNC: 139 MMOL/L (ref 135–145)
TRIGL SERPL-MCNC: 119 MG/DL

## (undated) DEVICE — SOL WATER IRRIG 1000ML BOTTLE 2F7114

## (undated) DEVICE — TUBING SUCTION MEDI-VAC 1/4"X20' N620A

## (undated) DEVICE — ENDO SNARE EXACTO COLD 9MM LOOP 2.4MMX230CM 00711115

## (undated) DEVICE — FORCEP BIOPSY 2.3MM DISP COATED 000388

## (undated) DEVICE — SUCTION MANIFOLD NEPTUNE 2 SYS 1 PORT 702-025-000

## (undated) DEVICE — CLIP HEMOSTATIC ASSURANCE W11 MM 00711882

## (undated) RX ORDER — FENTANYL CITRATE-0.9 % NACL/PF 10 MCG/ML
PLASTIC BAG, INJECTION (ML) INTRAVENOUS
Status: DISPENSED
Start: 2025-04-11

## (undated) RX ORDER — ONDANSETRON 2 MG/ML
INJECTION INTRAMUSCULAR; INTRAVENOUS
Status: DISPENSED
Start: 2025-04-11

## (undated) RX ORDER — PROPOFOL 10 MG/ML
INJECTION, EMULSION INTRAVENOUS
Status: DISPENSED
Start: 2025-04-11

## (undated) RX ORDER — LIDOCAINE HYDROCHLORIDE 10 MG/ML
INJECTION, SOLUTION EPIDURAL; INFILTRATION; INTRACAUDAL; PERINEURAL
Status: DISPENSED
Start: 2025-04-11